# Patient Record
Sex: MALE | Race: WHITE | NOT HISPANIC OR LATINO | Employment: OTHER | URBAN - NONMETROPOLITAN AREA
[De-identification: names, ages, dates, MRNs, and addresses within clinical notes are randomized per-mention and may not be internally consistent; named-entity substitution may affect disease eponyms.]

---

## 2017-05-10 ENCOUNTER — HOSPITAL ENCOUNTER (OUTPATIENT)
Dept: CT IMAGING | Facility: HOSPITAL | Age: 72
Discharge: HOME | End: 2017-05-10
Attending: INTERNAL MEDICINE | Admitting: INTERNAL MEDICINE

## 2017-05-10 ENCOUNTER — OFFICE VISIT (OUTPATIENT)
Dept: ONCOLOGY | Facility: CLINIC | Age: 72
End: 2017-05-10

## 2017-05-10 ENCOUNTER — LAB (OUTPATIENT)
Dept: ONCOLOGY | Facility: CLINIC | Age: 72
End: 2017-05-10

## 2017-05-10 VITALS
TEMPERATURE: 98.1 F | BODY MASS INDEX: 33.71 KG/M2 | HEART RATE: 75 BPM | WEIGHT: 215.2 LBS | DIASTOLIC BLOOD PRESSURE: 56 MMHG | OXYGEN SATURATION: 95 % | RESPIRATION RATE: 18 BRPM | SYSTOLIC BLOOD PRESSURE: 91 MMHG

## 2017-05-10 DIAGNOSIS — I71.40 ABDOMINAL AORTIC ANEURYSM (AAA) WITHOUT RUPTURE (HCC): ICD-10-CM

## 2017-05-10 DIAGNOSIS — C34.11 MALIGNANT NEOPLASM OF UPPER LOBE OF RIGHT LUNG (HCC): ICD-10-CM

## 2017-05-10 DIAGNOSIS — Z85.118 HISTORY OF LUNG CANCER: Primary | ICD-10-CM

## 2017-05-10 DIAGNOSIS — J44.9 CHRONIC OBSTRUCTIVE PULMONARY DISEASE, UNSPECIFIED COPD TYPE (HCC): ICD-10-CM

## 2017-05-10 LAB
ALBUMIN SERPL-MCNC: 4.5 G/DL (ref 3.4–4.8)
ALBUMIN/GLOB SERPL: 1.6 G/DL (ref 1.5–2.5)
ALP SERPL-CCNC: 54 U/L (ref 40–129)
ALT SERPL W P-5'-P-CCNC: 33 U/L (ref 10–44)
ANION GAP SERPL CALCULATED.3IONS-SCNC: 8 MMOL/L (ref 3.6–11.2)
AST SERPL-CCNC: 36 U/L (ref 10–34)
BASOPHILS # BLD AUTO: 0.05 10*3/MM3 (ref 0–0.3)
BASOPHILS NFR BLD AUTO: 0.6 % (ref 0–2)
BILIRUB SERPL-MCNC: 0.8 MG/DL (ref 0.2–1.8)
BUN BLD-MCNC: 16 MG/DL (ref 7–21)
BUN/CREAT SERPL: 17.4 (ref 7–25)
CALCIUM SPEC-SCNC: 9.5 MG/DL (ref 7.7–10)
CHLORIDE SERPL-SCNC: 103 MMOL/L (ref 99–112)
CO2 SERPL-SCNC: 30 MMOL/L (ref 24.3–31.9)
CREAT BLD-MCNC: 0.92 MG/DL (ref 0.43–1.29)
CREAT BLDA-MCNC: 0.9 MG/DL (ref 0.6–1.3)
DEPRECATED RDW RBC AUTO: 46.6 FL (ref 37–54)
EOSINOPHIL # BLD AUTO: 0.37 10*3/MM3 (ref 0–0.7)
EOSINOPHIL NFR BLD AUTO: 4.6 % (ref 0–7)
ERYTHROCYTE [DISTWIDTH] IN BLOOD BY AUTOMATED COUNT: 14.4 % (ref 11.5–14.5)
GFR SERPL CREATININE-BSD FRML MDRD: 81 ML/MIN/1.73
GLOBULIN UR ELPH-MCNC: 2.9 GM/DL
GLUCOSE BLD-MCNC: 246 MG/DL (ref 70–110)
HCT VFR BLD AUTO: 44.9 % (ref 42–52)
HGB BLD-MCNC: 14.5 G/DL (ref 14–18)
IMM GRANULOCYTES # BLD: 0.05 10*3/MM3 (ref 0–0.03)
IMM GRANULOCYTES NFR BLD: 0.6 % (ref 0–0.5)
LYMPHOCYTES # BLD AUTO: 1.68 10*3/MM3 (ref 1–3)
LYMPHOCYTES NFR BLD AUTO: 21 % (ref 16–46)
MCH RBC QN AUTO: 28.8 PG (ref 27–33)
MCHC RBC AUTO-ENTMCNC: 32.3 G/DL (ref 33–37)
MCV RBC AUTO: 89.1 FL (ref 80–94)
MONOCYTES # BLD AUTO: 0.62 10*3/MM3 (ref 0.1–0.9)
MONOCYTES NFR BLD AUTO: 7.8 % (ref 0–12)
NEUTROPHILS # BLD AUTO: 5.23 10*3/MM3 (ref 1.4–6.5)
NEUTROPHILS NFR BLD AUTO: 65.4 % (ref 40–75)
OSMOLALITY SERPL CALC.SUM OF ELEC: 290.6 MOSM/KG (ref 273–305)
PLATELET # BLD AUTO: 210 10*3/MM3 (ref 130–400)
PMV BLD AUTO: 9.8 FL (ref 6–10)
POTASSIUM BLD-SCNC: 4.1 MMOL/L (ref 3.5–5.3)
PROT SERPL-MCNC: 7.4 G/DL (ref 6–8)
RBC # BLD AUTO: 5.04 10*6/MM3 (ref 4.7–6.1)
SODIUM BLD-SCNC: 141 MMOL/L (ref 135–153)
WBC NRBC COR # BLD: 8 10*3/MM3 (ref 4.5–12.5)

## 2017-05-10 PROCEDURE — 80053 COMPREHEN METABOLIC PANEL: CPT | Performed by: INTERNAL MEDICINE

## 2017-05-10 PROCEDURE — 36415 COLL VENOUS BLD VENIPUNCTURE: CPT | Performed by: INTERNAL MEDICINE

## 2017-05-10 PROCEDURE — 85025 COMPLETE CBC W/AUTO DIFF WBC: CPT | Performed by: INTERNAL MEDICINE

## 2017-05-10 PROCEDURE — 71260 CT THORAX DX C+: CPT | Performed by: RADIOLOGY

## 2017-05-10 PROCEDURE — 82565 ASSAY OF CREATININE: CPT

## 2017-05-10 PROCEDURE — 71260 CT THORAX DX C+: CPT

## 2017-05-10 PROCEDURE — 99214 OFFICE O/P EST MOD 30 MIN: CPT | Performed by: INTERNAL MEDICINE

## 2017-05-10 PROCEDURE — 0 IOPAMIDOL 61 % SOLUTION: Performed by: INTERNAL MEDICINE

## 2017-05-10 RX ADMIN — IOPAMIDOL 80 ML: 612 INJECTION, SOLUTION INTRAVENOUS at 08:27

## 2017-06-15 ENCOUNTER — OFFICE VISIT (OUTPATIENT)
Dept: CARDIOLOGY | Facility: CLINIC | Age: 72
End: 2017-06-15

## 2017-06-15 VITALS
SYSTOLIC BLOOD PRESSURE: 113 MMHG | DIASTOLIC BLOOD PRESSURE: 75 MMHG | HEART RATE: 66 BPM | HEIGHT: 67 IN | BODY MASS INDEX: 33.59 KG/M2 | WEIGHT: 214 LBS | RESPIRATION RATE: 18 BRPM

## 2017-06-15 DIAGNOSIS — I71.21 THORACIC ASCENDING AORTIC ANEURYSM (HCC): Primary | ICD-10-CM

## 2017-06-15 DIAGNOSIS — J44.9 ADVANCED COPD (HCC): ICD-10-CM

## 2017-06-15 DIAGNOSIS — J98.4 LUNG CALCIFICATION: ICD-10-CM

## 2017-06-15 DIAGNOSIS — E78.5 DYSLIPIDEMIA: ICD-10-CM

## 2017-06-15 DIAGNOSIS — I10 ESSENTIAL HYPERTENSION: ICD-10-CM

## 2017-06-15 PROCEDURE — 99213 OFFICE O/P EST LOW 20 MIN: CPT | Performed by: INTERNAL MEDICINE

## 2017-06-15 PROCEDURE — 93000 ELECTROCARDIOGRAM COMPLETE: CPT | Performed by: INTERNAL MEDICINE

## 2017-06-15 NOTE — PROGRESS NOTES
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   • Advanced COPD, on home oxygen.   • History of Lung calcification diagnosed in November 2011, status post right upper lobectomy with no adjuvant chemotherapy.    • Type 2 diabetes mellitus   • Hypertension, which is well controlled.   • Dizziness   • SOB (shortness of breath)   • PAD (peripheral artery disease)   • Dyslipidemia       Dear Scar Mejia MD:    Subjective     Kb Marshall is a 72 y.o. male with the problems as listed above, presents      History of Present Illness:Mr. Marshall is a 72-year-old  male with a history of thoracic ascending aortic aneurysm measuring about 4.7 cm on CT scan of the chest in February 2016, is being followed by CT surgery at [facility].  He has also some peripheral artery disease.  He is here today for her regular cardiology follow-up.  Denies any significant back pains other than his chronic pains that is had for many years.  His blood pressure has been well controlled.  He denies any chest pains.  He has chronic dyspnea related to COPD and is on home oxygen.        Allergies   Allergen Reactions   • Acetaminophen-Codeine    • Indocin [Indomethacin] Itching   :      Current Outpatient Prescriptions:   •  amLODIPine (NORVASC) 5 MG tablet, Take 5 mg by mouth Daily., Disp: , Rfl:   •  aspirin 81 MG EC tablet, Take 81 mg by mouth Daily., Disp: , Rfl:   •  atorvastatin (LIPITOR) 10 MG tablet, Take 10 mg by mouth Daily., Disp: , Rfl:   •  Empagliflozin (JARDIANCE) 10 MG tablet, Take  by mouth., Disp: , Rfl:   •  gabapentin (NEURONTIN) 300 MG capsule, Take 300 mg by mouth 3 (Three) Times a Day., Disp: , Rfl:   •  glimepiride (AMARYL) 4 MG tablet, Take 4 mg by mouth Every Morning Before Breakfast., Disp: , Rfl:   •   hydrALAZINE (APRESOLINE) 50 MG tablet, Take 50 mg by mouth 3 (Three) Times a Day., Disp: , Rfl:   •  hydrochlorothiazide (HYDRODIURIL) 12.5 MG tablet, Take 12.5 mg by mouth Daily., Disp: , Rfl:   •  lisinopril (PRINIVIL,ZESTRIL) 20 MG tablet, Take 20 mg by mouth 2 (Two) Times a Day., Disp: , Rfl:   •  metFORMIN (GLUCOPHAGE) 500 MG tablet, Take 500 mg by mouth 2 (Two) Times a Day With Meals., Disp: , Rfl:   •  metoprolol tartrate (LOPRESSOR) 25 MG tablet, Take 37.5 mg by mouth 2 (Two) Times a Day. Takes 1 & 1/2 tab of 25mg, b.i.d., Disp: , Rfl:   •  nabumetone (RELAFEN) 500 MG tablet, Take 500 mg by mouth 2 (Two) Times a Day As Needed for mild pain (1-3)., Disp: , Rfl:   •  OLANZapine (ZYPREXA) 5 MG tablet, Take 5 mg by mouth Every Night., Disp: , Rfl:   •  oxyCODONE-acetaminophen (PERCOCET) 7.5-325 MG per tablet, Take 1 tablet by mouth Every 6 (Six) Hours As Needed., Disp: , Rfl:       The following portions of the patient's history were reviewed and updated as appropriate: allergies, current medications, past family history, past medical history, past social history, past surgical history and problem list.    Social History   Substance Use Topics   • Smoking status: Former Smoker     Packs/day: 1.00     Years: 50.00     Quit date: 11/2011   • Smokeless tobacco: Current User     Types: Chew   • Alcohol use No       Review of Systems   Constitution: Negative for chills and fever.   HENT: Negative for headaches, nosebleeds and sore throat.    Cardiovascular: Positive for chest pain (like heartburn at night) and leg swelling.   Respiratory: Positive for shortness of breath. Negative for cough, hemoptysis and wheezing.    Musculoskeletal: Positive for back pain (has chronic back pains.).   Gastrointestinal: Negative for abdominal pain, hematemesis, hematochezia, melena, nausea and vomiting.   Genitourinary: Negative for dysuria and hematuria.       Objective   Vitals:    06/15/17 1413 06/15/17 1605   BP:  113/75  "  Pulse:  66   Resp: 18    Weight: 214 lb (97.1 kg)    Height: 67\" (170.2 cm)      Body mass index is 33.52 kg/(m^2).        Physical Exam   Constitutional: He is oriented to person, place, and time. He appears well-developed and well-nourished.   Well-developed, well-nourished, chronically ill-looking  male who is alert, oriented ×3 and is in no acute distress at this time.   HENT:   Head: Normocephalic.   Eyes: Conjunctivae and EOM are normal.   Neck: Normal range of motion. Neck supple. No JVD present. No tracheal deviation present. No thyromegaly present.   Cardiovascular: Normal rate and regular rhythm.  Exam reveals no gallop and no friction rub.    No murmur heard.  Pulmonary/Chest: Breath sounds normal. No respiratory distress. He has no wheezes. He has no rales.   Abdominal: Soft. Bowel sounds are normal. He exhibits no mass. There is no tenderness.   Musculoskeletal: He exhibits edema.   Neurological: He is alert and oriented to person, place, and time. No cranial nerve deficit.   Skin: Skin is warm and dry.   Psychiatric: He has a normal mood and affect.       Lab Results   Component Value Date     05/10/2017    K 4.1 05/10/2017     05/10/2017    CO2 30.0 05/10/2017    BUN 16 05/10/2017    CREATININE 0.92 05/10/2017    GLUCOSE 246 (H) 05/10/2017    CALCIUM 9.5 05/10/2017    AST 36 (H) 05/10/2017    ALT 33 05/10/2017    ALKPHOS 54 05/10/2017    LABIL2 1.6 05/10/2017     No results found for: CKTOTAL  Lab Results   Component Value Date    WBC 8.00 05/10/2017    HGB 14.5 05/10/2017    HCT 44.9 05/10/2017     05/10/2017     Lab Results   Component Value Date    INR 1.00 03/29/2015        Lab Results   Component Value Date    BNP 20 04/16/2015     Echo   Lab Results   Component Value Date    ECHOEFEST 55 10/25/2016       ECG 12 Lead  Date/Time: 6/15/2017 2:09 PM  Performed by: IZZY DIALLO  Authorized by: IZZY DIALLO   Rhythm: sinus rhythm  BPM: 70  Comments: Nonspecific ST-T " wave changes noted in leads II, III, and F aVF.            Assessment/Plan      1. Thoracic ascending aortic aneurysm of 4.7cm and arch aortic aneurysm of 4.1cm noted on the CT scan of the chest recently in February 2016.     2. Essential hypertension     3. Advanced COPD, on home oxygen.     4. History of Lung calcification diagnosed in November 2011, status post right upper lobectomy with no adjuvant chemotherapy.      5. Dyslipidemia            Recommendations:    1. Follow-up with cardiothoracic surgery at  for the thoracic aneurysm.  2. Continue with the metoprolol, lisinopril, amlodipine and HCTZ.  3. Follow-up in 9 months.     Return in about 9 months (around 3/15/2018).    As always, I appreciate very much the opportunity to participate in the cardiovascular care of your patients.      With Best Regards,    Edward Aguilar MD, [facility]    Dragon disclaimer:  Much of this encounter note is an electronic transcription/translation of spoken language to printed text. The electronic translation of spoken language may permit erroneous, or at times, nonsensical words or phrases to be inadvertently transcribed; Although I have reviewed the note for such errors, some may still exist.

## 2017-06-28 ENCOUNTER — TELEPHONE (OUTPATIENT)
Dept: CARDIOLOGY | Facility: CLINIC | Age: 72
End: 2017-06-28

## 2017-06-28 NOTE — TELEPHONE ENCOUNTER
Patient called stated for the last 3-4 days his BP has been running low 105's/64's. He wants to know if he should continue to take all his BP medication?

## 2017-06-28 NOTE — TELEPHONE ENCOUNTER
Stop amlodipine. Recheck BP this evening and if >110/60 can take his evening time BP medications, then continue as scheduled tomorrow. Drink more water today to get BP up.

## 2017-11-06 DIAGNOSIS — J98.4 LUNG CALCIFICATION: Primary | ICD-10-CM

## 2017-11-17 ENCOUNTER — HOSPITAL ENCOUNTER (OUTPATIENT)
Dept: CT IMAGING | Facility: HOSPITAL | Age: 72
Discharge: HOME | End: 2017-11-17
Attending: INTERNAL MEDICINE | Admitting: INTERNAL MEDICINE

## 2017-11-17 DIAGNOSIS — J98.4 LUNG CALCIFICATION: ICD-10-CM

## 2017-11-17 LAB — CREAT BLDA-MCNC: 1 MG/DL (ref 0.6–1.3)

## 2017-11-17 PROCEDURE — 82565 ASSAY OF CREATININE: CPT

## 2017-11-17 PROCEDURE — 71260 CT THORAX DX C+: CPT | Performed by: RADIOLOGY

## 2017-11-17 PROCEDURE — 0 IOPAMIDOL 61 % SOLUTION: Performed by: INTERNAL MEDICINE

## 2017-11-17 PROCEDURE — 71260 CT THORAX DX C+: CPT

## 2017-11-17 RX ADMIN — IOPAMIDOL 80 ML: 612 INJECTION, SOLUTION INTRAVENOUS at 14:45

## 2017-11-20 ENCOUNTER — HOSPITAL ENCOUNTER (EMERGENCY)
Facility: HOSPITAL | Age: 72
Discharge: HOME | End: 2017-11-20
Admitting: NURSE PRACTITIONER

## 2017-11-20 VITALS
SYSTOLIC BLOOD PRESSURE: 130 MMHG | TEMPERATURE: 98 F | WEIGHT: 215 LBS | DIASTOLIC BLOOD PRESSURE: 77 MMHG | BODY MASS INDEX: 33.74 KG/M2 | RESPIRATION RATE: 17 BRPM | HEIGHT: 67 IN | OXYGEN SATURATION: 97 % | HEART RATE: 71 BPM

## 2017-11-20 DIAGNOSIS — S61.532A PUNCTURE WOUND OF LEFT WRIST, INITIAL ENCOUNTER: Primary | ICD-10-CM

## 2017-11-20 PROCEDURE — 99283 EMERGENCY DEPT VISIT LOW MDM: CPT

## 2017-11-21 NOTE — ED PROVIDER NOTES
Subjective   Patient is a 72 y.o. male presenting with upper extremity pain.   History provided by:  Patient   used: No    Upper Extremity Issue   Location:  Wrist  Wrist location:  L wrist  Injury: yes    Time since incident:  1 hour  Mechanism of injury comment:  Small punctured area with knife; is concerned cause there is redness  Pain details:     Quality:  Aching    Radiates to:  Does not radiate    Severity:  Mild    Onset quality:  Sudden    Duration:  1 day    Timing:  Constant    Progression:  Waxing and waning  Handedness:  Right-handed  Dislocation: no    Foreign body present:  No foreign bodies  Tetanus status:  Up to date  Prior injury to area:  No  Relieved by:  Nothing  Worsened by:  Nothing  Ineffective treatments:  None tried  Associated symptoms: no back pain, no decreased range of motion, no fatigue, no fever, no muscle weakness, no neck pain, no numbness, no stiffness and no swelling    Risk factors: no concern for non-accidental trauma, no known bone disorder, no frequent fractures and no recent illness        Review of Systems   Constitutional: Negative.  Negative for fatigue and fever.   HENT: Negative.    Eyes: Negative.    Respiratory: Negative.    Cardiovascular: Negative.    Gastrointestinal: Negative.    Endocrine: Negative.    Genitourinary: Negative.    Musculoskeletal: Negative.  Negative for back pain, neck pain and stiffness.   Skin: Negative.    Allergic/Immunologic: Negative.    Neurological: Negative.    Hematological: Negative.    Psychiatric/Behavioral: Negative.        Past Medical History:   Diagnosis Date   • Arthritis    • Asthma    • COPD (chronic obstructive pulmonary disease)    • Diabetes mellitus    • Dyslipidemia    • Heart disease    • Hypertension    • PAD (peripheral artery disease)        Allergies   Allergen Reactions   • Acetaminophen-Codeine    • Indocin [Indomethacin] Itching       Past Surgical History:   Procedure Laterality Date   •  ABDOMINAL AORTIC ANEURYSM REPAIR     • FINGER SURGERY Left    • KNEE SURGERY Left    • LUNG REMOVAL, PARTIAL  11/29/2011   • LUNG SURGERY  11/28/2011       Family History   Problem Relation Age of Onset   • Cancer Mother    • Cancer Brother        Social History     Social History   • Marital status:      Spouse name: N/A   • Number of children: N/A   • Years of education: N/A     Social History Main Topics   • Smoking status: Former Smoker     Packs/day: 1.00     Years: 50.00     Quit date: 11/2011   • Smokeless tobacco: Current User     Types: Chew   • Alcohol use No   • Drug use: No   • Sexual activity: Defer     Other Topics Concern   • Not on file     Social History Narrative           Objective   Physical Exam   Constitutional: He is oriented to person, place, and time. He appears well-developed and well-nourished.   HENT:   Head: Normocephalic.   Right Ear: External ear normal.   Left Ear: External ear normal.   Mouth/Throat: Oropharynx is clear and moist.   Eyes: EOM are normal. Pupils are equal, round, and reactive to light.   Neck: Normal range of motion. Neck supple.   Cardiovascular: Normal rate and regular rhythm.    Pulmonary/Chest: Effort normal and breath sounds normal.   Abdominal: Soft. Bowel sounds are normal.   Musculoskeletal: Normal range of motion.   Neurological: He is alert and oriented to person, place, and time.   Skin: Skin is warm and dry.   Tiny puncture area on left wrist with surrounding ecchymosis   Psychiatric: He has a normal mood and affect. His behavior is normal.   Nursing note and vitals reviewed.      Procedures         ED Course  ED Course                  MDM    Final diagnoses:   Puncture wound of left wrist, initial encounter            Evans Sanchez, APRN  11/20/17 1948

## 2017-11-28 ENCOUNTER — OFFICE VISIT (OUTPATIENT)
Dept: ONCOLOGY | Facility: CLINIC | Age: 72
End: 2017-11-28

## 2017-11-28 ENCOUNTER — LAB (OUTPATIENT)
Dept: ONCOLOGY | Facility: CLINIC | Age: 72
End: 2017-11-28

## 2017-11-28 VITALS
DIASTOLIC BLOOD PRESSURE: 78 MMHG | BODY MASS INDEX: 33.99 KG/M2 | SYSTOLIC BLOOD PRESSURE: 128 MMHG | TEMPERATURE: 97.2 F | RESPIRATION RATE: 22 BRPM | WEIGHT: 217 LBS | HEART RATE: 86 BPM | OXYGEN SATURATION: 95 %

## 2017-11-28 DIAGNOSIS — Z85.118 HISTORY OF LUNG CANCER: Primary | ICD-10-CM

## 2017-11-28 DIAGNOSIS — J43.9 PULMONARY EMPHYSEMA, UNSPECIFIED EMPHYSEMA TYPE (HCC): ICD-10-CM

## 2017-11-28 DIAGNOSIS — I71.21 THORACIC ASCENDING AORTIC ANEURYSM (HCC): ICD-10-CM

## 2017-11-28 DIAGNOSIS — R59.0 MEDIASTINAL ADENOPATHY: ICD-10-CM

## 2017-11-28 DIAGNOSIS — Z85.118 HISTORY OF LUNG CANCER: ICD-10-CM

## 2017-11-28 LAB
ALBUMIN SERPL-MCNC: 4.8 G/DL (ref 3.4–4.8)
ALBUMIN/GLOB SERPL: 1.8 G/DL (ref 1.5–2.5)
ALP SERPL-CCNC: 65 U/L (ref 40–129)
ALT SERPL W P-5'-P-CCNC: 50 U/L (ref 10–44)
ANION GAP SERPL CALCULATED.3IONS-SCNC: 8.2 MMOL/L (ref 3.6–11.2)
AST SERPL-CCNC: 44 U/L (ref 10–34)
BASOPHILS # BLD AUTO: 0.06 10*3/MM3 (ref 0–0.3)
BASOPHILS NFR BLD AUTO: 0.6 % (ref 0–2)
BILIRUB SERPL-MCNC: 0.7 MG/DL (ref 0.2–1.8)
BUN BLD-MCNC: 21 MG/DL (ref 7–21)
BUN/CREAT SERPL: 22.6 (ref 7–25)
CALCIUM SPEC-SCNC: 9.5 MG/DL (ref 7.7–10)
CHLORIDE SERPL-SCNC: 102 MMOL/L (ref 99–112)
CO2 SERPL-SCNC: 26.8 MMOL/L (ref 24.3–31.9)
CREAT BLD-MCNC: 0.93 MG/DL (ref 0.43–1.29)
DEPRECATED RDW RBC AUTO: 46.8 FL (ref 37–54)
EOSINOPHIL # BLD AUTO: 0.26 10*3/MM3 (ref 0–0.7)
EOSINOPHIL NFR BLD AUTO: 2.6 % (ref 0–7)
ERYTHROCYTE [DISTWIDTH] IN BLOOD BY AUTOMATED COUNT: 14.9 % (ref 11.5–14.5)
GFR SERPL CREATININE-BSD FRML MDRD: 80 ML/MIN/1.73
GLOBULIN UR ELPH-MCNC: 2.7 GM/DL
GLUCOSE BLD-MCNC: 187 MG/DL (ref 70–110)
HCT VFR BLD AUTO: 44.9 % (ref 42–52)
HGB BLD-MCNC: 15.2 G/DL (ref 14–18)
IMM GRANULOCYTES # BLD: 0.06 10*3/MM3 (ref 0–0.03)
IMM GRANULOCYTES NFR BLD: 0.6 % (ref 0–0.5)
LYMPHOCYTES # BLD AUTO: 1.91 10*3/MM3 (ref 1–3)
LYMPHOCYTES NFR BLD AUTO: 19.4 % (ref 16–46)
MCH RBC QN AUTO: 29.5 PG (ref 27–33)
MCHC RBC AUTO-ENTMCNC: 33.9 G/DL (ref 33–37)
MCV RBC AUTO: 87.2 FL (ref 80–94)
MONOCYTES # BLD AUTO: 0.99 10*3/MM3 (ref 0.1–0.9)
MONOCYTES NFR BLD AUTO: 10.1 % (ref 0–12)
NEUTROPHILS # BLD AUTO: 6.55 10*3/MM3 (ref 1.4–6.5)
NEUTROPHILS NFR BLD AUTO: 66.7 % (ref 40–75)
OSMOLALITY SERPL CALC.SUM OF ELEC: 281.7 MOSM/KG (ref 273–305)
PLATELET # BLD AUTO: 228 10*3/MM3 (ref 130–400)
PMV BLD AUTO: 9.3 FL (ref 6–10)
POTASSIUM BLD-SCNC: 4.1 MMOL/L (ref 3.5–5.3)
PROT SERPL-MCNC: 7.5 G/DL (ref 6–8)
RBC # BLD AUTO: 5.15 10*6/MM3 (ref 4.7–6.1)
SODIUM BLD-SCNC: 137 MMOL/L (ref 135–153)
WBC NRBC COR # BLD: 9.83 10*3/MM3 (ref 4.5–12.5)

## 2017-11-28 PROCEDURE — 85025 COMPLETE CBC W/AUTO DIFF WBC: CPT | Performed by: INTERNAL MEDICINE

## 2017-11-28 PROCEDURE — 99213 OFFICE O/P EST LOW 20 MIN: CPT | Performed by: INTERNAL MEDICINE

## 2017-11-28 PROCEDURE — 80053 COMPREHEN METABOLIC PANEL: CPT | Performed by: INTERNAL MEDICINE

## 2017-11-28 NOTE — PROGRESS NOTES
DATE:  11/28/2017    DIAGNOSIS:  1. 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 AAA2. Thoracic adenopathy     CHIEF COMPLAINT  Follow up of Lung Cancer       TREATMENT HISTORY:  1. RUL lobectomy Nov. 2011 w/o any adjuvant therapy       HISTORY OF PRESENT ILLNESS:   Mr. Marshall was initially diagnosed with a Stage Ib well-differentiated mucinous adenocarcinoma of the lung in 2011 and underwent RULobectomy performed by Dr. Welsh in November of 2011. He did not receive adjuvant chemotherapy. He has been well sin ce his initial surgery and has not had evidence of recurrence, though he does have thoracic adenopathy which sometimes enlarges, sometimes shrinks and which has in the past been + on PET but which he hasn' t wanted investigated or treated.     INTERVAL HISTORY:  Mr. Marshall is here today with his daughter for f/u of lung cancer. Since he was here last, he been well.  His breathing is stable.  He denies chest pain.  He is anxious to hear the results of his recent imaging but is otherwise without complaitn.    PAST MEDICAL HISTORY:  Past Medical History:   Diagnosis Date   • Arthritis    • Asthma    • COPD (chronic obstructive pulmonary disease)    • Diabetes mellitus    • Dyslipidemia    • Heart disease    • Hypertension    • PAD (peripheral artery disease)        PAST SURGICAL HISTORY:  Past Surgical History:   Procedure Laterality Date   • ABDOMINAL AORTIC ANEURYSM REPAIR     • FINGER SURGERY Left    • KNEE SURGERY Left    • LUNG REMOVAL, PARTIAL  11/29/2011   • LUNG SURGERY  11/28/2011       FAMILY HISTORY:  Family History   Problem Relation Age of Onset   • Cancer Mother    • Cancer Brother      Social History     Social History   • Marital status:      Spouse name: N/A   • Number of children: N/A   • Years of education: N/A     Occupational History   • Not on file.     Social History Main Topics   • Smoking status: Former Smoker     Packs/day: 1.00     Years: 50.00      Quit date: 11/2011   • Smokeless tobacco: Current User     Types: Chew   • Alcohol use No   • Drug use: No   • Sexual activity: Defer     Other Topics Concern   • Not on file     Social History Narrative       REVIEW OF SYSTEMS:    A comprehensive 14 point review of systems was performed and was negative except as mentioned    MEDICATIONS:  The current medication list was reviewed in the EMR    Current Outpatient Prescriptions:   •  amLODIPine (NORVASC) 5 MG tablet, Take 5 mg by mouth Daily., Disp: , Rfl:   •  aspirin 81 MG EC tablet, Take 81 mg by mouth Daily., Disp: , Rfl:   •  atorvastatin (LIPITOR) 10 MG tablet, Take 10 mg by mouth Daily., Disp: , Rfl:   •  Empagliflozin (JARDIANCE) 10 MG tablet, Take  by mouth., Disp: , Rfl:   •  gabapentin (NEURONTIN) 300 MG capsule, Take 300 mg by mouth 3 (Three) Times a Day., Disp: , Rfl:   •  glimepiride (AMARYL) 4 MG tablet, Take 4 mg by mouth Every Morning Before Breakfast., Disp: , Rfl:   •  hydrALAZINE (APRESOLINE) 50 MG tablet, Take 50 mg by mouth 3 (Three) Times a Day., Disp: , Rfl:   •  hydrochlorothiazide (HYDRODIURIL) 12.5 MG tablet, Take 12.5 mg by mouth Daily., Disp: , Rfl:   •  lisinopril (PRINIVIL,ZESTRIL) 20 MG tablet, Take 20 mg by mouth 2 (Two) Times a Day., Disp: , Rfl:   •  metFORMIN (GLUCOPHAGE) 500 MG tablet, Take 500 mg by mouth 2 (Two) Times a Day With Meals., Disp: , Rfl:   •  metoprolol tartrate (LOPRESSOR) 25 MG tablet, Take 37.5 mg by mouth 2 (Two) Times a Day. Takes 1 & 1/2 tab of 25mg, b.i.d., Disp: , Rfl:   •  nabumetone (RELAFEN) 500 MG tablet, Take 500 mg by mouth 2 (Two) Times a Day As Needed for mild pain (1-3)., Disp: , Rfl:   •  OLANZapine (ZYPREXA) 5 MG tablet, Take 5 mg by mouth Every Night., Disp: , Rfl:   •  oxyCODONE-acetaminophen (PERCOCET) 7.5-325 MG per tablet, Take 1 tablet by mouth Every 6 (Six) Hours As Needed., Disp: , Rfl:     ALLERGIES:    Allergies   Allergen Reactions   • Acetaminophen-Codeine    • Indocin  [Indomethacin] Itching       PHYSICAL EXAM:  /78  Pulse 86  Temp 97.2 °F (36.2 °C) (Oral)   Resp 22  Wt 217 lb (98.4 kg)  SpO2 95%  BMI 33.99 kg/m2   General: Awake, alert and oriented, in no distress.  In good spirits.  HEENT: Pupils are equal, round and reactive to light and accommodation, Extraocular movements full, oropharynx clear   Neck: no jvd, lymphadenopathy or thyromegaly   Cardiovascular: regular rate and rhythm without murmurs, rubs or gallops   Pulmonary: Decreased breath sounds bilaterally, but lungs are clear. No wheezing, rhonchi or rales. Old surgical incision is smooth and flat.   Abdomen: soft, non-tender, sl distended, bowel sounds present, no organomegaly   Extremities: No LE edema  Lymph: No cervical, supraclavicular, axillary, inguinal or femoral adenopathy   Neurologic: Mental status as above, CN II-XII intact, grossly non-focal exam     PATHOLOGY:  11-29-11 Rt upper lobe lung:   - Adenocarcinoma, mucinous type   - Negative for mutation involving ALK gene       IMAGING:  PET-CT Whole Body 1-9-13:   - Overall stable appearance compared with the previous exam.   - There is a continued evidence of hypermetabolism corresponding to mediastinal and right hilar adenopathy but no new lymph nodes are seen and no significant change in the SUV.     CT Chest w/o contrast 4-1-13:   - Overall there has been no significant interval change when compared to the previous exam dated 8/12.   - There are several m ediastinal lymph nodes present but stable.   - No pericardial or pleural effusion.   - Tiny nodule in the left lung base is stable   - Descending thoracic aneurysm stable.   - Fatty infiltration of the liver     CT Chest w/ contrast 7-29-13:   - Questionable increase in the siz e of mediastinal lymphadenopathy.   - An AP window lymph node was 1.4mc and is 1.9cm.   - A prevascular space lymph node was 1.4cm and is 1.4cm.   - A subcarinal region lymph node was 2cm and is 2.7cm.   - A left  hilar region lymph node is stable measuring 1.3 measuring 12 mm. conceivably difference and axial selection could cause this discrepancy.   - Compared 8/8/12 examination, the lymphadenopathy is stable to slightly improved.     Chest CT w/ contrast 6-9-14:   - Stable mediastinal borderline enlarged lymph nodes.   - An AP window lymph node measures 1.5cm and was 1.5cm.   - Stable slightly increased soft tissue density in the right hilar region.   - There is persistent supleural fibrotic scarring right lung base scarring is again noted.   - No new suspicious pulmonary mass or nodule.   - Posterior right rib fractures are again noted.   - Descending thoracic aortic aneurysm of 4.7cm. Stable.     CT Chest w/ contrast 12-02-14   - Enlarging mediastinal lymph nodoes   - No parenchymal soft tissue nodules or masses   - The largest soft tissue density is in the right paratrachel location and measures 2cm.  - Previously the same nodule measured 1.3cm.     CT Chest w/ contrast 03-02-15:   - The descending portion of the abdominal aorta is stable In size measuring  approximately 4.9 cm on today's study.   - There is abnormal adenopathy throughout the mediastinum. The largest lymph node measured 21 mm in the superior mediastinum adjacent to the esophagus. The size and number of lymph nodes is radiographically stable.   - There are emphysematous changes and increased interstitial markings throughout the lungs.     CT Chest w/ contrast 06-01-15:   - Stable appearance of the chest   - 4.3 cm infrarenal abdominal aortic aneurysm.     CT Chest w/ contrast 09-03-15:   - Mediastinal adenopathy is stable   - There is a 4.6cm abdominal aortic aneurysm slightly increased in size from the  previous exam.     CT Chest 09-20-16:  - Slight interval increase in size of the abdominal aortic aneurysm. It now measures 5 cm just above the diaphragm. There is adenopathy i n the mediastinum. The lymph nodes have increased in size in comparing with  the earlier CT back in September.   - Continued adenopathy in the mediastinum and right hilum.   - Radiographically this is stable in comparing with the earlier exam.   - There continues to be aneurysmal dilatation of the aorta with a diameter of 5 cm just above the diaphragm.   - The lungs show changes of chronic interstitial lung disease.     CT Chest 05-10-17:  - Persistent mediastinal adenopathy radiographically unchanged from September 2016.   - There continues to be diffuse interstitial lung disease throughout both lungs. There is aneurysmal dilatation of the aorta just above the diaphragm measuring 5 cm.    CT Chest 11-17-17:  Stable mediastinal adenopathy. Largest prevascular lymph   node is 1.6 cm and was previously 1.8 cm. A right subcarinal lymph node   is 2.2 cm and was previously 2.1 cm. A right para esophageal lymph node   superiorly is 1.8 cm and was previously 1.9 cm. No new adenopathy   stations identified.  Stable 5 cm fusiform type thoracic aortic aneurysm   predominantly involving the descending thoracic aorta. Mural thrombus is   present.      RECENT LABS:  Lab Results   Component Value Date    WBC 9.83 11/28/2017    HGB 15.2 11/28/2017    HCT 44.9 11/28/2017    MCV 87.2 11/28/2017    RDW 14.9 (H) 11/28/2017     11/28/2017    NEUTRORELPCT 66.7 11/28/2017    LYMPHORELPCT 19.4 11/28/2017    MONORELPCT 10.1 11/28/2017    EOSRELPCT 2.6 11/28/2017    BASORELPCT 0.6 11/28/2017    NEUTROABS 6.55 (H) 11/28/2017    LYMPHSABS 1.91 11/28/2017       Lab Results   Component Value Date     11/28/2017    K 4.1 11/28/2017    CO2 26.8 11/28/2017     11/28/2017    BUN 21 11/28/2017    CREATININE 0.93 11/28/2017    GLUCOSE 187 (H) 11/28/2017    CALCIUM 9.5 11/28/2017    ALKPHOS 65 11/28/2017    AST 44 (H) 11/28/2017    ALT 50 (H) 11/28/2017    BILITOT 0.7 11/28/2017    ALBUMIN 4.80 11/28/2017    PROTEINTOT 7.5 11/28/2017       ASSESSMENT & PLAN:  Kb Marshall is a very pleasant 72 y.o. 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    1. Lung cancer:   - Treated with RULobectomy in November 2011 without adjuvant therapy.   - At one point in the past, there was con cern for PET + enlarging mediastinal adenopathy, and I recommended biopsy, but after discussion with Dr. Ojeda and reluctance to treat recurrence if it was found, he decided to watch this with imaging.   - These LNs have intermittently waxed and waned over time.  Currently they are overall a little better. I do not know the etiology, but since he always said he wouldn' t take chemotherapy if it were indicated, Dr. Ojeda was disinclined to perform mediastinoscopy for biopsy. Ultimately he saw Dr. Rothman and was referred for bronchoscopy with EBUS and there was no cause for concern identified. Mediastinal adenopathy is still present, but imaging indicates adenopathy is stable to improved.  I think it is unlikely to be malignant under the circumstances.. Will continue with observation / monitoring .   - I will order CT Chest with contrast to be done in 6 months with f/u planned at that time.    2. Oxygen dependent COPD - stable on 2-3L NCO2. He remains tobacco free. He continues to f/u with his pulmonologist, Dr. Vicente.     3. AAA: S/p repair of femoral artery aneurysm performed by Dr. Ojeda. He has been following with Dr. Taveras and Dr. Rothman and more recently with Dr. Yusef Gusman . He has aneurysms in the thoracic and abdominal aorta. Dr. Gusman recommended continued monitoring for the time being.  Aneurysm measured stable on recent CT.    4.  Prophylaxis:  I recommended flu and pneumonia vaccines, but he declined.     This note was scribed for Chaya Ferrer MD by Ghada Verma RN.    I, Chaya Ferrer MD, personally performed the services described in this documentation as scribed by the above named individual in my presence, and it is both accurate and complete.   11/28/2017       I spent 15 minutes with Mr. Marshall today. More than 50% of the time was spent in counseling / coordination of care for the above mentioned problems.       Electronically Signed by: Chaya Ferrer MD      CC:   MD Dr. Jeff Elmore

## 2018-05-03 ENCOUNTER — OFFICE VISIT (OUTPATIENT)
Dept: CARDIOLOGY | Facility: CLINIC | Age: 73
End: 2018-05-03

## 2018-05-03 VITALS
HEIGHT: 67 IN | HEART RATE: 71 BPM | SYSTOLIC BLOOD PRESSURE: 121 MMHG | DIASTOLIC BLOOD PRESSURE: 74 MMHG | WEIGHT: 217 LBS | BODY MASS INDEX: 34.06 KG/M2

## 2018-05-03 DIAGNOSIS — I10 ESSENTIAL HYPERTENSION: ICD-10-CM

## 2018-05-03 DIAGNOSIS — I73.9 PAD (PERIPHERAL ARTERY DISEASE) (HCC): ICD-10-CM

## 2018-05-03 DIAGNOSIS — I71.21 THORACIC ASCENDING AORTIC ANEURYSM (HCC): Primary | ICD-10-CM

## 2018-05-03 DIAGNOSIS — C34.11 MALIGNANT NEOPLASM OF UPPER LOBE OF RIGHT LUNG (HCC): ICD-10-CM

## 2018-05-03 DIAGNOSIS — J44.9 ADVANCED COPD (HCC): ICD-10-CM

## 2018-05-03 PROCEDURE — 93000 ELECTROCARDIOGRAM COMPLETE: CPT | Performed by: INTERNAL MEDICINE

## 2018-05-03 PROCEDURE — 99213 OFFICE O/P EST LOW 20 MIN: CPT | Performed by: INTERNAL MEDICINE

## 2018-05-03 NOTE — PROGRESS NOTES
Scar Mejia MD  bK Marshall  1945 05/03/2018    Patient Active Problem List   Diagnosis   • Thoracic ascending aortic aneurysm of 4.7cm and arch aortic aneurysm of 4.1cm noted on the CT scan of the chest recently in February 2016.   • Advanced COPD, on home oxygen.   • History of Lung calcification diagnosed in November 2011, status post right upper lobectomy with no adjuvant chemotherapy.    • Type 2 diabetes mellitus   • Hypertension, which is well controlled.   • Dizziness   • SOB (shortness of breath)   • PAD (peripheral artery disease)   • Dyslipidemia   • Malignant neoplasm of upper lobe of right lung, status post right upper lobectomy in 2011, being followed by Dr. Johnson       Dear Scar Mejia MD:    Subjective     Kb Marshall is a 73 y.o. male with the problems as listed above, presents    Chief complaint: Ascending aortic aneurysm.    History of Present Illness:Mr. Marshall is a pleasant 71-year-old  male with history of thoracic ascending aortic aneurysm of 4.7 cm for which he follows with the cardiovascular surgeons at [facility].  He has chronic back pains but not any worse recently.  He has history of adenocarcinoma of the lung for which he follows with Dr. Johnson.  No history of known coronary artery disease.      Allergies   Allergen Reactions   • Acetaminophen-Codeine    • Adhesive Tape Other (See Comments)     ADHESIVE CAUSES SKIN BLISTERS, PER PT   • Indocin [Indomethacin] Itching   :      Current Outpatient Prescriptions:   •  atorvastatin (LIPITOR) 10 MG tablet, Take 10 mg by mouth Daily., Disp: , Rfl:   •  Empagliflozin (JARDIANCE) 10 MG tablet, Take  by mouth., Disp: , Rfl:   •  gabapentin (NEURONTIN) 300 MG capsule, Take 300 mg by mouth 3 (Three) Times a Day., Disp: , Rfl:   •  glimepiride (AMARYL) 4 MG tablet, Take 4 mg by mouth Every Morning Before Breakfast., Disp: , Rfl:   •  hydrALAZINE (APRESOLINE) 50 MG tablet, Take 50 mg by mouth 3 (Three) Times a  "Day., Disp: , Rfl:   •  hydrochlorothiazide (HYDRODIURIL) 12.5 MG tablet, Take 12.5 mg by mouth Daily., Disp: , Rfl:   •  lisinopril (PRINIVIL,ZESTRIL) 20 MG tablet, Take 20 mg by mouth 2 (Two) Times a Day., Disp: , Rfl:   •  metFORMIN (GLUCOPHAGE) 500 MG tablet, Take 500 mg by mouth 2 (Two) Times a Day With Meals., Disp: , Rfl:   •  metoprolol tartrate (LOPRESSOR) 25 MG tablet, Take 2 tablets by mouth Every 12 (Twelve) Hours. Takes 1 & 1/2 tab of 25mg, b.i.d., Disp: 120 tablet, Rfl: 5  •  nabumetone (RELAFEN) 500 MG tablet, Take 500 mg by mouth 2 (Two) Times a Day As Needed for mild pain (1-3)., Disp: , Rfl:   •  oxyCODONE-acetaminophen (PERCOCET) 7.5-325 MG per tablet, Take 1 tablet by mouth Every 6 (Six) Hours As Needed., Disp: , Rfl:   •  piroxicam (FELDENE) 20 MG capsule, Take 20 mg by mouth Daily., Disp: , Rfl:       The following portions of the patient's history were reviewed and updated as appropriate: allergies, current medications, past family history, past medical history, past social history, past surgical history and problem list.    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 Negative for leg swelling and palpitations.   Respiratory: Shortness of breath: SLIGHTLY WORSE, PER PT.    Neurological: Positive for loss of balance.       Objective   Vitals:    05/03/18 1509   BP: 121/74   BP Location: Right arm   Patient Position: Sitting   Cuff Size: Adult   Pulse: 71   Weight: 98.4 kg (217 lb)   Height: 170.2 cm (67\")     Body mass index is 33.99 kg/m².        Physical Exam   Constitutional: He is oriented to person, place, and time. He appears well-developed and well-nourished.   TRE: "   Mouth/Throat: Oropharynx is clear and moist.   Eyes: EOM are normal. Pupils are equal, round, and reactive to light.   Neck: Neck supple. No JVD present. No tracheal deviation present. No thyromegaly present.   Cardiovascular: Normal rate, regular rhythm, S1 normal and S2 normal.  Exam reveals no gallop and no friction rub.    No murmur heard.  Pulmonary/Chest: Effort normal. He has wheezes (mild expiratory wheezing bilaterally.).   Abdominal: Soft. Bowel sounds are normal. He exhibits no mass. There is no tenderness.   Musculoskeletal: Normal range of motion. He exhibits no edema.   Lymphadenopathy:     He has no cervical adenopathy.   Neurological: He is alert and oriented to person, place, and time.   Skin: Skin is warm and dry. No rash noted.   Psychiatric: He has a normal mood and affect.       Lab Results   Component Value Date     11/28/2017    K 4.1 11/28/2017     11/28/2017    CO2 26.8 11/28/2017    BUN 21 11/28/2017    CREATININE 0.93 11/28/2017    GLUCOSE 187 (H) 11/28/2017    CALCIUM 9.5 11/28/2017    AST 44 (H) 11/28/2017    ALT 50 (H) 11/28/2017    ALKPHOS 65 11/28/2017    LABIL2 1.8 11/28/2017     No results found for: CKTOTAL  Lab Results   Component Value Date    WBC 9.83 11/28/2017    HGB 15.2 11/28/2017    HCT 44.9 11/28/2017     11/28/2017     Lab Results   Component Value Date    INR 1.00 03/29/2015     No results found for: MG  No results found for: TSH, PSA, CHLPL, TRIG, HDL, LDL   Lab Results   Component Value Date    BNP 20 04/16/2015     Echo   Lab Results   Component Value Date    ECHOEFEST 55 10/25/2016       ECG 12 Lead  Date/Time: 5/3/2018 3:09 PM  Performed by: IZZY DIALLO  Authorized by: IZZY DIALLO               Assessment/Plan :  1. Thoracic ascending aortic aneurysm of 4.7cm and arch aortic aneurysm of 4.1cm noted on the CT scan of the chest recently in February 2016.     2. Essential hypertension     3. PAD (peripheral artery disease)     4. Advanced  COPD, on home oxygen.     5. Malignant neoplasm of upper lobe of right lung, status post right upper lobectomy in 2011, being followed by Dr. Gonzalez          Recommendations:  1. Continue metoprolol and increase the dose to 50 mg by mouth twice a day.  2. Continue to keep close follow-up with cardiac thoracic surgeons at [facility].  3. Follow up here in 1 year.    Return in about 1 year (around 5/3/2019).    As always, I appreciate very much the opportunity to participate in the cardiovascular care of your patients.      With Best Regards,    Edward Aguilar MD, [facility]    Dragon disclaimer:  Much of this encounter note is an electronic transcription/translation of spoken language to printed text. The electronic translation of spoken language may permit erroneous, or at times, nonsensical words or phrases to be inadvertently transcribed; Although I have reviewed the note for such errors, some may still exist.

## 2018-06-06 ENCOUNTER — OFFICE VISIT (OUTPATIENT)
Dept: ONCOLOGY | Facility: CLINIC | Age: 73
End: 2018-06-06

## 2018-06-06 ENCOUNTER — LAB (OUTPATIENT)
Dept: ONCOLOGY | Facility: CLINIC | Age: 73
End: 2018-06-06

## 2018-06-06 VITALS
DIASTOLIC BLOOD PRESSURE: 84 MMHG | WEIGHT: 216.9 LBS | BODY MASS INDEX: 33.97 KG/M2 | HEART RATE: 75 BPM | RESPIRATION RATE: 22 BRPM | OXYGEN SATURATION: 98 % | TEMPERATURE: 96.7 F | SYSTOLIC BLOOD PRESSURE: 124 MMHG

## 2018-06-06 DIAGNOSIS — C34.90 MALIGNANT NEOPLASM OF LUNG, UNSPECIFIED LATERALITY, UNSPECIFIED PART OF LUNG (HCC): Primary | ICD-10-CM

## 2018-06-06 DIAGNOSIS — L98.9 SKIN LESIONS: ICD-10-CM

## 2018-06-06 DIAGNOSIS — C34.11 MALIGNANT NEOPLASM OF UPPER LOBE OF RIGHT LUNG (HCC): Primary | ICD-10-CM

## 2018-06-06 DIAGNOSIS — I71.21 THORACIC ASCENDING AORTIC ANEURYSM (HCC): ICD-10-CM

## 2018-06-06 DIAGNOSIS — J44.9 ADVANCED COPD (HCC): ICD-10-CM

## 2018-06-06 LAB
ALBUMIN SERPL-MCNC: 4.5 G/DL (ref 3.4–4.8)
ALBUMIN/GLOB SERPL: 1.7 G/DL (ref 1.5–2.5)
ALP SERPL-CCNC: 58 U/L (ref 40–129)
ALT SERPL W P-5'-P-CCNC: 46 U/L (ref 10–44)
ANION GAP SERPL CALCULATED.3IONS-SCNC: 8.6 MMOL/L (ref 3.6–11.2)
AST SERPL-CCNC: 51 U/L (ref 10–34)
BASOPHILS # BLD AUTO: 0.04 10*3/MM3 (ref 0–0.3)
BASOPHILS NFR BLD AUTO: 0.5 % (ref 0–2)
BILIRUB SERPL-MCNC: 0.6 MG/DL (ref 0.2–1.8)
BUN BLD-MCNC: 17 MG/DL (ref 7–21)
BUN/CREAT SERPL: 17.7 (ref 7–25)
CALCIUM SPEC-SCNC: 8.8 MG/DL (ref 7.7–10)
CHLORIDE SERPL-SCNC: 103 MMOL/L (ref 99–112)
CO2 SERPL-SCNC: 25.4 MMOL/L (ref 24.3–31.9)
CREAT BLD-MCNC: 0.96 MG/DL (ref 0.43–1.29)
DEPRECATED RDW RBC AUTO: 48 FL (ref 37–54)
EOSINOPHIL # BLD AUTO: 0.28 10*3/MM3 (ref 0–0.7)
EOSINOPHIL NFR BLD AUTO: 3.2 % (ref 0–7)
ERYTHROCYTE [DISTWIDTH] IN BLOOD BY AUTOMATED COUNT: 14.9 % (ref 11.5–14.5)
GFR SERPL CREATININE-BSD FRML MDRD: 77 ML/MIN/1.73
GLOBULIN UR ELPH-MCNC: 2.7 GM/DL
GLUCOSE BLD-MCNC: 247 MG/DL (ref 70–110)
HCT VFR BLD AUTO: 44.1 % (ref 42–52)
HGB BLD-MCNC: 14.6 G/DL (ref 14–18)
IMM GRANULOCYTES # BLD: 0.06 10*3/MM3 (ref 0–0.03)
IMM GRANULOCYTES NFR BLD: 0.7 % (ref 0–0.5)
LYMPHOCYTES # BLD AUTO: 1.87 10*3/MM3 (ref 1–3)
LYMPHOCYTES NFR BLD AUTO: 21.7 % (ref 16–46)
MCH RBC QN AUTO: 29.3 PG (ref 27–33)
MCHC RBC AUTO-ENTMCNC: 33.1 G/DL (ref 33–37)
MCV RBC AUTO: 88.6 FL (ref 80–94)
MONOCYTES # BLD AUTO: 0.9 10*3/MM3 (ref 0.1–0.9)
MONOCYTES NFR BLD AUTO: 10.4 % (ref 0–12)
NEUTROPHILS # BLD AUTO: 5.48 10*3/MM3 (ref 1.4–6.5)
NEUTROPHILS NFR BLD AUTO: 63.5 % (ref 40–75)
OSMOLALITY SERPL CALC.SUM OF ELEC: 283.6 MOSM/KG (ref 273–305)
PLATELET # BLD AUTO: 224 10*3/MM3 (ref 130–400)
PMV BLD AUTO: 9.1 FL (ref 6–10)
POTASSIUM BLD-SCNC: 4.3 MMOL/L (ref 3.5–5.3)
PROT SERPL-MCNC: 7.2 G/DL (ref 6–8)
RBC # BLD AUTO: 4.98 10*6/MM3 (ref 4.7–6.1)
SODIUM BLD-SCNC: 137 MMOL/L (ref 135–153)
WBC NRBC COR # BLD: 8.63 10*3/MM3 (ref 4.5–12.5)

## 2018-06-06 PROCEDURE — 85025 COMPLETE CBC W/AUTO DIFF WBC: CPT | Performed by: INTERNAL MEDICINE

## 2018-06-06 PROCEDURE — 80053 COMPREHEN METABOLIC PANEL: CPT | Performed by: INTERNAL MEDICINE

## 2018-06-06 PROCEDURE — 36415 COLL VENOUS BLD VENIPUNCTURE: CPT

## 2018-06-06 PROCEDURE — 99213 OFFICE O/P EST LOW 20 MIN: CPT | Performed by: NURSE PRACTITIONER

## 2018-06-06 RX ORDER — METOPROLOL TARTRATE 50 MG/1
50 TABLET, FILM COATED ORAL 2 TIMES DAILY
COMMUNITY
End: 2021-01-01 | Stop reason: HOSPADM

## 2018-06-06 NOTE — PROGRESS NOTES
DATE:  6/6/2018    DIAGNOSIS:  1. 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 AAA2. Thoracic adenopathy     CHIEF COMPLAINT  Follow up of Lung Cancer     TREATMENT HISTORY:  1. RUL lobectomy Nov. 2011 w/o any adjuvant therapy     HISTORY OF PRESENT ILLNESS:   Mr. Marshall was initially diagnosed with a Stage Ib well-differentiated mucinous adenocarcinoma of the lung in 2011 and underwent RULobectomy performed by Dr. Welsh in November of 2011. He did not receive adjuvant chemotherapy. He has been well sin ce his initial surgery and has not had evidence of recurrence, though he does have thoracic adenopathy which sometimes enlarges, sometimes shrinks and which has in the past been + on PET but which he hasn' t wanted investigated or treated.     INTERVAL HISTORY:  Mr. Marshall is here today with his daughter for f/u of lung cancer. Since his last visit, overall, he reports he has been doing well. He continues to follow with Dr. Gusman for aneurysms in the thoracic and abdominal aorta which has remained stable on repeat imaging. He recently had repeat CT scan at [facility] and will follow up with Dr. Gusman next week. He denies any worsening SOB and continues to requires supplemental oxygen 3L per NC. He is complaining of several skin lesions/moles which have been changing the last few months and requests dermatology referral. He denies any other specific complaints.     PAST MEDICAL HISTORY:  Past Medical History:   Diagnosis Date   • Arthritis    • Asthma    • COPD (chronic obstructive pulmonary disease)    • Diabetes mellitus    • Dyslipidemia    • Heart disease    • Hypertension    • PAD (peripheral artery disease)        PAST SURGICAL HISTORY:  Past Surgical History:   Procedure Laterality Date   • ABDOMINAL AORTIC ANEURYSM REPAIR     • FINGER SURGERY Left    • KNEE SURGERY Left    • LUNG REMOVAL, PARTIAL  11/29/2011   • LUNG SURGERY  11/28/2011       FAMILY HISTORY:  Family History    Problem Relation Age of Onset   • Cancer Mother    • Cancer Brother      Social History     Social History   • Marital status:      Spouse name: N/A   • Number of children: N/A   • Years of education: N/A     Occupational History   • Not on file.     Social History Main Topics   • Smoking status: Former Smoker     Packs/day: 1.00     Years: 50.00     Quit date: 11/2011   • Smokeless tobacco: Current User     Types: Chew   • Alcohol use No   • Drug use: No   • Sexual activity: Defer     Other Topics Concern   • Not on file     Social History Narrative   • No narrative on file       REVIEW OF SYSTEMS:    A comprehensive 14 point review of systems was performed and was negative except as mentioned    MEDICATIONS:  The current medication list was reviewed in the EMR    Current Outpatient Prescriptions:   •  atorvastatin (LIPITOR) 10 MG tablet, Take 10 mg by mouth Daily., Disp: , Rfl:   •  Empagliflozin (JARDIANCE) 10 MG tablet, Take  by mouth., Disp: , Rfl:   •  gabapentin (NEURONTIN) 300 MG capsule, Take 300 mg by mouth 3 (Three) Times a Day., Disp: , Rfl:   •  glimepiride (AMARYL) 4 MG tablet, Take 4 mg by mouth Every Morning Before Breakfast., Disp: , Rfl:   •  hydrALAZINE (APRESOLINE) 50 MG tablet, Take 50 mg by mouth 3 (Three) Times a Day., Disp: , Rfl:   •  hydrochlorothiazide (HYDRODIURIL) 12.5 MG tablet, Take 12.5 mg by mouth Daily., Disp: , Rfl:   •  lisinopril (PRINIVIL,ZESTRIL) 20 MG tablet, Take 20 mg by mouth 2 (Two) Times a Day., Disp: , Rfl:   •  metFORMIN (GLUCOPHAGE) 500 MG tablet, Take 500 mg by mouth 2 (Two) Times a Day With Meals., Disp: , Rfl:   •  metoprolol tartrate (LOPRESSOR) 50 MG tablet, Take 50 mg by mouth 2 (Two) Times a Day. Takes 1 tablet twice per day, Disp: , Rfl:   •  nabumetone (RELAFEN) 500 MG tablet, Take 500 mg by mouth 2 (Two) Times a Day As Needed for mild pain (1-3)., Disp: , Rfl:   •  oxyCODONE-acetaminophen (PERCOCET) 7.5-325 MG per tablet, Take 1 tablet by mouth  Every 6 (Six) Hours As Needed., Disp: , Rfl:   •  piroxicam (FELDENE) 20 MG capsule, Take 20 mg by mouth Daily., Disp: , Rfl:     ALLERGIES:    Allergies   Allergen Reactions   • Acetaminophen-Codeine    • Adhesive Tape Other (See Comments)     ADHESIVE CAUSES SKIN BLISTERS, PER PT   • Indocin [Indomethacin] Itching     PHYSICAL EXAM:  /84   Pulse 75   Temp 96.7 °F (35.9 °C) (Oral)   Resp 22   Wt 98.4 kg (216 lb 14.4 oz)   SpO2 98%   BMI 33.97 kg/m²    General: Awake, alert and oriented, in no distress.  In good spirits.  HEENT: Pupils are equal, round and reactive to light and accommodation, Extraocular movements full, oropharynx clear, NC in place  Neck: no jvd, lymphadenopathy or thyromegaly   Cardiovascular: regular rate and rhythm without murmurs, rubs or gallops   Pulmonary: Decreased breath sounds bilaterally, but clear. No wheezing, rhonchi or rales. Old surgical incision is smooth and flat.   Abdomen: soft, non-tender, sl distended, bowel sounds present, no organomegaly   Extremities: No LE edema  Lymph: No cervical, supraclavicular, axillary, adenopathy   Neurologic: grossly non-focal exam     PATHOLOGY:  11-29-11 Rt upper lobe lung:   - Adenocarcinoma, mucinous type   - Negative for mutation involving ALK gene       IMAGING:  PET-CT Whole Body 1-9-13:   - Overall stable appearance compared with the previous exam.   - There is a continued evidence of hypermetabolism corresponding to mediastinal and right hilar adenopathy but no new lymph nodes are seen and no significant change in the SUV.     CT Chest w/o contrast 4-1-13:   - Overall there has been no significant interval change when compared to the previous exam dated 8/12.   - There are several m ediastinal lymph nodes present but stable.   - No pericardial or pleural effusion.   - Tiny nodule in the left lung base is stable   - Descending thoracic aneurysm stable.   - Fatty infiltration of the liver     CT Chest w/ contrast 7-29-13:   -  Questionable increase in the siz e of mediastinal lymphadenopathy.   - An AP window lymph node was 1.4mc and is 1.9cm.   - A prevascular space lymph node was 1.4cm and is 1.4cm.   - A subcarinal region lymph node was 2cm and is 2.7cm.   - A left hilar region lymph node is stable measuring 1.3 measuring 12 mm. conceivably difference and axial selection could cause this discrepancy.   - Compared 8/8/12 examination, the lymphadenopathy is stable to slightly improved.     Chest CT w/ contrast 6-9-14:   - Stable mediastinal borderline enlarged lymph nodes.   - An AP window lymph node measures 1.5cm and was 1.5cm.   - Stable slightly increased soft tissue density in the right hilar region.   - There is persistent supleural fibrotic scarring right lung base scarring is again noted.   - No new suspicious pulmonary mass or nodule.   - Posterior right rib fractures are again noted.   - Descending thoracic aortic aneurysm of 4.7cm. Stable.     CT Chest w/ contrast 12-02-14   - Enlarging mediastinal lymph nodoes   - No parenchymal soft tissue nodules or masses   - The largest soft tissue density is in the right paratrachel location and measures 2cm.  - Previously the same nodule measured 1.3cm.     CT Chest w/ contrast 03-02-15:   - The descending portion of the abdominal aorta is stable In size measuring  approximately 4.9 cm on today's study.   - There is abnormal adenopathy throughout the mediastinum. The largest lymph node measured 21 mm in the superior mediastinum adjacent to the esophagus. The size and number of lymph nodes is radiographically stable.   - There are emphysematous changes and increased interstitial markings throughout the lungs.     CT Chest w/ contrast 06-01-15:   - Stable appearance of the chest   - 4.3 cm infrarenal abdominal aortic aneurysm.     CT Chest w/ contrast 09-03-15:   - Mediastinal adenopathy is stable   - There is a 4.6cm abdominal aortic aneurysm slightly increased in size from the  previous  exam.     CT Chest 09-20-16:  - Slight interval increase in size of the abdominal aortic aneurysm. It now measures 5 cm just above the diaphragm. There is adenopathy i n the mediastinum. The lymph nodes have increased in size in comparing with the earlier CT back in September.   - Continued adenopathy in the mediastinum and right hilum.   - Radiographically this is stable in comparing with the earlier exam.   - There continues to be aneurysmal dilatation of the aorta with a diameter of 5 cm just above the diaphragm.   - The lungs show changes of chronic interstitial lung disease.     CT Chest 05-10-17:  - Persistent mediastinal adenopathy radiographically unchanged from September 2016.   - There continues to be diffuse interstitial lung disease throughout both lungs. There is aneurysmal dilatation of the aorta just above the diaphragm measuring 5 cm.    CT Chest 11-17-17:  Stable mediastinal adenopathy. Largest prevascular lymph   node is 1.6 cm and was previously 1.8 cm. A right subcarinal lymph node   is 2.2 cm and was previously 2.1 cm. A right para esophageal lymph node   superiorly is 1.8 cm and was previously 1.9 cm. No new adenopathy   stations identified.  Stable 5 cm fusiform type thoracic aortic aneurysm   predominantly involving the descending thoracic aorta. Mural thrombus is   present.    CT Thoracic Aorta 6/4/18 [facility]      RECENT LABS:  Lab Results   Component Value Date    WBC 8.63 06/06/2018    HGB 14.6 06/06/2018    HCT 44.1 06/06/2018    MCV 88.6 06/06/2018    RDW 14.9 (H) 06/06/2018     06/06/2018    NEUTRORELPCT 63.5 06/06/2018    LYMPHORELPCT 21.7 06/06/2018    MONORELPCT 10.4 06/06/2018    EOSRELPCT 3.2 06/06/2018    BASORELPCT 0.5 06/06/2018    NEUTROABS 5.48 06/06/2018    LYMPHSABS 1.87 06/06/2018       Lab Results   Component Value Date     06/06/2018    K 4.3 06/06/2018    CO2 25.4 06/06/2018     06/06/2018    BUN 17 06/06/2018    CREATININE 0.96 06/06/2018    GLUCOSE 247  (H) 06/06/2018    CALCIUM 8.8 06/06/2018    ALKPHOS 58 06/06/2018    AST 51 (H) 06/06/2018    ALT 46 (H) 06/06/2018    BILITOT 0.6 06/06/2018    ALBUMIN 4.50 06/06/2018    PROTEINTOT 7.2 06/06/2018       ASSESSMENT & PLAN:  Kb Marshall is a very pleasant 73 y.o. 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    1. Lung cancer:   - Treated with RU Lobectomy in November 2011 without adjuvant therapy.   - At one point in the past, there was concern for PET + enlarging mediastinal adenopathy, and recommended biopsy, but after discussion with Dr. Ojeda and reluctance to treat recurrence if it was found, he decided to watch this with imaging.   - These LNs have intermittently waxed and waned over time. Do not know the etiology, but since he always said he wouldn' t take chemotherapy if it were indicated, Dr. Ojeda was disinclined to perform mediastinoscopy for biopsy. Ultimately, he saw Dr. Rothman and was referred for bronchoscopy with EBUS and there was no cause for concern identified. Mediastinal adenopathy is still present, but imaging indicates adenopathy has remained stable (see above). Therefore, unlikely to be malignant under the circumstances. Will continue with observation / monitoring .   - Will plan to follow up again in 6 months with repeat CT Chest with contrast and labs.     2. Oxygen dependent COPD - stable on 3L oxygen per NC. He remains tobacco free. He continues to f/u with his pulmonologist, Dr. Vicente.     3. AAA: S/p repair of femoral artery aneurysm performed by Dr. Ojeda. He has been following with Dr. Taveras and Dr. Rothman and more recently with Dr. Yusef Gusman . He has aneurysms in the thoracic and abdominal aorta. Dr. Gusman recommended continued monitoring for the time being.  Aneurysm measured stable on recent CT. He will follow up with Dr. Gusman next week.     4. Changing skin lesions: Patient requests referral to dermatology. Will  place this today.     5.  Prophylaxis:  Recommended flu and pneumonia vaccines, but he declined.     ACO Quality measures  - Kb Marshall does not use tobacco products.  - Patient's Body mass index is 33.97 kg/m². BMI is above normal parameters. Recommendations include: referral to primary care.  - Current outpatient and discharge medications have been reconciled for the patient.  Reviewed by: GOLDEN Zamora      I spent 15 minutes with Mr. Marshall today. More than 50% of the time was spent in counseling / coordination of care for the above mentioned problems.       Electronically Signed by: GOLDEN Brown      CC:   MD Dr. Jeff Elmore

## 2018-09-20 DIAGNOSIS — Z01.818 PREOPERATIVE CLEARANCE: Primary | ICD-10-CM

## 2018-09-24 ENCOUNTER — OFFICE VISIT (OUTPATIENT)
Dept: SURGERY | Facility: CLINIC | Age: 73
End: 2018-09-24

## 2018-09-24 VITALS
BODY MASS INDEX: 32.18 KG/M2 | DIASTOLIC BLOOD PRESSURE: 74 MMHG | WEIGHT: 205 LBS | SYSTOLIC BLOOD PRESSURE: 113 MMHG | HEART RATE: 85 BPM | HEIGHT: 67 IN

## 2018-09-24 DIAGNOSIS — K62.5 RECTAL BLEEDING: Primary | ICD-10-CM

## 2018-09-24 PROCEDURE — 99202 OFFICE O/P NEW SF 15 MIN: CPT | Performed by: SURGERY

## 2018-09-24 RX ORDER — SODIUM, POTASSIUM,MAG SULFATES 17.5-3.13G
1 SOLUTION, RECONSTITUTED, ORAL ORAL EVERY 12 HOURS
Qty: 1 BOTTLE | Refills: 0 | Status: SHIPPED | OUTPATIENT
Start: 2018-09-24 | End: 2018-12-10

## 2018-09-24 NOTE — PROGRESS NOTES
Subjective   Kb Marshall is a 73 y.o. male here as consultation from Scar Mejia MD    73 y.o. male here for rectal bleeding There is no family history of colon neoplasia. No family hx of gastric, ovarian, or uterine cancer.colonoscopy 6 years ago With polyps noted as the only abnormalities..  Patient has previously undergone the following surgeries: Following surgery, abdominal aortic aneurysm repair.  Patient is not on anticoagulation.  Patient denies weight loss.  Patient states streaking on tissue paper and some pain when he wipes concerning for possible fissure..     The following portions of the patient's history were reviewed and updated as appropriate: allergies, current medications, past family history, past medical history, past social history, past surgical history and problem list.    Past Medical History:   Diagnosis Date   • Arthritis    • Asthma    • COPD (chronic obstructive pulmonary disease) (CMS/[facility])    • Diabetes mellitus (CMS/[facility])    • Dyslipidemia    • Heart disease    • Hypertension    • PAD (peripheral artery disease) (CMS/[facility])        Family History   Problem Relation Age of Onset   • Cancer Mother    • Cancer Brother        Social History     Social History   • Marital status:      Spouse name: N/A   • Number of children: N/A   • Years of education: N/A     Occupational History   • Not on file.     Social History Main Topics   • Smoking status: Former Smoker     Packs/day: 1.00     Years: 50.00     Quit date: 11/2011   • Smokeless tobacco: Current User     Types: Chew   • Alcohol use No   • Drug use: No   • Sexual activity: Defer     Other Topics Concern   • Not on file     Social History Narrative   • No narrative on file       Past Surgical History:   Procedure Laterality Date   • ABDOMINAL AORTIC ANEURYSM REPAIR     • FINGER SURGERY Left    • KNEE SURGERY Left    • LUNG REMOVAL, PARTIAL  11/29/2011   • LUNG SURGERY  11/28/2011         Review of Systems   Constitutional:  "Negative for activity change, appetite change, chills and fever.   HENT: Negative for sore throat and trouble swallowing.    Eyes: Negative for visual disturbance.   Respiratory: Positive for shortness of breath. Negative for cough.    Cardiovascular: Negative for chest pain and palpitations.   Gastrointestinal: Negative for abdominal distention, abdominal pain, blood in stool, constipation, diarrhea, nausea and vomiting.   Endocrine: Negative for cold intolerance and heat intolerance.   Genitourinary: Negative for dysuria.   Musculoskeletal: Negative for joint swelling.   Skin: Negative for color change, rash and wound.   Allergic/Immunologic: Negative for immunocompromised state.   Neurological: Negative for dizziness, seizures, weakness and headaches.   Hematological: Negative for adenopathy. Does not bruise/bleed easily.   Psychiatric/Behavioral: Negative for agitation and confusion.         /74   Pulse 85   Ht 170.2 cm (67\")   Wt 93 kg (205 lb)   BMI 32.11 kg/m²   Objective   Physical Exam   Constitutional: He is oriented to person, place, and time. He appears well-developed.   HENT:   Head: Normocephalic and atraumatic.   Mouth/Throat: Mucous membranes are normal.   Eyes: Pupils are equal, round, and reactive to light. Conjunctivae are normal.   Neck: Neck supple. No JVD present. No tracheal deviation present. No thyromegaly present.   Cardiovascular: Normal rate and regular rhythm.  Exam reveals no gallop and no friction rub.    No murmur heard.  Pulmonary/Chest: Effort normal. He has wheezes.   Abdominal: Soft. He exhibits no distension. There is no splenomegaly or hepatomegaly. There is no tenderness. No hernia.   Musculoskeletal: Normal range of motion. He exhibits no deformity.   Neurological: He is alert and oriented to person, place, and time.   Skin: Skin is warm and dry.   Psychiatric: He has a normal mood and affect.         Kb was seen today for colonoscopy consult.    Diagnoses and all " orders for this visit:    Rectal bleeding  -     Case Request; Standing  -     Case Request    Other orders  -     sodium-potassium-magnesium sulfates (SUPREP BOWEL PREP KIT) 17.5-3.13-1.6 GM/180ML solution oral solution; Take 1 bottle by mouth Every 12 (Twelve) Hours.  -     Follow anesthesia standing orders.  -     Follow anesthesia standing orders.; Standing  -     Verify NPO Status; Standing  -     Obtain informed consent; Standing  -     SCD (sequential compression device)- to be placed on patient in Pre-op; Standing        Assessment     73-year-old male with need for screening colonoscopy and to evaluate for possible anal fissure.  The patient will obtain cardiac clearance and is scheduled for colonoscopy.  He has a history of polyps.  Patient's Body mass index is 32.11 kg/m². BMI is above normal parameters. Recommendations include: educational material.

## 2018-10-04 ENCOUNTER — TELEPHONE (OUTPATIENT)
Dept: SURGERY | Facility: CLINIC | Age: 73
End: 2018-10-04

## 2018-10-04 NOTE — TELEPHONE ENCOUNTER
Spoke with Suzie to discuss patients colonoscopy instructions. Per patient request that we discuss this with her. Suzie is aware of clear liquid diet the day prior to procedure. Everything that the patient is allowed to have has been discussed. Bowel prep instructions were given as well as time to begin bowel prep. She was aware for them to be NPO after midnight and have a  present the day of.     BC 10/4/18 @ 3:40pm

## 2018-10-11 ENCOUNTER — ANESTHESIA (OUTPATIENT)
Dept: PERIOP | Facility: HOSPITAL | Age: 73
End: 2018-10-11

## 2018-10-11 ENCOUNTER — HOSPITAL ENCOUNTER (OUTPATIENT)
Facility: HOSPITAL | Age: 73
Setting detail: HOSPITAL OUTPATIENT SURGERY
Discharge: HOME | End: 2018-10-11
Attending: SURGERY | Admitting: ANESTHESIOLOGY

## 2018-10-11 ENCOUNTER — ANESTHESIA EVENT (OUTPATIENT)
Dept: PERIOP | Facility: HOSPITAL | Age: 73
End: 2018-10-11

## 2018-10-11 VITALS
TEMPERATURE: 98.2 F | BODY MASS INDEX: 32.18 KG/M2 | HEIGHT: 67 IN | DIASTOLIC BLOOD PRESSURE: 78 MMHG | RESPIRATION RATE: 20 BRPM | SYSTOLIC BLOOD PRESSURE: 120 MMHG | OXYGEN SATURATION: 96 % | WEIGHT: 205 LBS | HEART RATE: 74 BPM

## 2018-10-11 PROCEDURE — 45378 DIAGNOSTIC COLONOSCOPY: CPT | Performed by: SURGERY

## 2018-10-11 PROCEDURE — 25010000002 PROPOFOL 10 MG/ML EMULSION: Performed by: NURSE ANESTHETIST, CERTIFIED REGISTERED

## 2018-10-11 PROCEDURE — 25010000002 FENTANYL CITRATE (PF) 100 MCG/2ML SOLUTION: Performed by: NURSE ANESTHETIST, CERTIFIED REGISTERED

## 2018-10-11 RX ORDER — PROPOFOL 10 MG/ML
VIAL (ML) INTRAVENOUS AS NEEDED
Status: DISCONTINUED | OUTPATIENT
Start: 2018-10-11 | End: 2018-10-11 | Stop reason: SURG

## 2018-10-11 RX ORDER — SODIUM CHLORIDE 0.9 % (FLUSH) 0.9 %
3-10 SYRINGE (ML) INJECTION AS NEEDED
Status: DISCONTINUED | OUTPATIENT
Start: 2018-10-11 | End: 2018-10-11 | Stop reason: HOSPADM

## 2018-10-11 RX ORDER — SODIUM CHLORIDE, SODIUM LACTATE, POTASSIUM CHLORIDE, CALCIUM CHLORIDE 600; 310; 30; 20 MG/100ML; MG/100ML; MG/100ML; MG/100ML
125 INJECTION, SOLUTION INTRAVENOUS CONTINUOUS
Status: DISCONTINUED | OUTPATIENT
Start: 2018-10-11 | End: 2018-10-11 | Stop reason: HOSPADM

## 2018-10-11 RX ORDER — MEPERIDINE HYDROCHLORIDE 25 MG/ML
12.5 INJECTION INTRAMUSCULAR; INTRAVENOUS; SUBCUTANEOUS
Status: DISCONTINUED | OUTPATIENT
Start: 2018-10-11 | End: 2018-10-11 | Stop reason: HOSPADM

## 2018-10-11 RX ORDER — LIDOCAINE HYDROCHLORIDE 20 MG/ML
INJECTION, SOLUTION EPIDURAL; INFILTRATION; INTRACAUDAL; PERINEURAL AS NEEDED
Status: DISCONTINUED | OUTPATIENT
Start: 2018-10-11 | End: 2018-10-11 | Stop reason: SURG

## 2018-10-11 RX ORDER — FENTANYL CITRATE 50 UG/ML
50 INJECTION, SOLUTION INTRAMUSCULAR; INTRAVENOUS
Status: DISCONTINUED | OUTPATIENT
Start: 2018-10-11 | End: 2018-10-11 | Stop reason: HOSPADM

## 2018-10-11 RX ORDER — PANTOPRAZOLE SODIUM 40 MG/1
40 TABLET, DELAYED RELEASE ORAL DAILY
Qty: 30 TABLET | Refills: 1 | Status: SHIPPED | OUTPATIENT
Start: 2018-10-11 | End: 2018-12-10

## 2018-10-11 RX ORDER — FENTANYL CITRATE 50 UG/ML
INJECTION, SOLUTION INTRAMUSCULAR; INTRAVENOUS AS NEEDED
Status: DISCONTINUED | OUTPATIENT
Start: 2018-10-11 | End: 2018-10-11 | Stop reason: SURG

## 2018-10-11 RX ORDER — SODIUM CHLORIDE 0.9 % (FLUSH) 0.9 %
3 SYRINGE (ML) INJECTION EVERY 12 HOURS SCHEDULED
Status: DISCONTINUED | OUTPATIENT
Start: 2018-10-11 | End: 2018-10-11 | Stop reason: HOSPADM

## 2018-10-11 RX ORDER — ONDANSETRON 2 MG/ML
4 INJECTION INTRAMUSCULAR; INTRAVENOUS ONCE AS NEEDED
Status: DISCONTINUED | OUTPATIENT
Start: 2018-10-11 | End: 2018-10-11 | Stop reason: HOSPADM

## 2018-10-11 RX ORDER — IPRATROPIUM BROMIDE AND ALBUTEROL SULFATE 2.5; .5 MG/3ML; MG/3ML
3 SOLUTION RESPIRATORY (INHALATION) ONCE AS NEEDED
Status: DISCONTINUED | OUTPATIENT
Start: 2018-10-11 | End: 2018-10-11 | Stop reason: HOSPADM

## 2018-10-11 RX ADMIN — PROPOFOL 50 MG: 10 INJECTION, EMULSION INTRAVENOUS at 07:40

## 2018-10-11 RX ADMIN — FENTANYL CITRATE 50 MCG: 50 INJECTION INTRAMUSCULAR; INTRAVENOUS at 07:35

## 2018-10-11 RX ADMIN — LIDOCAINE HYDROCHLORIDE 50 MG: 20 INJECTION, SOLUTION EPIDURAL; INFILTRATION; INTRACAUDAL; PERINEURAL at 07:40

## 2018-10-11 RX ADMIN — PROPOFOL 50 MG: 10 INJECTION, EMULSION INTRAVENOUS at 07:45

## 2018-10-11 RX ADMIN — SODIUM CHLORIDE, POTASSIUM CHLORIDE, SODIUM LACTATE AND CALCIUM CHLORIDE: 600; 310; 30; 20 INJECTION, SOLUTION INTRAVENOUS at 07:35

## 2018-10-11 RX ADMIN — PROPOFOL 20 MG: 10 INJECTION, EMULSION INTRAVENOUS at 07:43

## 2018-10-11 RX ADMIN — FENTANYL CITRATE 50 MCG: 50 INJECTION INTRAMUSCULAR; INTRAVENOUS at 07:38

## 2018-10-11 RX ADMIN — PROPOFOL 50 MG: 10 INJECTION, EMULSION INTRAVENOUS at 07:50

## 2018-10-11 NOTE — ANESTHESIA PREPROCEDURE EVALUATION
Anesthesia Evaluation     Patient summary reviewed and Nursing notes reviewed   no history of anesthetic complications:  NPO Solid Status: > 8 hours  NPO Liquid Status: > 8 hours           Airway   Mallampati: II  TM distance: >3 FB  Neck ROM: full  No difficulty expected  Dental - normal exam   (+) edentulous    Pulmonary - normal exam    breath sounds clear to auscultation  (+) a smoker (quit 2011...h/o 3-4 packs per day) Former, lung cancer, COPD (on home O2 24/7) severe, asthma, shortness of breath,   Cardiovascular - normal exam    ECG reviewed  Patient on routine beta blocker and Beta blocker given within 24 hours of surgery  Rhythm: regular  Rate: normal    (+) hypertension poorly controlled 2 medications or greater, CAD, TRISTAN, PVD,       Neuro/Psych  (+) dizziness/light headedness,     GI/Hepatic/Renal/Endo    (+) obesity, morbid obesity, GERD, GI bleeding, diabetes mellitus,     Musculoskeletal     Abdominal  - normal exam  (+) obese,     Bowel sounds: normal.   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 Marshall is being followed by Dr. Aguilar and [facility] surgery.  Last seen 5/2018.  No further interventions planned at this time.                Anesthesia Plan    ASA 4     general   total IV anesthesia  intravenous induction   Anesthetic plan, all risks, benefits, and alternatives have been provided, discussed and informed consent has been obtained with: patient.    Plan discussed with CRNA.

## 2018-10-11 NOTE — OP NOTE
COLONOSCOPY  Procedure Note    Kb Marshall  10/11/2018    Pre-op Diagnosis:   Rectal bleeding [K62.5]    Post-op Diagnosis:   Diverticulosis    Indications: see above    Procedure(s):  COLONOSCOPY    Surgeon(s):  Calos Stockton MD    Anesthesia: Choice    Staff:   Circulator: Kaycee Murrieta RN  Endo Technician: Jesus Hanks    Findings: severe diverticulosis of the sigmoid colon    Operative Procedure: The patient was taken to the operating suite and placed in left lateral decubitus position.  Bilateral sequential compression devices were in place and IV anesthesia was administered.  Timeout procedure was performed.  Digital rectal examination was negative, prostate normal.  The colonoscope was inserted and advanced to the cecum as evidenced by the ileocecal valve and appendiceal orifice.  The bowel prep was very good.  The colonoscope was slowly removed and the entirety of the colonic mucosa was evaluated.  Colonoscopic findings included diverticular disease.  No source of bleeding identified.  The colonoscope was then removed and the patient was awakened from anesthesia and taken recovery.  They tolerated the procedure well.    Estimated Blood Loss: minimal    Specimens:  none                 Drains: none    Grafts or Implants: none    Complications: None    Recommendations: screening colonoscopy in 10 years.    Calos Stockton MD     Date: 10/11/2018  Time: 7:58 AM

## 2018-10-11 NOTE — ANESTHESIA POSTPROCEDURE EVALUATION
Patient: Kb Marshall    Procedure Summary     Date:  10/11/18 Room / Location:  [facility] OR  / [facility] OR    Anesthesia Start:  0735 Anesthesia Stop:  0758    Procedure:  COLONOSCOPY (N/A ) Diagnosis:       Rectal bleeding      (Rectal bleeding [K62.5])    Surgeon:  Calos Stockton MD Provider:  Mykel Nickerson DO    Anesthesia Type:  general ASA Status:  4          Anesthesia Type: general  Last vitals  BP   127/84 (10/11/18 0715)   Temp   97.5 °F (36.4 °C) (10/11/18 0715)   Pulse   65 (10/11/18 0715)   Resp   20 (10/11/18 0715)     SpO2   97 % (10/11/18 0715)     Post Anesthesia Care and Evaluation    Patient location during evaluation: PHASE II  Patient participation: complete - patient participated  Level of consciousness: awake and alert  Pain score: 1  Pain management: adequate  Airway patency: patent  Anesthetic complications: No anesthetic complications  PONV Status: controlled  Cardiovascular status: acceptable  Respiratory status: acceptable  Hydration status: acceptable

## 2018-10-11 NOTE — H&P (VIEW-ONLY)
Subjective   Kb Marshall is a 73 y.o. male here as consultation from Scar Mejia MD    73 y.o. male here for rectal bleeding There is no family history of colon neoplasia. No family hx of gastric, ovarian, or uterine cancer.colonoscopy 6 years ago With polyps noted as the only abnormalities..  Patient has previously undergone the following surgeries: Following surgery, abdominal aortic aneurysm repair.  Patient is not on anticoagulation.  Patient denies weight loss.  Patient states streaking on tissue paper and some pain when he wipes concerning for possible fissure..     The following portions of the patient's history were reviewed and updated as appropriate: allergies, current medications, past family history, past medical history, past social history, past surgical history and problem list.    Past Medical History:   Diagnosis Date   • Arthritis    • Asthma    • COPD (chronic obstructive pulmonary disease) (CMS/[facility])    • Diabetes mellitus (CMS/[facility])    • Dyslipidemia    • Heart disease    • Hypertension    • PAD (peripheral artery disease) (CMS/[facility])        Family History   Problem Relation Age of Onset   • Cancer Mother    • Cancer Brother        Social History     Social History   • Marital status:      Spouse name: N/A   • Number of children: N/A   • Years of education: N/A     Occupational History   • Not on file.     Social History Main Topics   • Smoking status: Former Smoker     Packs/day: 1.00     Years: 50.00     Quit date: 11/2011   • Smokeless tobacco: Current User     Types: Chew   • Alcohol use No   • Drug use: No   • Sexual activity: Defer     Other Topics Concern   • Not on file     Social History Narrative   • No narrative on file       Past Surgical History:   Procedure Laterality Date   • ABDOMINAL AORTIC ANEURYSM REPAIR     • FINGER SURGERY Left    • KNEE SURGERY Left    • LUNG REMOVAL, PARTIAL  11/29/2011   • LUNG SURGERY  11/28/2011         Review of Systems   Constitutional:  "Negative for activity change, appetite change, chills and fever.   HENT: Negative for sore throat and trouble swallowing.    Eyes: Negative for visual disturbance.   Respiratory: Positive for shortness of breath. Negative for cough.    Cardiovascular: Negative for chest pain and palpitations.   Gastrointestinal: Negative for abdominal distention, abdominal pain, blood in stool, constipation, diarrhea, nausea and vomiting.   Endocrine: Negative for cold intolerance and heat intolerance.   Genitourinary: Negative for dysuria.   Musculoskeletal: Negative for joint swelling.   Skin: Negative for color change, rash and wound.   Allergic/Immunologic: Negative for immunocompromised state.   Neurological: Negative for dizziness, seizures, weakness and headaches.   Hematological: Negative for adenopathy. Does not bruise/bleed easily.   Psychiatric/Behavioral: Negative for agitation and confusion.         /74   Pulse 85   Ht 170.2 cm (67\")   Wt 93 kg (205 lb)   BMI 32.11 kg/m²   Objective   Physical Exam   Constitutional: He is oriented to person, place, and time. He appears well-developed.   HENT:   Head: Normocephalic and atraumatic.   Mouth/Throat: Mucous membranes are normal.   Eyes: Pupils are equal, round, and reactive to light. Conjunctivae are normal.   Neck: Neck supple. No JVD present. No tracheal deviation present. No thyromegaly present.   Cardiovascular: Normal rate and regular rhythm.  Exam reveals no gallop and no friction rub.    No murmur heard.  Pulmonary/Chest: Effort normal. He has wheezes.   Abdominal: Soft. He exhibits no distension. There is no splenomegaly or hepatomegaly. There is no tenderness. No hernia.   Musculoskeletal: Normal range of motion. He exhibits no deformity.   Neurological: He is alert and oriented to person, place, and time.   Skin: Skin is warm and dry.   Psychiatric: He has a normal mood and affect.         Kb was seen today for colonoscopy consult.    Diagnoses and all " orders for this visit:    Rectal bleeding  -     Case Request; Standing  -     Case Request    Other orders  -     sodium-potassium-magnesium sulfates (SUPREP BOWEL PREP KIT) 17.5-3.13-1.6 GM/180ML solution oral solution; Take 1 bottle by mouth Every 12 (Twelve) Hours.  -     Follow anesthesia standing orders.  -     Follow anesthesia standing orders.; Standing  -     Verify NPO Status; Standing  -     Obtain informed consent; Standing  -     SCD (sequential compression device)- to be placed on patient in Pre-op; Standing        Assessment     73-year-old male with need for screening colonoscopy and to evaluate for possible anal fissure.  The patient will obtain cardiac clearance and is scheduled for colonoscopy.  He has a history of polyps.  Patient's Body mass index is 32.11 kg/m². BMI is above normal parameters. Recommendations include: educational material.

## 2018-12-03 ENCOUNTER — HOSPITAL ENCOUNTER (OUTPATIENT)
Dept: CT IMAGING | Facility: HOSPITAL | Age: 73
Discharge: HOME | End: 2018-12-03
Admitting: NURSE PRACTITIONER

## 2018-12-03 DIAGNOSIS — C34.90 MALIGNANT NEOPLASM OF LUNG, UNSPECIFIED LATERALITY, UNSPECIFIED PART OF LUNG (HCC): ICD-10-CM

## 2018-12-03 LAB — CREAT BLDA-MCNC: 0.7 MG/DL (ref 0.6–1.3)

## 2018-12-03 PROCEDURE — 82565 ASSAY OF CREATININE: CPT

## 2018-12-03 PROCEDURE — 71260 CT THORAX DX C+: CPT | Performed by: RADIOLOGY

## 2018-12-03 PROCEDURE — 25010000002 IOPAMIDOL 61 % SOLUTION: Performed by: NURSE PRACTITIONER

## 2018-12-03 PROCEDURE — 71260 CT THORAX DX C+: CPT

## 2018-12-03 RX ADMIN — IOPAMIDOL 80 ML: 612 INJECTION, SOLUTION INTRAVENOUS at 08:49

## 2018-12-07 NOTE — PROGRESS NOTES
DATE:  12/10/2018    DIAGNOSIS:  1. 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 AAA  3. Thoracic adenopathy     CHIEF COMPLAINT  Follow up of Lung Cancer     TREATMENT HISTORY:  1. RUL lobectomy Nov. 2011 w/o any adjuvant therapy     HISTORY OF PRESENT ILLNESS:   Mr. Marshall was initially diagnosed with a Stage Ib well-differentiated mucinous adenocarcinoma of the lung in 2011 and underwent RULobectomy performed by Dr. Welsh in November of 2011. He did not receive adjuvant chemotherapy. He has been well sin ce his initial surgery and has not had evidence of recurrence, though he does have thoracic adenopathy which sometimes enlarges, sometimes shrinks and which has in the past been + on PET but which he hasn' t wanted investigated or treated.     INTERVAL HISTORY:  Mr. Marshall is here today for follow up of lung cancer. He has been well since his last visit. He continues to follow with Dr. Gusman for aneurysms in the thoracic and abdominal aorta which has remained stable on repeat imaging. He denies any worsening SOB and continues to requires supplemental oxygen 3L per NC.    PAST MEDICAL HISTORY:  Past Medical History:   Diagnosis Date   • Aneurysm (CMS/HCC)    • Arthritis    • Asthma    • Cancer (CMS/HCC)     lungs   • COPD (chronic obstructive pulmonary disease) (CMS/HCC)    • Coronary artery disease    • Diabetes mellitus (CMS/HCC)    • Dyslipidemia    • GERD (gastroesophageal reflux disease)    • Heart disease    • Hypertension    • PAD (peripheral artery disease) (CMS/HCC)        PAST SURGICAL HISTORY:  Past Surgical History:   Procedure Laterality Date   • ABDOMINAL AORTIC ANEURYSM REPAIR     • FINGER SURGERY Left    • KNEE SURGERY Left    • LUNG REMOVAL, PARTIAL  11/29/2011   • LUNG SURGERY  11/28/2011       FAMILY HISTORY:  Family History   Problem Relation Age of Onset   • Cancer Mother    • Cancer Brother      Social History     Socioeconomic History   • Marital status:       Spouse name: Not on file   • Number of children: Not on file   • Years of education: Not on file   • Highest education level: Not on file   Social Needs   • Financial resource strain: Not on file   • Food insecurity - worry: Not on file   • Food insecurity - inability: Not on file   • Transportation needs - medical: Not on file   • Transportation needs - non-medical: Not on file   Occupational History   • Not on file   Tobacco Use   • Smoking status: Former Smoker     Packs/day: 1.00     Years: 50.00     Pack years: 50.00     Last attempt to quit: 2011     Years since quittin.1   • Smokeless tobacco: Current User     Types: Chew   Substance and Sexual Activity   • Alcohol use: Yes     Alcohol/week: 0.6 oz     Types: 1 Cans of beer per week   • Drug use: No   • Sexual activity: Defer   Other Topics Concern   • Not on file   Social History Narrative   • Not on file       REVIEW OF SYSTEMS:    A comprehensive 14 point review of systems was performed and was negative except as mentioned    MEDICATIONS:  The current medication list was reviewed in the EMR    Current Outpatient Medications:   •  atorvastatin (LIPITOR) 10 MG tablet, Take 10 mg by mouth Daily., Disp: , Rfl:   •  Empagliflozin (JARDIANCE) 10 MG tablet, Take  by mouth., Disp: , Rfl:   •  gabapentin (NEURONTIN) 300 MG capsule, Take 300 mg by mouth 3 (Three) Times a Day., Disp: , Rfl:   •  glimepiride (AMARYL) 4 MG tablet, Take 4 mg by mouth Every Morning Before Breakfast., Disp: , Rfl:   •  hydrALAZINE (APRESOLINE) 50 MG tablet, Take 50 mg by mouth 3 (Three) Times a Day., Disp: , Rfl:   •  hydrochlorothiazide (HYDRODIURIL) 12.5 MG tablet, Take 12.5 mg by mouth Daily., Disp: , Rfl:   •  lisinopril (PRINIVIL,ZESTRIL) 20 MG tablet, Take 20 mg by mouth 2 (Two) Times a Day., Disp: , Rfl:   •  metFORMIN (GLUCOPHAGE) 500 MG tablet, Take 500 mg by mouth 2 (Two) Times a Day With Meals., Disp: , Rfl:   •  metoprolol tartrate (LOPRESSOR) 50 MG tablet,  Take 50 mg by mouth 2 (Two) Times a Day. Takes 1 tablet twice per day, Disp: , Rfl:   •  nabumetone (RELAFEN) 500 MG tablet, Take 500 mg by mouth 2 (Two) Times a Day As Needed for mild pain (1-3)., Disp: , Rfl:   •  oxyCODONE-acetaminophen (PERCOCET) 7.5-325 MG per tablet, Take 1 tablet by mouth Every 6 (Six) Hours As Needed., Disp: , Rfl:   •  pantoprazole (PROTONIX) 40 MG EC tablet, Take 1 tablet by mouth Daily., Disp: 30 tablet, Rfl: 1  •  piroxicam (FELDENE) 20 MG capsule, Take 20 mg by mouth Daily., Disp: , Rfl:   •  sodium-potassium-magnesium sulfates (SUPREP BOWEL PREP KIT) 17.5-3.13-1.6 GM/180ML solution oral solution, Take 1 bottle by mouth Every 12 (Twelve) Hours., Disp: 1 bottle, Rfl: 0    ALLERGIES:    Allergies   Allergen Reactions   • Acetaminophen-Codeine    • Adhesive Tape Other (See Comments)     ADHESIVE CAUSES SKIN BLISTERS, PER PT   • Indocin [Indomethacin] Itching     PHYSICAL EXAM:  Visit Vitals  /69   Pulse 68   Temp 96.7 °F (35.9 °C) (Oral)   Resp 22   Wt 95.6 kg (210 lb 12.8 oz)   SpO2 97%   BMI 33.02 kg/m²   General: Awake, alert and oriented, in no distress.  In good spirits.  HEENT: Pupils are equal, round and reactive to light and accommodation, Extraocular movements full, oropharynx clear, NC in place  Neck: no jvd, lymphadenopathy or thyromegaly   Cardiovascular: regular rate and rhythm without murmurs, rubs or gallops   Pulmonary: Decreased breath sounds bilaterally, but clear. No wheezing, rhonchi or rales. Old surgical incision is smooth and flat.   Abdomen: soft, non-tender, sl distended, bowel sounds present, no organomegaly   Extremities: No LE edema  Lymph: No cervical, supraclavicular, axillary, adenopathy   Neurologic: grossly non-focal exam     PATHOLOGY:  11-29-11 Rt upper lobe lung:   - Adenocarcinoma, mucinous type   - Negative for mutation involving ALK gene       IMAGING:  PET-CT Whole Body 1-9-13:   - Overall stable appearance compared with the previous exam.   -  There is a continued evidence of hypermetabolism corresponding to mediastinal and right hilar adenopathy but no new lymph nodes are seen and no significant change in the SUV.     CT Chest w/o contrast 4-1-13:   - Overall there has been no significant interval change when compared to the previous exam dated 8/12.   - There are several m ediastinal lymph nodes present but stable.   - No pericardial or pleural effusion.   - Tiny nodule in the left lung base is stable   - Descending thoracic aneurysm stable.   - Fatty infiltration of the liver     CT Chest w/ contrast 7-29-13:   - Questionable increase in the siz e of mediastinal lymphadenopathy.   - An AP window lymph node was 1.4mc and is 1.9cm.   - A prevascular space lymph node was 1.4cm and is 1.4cm.   - A subcarinal region lymph node was 2cm and is 2.7cm.   - A left hilar region lymph node is stable measuring 1.3 measuring 12 mm. conceivably difference and axial selection could cause this discrepancy.   - Compared 8/8/12 examination, the lymphadenopathy is stable to slightly improved.     Chest CT w/ contrast 6-9-14:   - Stable mediastinal borderline enlarged lymph nodes.   - An AP window lymph node measures 1.5cm and was 1.5cm.   - Stable slightly increased soft tissue density in the right hilar region.   - There is persistent supleural fibrotic scarring right lung base scarring is again noted.   - No new suspicious pulmonary mass or nodule.   - Posterior right rib fractures are again noted.   - Descending thoracic aortic aneurysm of 4.7cm. Stable.     CT Chest w/ contrast 12-02-14   - Enlarging mediastinal lymph nodoes   - No parenchymal soft tissue nodules or masses   - The largest soft tissue density is in the right paratrachel location and measures 2cm.  - Previously the same nodule measured 1.3cm.     CT Chest w/ contrast 03-02-15:   - The descending portion of the abdominal aorta is stable In size measuring  approximately 4.9 cm on today's study.   - There  is abnormal adenopathy throughout the mediastinum. The largest lymph node measured 21 mm in the superior mediastinum adjacent to the esophagus. The size and number of lymph nodes is radiographically stable.   - There are emphysematous changes and increased interstitial markings throughout the lungs.     CT Chest w/ contrast 06-01-15:   - Stable appearance of the chest   - 4.3 cm infrarenal abdominal aortic aneurysm.     CT Chest w/ contrast 09-03-15:   - Mediastinal adenopathy is stable   - There is a 4.6cm abdominal aortic aneurysm slightly increased in size from the  previous exam.     CT Chest 09-20-16:  - Slight interval increase in size of the abdominal aortic aneurysm. It now measures 5 cm just above the diaphragm. There is adenopathy i n the mediastinum. The lymph nodes have increased in size in comparing with the earlier CT back in September.   - Continued adenopathy in the mediastinum and right hilum.   - Radiographically this is stable in comparing with the earlier exam.   - There continues to be aneurysmal dilatation of the aorta with a diameter of 5 cm just above the diaphragm.   - The lungs show changes of chronic interstitial lung disease.     CT Chest 05-10-17:  - Persistent mediastinal adenopathy radiographically unchanged from September 2016.   - There continues to be diffuse interstitial lung disease throughout both lungs. There is aneurysmal dilatation of the aorta just above the diaphragm measuring 5 cm    CT Chest 11-17-17:  - Stable mediastinal adenopathy. Largest prevascular lymph  node is 1.6 cm and was previously 1.8 cm. A right subcarinal lymph node  is 2.2 cm and was previously 2.1 cm. A right para esophageal lymph node superiorly is 1.8 cm and was previously 1.9 cm. No new adenopathy stations identified.   - Stable 5 cm fusiform type thoracic aortic aneurysm  predominantly involving the descending thoracic aorta. Mural thrombus is present.    CT Thoracic Aorta 6/4/18 [facility]        12-03-18  CT Chest With Contrast   - On the mediastinal windows there are enlarged lymph nodes in the mediastinum. These involve the superior mediastinum, aorticopulmonary window, anterior mediastinum, pretracheal and subcarinal regions. The size of the lymph nodes are fairly stable. The largest lymph node today is in the subcarinal region measuring 3 cm in diameter. There continues to be aneurysmal dilatation of the thoracic aorta. The maximum diameter is approximately 5 cm. The upper abdominal aorta is also dilated. On the lung windows there are moderate emphysematous changes noted in the lungs with hyperinflation and increased interstitial markings.     IMPRESSION:  - Continued evidence of COPD and emphysema in the lungs. There is persistent lymphadenopathy in the mediastinum. The largest node now has increased in size in the subcarinal region measuring 3 cm. The other lymph nodes are fairly stable.  - There continues to be aneurysmal dilatation of the descending thoracic  aorta, stable at 5 cm.     RECENT LABS:  Lab Results   Component Value Date    WBC 8.62 12/10/2018    HGB 14.7 12/10/2018    HCT 44.3 12/10/2018    MCV 89.5 12/10/2018    RDW 14.6 (H) 12/10/2018     12/10/2018    NEUTRORELPCT 64.8 12/10/2018    LYMPHORELPCT 20.4 12/10/2018    MONORELPCT 9.9 12/10/2018    EOSRELPCT 3.7 12/10/2018    BASORELPCT 0.6 12/10/2018    NEUTROABS 5.59 12/10/2018    LYMPHSABS 1.76 12/10/2018       Lab Results   Component Value Date     12/10/2018    K 4.2 12/10/2018    CO2 26.2 12/10/2018     12/10/2018    BUN 15 12/10/2018    CREATININE 0.79 12/10/2018    GLUCOSE 135 (H) 12/10/2018    CALCIUM 9.2 12/10/2018    ALKPHOS 59 12/10/2018    AST 36 (H) 12/10/2018    ALT 43 12/10/2018    BILITOT 1.3 12/10/2018    ALBUMIN 4.70 12/10/2018    PROTEINTOT 7.2 12/10/2018       ASSESSMENT & PLAN:  Kb Marshall is a very pleasant 73 y.o. male witha history of Stage IB ([id number]) well differentiated mucinous adenocarcinoma of  the lung.    1. Lung cancer:   - Treated with RU Lobectomy in November 2011 without adjuvant therapy.   - At one point in the past, there was concern for PET + enlarging mediastinal adenopathy, and recommended biopsy, but after discussion with Dr. Ojeda and reluctance to treat recurrence if it was found, he decided to watch this with imaging.   - These LNs have intermittently waxed and waned over time. Do not know the etiology, but since he always said he wouldn' t take chemotherapy if it were indicated, Dr. Ojeda was disinclined to perform mediastinoscopy for biopsy. Ultimately, he saw Dr. Rothman and was referred for bronchoscopy with EBUS and there was no cause for concern identified. Mediastinal adenopathy is still present, but imaging indicates adenopathy, while sizeable, has remained stable (see above). Previously had negative biopsy. Therefore, unlikely to be malignant under the circumstances. Will continue with observation / monitoring .   - Will plan to follow up again in 6 months with repeat CT Chest  with contrast and labs.     2. Oxygen dependent COPD - stable on 3L oxygen per NC. He remains tobacco free. Recommended continued f/u with his pulmonologist, Dr. Vicente at [facility].     3. AAA: S/p repair of femoral artery aneurysm performed by Dr. Ojeda. He has been following with Dr. Taveras and Dr. Yusef Gusman ([facility] CT Surgery) . He has aneurysms in the thoracic and abdominal aorta. Dr. Gusman recommended continued monitoring for the time being.  Aneurysm appeared stable on recent CT.      4.  Prophylaxis:  Recommended flu and pneumonia vaccines, but he declined.     5. ACO Quality measures  - bK Marshall does not use tobacco products.  - Patient's Body mass index is 33.02 kg/m². BMI is above normal parameters. Recommendations include: referral to primary care.  - Current outpatient and discharge medications have been reconciled for the patient.  Reviewed by: Chaya Ferrer MD    This note was scribed  for Chaya Ferrer MD by Ghada Verma RN.    I, Chaya Ferrer MD, personally performed the services described in this documentation as scribed by the above named individual in my presence, and it is both accurate and complete.  12/10/2018     I spent 15 minutes with Mr. Marshall today. More than 50% of the time was spent in counseling / coordination of care for the above mentioned problems.       Electronically Signed by: Chaya Ferrer MD        CC:   Scar Mejia MD Dr. Pramod Reddy Dr.David Minion

## 2018-12-10 ENCOUNTER — OFFICE VISIT (OUTPATIENT)
Dept: ONCOLOGY | Facility: CLINIC | Age: 73
End: 2018-12-10

## 2018-12-10 ENCOUNTER — LAB (OUTPATIENT)
Dept: ONCOLOGY | Facility: CLINIC | Age: 73
End: 2018-12-10

## 2018-12-10 VITALS
RESPIRATION RATE: 22 BRPM | SYSTOLIC BLOOD PRESSURE: 111 MMHG | HEART RATE: 68 BPM | TEMPERATURE: 96.7 F | BODY MASS INDEX: 33.02 KG/M2 | DIASTOLIC BLOOD PRESSURE: 69 MMHG | OXYGEN SATURATION: 97 % | WEIGHT: 210.8 LBS

## 2018-12-10 DIAGNOSIS — R59.0 MEDIASTINAL ADENOPATHY: ICD-10-CM

## 2018-12-10 DIAGNOSIS — C34.90 MALIGNANT NEOPLASM OF LUNG, UNSPECIFIED LATERALITY, UNSPECIFIED PART OF LUNG (HCC): ICD-10-CM

## 2018-12-10 DIAGNOSIS — C34.11 MALIGNANT NEOPLASM OF UPPER LOBE OF RIGHT LUNG (HCC): Primary | ICD-10-CM

## 2018-12-10 DIAGNOSIS — I71.21 THORACIC ASCENDING AORTIC ANEURYSM (HCC): ICD-10-CM

## 2018-12-10 DIAGNOSIS — J44.9 ADVANCED COPD (HCC): ICD-10-CM

## 2018-12-10 LAB
ALBUMIN SERPL-MCNC: 4.7 G/DL (ref 3.4–4.8)
ALBUMIN/GLOB SERPL: 1.9 G/DL (ref 1.5–2.5)
ALP SERPL-CCNC: 59 U/L (ref 40–129)
ALT SERPL W P-5'-P-CCNC: 43 U/L (ref 10–44)
ANION GAP SERPL CALCULATED.3IONS-SCNC: 8.8 MMOL/L (ref 3.6–11.2)
AST SERPL-CCNC: 36 U/L (ref 10–34)
BASOPHILS # BLD AUTO: 0.05 10*3/MM3 (ref 0–0.3)
BASOPHILS NFR BLD AUTO: 0.6 % (ref 0–2)
BILIRUB SERPL-MCNC: 1.3 MG/DL (ref 0.2–1.8)
BUN BLD-MCNC: 15 MG/DL (ref 7–21)
BUN/CREAT SERPL: 19 (ref 7–25)
CALCIUM SPEC-SCNC: 9.2 MG/DL (ref 7.7–10)
CHLORIDE SERPL-SCNC: 102 MMOL/L (ref 99–112)
CO2 SERPL-SCNC: 26.2 MMOL/L (ref 24.3–31.9)
CREAT BLD-MCNC: 0.79 MG/DL (ref 0.43–1.29)
DEPRECATED RDW RBC AUTO: 46.4 FL (ref 37–54)
EOSINOPHIL # BLD AUTO: 0.32 10*3/MM3 (ref 0–0.7)
EOSINOPHIL NFR BLD AUTO: 3.7 % (ref 0–7)
ERYTHROCYTE [DISTWIDTH] IN BLOOD BY AUTOMATED COUNT: 14.6 % (ref 11.5–14.5)
GFR SERPL CREATININE-BSD FRML MDRD: 96 ML/MIN/1.73
GLOBULIN UR ELPH-MCNC: 2.5 GM/DL
GLUCOSE BLD-MCNC: 135 MG/DL (ref 70–110)
HCT VFR BLD AUTO: 44.3 % (ref 42–52)
HGB BLD-MCNC: 14.7 G/DL (ref 14–18)
IMM GRANULOCYTES # BLD: 0.05 10*3/MM3 (ref 0–0.03)
IMM GRANULOCYTES NFR BLD: 0.6 % (ref 0–0.5)
LYMPHOCYTES # BLD AUTO: 1.76 10*3/MM3 (ref 1–3)
LYMPHOCYTES NFR BLD AUTO: 20.4 % (ref 16–46)
MCH RBC QN AUTO: 29.7 PG (ref 27–33)
MCHC RBC AUTO-ENTMCNC: 33.2 G/DL (ref 33–37)
MCV RBC AUTO: 89.5 FL (ref 80–94)
MONOCYTES # BLD AUTO: 0.85 10*3/MM3 (ref 0.1–0.9)
MONOCYTES NFR BLD AUTO: 9.9 % (ref 0–12)
NEUTROPHILS # BLD AUTO: 5.59 10*3/MM3 (ref 1.4–6.5)
NEUTROPHILS NFR BLD AUTO: 64.8 % (ref 40–75)
OSMOLALITY SERPL CALC.SUM OF ELEC: 276.7 MOSM/KG (ref 273–305)
PLATELET # BLD AUTO: 214 10*3/MM3 (ref 130–400)
PMV BLD AUTO: 9.2 FL (ref 6–10)
POTASSIUM BLD-SCNC: 4.2 MMOL/L (ref 3.5–5.3)
PROT SERPL-MCNC: 7.2 G/DL (ref 6–8)
RBC # BLD AUTO: 4.95 10*6/MM3 (ref 4.7–6.1)
SODIUM BLD-SCNC: 137 MMOL/L (ref 135–153)
WBC NRBC COR # BLD: 8.62 10*3/MM3 (ref 4.5–12.5)

## 2018-12-10 PROCEDURE — 80053 COMPREHEN METABOLIC PANEL: CPT | Performed by: NURSE PRACTITIONER

## 2018-12-10 PROCEDURE — 99214 OFFICE O/P EST MOD 30 MIN: CPT | Performed by: INTERNAL MEDICINE

## 2018-12-10 PROCEDURE — 85025 COMPLETE CBC W/AUTO DIFF WBC: CPT | Performed by: NURSE PRACTITIONER

## 2019-04-13 ENCOUNTER — HOSPITAL ENCOUNTER (EMERGENCY)
Facility: HOSPITAL | Age: 74
Discharge: HOME | End: 2019-04-13
Attending: EMERGENCY MEDICINE | Admitting: EMERGENCY MEDICINE

## 2019-04-13 VITALS
RESPIRATION RATE: 18 BRPM | WEIGHT: 195 LBS | BODY MASS INDEX: 30.61 KG/M2 | TEMPERATURE: 98 F | HEIGHT: 67 IN | SYSTOLIC BLOOD PRESSURE: 136 MMHG | DIASTOLIC BLOOD PRESSURE: 79 MMHG | HEART RATE: 75 BPM | OXYGEN SATURATION: 98 %

## 2019-04-13 DIAGNOSIS — S61.213A LACERATION OF LEFT MIDDLE FINGER WITHOUT FOREIGN BODY WITHOUT DAMAGE TO NAIL, INITIAL ENCOUNTER: Primary | ICD-10-CM

## 2019-04-13 PROCEDURE — 99284 EMERGENCY DEPT VISIT MOD MDM: CPT

## 2019-04-13 RX ORDER — LIDOCAINE HYDROCHLORIDE 10 MG/ML
10 INJECTION, SOLUTION EPIDURAL; INFILTRATION; INTRACAUDAL; PERINEURAL ONCE
Status: COMPLETED | OUTPATIENT
Start: 2019-04-13 | End: 2019-04-13

## 2019-04-13 RX ADMIN — LIDOCAINE HYDROCHLORIDE 10 ML: 10 INJECTION, SOLUTION EPIDURAL; INFILTRATION; INTRACAUDAL; PERINEURAL at 19:52

## 2019-04-15 ENCOUNTER — PATIENT OUTREACH (OUTPATIENT)
Dept: CASE MANAGEMENT | Facility: OTHER | Age: 74
End: 2019-04-15

## 2019-04-15 NOTE — OUTREACH NOTE
RN-Care Advisor Unsuccessful Outreach Attempt    UTR#:  1  Purpose of Call: ED f/u  Next Attempt Schedule: 3-5 days

## 2019-05-09 ENCOUNTER — OFFICE VISIT (OUTPATIENT)
Dept: CARDIOLOGY | Facility: CLINIC | Age: 74
End: 2019-05-09

## 2019-05-09 VITALS
SYSTOLIC BLOOD PRESSURE: 116 MMHG | HEART RATE: 74 BPM | WEIGHT: 202 LBS | BODY MASS INDEX: 31.71 KG/M2 | DIASTOLIC BLOOD PRESSURE: 75 MMHG | RESPIRATION RATE: 16 BRPM | OXYGEN SATURATION: 93 % | HEIGHT: 67 IN

## 2019-05-09 DIAGNOSIS — I71.21 THORACIC ASCENDING AORTIC ANEURYSM (HCC): Primary | ICD-10-CM

## 2019-05-09 DIAGNOSIS — E78.5 DYSLIPIDEMIA: ICD-10-CM

## 2019-05-09 DIAGNOSIS — I73.9 PAD (PERIPHERAL ARTERY DISEASE) (HCC): ICD-10-CM

## 2019-05-09 DIAGNOSIS — C34.11 MALIGNANT NEOPLASM OF UPPER LOBE OF RIGHT LUNG (HCC): ICD-10-CM

## 2019-05-09 DIAGNOSIS — I10 ESSENTIAL HYPERTENSION: ICD-10-CM

## 2019-05-09 PROCEDURE — 93000 ELECTROCARDIOGRAM COMPLETE: CPT | Performed by: NURSE PRACTITIONER

## 2019-05-09 PROCEDURE — 99213 OFFICE O/P EST LOW 20 MIN: CPT | Performed by: NURSE PRACTITIONER

## 2019-05-09 NOTE — PROGRESS NOTES
Scar Mejia MD  Kb DEAL Rolando  1945 05/09/2019    Patient Active Problem List   Diagnosis   • Thoracic ascending aortic aneurysm of 4.7cm and arch aortic aneurysm of 4.1cm noted on the CT scan of the chest recently in February 2016.   • Advanced COPD, on home oxygen.   • History of Lung calcification diagnosed in November 2011, status post right upper lobectomy with no adjuvant chemotherapy.    • Type 2 diabetes mellitus (CMS/HCC)   • Hypertension, which is well controlled.   • Dizziness   • SOB (shortness of breath)   • PAD (peripheral artery disease) (CMS/HCC)   • Dyslipidemia   • Malignant neoplasm of upper lobe of right lung, status post right upper lobectomy in 2011, being followed by Dr. Johnson   • Rectal bleeding       Dear Scar Mejia MD:    Subjective       Chief Complaint   Patient presents with   • Follow-up     1 year   • Shortness of Breath     increased   • Chest Pain     vague epigastric pain about 2 weeks ago, lasting 2 days   • Med Management     list provided       History of Present Illness:    The patient presents today for routine cardiology follow up. He has a history of thoracic ascending aortic aneurysm of 4.7 cm for which he follows with CT surgery at [facility]. He also has a history of lung cancer and follows routinely with Dr. Ferrer. He presents today for routine cardiology follow up. Reports he has be doing well. He has chronic shortness of breath which has not recently worsened. He wears continuous oxygen. He did have some epigastric discomfort 2 weeks ago. This lasted intermittently for 2 days. The pain was sharp and worse with deep breaths.This has now resolved. He has had no chest pains with exertion.    Allergies   Allergen Reactions   • Acetaminophen-Codeine    • Adhesive Tape Other (See Comments)     ADHESIVE CAUSES SKIN BLISTERS, PER PT   • Indocin [Indomethacin] Itching   :      Current Outpatient Medications:   •  atorvastatin (LIPITOR) 10 MG tablet,  Take 10 mg by mouth Daily., Disp: , Rfl:   •  Empagliflozin (JARDIANCE) 25 MG tablet, Take 25 mg by mouth Daily., Disp: , Rfl:   •  gabapentin (NEURONTIN) 400 MG capsule, Take 400 mg by mouth 3 (Three) Times a Day., Disp: , Rfl:   •  glimepiride (AMARYL) 4 MG tablet, Take 4 mg by mouth 2 (Two) Times a Day., Disp: , Rfl:   •  hydrALAZINE (APRESOLINE) 50 MG tablet, Take 50 mg by mouth 2 (Two) Times a Day., Disp: , Rfl:   •  hydrochlorothiazide (HYDRODIURIL) 12.5 MG tablet, Take 12.5 mg by mouth Daily., Disp: , Rfl:   •  lisinopril (PRINIVIL,ZESTRIL) 20 MG tablet, Take 20 mg by mouth 2 (Two) Times a Day., Disp: , Rfl:   •  metFORMIN (GLUCOPHAGE) 500 MG tablet, Take 1,000 mg by mouth 2 (Two) Times a Day With Meals. Takes 2 500mg tabs per dose, Disp: , Rfl:   •  metoprolol tartrate (LOPRESSOR) 50 MG tablet, Take 50 mg by mouth 2 (Two) Times a Day. Takes 1 tablet twice per day, Disp: , Rfl:   •  nabumetone (RELAFEN) 500 MG tablet, Take 500 mg by mouth 2 (Two) Times a Day As Needed for mild pain (1-3)., Disp: , Rfl:   •  oxyCODONE-acetaminophen (PERCOCET) 7.5-325 MG per tablet, Take 1 tablet by mouth Every 6 (Six) Hours As Needed., Disp: , Rfl:       The following portions of the patient's history were reviewed and updated as appropriate: allergies, current medications, past family history, past medical history, past social history, past surgical history and problem list.    Social History     Tobacco Use   • Smoking status: Former Smoker     Packs/day: 1.00     Years: 50.00     Pack years: 50.00     Last attempt to quit: 2011     Years since quittin.5   • Smokeless tobacco: Current User     Types: Chew   Substance Use Topics   • Alcohol use: Yes     Alcohol/week: 0.6 oz     Types: 1 Cans of beer per week   • Drug use: No       Review of Systems   Constitution: Negative for weakness and malaise/fatigue.   Cardiovascular: Negative for chest pain, dyspnea on exertion, irregular heartbeat, leg swelling, near-syncope,  "orthopnea, palpitations, paroxysmal nocturnal dyspnea and syncope.   Respiratory: Positive for shortness of breath. Negative for cough and wheezing.    Neurological: Negative for dizziness and light-headedness.       Objective   Vitals:    05/09/19 0944   BP: 116/75   Pulse: 74   Resp: 16   SpO2: 93%   Weight: 91.6 kg (202 lb)   Height: 170.2 cm (67\")     Body mass index is 31.64 kg/m².        Physical Exam   Constitutional: He is oriented to person, place, and time. He appears well-developed and well-nourished.   HENT:   Head: Normocephalic and atraumatic.   Cardiovascular: Normal rate, regular rhythm and normal heart sounds. Exam reveals no S3 and no S4.   No murmur heard.  Pulmonary/Chest: Effort normal. He has wheezes (expiratory). He has no rales.   Wearing continuous oxygen   Abdominal: Soft. Bowel sounds are normal.   Musculoskeletal: He exhibits no edema.   Neurological: He is alert and oriented to person, place, and time.   Skin: Skin is warm and dry.   Psychiatric: He has a normal mood and affect. His behavior is normal.             ECG 12 Lead  Date/Time: 5/9/2019 9:46 AM  Performed by: Edward Aguilar MD  Authorized by: Edward Aguilar MD   Comparison: compared with previous ECG   Similar to previous ECG  Rhythm: sinus rhythm  BPM: 74  Other findings: non-specific ST-T wave changes              Assessment/Plan    Diagnosis Plan   1. Thoracic ascending aortic aneurysm of 4.7cm and arch aortic aneurysm of 4.1cm noted on the CT scan of the chest recently in February 2016.  ECG 12 Lead   2. Essential hypertension  ECG 12 Lead   3. PAD (peripheral artery disease) (CMS/HCC)  ECG 12 Lead   4. Malignant neoplasm of upper lobe of right lung, status post right upper lobectomy in 2011, being followed by Dr. Johnson  ECG 12 Lead   5. Dyslipidemia  ECG 12 Lead                Recommendations:    1.  I have advised him to continue close follow up with CT surgery at [facility] for surveillance of the thoracic " aortic aneurysm.    2. Since his chest pain was atypical and has resolved, will not pursue any further testing. However, if this reoccurs he will let us know. Would consider stress test at that time.    3. Continue atorvastatin, hydralazine, HCTZ, lisinopril, and metoprolol.    4. Follow up in 6 months and PRN.       Return in about 6 months (around 11/9/2019) for Recheck.      With Best Regards,    Concha Winters, APRN

## 2019-06-03 ENCOUNTER — HOSPITAL ENCOUNTER (OUTPATIENT)
Dept: CT IMAGING | Facility: HOSPITAL | Age: 74
Discharge: HOME | End: 2019-06-03
Admitting: INTERNAL MEDICINE

## 2019-06-03 DIAGNOSIS — C34.11 MALIGNANT NEOPLASM OF UPPER LOBE OF RIGHT LUNG (HCC): ICD-10-CM

## 2019-06-03 DIAGNOSIS — R59.0 MEDIASTINAL ADENOPATHY: ICD-10-CM

## 2019-06-03 LAB — CREAT BLDA-MCNC: 0.8 MG/DL (ref 0.6–1.3)

## 2019-06-03 PROCEDURE — 82565 ASSAY OF CREATININE: CPT

## 2019-06-03 PROCEDURE — 71260 CT THORAX DX C+: CPT | Performed by: RADIOLOGY

## 2019-06-03 PROCEDURE — 71260 CT THORAX DX C+: CPT

## 2019-06-03 PROCEDURE — 0 IOVERSOL 68 % SOLUTION: Performed by: INTERNAL MEDICINE

## 2019-06-03 RX ADMIN — IOVERSOL 70 ML: 678 INJECTION INTRA-ARTERIAL; INTRAVENOUS at 08:47

## 2019-06-11 ENCOUNTER — OFFICE VISIT (OUTPATIENT)
Dept: ONCOLOGY | Facility: CLINIC | Age: 74
End: 2019-06-11

## 2019-06-11 VITALS
TEMPERATURE: 97.2 F | DIASTOLIC BLOOD PRESSURE: 77 MMHG | WEIGHT: 200 LBS | BODY MASS INDEX: 31.32 KG/M2 | SYSTOLIC BLOOD PRESSURE: 126 MMHG | OXYGEN SATURATION: 95 % | HEART RATE: 79 BPM | RESPIRATION RATE: 22 BRPM

## 2019-06-11 DIAGNOSIS — R59.0 MEDIASTINAL ADENOPATHY: Primary | ICD-10-CM

## 2019-06-11 DIAGNOSIS — J44.9 ADVANCED COPD (HCC): ICD-10-CM

## 2019-06-11 DIAGNOSIS — I71.21 THORACIC ASCENDING AORTIC ANEURYSM (HCC): ICD-10-CM

## 2019-06-11 PROCEDURE — 99213 OFFICE O/P EST LOW 20 MIN: CPT | Performed by: INTERNAL MEDICINE

## 2019-06-11 PROCEDURE — 80053 COMPREHEN METABOLIC PANEL: CPT | Performed by: INTERNAL MEDICINE

## 2019-06-11 PROCEDURE — 85025 COMPLETE CBC W/AUTO DIFF WBC: CPT | Performed by: INTERNAL MEDICINE

## 2019-06-11 NOTE — PROGRESS NOTES
DATE:  6/11/2019    DIAGNOSIS:  1. 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 AAA  3. Thoracic adenopathy     CHIEF COMPLAINT  Follow up of Lung Cancer     TREATMENT HISTORY:  1. RUL lobectomy Nov. 2011 w/o any adjuvant therapy     HISTORY OF PRESENT ILLNESS:   Mr. Marshall was initially diagnosed with a Stage Ib well-differentiated mucinous adenocarcinoma of the lung in 2011 and underwent RULobectomy performed by Dr. Welsh in November of 2011. He did not receive adjuvant chemotherapy. He has been well sin ce his initial surgery and has not had evidence of recurrence, though he does have thoracic adenopathy which sometimes enlarges, sometimes shrinks and which has in the past been + on PET but which he hasn' t wanted investigated or treated.     INTERVAL HISTORY:  Mr. Marshall is here today for follow up of lung cancer. He has been well since his last visit. He is anxious about his imaging.  He has been feeling well.  He was able to go fishing and enjoyed himself.  He continues to use 2-3L oxygen. He has no complaints today.      PAST MEDICAL HISTORY:  Past Medical History:   Diagnosis Date   • Aneurysm (CMS/HCC)    • Arthritis    • Asthma    • Cancer (CMS/HCC)     lungs   • COPD (chronic obstructive pulmonary disease) (CMS/HCC)    • Coronary artery disease    • Diabetes mellitus (CMS/HCC)    • Dyslipidemia    • GERD (gastroesophageal reflux disease)    • Heart disease    • Hypertension    • PAD (peripheral artery disease) (CMS/HCC)        PAST SURGICAL HISTORY:  Past Surgical History:   Procedure Laterality Date   • ABDOMINAL AORTIC ANEURYSM REPAIR     • COLONOSCOPY N/A 10/11/2018    Procedure: COLONOSCOPY;  Surgeon: Calos Stockton MD;  Location: [facility];  Service: Gastroenterology   • FINGER SURGERY Left    • KNEE SURGERY Left    • LUNG REMOVAL, PARTIAL  11/29/2011   • LUNG SURGERY  11/28/2011       FAMILY HISTORY:  Family History   Problem Relation Age of Onset   • Cancer  Mother    • Cancer Brother      Social History     Socioeconomic History   • Marital status:      Spouse name: Not on file   • Number of children: Not on file   • Years of education: Not on file   • Highest education level: Not on file   Tobacco Use   • Smoking status: Former Smoker     Packs/day: 1.00     Years: 50.00     Pack years: 50.00     Last attempt to quit: 2011     Years since quittin.6   • Smokeless tobacco: Current User     Types: Chew   Substance and Sexual Activity   • Alcohol use: Yes     Alcohol/week: 0.6 oz     Types: 1 Cans of beer per week   • Drug use: No   • Sexual activity: Defer       REVIEW OF SYSTEMS:    A comprehensive 14 point review of systems was performed and was negative except as mentioned    MEDICATIONS:  The current medication list was reviewed in the EMR    Current Outpatient Medications:   •  atorvastatin (LIPITOR) 10 MG tablet, Take 10 mg by mouth Daily., Disp: , Rfl:   •  Empagliflozin (JARDIANCE) 25 MG tablet, Take 25 mg by mouth Daily., Disp: , Rfl:   •  gabapentin (NEURONTIN) 400 MG capsule, Take 400 mg by mouth 3 (Three) Times a Day., Disp: , Rfl:   •  glimepiride (AMARYL) 4 MG tablet, Take 4 mg by mouth 2 (Two) Times a Day., Disp: , Rfl:   •  hydrALAZINE (APRESOLINE) 50 MG tablet, Take 50 mg by mouth 2 (Two) Times a Day., Disp: , Rfl:   •  hydrochlorothiazide (HYDRODIURIL) 12.5 MG tablet, Take 12.5 mg by mouth Daily., Disp: , Rfl:   •  lisinopril (PRINIVIL,ZESTRIL) 20 MG tablet, Take 20 mg by mouth 2 (Two) Times a Day., Disp: , Rfl:   •  metFORMIN (GLUCOPHAGE) 500 MG tablet, Take 1,000 mg by mouth 2 (Two) Times a Day With Meals. Takes 2 500mg tabs per dose, Disp: , Rfl:   •  metoprolol tartrate (LOPRESSOR) 50 MG tablet, Take 50 mg by mouth 2 (Two) Times a Day. Takes 1 tablet twice per day, Disp: , Rfl:   •  nabumetone (RELAFEN) 500 MG tablet, Take 500 mg by mouth 2 (Two) Times a Day As Needed for mild pain (1-3)., Disp: , Rfl:   •  oxyCODONE-acetaminophen  (PERCOCET) 7.5-325 MG per tablet, Take 1 tablet by mouth Every 6 (Six) Hours As Needed., Disp: , Rfl:     ALLERGIES:    Allergies   Allergen Reactions   • Acetaminophen-Codeine    • Adhesive Tape Other (See Comments)     ADHESIVE CAUSES SKIN BLISTERS, PER PT   • Indocin [Indomethacin] Itching     PHYSICAL EXAM:  Visit Vitals  /77   Pulse 79   Temp 97.2 °F (36.2 °C) (Oral)   Resp 22   Wt 90.7 kg (200 lb)   SpO2 95%   BMI 31.32 kg/m²   General: Awake, alert and oriented, in no distress.  In good spirits.  HEENT: Pupils are equal, round and reactive to light and accommodation, Extraocular movements full, oropharynx clear, NC in place  Neck: no jvd, lymphadenopathy or thyromegaly   Cardiovascular: regular rate and rhythm without murmurs, rubs or gallops   Pulmonary: Decreased breath sounds bilaterally, but clear. No wheezing, rhonchi or rales. Old surgical incision is smooth and flat.   Abdomen: soft, non-tender, sl distended, bowel sounds present, no organomegaly   Extremities: No LE edema  Lymph: No cervical, supraclavicular, axillary, adenopathy   Neurologic: grossly non-focal exam     PATHOLOGY:  11-29-11 Rt upper lobe lung:   - Adenocarcinoma, mucinous type   - Negative for mutation involving ALK gene       IMAGING:  PET-CT Whole Body 1-9-13:   - Overall stable appearance compared with the previous exam.   - There is a continued evidence of hypermetabolism corresponding to mediastinal and right hilar adenopathy but no new lymph nodes are seen and no significant change in the SUV.     CT Chest w/o contrast 4-1-13:   - Overall there has been no significant interval change when compared to the previous exam dated 8/12.   - There are several m ediastinal lymph nodes present but stable.   - No pericardial or pleural effusion.   - Tiny nodule in the left lung base is stable   - Descending thoracic aneurysm stable.   - Fatty infiltration of the liver     CT Chest w/ contrast 7-29-13:   - Questionable increase in the  siz e of mediastinal lymphadenopathy.   - An AP window lymph node was 1.4mc and is 1.9cm.   - A prevascular space lymph node was 1.4cm and is 1.4cm.   - A subcarinal region lymph node was 2cm and is 2.7cm.   - A left hilar region lymph node is stable measuring 1.3 measuring 12 mm. conceivably difference and axial selection could cause this discrepancy.   - Compared 8/8/12 examination, the lymphadenopathy is stable to slightly improved.     Chest CT w/ contrast 6-9-14:   - Stable mediastinal borderline enlarged lymph nodes.   - An AP window lymph node measures 1.5cm and was 1.5cm.   - Stable slightly increased soft tissue density in the right hilar region.   - There is persistent supleural fibrotic scarring right lung base scarring is again noted.   - No new suspicious pulmonary mass or nodule.   - Posterior right rib fractures are again noted.   - Descending thoracic aortic aneurysm of 4.7cm. Stable.     CT Chest w/ contrast 12-02-14   - Enlarging mediastinal lymph nodoes   - No parenchymal soft tissue nodules or masses   - The largest soft tissue density is in the right paratrachel location and measures 2cm.  - Previously the same nodule measured 1.3cm.     CT Chest w/ contrast 03-02-15:   - The descending portion of the abdominal aorta is stable In size measuring  approximately 4.9 cm on today's study.   - There is abnormal adenopathy throughout the mediastinum. The largest lymph node measured 21 mm in the superior mediastinum adjacent to the esophagus. The size and number of lymph nodes is radiographically stable.   - There are emphysematous changes and increased interstitial markings throughout the lungs.     CT Chest w/ contrast 06-01-15:   - Stable appearance of the chest   - 4.3 cm infrarenal abdominal aortic aneurysm.     CT Chest w/ contrast 09-03-15:   - Mediastinal adenopathy is stable   - There is a 4.6cm abdominal aortic aneurysm slightly increased in size from the  previous exam.     CT Chest  09-20-16:  - Slight interval increase in size of the abdominal aortic aneurysm. It now measures 5 cm just above the diaphragm. There is adenopathy i n the mediastinum. The lymph nodes have increased in size in comparing with the earlier CT back in September.   - Continued adenopathy in the mediastinum and right hilum.   - Radiographically this is stable in comparing with the earlier exam.   - There continues to be aneurysmal dilatation of the aorta with a diameter of 5 cm just above the diaphragm.   - The lungs show changes of chronic interstitial lung disease.     CT Chest 05-10-17:  - Persistent mediastinal adenopathy radiographically unchanged from September 2016.   - There continues to be diffuse interstitial lung disease throughout both lungs. There is aneurysmal dilatation of the aorta just above the diaphragm measuring 5 cm    CT Chest 11-17-17:  - Stable mediastinal adenopathy. Largest prevascular lymph  node is 1.6 cm and was previously 1.8 cm. A right subcarinal lymph node  is 2.2 cm and was previously 2.1 cm. A right para esophageal lymph node superiorly is 1.8 cm and was previously 1.9 cm. No new adenopathy stations identified.   - Stable 5 cm fusiform type thoracic aortic aneurysm  predominantly involving the descending thoracic aorta. Mural thrombus is present.    CT Thoracic Aorta 6/4/18 [facility]        12-03-18 CT Chest With Contrast   - On the mediastinal windows there are enlarged lymph nodes in the mediastinum. These involve the superior mediastinum, aorticopulmonary window, anterior mediastinum, pretracheal and subcarinal regions. The size of the lymph nodes are fairly stable. The largest lymph node today is in the subcarinal region measuring 3 cm in diameter. There continues to be aneurysmal dilatation of the thoracic aorta. The maximum diameter is approximately 5 cm. The upper abdominal aorta is also dilated. On the lung windows there are moderate emphysematous changes noted in the lungs with  hyperinflation and increased interstitial markings.     IMPRESSION:  - Continued evidence of COPD and emphysema in the lungs. There is persistent lymphadenopathy in the mediastinum. The largest node now has increased in size in the subcarinal region measuring 3 cm. The other lymph nodes are fairly stable.  - There continues to be aneurysmal dilatation of the descending thoracic  aorta, stable at 5 cm.     CT Chest with Contrast 6-3-19:  The soft tissue windows continue to show lymphadenopathy in  the mediastinum. The number of enlarged lymph nodes is stable. The  largest lymph node in the subcarinal area is slightly smaller measuring  2.5 cm today. It measured 3 cm earlier. The other lymph nodes are  stable, none have increased in size. There were no pleural effusions.  There continues to be aneurysmal dilatation of the distal thoracic  aorta. The lung windows show changes of interstitial lung disease,  emphysema and COPD.     IMPRESSION:  Lymphadenopathy in the mediastinum is relatively stable, the  largest node has decreased in size by approximately 5 mm. None of the  nodes have increased in size.     RECENT LABS:  Lab Results   Component Value Date    WBC 9.60 06/11/2019    HGB 14.8 06/11/2019    HCT 46.1 06/11/2019    MCV 90.7 06/11/2019    RDW 15.0 06/11/2019     06/11/2019    NEUTRORELPCT 67.8 06/11/2019    LYMPHORELPCT 19.3 (L) 06/11/2019    MONORELPCT 10.2 06/11/2019    EOSRELPCT 2.1 06/11/2019    BASORELPCT 0.3 06/11/2019    NEUTROABS 6.51 06/11/2019    LYMPHSABS 1.85 06/11/2019       Lab Results   Component Value Date     06/11/2019    K 3.9 06/11/2019    CO2 25.5 06/11/2019     06/11/2019    BUN 12 06/11/2019    CREATININE 0.89 06/11/2019    GLUCOSE 140 (H) 06/11/2019    CALCIUM 9.7 06/11/2019    ALKPHOS 59 06/11/2019    AST 26 06/11/2019    ALT 26 06/11/2019    BILITOT 0.9 06/11/2019    ALBUMIN 4.40 06/11/2019    PROTEINTOT 7.5 06/11/2019       ASSESSMENT & PLAN:  Kb Marshall is a  very pleasant 74 y.o. 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    1. Lung cancer:   - Treated with RU Lobectomy in November 2011 without adjuvant therapy.   - At one point in the past, there was concern for PET + enlarging mediastinal adenopathy, and recommended biopsy, but after discussion with Dr. Ojeda and reluctance to treat recurrence if it was found, he decided to watch this with imaging.   - These LNs have intermittently waxed and waned over time. Do not know the etiology, but since he always said he wouldn' t take chemotherapy if it were indicated, Dr. Ojeda was disinclined to perform mediastinoscopy for biopsy. Ultimately, he saw Dr. Rothman and was referred for bronchoscopy with EBUS and there was no cause for concern identified. Mediastinal adenopathy is still present, but imaging indicates adenopathy, while sizeable, has remained stable to improved (see above). Previously had negative biopsy. Therefore, unlikely to be malignant under the circumstances. Will continue with observation / monitoring .   - Will plan to follow up again in 6 months with repeat CT Chest and labs.     2. Oxygen dependent COPD - stable on 2.5-3L oxygen per NC. He remains tobacco free. Recommended continued f/u with his pulmonologist, Dr. Vicente at [facility]. He says he hasn't needed to f/u there in some time because of stability of his COPD.    3. AAA: S/p repair of femoral artery aneurysm performed by Dr. Ojeda. He has been following with Dr. Taveras and Dr. Yusef Gusman ([facility] CT Surgery) . He has aneurysms in the thoracic and abdominal aorta. Dr. Gusman recommended continued monitoring for the time being.  Aneurysm appeared stable on recent CT.      4.  Prophylaxis:  Recommended flu and pneumonia vaccines, but he declined.     5. ACO Quality measures  - Quiles TOYIN Marshall does not use tobacco products.  - Kb Marshall received 2018 flu vaccine.  - Current outpatient and  discharge medications have been reconciled for the patient.  Reviewed by: Chaya Ferrer MD     I spent 15 minutes with Mr. Marshall today. More than 50% of the time was spent in counseling / coordination of care for the above mentioned problems.       Electronically Signed by: Chaya Ferrer MD        CC:   Scar Mejia MD Dr. Pramod Reddy Dr.David Minion

## 2019-09-30 ENCOUNTER — TRANSCRIBE ORDERS (OUTPATIENT)
Dept: ADMINISTRATIVE | Facility: HOSPITAL | Age: 74
End: 2019-09-30

## 2019-09-30 DIAGNOSIS — M54.2 CERVICALGIA: Primary | ICD-10-CM

## 2019-09-30 DIAGNOSIS — M54.2 NECK PAIN: ICD-10-CM

## 2019-10-03 ENCOUNTER — HOSPITAL ENCOUNTER (OUTPATIENT)
Dept: CT IMAGING | Facility: HOSPITAL | Age: 74
Discharge: HOME | End: 2019-10-03
Admitting: PHYSICIAN ASSISTANT

## 2019-10-03 DIAGNOSIS — M54.2 NECK PAIN: ICD-10-CM

## 2019-10-03 DIAGNOSIS — M54.2 CERVICALGIA: ICD-10-CM

## 2019-10-03 PROCEDURE — 72125 CT NECK SPINE W/O DYE: CPT

## 2019-10-03 PROCEDURE — 72125 CT NECK SPINE W/O DYE: CPT | Performed by: RADIOLOGY

## 2019-10-31 ENCOUNTER — OFFICE VISIT (OUTPATIENT)
Dept: CARDIOLOGY | Facility: CLINIC | Age: 74
End: 2019-10-31

## 2019-10-31 VITALS
RESPIRATION RATE: 16 BRPM | WEIGHT: 194.2 LBS | DIASTOLIC BLOOD PRESSURE: 75 MMHG | SYSTOLIC BLOOD PRESSURE: 119 MMHG | BODY MASS INDEX: 30.48 KG/M2 | HEART RATE: 73 BPM | HEIGHT: 67 IN

## 2019-10-31 DIAGNOSIS — I71.21 THORACIC ASCENDING AORTIC ANEURYSM (HCC): ICD-10-CM

## 2019-10-31 DIAGNOSIS — J44.9 ADVANCED COPD (HCC): ICD-10-CM

## 2019-10-31 DIAGNOSIS — I10 ESSENTIAL HYPERTENSION: ICD-10-CM

## 2019-10-31 DIAGNOSIS — C34.11 MALIGNANT NEOPLASM OF UPPER LOBE OF RIGHT LUNG (HCC): ICD-10-CM

## 2019-10-31 DIAGNOSIS — R07.9 CHEST PAIN, UNSPECIFIED TYPE: Primary | ICD-10-CM

## 2019-10-31 PROCEDURE — 93000 ELECTROCARDIOGRAM COMPLETE: CPT | Performed by: INTERNAL MEDICINE

## 2019-10-31 PROCEDURE — 99213 OFFICE O/P EST LOW 20 MIN: CPT | Performed by: INTERNAL MEDICINE

## 2019-12-02 ENCOUNTER — HOSPITAL ENCOUNTER (OUTPATIENT)
Dept: CT IMAGING | Facility: HOSPITAL | Age: 74
Discharge: HOME | End: 2019-12-02
Admitting: INTERNAL MEDICINE

## 2019-12-02 DIAGNOSIS — R59.0 MEDIASTINAL ADENOPATHY: ICD-10-CM

## 2019-12-02 DIAGNOSIS — I71.21 THORACIC ASCENDING AORTIC ANEURYSM (HCC): ICD-10-CM

## 2019-12-02 PROCEDURE — 71250 CT THORAX DX C-: CPT

## 2019-12-02 PROCEDURE — 71250 CT THORAX DX C-: CPT | Performed by: RADIOLOGY

## 2019-12-11 ENCOUNTER — OFFICE VISIT (OUTPATIENT)
Dept: ONCOLOGY | Facility: CLINIC | Age: 74
End: 2019-12-11

## 2019-12-11 ENCOUNTER — LAB (OUTPATIENT)
Dept: ONCOLOGY | Facility: CLINIC | Age: 74
End: 2019-12-11

## 2019-12-11 VITALS
RESPIRATION RATE: 16 BRPM | BODY MASS INDEX: 31.62 KG/M2 | SYSTOLIC BLOOD PRESSURE: 116 MMHG | TEMPERATURE: 96.8 F | WEIGHT: 201.9 LBS | HEART RATE: 67 BPM | OXYGEN SATURATION: 93 % | DIASTOLIC BLOOD PRESSURE: 74 MMHG

## 2019-12-11 DIAGNOSIS — I71.21 THORACIC ASCENDING AORTIC ANEURYSM (HCC): ICD-10-CM

## 2019-12-11 DIAGNOSIS — J44.9 ADVANCED COPD (HCC): ICD-10-CM

## 2019-12-11 DIAGNOSIS — R59.0 MEDIASTINAL ADENOPATHY: Primary | ICD-10-CM

## 2019-12-11 DIAGNOSIS — R59.0 MEDIASTINAL ADENOPATHY: ICD-10-CM

## 2019-12-11 LAB
ALBUMIN SERPL-MCNC: 4.33 G/DL (ref 3.5–5.2)
ALBUMIN/GLOB SERPL: 1.3 G/DL
ALP SERPL-CCNC: 60 U/L (ref 39–117)
ALT SERPL W P-5'-P-CCNC: 35 U/L (ref 1–41)
ANION GAP SERPL CALCULATED.3IONS-SCNC: 14.1 MMOL/L (ref 5–15)
AST SERPL-CCNC: 30 U/L (ref 1–40)
BASOPHILS # BLD AUTO: 0.06 10*3/MM3 (ref 0–0.2)
BASOPHILS NFR BLD AUTO: 0.7 % (ref 0–1.5)
BILIRUB SERPL-MCNC: 0.8 MG/DL (ref 0.2–1.2)
BUN BLD-MCNC: 12 MG/DL (ref 8–23)
BUN/CREAT SERPL: 12.8 (ref 7–25)
CALCIUM SPEC-SCNC: 9.4 MG/DL (ref 8.6–10.5)
CHLORIDE SERPL-SCNC: 100 MMOL/L (ref 98–107)
CO2 SERPL-SCNC: 24.9 MMOL/L (ref 22–29)
CREAT BLD-MCNC: 0.94 MG/DL (ref 0.76–1.27)
DEPRECATED RDW RBC AUTO: 47.9 FL (ref 37–54)
EOSINOPHIL # BLD AUTO: 0.27 10*3/MM3 (ref 0–0.4)
EOSINOPHIL NFR BLD AUTO: 3 % (ref 0.3–6.2)
ERYTHROCYTE [DISTWIDTH] IN BLOOD BY AUTOMATED COUNT: 14.3 % (ref 12.3–15.4)
GFR SERPL CREATININE-BSD FRML MDRD: 79 ML/MIN/1.73
GLOBULIN UR ELPH-MCNC: 3.3 GM/DL
GLUCOSE BLD-MCNC: 191 MG/DL (ref 65–99)
HCT VFR BLD AUTO: 44.8 % (ref 37.5–51)
HGB BLD-MCNC: 14.7 G/DL (ref 13–17.7)
IMM GRANULOCYTES # BLD AUTO: 0.05 10*3/MM3 (ref 0–0.05)
IMM GRANULOCYTES NFR BLD AUTO: 0.6 % (ref 0–0.5)
LYMPHOCYTES # BLD AUTO: 1.53 10*3/MM3 (ref 0.7–3.1)
LYMPHOCYTES NFR BLD AUTO: 16.9 % (ref 19.6–45.3)
MCH RBC QN AUTO: 29.8 PG (ref 26.6–33)
MCHC RBC AUTO-ENTMCNC: 32.8 G/DL (ref 31.5–35.7)
MCV RBC AUTO: 90.9 FL (ref 79–97)
MONOCYTES # BLD AUTO: 1.05 10*3/MM3 (ref 0.1–0.9)
MONOCYTES NFR BLD AUTO: 11.6 % (ref 5–12)
NEUTROPHILS # BLD AUTO: 6.09 10*3/MM3 (ref 1.7–7)
NEUTROPHILS NFR BLD AUTO: 67.2 % (ref 42.7–76)
NRBC BLD AUTO-RTO: 0 /100 WBC (ref 0–0.2)
PLATELET # BLD AUTO: 196 10*3/MM3 (ref 140–450)
PMV BLD AUTO: 8.9 FL (ref 6–12)
POTASSIUM BLD-SCNC: 4 MMOL/L (ref 3.5–5.2)
PROT SERPL-MCNC: 7.6 G/DL (ref 6–8.5)
RBC # BLD AUTO: 4.93 10*6/MM3 (ref 4.14–5.8)
SODIUM BLD-SCNC: 139 MMOL/L (ref 136–145)
WBC NRBC COR # BLD: 9.05 10*3/MM3 (ref 3.4–10.8)

## 2019-12-11 PROCEDURE — 36415 COLL VENOUS BLD VENIPUNCTURE: CPT

## 2019-12-11 PROCEDURE — 85025 COMPLETE CBC W/AUTO DIFF WBC: CPT | Performed by: INTERNAL MEDICINE

## 2019-12-11 PROCEDURE — 99213 OFFICE O/P EST LOW 20 MIN: CPT | Performed by: INTERNAL MEDICINE

## 2019-12-11 PROCEDURE — 80053 COMPREHEN METABOLIC PANEL: CPT | Performed by: INTERNAL MEDICINE

## 2019-12-11 NOTE — PROGRESS NOTES
DATE:  12/11/2019    DIAGNOSIS:  1. 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 AAA  3. Thoracic adenopathy     CHIEF COMPLAINT  Follow up of Lung Cancer     TREATMENT HISTORY:  1. RUL lobectomy Nov. 2011 w/o any adjuvant therapy     HISTORY OF PRESENT ILLNESS:   Mr. Marshall was initially diagnosed with a Stage Ib well-differentiated mucinous adenocarcinoma of the lung in 2011 and underwent RULobectomy performed by Dr. Welsh in November of 2011. He did not receive adjuvant chemotherapy. He has been well sin ce his initial surgery and has not had evidence of recurrence, though he does have thoracic adenopathy which sometimes enlarges, sometimes shrinks and which has in the past been + on PET but which he hasn' t wanted investigated or treated.     INTERVAL HISTORY:  Mr. Marshall is here today for follow up of lung cancer. He has been well since his last visit except for an incident where he fell out of a tree trying to rig up something to hold his boat so he could work on the boat trailer.  Luckily he was not seriously injured.  He is anxious to hear about his imaging.  He has been feeling well.      PAST MEDICAL HISTORY:  Past Medical History:   Diagnosis Date   • Aneurysm (CMS/HCC)    • Arthritis    • Asthma    • Cancer (CMS/HCC)     lungs   • COPD (chronic obstructive pulmonary disease) (CMS/HCC)    • Coronary artery disease    • Diabetes mellitus (CMS/HCC)    • Dyslipidemia    • GERD (gastroesophageal reflux disease)    • Heart disease    • Hypertension    • PAD (peripheral artery disease) (CMS/HCC)        PAST SURGICAL HISTORY:  Past Surgical History:   Procedure Laterality Date   • ABDOMINAL AORTIC ANEURYSM REPAIR     • COLONOSCOPY N/A 10/11/2018    Procedure: COLONOSCOPY;  Surgeon: Calos Stockton MD;  Location: [facility];  Service: Gastroenterology   • FINGER SURGERY Left    • KNEE SURGERY Left    • LUNG REMOVAL, PARTIAL  11/29/2011   • LUNG SURGERY  11/28/2011        FAMILY HISTORY:  Family History   Problem Relation Age of Onset   • Cancer Mother    • Cancer Brother      Social History     Socioeconomic History   • Marital status:      Spouse name: Not on file   • Number of children: Not on file   • Years of education: Not on file   • Highest education level: Not on file   Tobacco Use   • Smoking status: Former Smoker     Packs/day: 3.00     Years: 50.00     Pack years: 150.00     Last attempt to quit: 2011     Years since quittin.1   • Smokeless tobacco: Former User     Types: Chew   Substance and Sexual Activity   • Alcohol use: Yes     Alcohol/week: 1.0 standard drinks     Types: 1 Cans of beer per week   • Drug use: No   • Sexual activity: Defer       REVIEW OF SYSTEMS:    A comprehensive 14 point review of systems was performed and was negative except as mentioned    MEDICATIONS:  The current medication list was reviewed in the EMR    Current Outpatient Medications:   •  atorvastatin (LIPITOR) 10 MG tablet, Take 10 mg by mouth Daily., Disp: , Rfl:   •  Empagliflozin (JARDIANCE) 25 MG tablet, Take 25 mg by mouth Daily., Disp: , Rfl:   •  gabapentin (NEURONTIN) 400 MG capsule, Take 400 mg by mouth 3 (Three) Times a Day., Disp: , Rfl:   •  glimepiride (AMARYL) 4 MG tablet, Take 4 mg by mouth 2 (Two) Times a Day., Disp: , Rfl:   •  hydrALAZINE (APRESOLINE) 50 MG tablet, Take 50 mg by mouth 2 (Two) Times a Day., Disp: , Rfl:   •  hydrochlorothiazide (HYDRODIURIL) 12.5 MG tablet, Take 12.5 mg by mouth Daily., Disp: , Rfl:   •  lisinopril (PRINIVIL,ZESTRIL) 20 MG tablet, Take 20 mg by mouth 2 (Two) Times a Day., Disp: , Rfl:   •  metFORMIN (GLUCOPHAGE) 500 MG tablet, Take 1,000 mg by mouth 2 (Two) Times a Day With Meals. Takes 2 500mg tabs per dose, Disp: , Rfl:   •  metoprolol tartrate (LOPRESSOR) 50 MG tablet, Take 50 mg by mouth 2 (Two) Times a Day. Takes 1 tablet twice per day, Disp: , Rfl:   •  nabumetone (RELAFEN) 500 MG tablet, Take 500 mg by mouth 2  (Two) Times a Day As Needed for mild pain (1-3)., Disp: , Rfl:   •  oxyCODONE-acetaminophen (PERCOCET) 7.5-325 MG per tablet, Take 1 tablet by mouth Every 6 (Six) Hours As Needed., Disp: , Rfl:     ALLERGIES:    Allergies   Allergen Reactions   • Acetaminophen-Codeine    • Adhesive Tape Other (See Comments)     ADHESIVE CAUSES SKIN BLISTERS, PER PT   • Indocin [Indomethacin] Itching     PHYSICAL EXAM:  Visit Vitals  /74   Pulse 67   Temp 96.8 °F (36 °C) (Oral)   Resp 16   Wt 91.6 kg (201 lb 14.4 oz)   SpO2 93% Comment: 2.5 liter o2   BMI 31.62 kg/m²   General: Awake, alert and oriented, in no distress.  In good spirits.  HEENT: Pupils are equal, round and reactive to light and accommodation, Extraocular movements full, oropharynx clear, NC in place  Neck: no jvd, lymphadenopathy or thyromegaly   Cardiovascular: regular rate and rhythm without murmurs, rubs or gallops   Pulmonary: Decreased breath sounds bilaterally, but clear. No wheezing, rhonchi or rales. Old surgical incision is smooth and flat.   Abdomen: soft, non-tender, sl distended, bowel sounds present, no organomegaly   Extremities: No LE edema  Lymph: No cervical, supraclavicular, axillary, adenopathy   Neurologic: grossly non-focal exam     PATHOLOGY:  11-29-11 Rt upper lobe lung:   - Adenocarcinoma, mucinous type   - Negative for mutation involving ALK gene       IMAGING:  PET-CT Whole Body 1-9-13:   - Overall stable appearance compared with the previous exam.   - There is a continued evidence of hypermetabolism corresponding to mediastinal and right hilar adenopathy but no new lymph nodes are seen and no significant change in the SUV.     CT Chest w/o contrast 4-1-13:   - Overall there has been no significant interval change when compared to the previous exam dated 8/12.   - There are several m ediastinal lymph nodes present but stable.   - No pericardial or pleural effusion.   - Tiny nodule in the left lung base is stable   - Descending thoracic  aneurysm stable.   - Fatty infiltration of the liver     CT Chest w/ contrast 7-29-13:   - Questionable increase in the siz e of mediastinal lymphadenopathy.   - An AP window lymph node was 1.4mc and is 1.9cm.   - A prevascular space lymph node was 1.4cm and is 1.4cm.   - A subcarinal region lymph node was 2cm and is 2.7cm.   - A left hilar region lymph node is stable measuring 1.3 measuring 12 mm. conceivably difference and axial selection could cause this discrepancy.   - Compared 8/8/12 examination, the lymphadenopathy is stable to slightly improved.     Chest CT w/ contrast 6-9-14:   - Stable mediastinal borderline enlarged lymph nodes.   - An AP window lymph node measures 1.5cm and was 1.5cm.   - Stable slightly increased soft tissue density in the right hilar region.   - There is persistent supleural fibrotic scarring right lung base scarring is again noted.   - No new suspicious pulmonary mass or nodule.   - Posterior right rib fractures are again noted.   - Descending thoracic aortic aneurysm of 4.7cm. Stable.     CT Chest w/ contrast 12-02-14   - Enlarging mediastinal lymph nodoes   - No parenchymal soft tissue nodules or masses   - The largest soft tissue density is in the right paratrachel location and measures 2cm.  - Previously the same nodule measured 1.3cm.     CT Chest w/ contrast 03-02-15:   - The descending portion of the abdominal aorta is stable In size measuring  approximately 4.9 cm on today's study.   - There is abnormal adenopathy throughout the mediastinum. The largest lymph node measured 21 mm in the superior mediastinum adjacent to the esophagus. The size and number of lymph nodes is radiographically stable.   - There are emphysematous changes and increased interstitial markings throughout the lungs.     CT Chest w/ contrast 06-01-15:   - Stable appearance of the chest   - 4.3 cm infrarenal abdominal aortic aneurysm.     CT Chest w/ contrast 09-03-15:   - Mediastinal adenopathy is stable    - There is a 4.6cm abdominal aortic aneurysm slightly increased in size from the  previous exam.     CT Chest 09-20-16:  - Slight interval increase in size of the abdominal aortic aneurysm. It now measures 5 cm just above the diaphragm. There is adenopathy i n the mediastinum. The lymph nodes have increased in size in comparing with the earlier CT back in September.   - Continued adenopathy in the mediastinum and right hilum.   - Radiographically this is stable in comparing with the earlier exam.   - There continues to be aneurysmal dilatation of the aorta with a diameter of 5 cm just above the diaphragm.   - The lungs show changes of chronic interstitial lung disease.     CT Chest 05-10-17:  - Persistent mediastinal adenopathy radiographically unchanged from September 2016.   - There continues to be diffuse interstitial lung disease throughout both lungs. There is aneurysmal dilatation of the aorta just above the diaphragm measuring 5 cm    CT Chest 11-17-17:  - Stable mediastinal adenopathy. Largest prevascular lymph  node is 1.6 cm and was previously 1.8 cm. A right subcarinal lymph node  is 2.2 cm and was previously 2.1 cm. A right para esophageal lymph node superiorly is 1.8 cm and was previously 1.9 cm. No new adenopathy stations identified.   - Stable 5 cm fusiform type thoracic aortic aneurysm  predominantly involving the descending thoracic aorta. Mural thrombus is present.    CT Thoracic Aorta 6/4/18 [facility]        12-03-18 CT Chest With Contrast   - On the mediastinal windows there are enlarged lymph nodes in the mediastinum. These involve the superior mediastinum, aorticopulmonary window, anterior mediastinum, pretracheal and subcarinal regions. The size of the lymph nodes are fairly stable. The largest lymph node today is in the subcarinal region measuring 3 cm in diameter. There continues to be aneurysmal dilatation of the thoracic aorta. The maximum diameter is approximately 5 cm. The upper abdominal  aorta is also dilated. On the lung windows there are moderate emphysematous changes noted in the lungs with hyperinflation and increased interstitial markings.     IMPRESSION:  - Continued evidence of COPD and emphysema in the lungs. There is persistent lymphadenopathy in the mediastinum. The largest node now has increased in size in the subcarinal region measuring 3 cm. The other lymph nodes are fairly stable.  - There continues to be aneurysmal dilatation of the descending thoracic  aorta, stable at 5 cm.     CT Chest with Contrast 6-3-19:  The soft tissue windows continue to show lymphadenopathy in  the mediastinum. The number of enlarged lymph nodes is stable. The  largest lymph node in the subcarinal area is slightly smaller measuring  2.5 cm today. It measured 3 cm earlier. The other lymph nodes are  stable, none have increased in size. There were no pleural effusions.  There continues to be aneurysmal dilatation of the distal thoracic  aorta. The lung windows show changes of interstitial lung disease,  emphysema and COPD.     IMPRESSION:  Lymphadenopathy in the mediastinum is relatively stable, the  largest node has decreased in size by approximately 5 mm. None of the  nodes have increased in size.     12-02-19 CT Chest:  Mediastinal lymph nodes are stable from the previous exam.  Largest nodes are in the aorticopulmonary window.     No new nodes are seen.     There are no pericardial or pleural effusions.     The lower thoracic and upper abdominal aorta are dilated. Maximum  dimension 5.75 cm. This does appear to be stable.     Chronic interstitial changes are present. There is no new parenchymal  soft tissue nodule or mass.     IMPRESSION:  1. Stable mediastinal adenopathy.  2. Coronary artery calcifications.  3. Thoracic aortic aneurysm.     RECENT LABS:  Lab Results   Component Value Date    WBC 9.05 12/11/2019    HGB 14.7 12/11/2019    HCT 44.8 12/11/2019    MCV 90.9 12/11/2019    RDW 14.3 12/11/2019      12/11/2019    NEUTRORELPCT 67.2 12/11/2019    LYMPHORELPCT 16.9 (L) 12/11/2019    MONORELPCT 11.6 12/11/2019    EOSRELPCT 3.0 12/11/2019    BASORELPCT 0.7 12/11/2019    NEUTROABS 6.09 12/11/2019    LYMPHSABS 1.53 12/11/2019       Lab Results   Component Value Date     12/11/2019    K 4.0 12/11/2019    CO2 24.9 12/11/2019     12/11/2019    BUN 12 12/11/2019    CREATININE 0.94 12/11/2019    GLUCOSE 191 (H) 12/11/2019    CALCIUM 9.4 12/11/2019    ALKPHOS 60 12/11/2019    AST 30 12/11/2019    ALT 35 12/11/2019    BILITOT 0.8 12/11/2019    ALBUMIN 4.33 12/11/2019    PROTEINTOT 7.6 12/11/2019       ASSESSMENT & PLAN:  Kb Marshall is a very pleasant 74 y.o. 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    1. Lung cancer:   - Treated with RU Lobectomy in November 2011 without adjuvant therapy.   - At one point in the past, there was concern for PET + enlarging mediastinal adenopathy, and recommended biopsy, but after discussion with Dr. Ojeda and reluctance to treat recurrence if it was found, he decided to watch this with imaging.   - These LNs have intermittently waxed and waned over time. Do not know the etiology, but since he always said he wouldn' t take chemotherapy if it were indicated, Dr. Ojeda was disinclined to perform mediastinoscopy for biopsy. Ultimately, he saw Dr. Rothman and was referred for bronchoscopy with EBUS and there was no cause for concern identified. Mediastinal adenopathy is still present, but imaging indicates adenopathy, while sizeable, has remained stable to improved (see above). Previously had negative biopsy. Therefore, unlikely to be malignant under the circumstances. Will continue with observation / monitoring .   - Will plan to follow up again in 6 months with repeat CT Chest and labs.     2. Oxygen dependent COPD - stable on 2.5-3L oxygen per NC. He remains tobacco free. Recommended continued f/u with his  pulmonologist, Dr. Vicente at [facility]. He says he hasn't needed to f/u there in some time because of stability of his COPD.    3. AAA: S/p repair of femoral artery aneurysm performed by Dr. Ojeda. He has been following with Dr. Taveras and Dr. Yusef Gusman ([facility] CT Surgery) . He has aneurysms in the thoracic and abdominal aorta. Dr. Gusman recommended continued monitoring for the time being.  Aneurysm appeared stable on recent CT but has grown somewhat over time and now measures 5.75 cm. He will continue to f/u with Dr. Gusman..      4.  Prophylaxis:  Recommended flu and pneumonia vaccines, but he declined.     5. ACO Quality measures  - Kb Marshall does not use tobacco products.  - Kb Marshall did not receive 2019 flu vaccine  This was recommended but declined.  - Current outpatient and discharge medications have been reconciled for the patient.  Reviewed by: Chaya Ferrer MD     This note was scribed for Chaya Ferrer MD by Ghada Veram RN.    I, Chaya Ferrer MD, personally performed the services described in this documentation as scribed by the above named individual in my presence, and it is both accurate and complete.  12/11/2019     I spent 15 minutes with Mr. Marshall today. More than 50% of the time was spent in counseling / coordination of care for the above mentioned problems.       Electronically Signed by: Chaya Ferrer MD        CC:   Scar Mejia MD Dr. Pramod Reddy Dr.David Minion

## 2020-06-10 ENCOUNTER — OFFICE VISIT (OUTPATIENT)
Dept: ONCOLOGY | Facility: CLINIC | Age: 75
End: 2020-06-10

## 2020-06-10 VITALS
RESPIRATION RATE: 22 BRPM | SYSTOLIC BLOOD PRESSURE: 135 MMHG | WEIGHT: 201.6 LBS | OXYGEN SATURATION: 95 % | TEMPERATURE: 97.8 F | HEART RATE: 76 BPM | BODY MASS INDEX: 31.58 KG/M2 | DIASTOLIC BLOOD PRESSURE: 85 MMHG

## 2020-06-10 DIAGNOSIS — J44.9 ADVANCED COPD (HCC): ICD-10-CM

## 2020-06-10 DIAGNOSIS — C34.11 MALIGNANT NEOPLASM OF UPPER LOBE OF RIGHT LUNG (HCC): Primary | ICD-10-CM

## 2020-06-10 DIAGNOSIS — I71.21 THORACIC ASCENDING AORTIC ANEURYSM (HCC): ICD-10-CM

## 2020-06-10 PROCEDURE — 85025 COMPLETE CBC W/AUTO DIFF WBC: CPT | Performed by: INTERNAL MEDICINE

## 2020-06-10 PROCEDURE — 99213 OFFICE O/P EST LOW 20 MIN: CPT | Performed by: INTERNAL MEDICINE

## 2020-06-10 PROCEDURE — 80053 COMPREHEN METABOLIC PANEL: CPT | Performed by: INTERNAL MEDICINE

## 2020-06-10 NOTE — PROGRESS NOTES
NAME: Kb Marshall    : 1945    DATE:  6/10/2020    DIAGNOSIS:  1. 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 AAA  3. Thoracic adenopathy     CHIEF COMPLAINT  Follow up of Lung Cancer     TREATMENT HISTORY:  1. RUL lobectomy 2011 w/o any adjuvant therapy     HISTORY OF PRESENT ILLNESS:   Mr. Marshall was initially diagnosed with a Stage Ib well-differentiated mucinous adenocarcinoma of the lung in  and underwent RULobectomy performed by Dr. Welsh in 2011. He did not receive adjuvant chemotherapy. He has been well sin ce his initial surgery and has not had evidence of recurrence, though he does have thoracic adenopathy which sometimes enlarges, sometimes shrinks and which has in the past been + on PET but which he hasn' t wanted investigated or treated.     INTERVAL HISTORY:  Mr. Marshall is here today for follow up of lung cancer. He continues to do well and denies complaint aside from discomfort in his L elbow which he thinks is due to putting it up on a bed-side stand of sorts. He has been active, working in his garden.  He says his breathing has been good and says he does anything he wants to.      PAST MEDICAL HISTORY:  Past Medical History:   Diagnosis Date   • Aneurysm (CMS/HCC)    • Arthritis    • Asthma    • Cancer (CMS/HCC)     lungs   • COPD (chronic obstructive pulmonary disease) (CMS/HCC)    • Coronary artery disease    • Diabetes mellitus (CMS/HCC)    • Dyslipidemia    • GERD (gastroesophageal reflux disease)    • Heart disease    • Hypertension    • PAD (peripheral artery disease) (CMS/HCC)        PAST SURGICAL HISTORY:  Past Surgical History:   Procedure Laterality Date   • ABDOMINAL AORTIC ANEURYSM REPAIR     • COLONOSCOPY N/A 10/11/2018    Procedure: COLONOSCOPY;  Surgeon: Calos Stockton MD;  Location: [facility];  Service: Gastroenterology   • FINGER SURGERY Left    • KNEE SURGERY Left    • LUNG REMOVAL, PARTIAL  2011   • LUNG  SURGERY  2011       FAMILY HISTORY:  Family History   Problem Relation Age of Onset   • Cancer Mother    • Cancer Brother      Social History     Socioeconomic History   • Marital status:      Spouse name: Not on file   • Number of children: Not on file   • Years of education: Not on file   • Highest education level: Not on file   Tobacco Use   • Smoking status: Former Smoker     Packs/day: 3.00     Years: 50.00     Pack years: 150.00     Last attempt to quit: 2011     Years since quittin.6   • Smokeless tobacco: Former User     Types: Chew   Substance and Sexual Activity   • Alcohol use: Yes     Alcohol/week: 1.0 standard drinks     Types: 1 Cans of beer per week   • Drug use: No   • Sexual activity: Defer       REVIEW OF SYSTEMS:    A comprehensive 14 point review of systems was performed and was negative except as mentioned    MEDICATIONS:  The current medication list was reviewed in the EMR    Current Outpatient Medications:   •  atorvastatin (LIPITOR) 10 MG tablet, Take 10 mg by mouth Daily., Disp: , Rfl:   •  Empagliflozin (JARDIANCE) 25 MG tablet, Take 25 mg by mouth Daily., Disp: , Rfl:   •  gabapentin (NEURONTIN) 400 MG capsule, Take 400 mg by mouth 3 (Three) Times a Day., Disp: , Rfl:   •  glimepiride (AMARYL) 4 MG tablet, Take 4 mg by mouth 2 (Two) Times a Day., Disp: , Rfl:   •  hydrALAZINE (APRESOLINE) 50 MG tablet, Take 50 mg by mouth 2 (Two) Times a Day., Disp: , Rfl:   •  hydrochlorothiazide (HYDRODIURIL) 12.5 MG tablet, Take 12.5 mg by mouth Daily., Disp: , Rfl:   •  lisinopril (PRINIVIL,ZESTRIL) 20 MG tablet, Take 20 mg by mouth 2 (Two) Times a Day., Disp: , Rfl:   •  metoprolol tartrate (LOPRESSOR) 50 MG tablet, Take 50 mg by mouth 2 (Two) Times a Day. Takes 1 tablet twice per day, Disp: , Rfl:   •  nabumetone (RELAFEN) 500 MG tablet, Take 500 mg by mouth 2 (Two) Times a Day As Needed for mild pain (1-3)., Disp: , Rfl:   •  oxyCODONE-acetaminophen (PERCOCET) 7.5-325 MG per  tablet, Take 1 tablet by mouth Every 6 (Six) Hours As Needed., Disp: , Rfl:     ALLERGIES:    Allergies   Allergen Reactions   • Acetaminophen-Codeine    • Adhesive Tape Other (See Comments)     ADHESIVE CAUSES SKIN BLISTERS, PER PT   • Indocin [Indomethacin] Itching     PHYSICAL EXAM:  Visit Vitals  /85   Pulse 76   Temp 97.8 °F (36.6 °C) (Temporal)   Resp 22   Wt 91.4 kg (201 lb 9.6 oz)   SpO2 95%   BMI 31.58 kg/m²     Pain Score    06/10/20 1158   PainSc:   2   PainLoc: Arm  Comment: left arm, pain is an 8 w/o pain medication     General: Awake, alert and oriented, appears well.  In good spirits.  HEENT: Pupils are equal, round and reactive to light and accommodation, Extraocular movements full, oropharynx clear, wearing NCO2.  Neck: no jvd, lymphadenopathy or thyromegaly   Cardiovascular: regular rate and rhythm without murmurs, rubs or gallops   Pulmonary: Decreased breath sounds bilaterally, but clear. No wheezing, rhonchi or rales. Old surgical incision is smooth and flat.   Abdomen: soft, non-tender, sl distended, bowel sounds present, no organomegaly   Extremities: No LE edema  Lymph: No cervical, supraclavicular, axillary, adenopathy   Neurologic: grossly non-focal exam     PATHOLOGY:  11-29-11 Rt upper lobe lung:   - Adenocarcinoma, mucinous type   - Negative for mutation involving ALK gene       IMAGING:  PET-CT Whole Body 1-9-13:   - Overall stable appearance compared with the previous exam.   - There is a continued evidence of hypermetabolism corresponding to mediastinal and right hilar adenopathy but no new lymph nodes are seen and no significant change in the SUV.     CT Chest w/o contrast 4-1-13:   - Overall there has been no significant interval change when compared to the previous exam dated 8/12.   - There are several m ediastinal lymph nodes present but stable.   - No pericardial or pleural effusion.   - Tiny nodule in the left lung base is stable   - Descending thoracic aneurysm stable.    - Fatty infiltration of the liver     CT Chest w/ contrast 7-29-13:   - Questionable increase in the siz e of mediastinal lymphadenopathy.   - An AP window lymph node was 1.4mc and is 1.9cm.   - A prevascular space lymph node was 1.4cm and is 1.4cm.   - A subcarinal region lymph node was 2cm and is 2.7cm.   - A left hilar region lymph node is stable measuring 1.3 measuring 12 mm. conceivably difference and axial selection could cause this discrepancy.   - Compared 8/8/12 examination, the lymphadenopathy is stable to slightly improved.     Chest CT w/ contrast 6-9-14:   - Stable mediastinal borderline enlarged lymph nodes.   - An AP window lymph node measures 1.5cm and was 1.5cm.   - Stable slightly increased soft tissue density in the right hilar region.   - There is persistent supleural fibrotic scarring right lung base scarring is again noted.   - No new suspicious pulmonary mass or nodule.   - Posterior right rib fractures are again noted.   - Descending thoracic aortic aneurysm of 4.7cm. Stable.     CT Chest w/ contrast 12-02-14   - Enlarging mediastinal lymph nodoes   - No parenchymal soft tissue nodules or masses   - The largest soft tissue density is in the right paratrachel location and measures 2cm.  - Previously the same nodule measured 1.3cm.     CT Chest w/ contrast 03-02-15:   - The descending portion of the abdominal aorta is stable In size measuring  approximately 4.9 cm on today's study.   - There is abnormal adenopathy throughout the mediastinum. The largest lymph node measured 21 mm in the superior mediastinum adjacent to the esophagus. The size and number of lymph nodes is radiographically stable.   - There are emphysematous changes and increased interstitial markings throughout the lungs.     CT Chest w/ contrast 06-01-15:   - Stable appearance of the chest   - 4.3 cm infrarenal abdominal aortic aneurysm.     CT Chest w/ contrast 09-03-15:   - Mediastinal adenopathy is stable   - There is a  4.6cm abdominal aortic aneurysm slightly increased in size from the  previous exam.     CT Chest 09-20-16:  - Slight interval increase in size of the abdominal aortic aneurysm. It now measures 5 cm just above the diaphragm. There is adenopathy i n the mediastinum. The lymph nodes have increased in size in comparing with the earlier CT back in September.   - Continued adenopathy in the mediastinum and right hilum.   - Radiographically this is stable in comparing with the earlier exam.   - There continues to be aneurysmal dilatation of the aorta with a diameter of 5 cm just above the diaphragm.   - The lungs show changes of chronic interstitial lung disease.     CT Chest 05-10-17:  - Persistent mediastinal adenopathy radiographically unchanged from September 2016.   - There continues to be diffuse interstitial lung disease throughout both lungs. There is aneurysmal dilatation of the aorta just above the diaphragm measuring 5 cm    CT Chest 11-17-17:  - Stable mediastinal adenopathy. Largest prevascular lymph  node is 1.6 cm and was previously 1.8 cm. A right subcarinal lymph node  is 2.2 cm and was previously 2.1 cm. A right para esophageal lymph node superiorly is 1.8 cm and was previously 1.9 cm. No new adenopathy stations identified.   - Stable 5 cm fusiform type thoracic aortic aneurysm  predominantly involving the descending thoracic aorta. Mural thrombus is present.    CT Thoracic Aorta 6/4/18 [facility]        12-03-18 CT Chest With Contrast   - On the mediastinal windows there are enlarged lymph nodes in the mediastinum. These involve the superior mediastinum, aorticopulmonary window, anterior mediastinum, pretracheal and subcarinal regions. The size of the lymph nodes are fairly stable. The largest lymph node today is in the subcarinal region measuring 3 cm in diameter. There continues to be aneurysmal dilatation of the thoracic aorta. The maximum diameter is approximately 5 cm. The upper abdominal aorta is also  dilated. On the lung windows there are moderate emphysematous changes noted in the lungs with hyperinflation and increased interstitial markings.     IMPRESSION:  - Continued evidence of COPD and emphysema in the lungs. There is persistent lymphadenopathy in the mediastinum. The largest node now has increased in size in the subcarinal region measuring 3 cm. The other lymph nodes are fairly stable.  - There continues to be aneurysmal dilatation of the descending thoracic  aorta, stable at 5 cm.     CT Chest with Contrast 6-3-19:  The soft tissue windows continue to show lymphadenopathy in  the mediastinum. The number of enlarged lymph nodes is stable. The  largest lymph node in the subcarinal area is slightly smaller measuring  2.5 cm today. It measured 3 cm earlier. The other lymph nodes are  stable, none have increased in size. There were no pleural effusions.  There continues to be aneurysmal dilatation of the distal thoracic  aorta. The lung windows show changes of interstitial lung disease,  emphysema and COPD.     IMPRESSION:  Lymphadenopathy in the mediastinum is relatively stable, the  largest node has decreased in size by approximately 5 mm. None of the  nodes have increased in size.     12-02-19 CT Chest:  Mediastinal lymph nodes are stable from the previous exam.  Largest nodes are in the aorticopulmonary window.     No new nodes are seen.     There are no pericardial or pleural effusions.     The lower thoracic and upper abdominal aorta are dilated. Maximum  dimension 5.75 cm. This does appear to be stable.     Chronic interstitial changes are present. There is no new parenchymal  soft tissue nodule or mass.     IMPRESSION:  1. Stable mediastinal adenopathy.  2. Coronary artery calcifications.  3. Thoracic aortic aneurysm.     RECENT LABS:  Lab Results   Component Value Date    WBC 9.40 06/10/2020    HGB 14.8 06/10/2020    HCT 45.7 06/10/2020    MCV 93.1 06/10/2020    RDW 14.5 06/10/2020      06/10/2020    NEUTRORELPCT 67.2 06/10/2020    LYMPHORELPCT 18.7 (L) 06/10/2020    MONORELPCT 9.5 06/10/2020    EOSRELPCT 3.0 06/10/2020    BASORELPCT 1.0 06/10/2020    NEUTROABS 6.32 06/10/2020    LYMPHSABS 1.76 06/10/2020       Lab Results   Component Value Date     06/10/2020    K 4.1 06/10/2020    CO2 24.4 06/10/2020     06/10/2020    BUN 13 06/10/2020    CREATININE 0.85 06/10/2020    GLUCOSE 149 (H) 06/10/2020    CALCIUM 9.4 06/10/2020    ALKPHOS 58 06/10/2020    AST 24 06/10/2020    ALT 25 06/10/2020    BILITOT 0.7 06/10/2020    ALBUMIN 4.25 06/10/2020    PROTEINTOT 7.5 06/10/2020       ASSESSMENT & PLAN:  Kb Marshall is a very pleasant 75 y.o. 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    1. Lung cancer:   - Treated with RU Lobectomy in November 2011 without adjuvant therapy.   - At one point in the past, there was concern for PET + enlarging mediastinal adenopathy, and recommended biopsy, but after discussion with Dr. Ojeda and reluctance to treat recurrence if it was found, he decided to watch this with imaging.   - These LNs have intermittently waxed and waned over time. Do not know the etiology, but since he always said he wouldn' t take chemotherapy if it were indicated, Dr. Ojeda was disinclined to perform mediastinoscopy for biopsy. Ultimately, he saw Dr. Rothman and was referred for bronchoscopy with EBUS and there was no cause for concern identified. Mediastinal adenopathy has continued to be present, but imaging has shown that adenopathy, while sizeable, has remained stable to improved (see above). Previously had negative biopsy. Therefore, unlikely to be malignant under the circumstances. Decided to continue with observation / monitoring .   - Will repeat CT to evaluate for interval change.    2. Oxygen dependent COPD - stable on 2.5-3L oxygen per NC. He remains tobacco free. Recommended continued f/u with his pulmonologist,  Dr. Vicente at [facility]. He says he is feeling well from a respiratory point of view and says he isn't limited by his COPD.    3. AAA: S/p repair of femoral artery aneurysm performed by Dr. Ojeda. He has been following with Dr. Taveras and Dr. Yusef Gusman ([facility] CT [facility]) . He has aneurysms in the thoracic and abdominal aorta. Dr. Gusmna recommended continued monitoring for the time being.  Aneurysm appeared stable on recent CT but has grown somewhat over time and last measured 5.75 cm. He will continue to f/u with Dr. Gusman.  Repeat CT as above.    4.  Prophylaxis:  Recommended flu and pneumonia vaccines, but he declined.     5. ACO Quality measures  - Kb Marshall does not use tobacco products.  - Kb Marshall did not receive 2019 flu vaccine  This was recommended but declined.  - Current outpatient and discharge medications have been reconciled for the patient.  Reviewed by: Chaya Ferrer MD       6. Follow up:   Will check CT Chest and have him return after imaging complete.    This note was scribed for Chaya Ferrer MD by Ghada Verma RN.    I, Chaya Ferrer MD, personally performed the services described in this documentation as scribed by the above named individual in my presence, and it is both accurate and complete.  06/10/2020       I spent 15 minutes with Kb Marshall today.  In the office today, more than 50% of this time was spent face-to-face with him  in counseling / coordination of care, reviewing his interim medical history and counseling on the current treatment plan.  All questions were answered to his satisfaction.          Electronically Signed by: Chaya Ferrer MD        CC:   Scar Mejia MD Dr. Pramod Reddy Dr.David Minion

## 2020-06-18 ENCOUNTER — HOSPITAL ENCOUNTER (OUTPATIENT)
Dept: RESPIRATORY THERAPY | Facility: HOSPITAL | Age: 75
Discharge: HOME | End: 2020-06-18

## 2020-06-18 ENCOUNTER — HOSPITAL ENCOUNTER (OUTPATIENT)
Dept: CT IMAGING | Facility: HOSPITAL | Age: 75
Discharge: HOME | End: 2020-06-18
Admitting: INTERNAL MEDICINE

## 2020-06-18 ENCOUNTER — TRANSCRIBE ORDERS (OUTPATIENT)
Dept: ADMINISTRATIVE | Facility: HOSPITAL | Age: 75
End: 2020-06-18

## 2020-06-18 DIAGNOSIS — Z79.891 ENCOUNTER FOR LONG-TERM METHADONE USE: Primary | ICD-10-CM

## 2020-06-18 DIAGNOSIS — C34.11 MALIGNANT NEOPLASM OF UPPER LOBE OF RIGHT LUNG (HCC): ICD-10-CM

## 2020-06-18 PROCEDURE — 71260 CT THORAX DX C+: CPT

## 2020-06-18 PROCEDURE — 0 IOVERSOL 74 % SOLUTION: Performed by: INTERNAL MEDICINE

## 2020-06-18 PROCEDURE — 71260 CT THORAX DX C+: CPT | Performed by: RADIOLOGY

## 2020-06-18 PROCEDURE — 93005 ELECTROCARDIOGRAM TRACING: CPT

## 2020-06-18 PROCEDURE — 93010 ELECTROCARDIOGRAM REPORT: CPT | Performed by: INTERNAL MEDICINE

## 2020-06-18 RX ADMIN — IOVERSOL 100 ML: 741 INJECTION INTRA-ARTERIAL; INTRAVENOUS at 09:11

## 2020-07-01 ENCOUNTER — OFFICE VISIT (OUTPATIENT)
Dept: ONCOLOGY | Facility: CLINIC | Age: 75
End: 2020-07-01

## 2020-07-01 VITALS
RESPIRATION RATE: 22 BRPM | WEIGHT: 200.9 LBS | BODY MASS INDEX: 31.47 KG/M2 | SYSTOLIC BLOOD PRESSURE: 139 MMHG | DIASTOLIC BLOOD PRESSURE: 80 MMHG | HEART RATE: 79 BPM | OXYGEN SATURATION: 92 % | TEMPERATURE: 97.3 F

## 2020-07-01 DIAGNOSIS — C34.11 MALIGNANT NEOPLASM OF UPPER LOBE OF RIGHT LUNG (HCC): Primary | ICD-10-CM

## 2020-07-01 PROCEDURE — 99214 OFFICE O/P EST MOD 30 MIN: CPT | Performed by: NURSE PRACTITIONER

## 2020-07-01 NOTE — PROGRESS NOTES
NAME: Kb Marshall    : 1945    DATE:  2020    DIAGNOSIS:  1. 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 AAA  3. Thoracic adenopathy     CHIEF COMPLAINT  Follow up of Lung Cancer     TREATMENT HISTORY:  1. RUL lobectomy 2011 w/o any adjuvant therapy     HISTORY OF PRESENT ILLNESS:   Mr. Marshall was initially diagnosed with a Stage Ib well-differentiated mucinous adenocarcinoma of the lung in  and underwent RULobectomy performed by Dr. Welsh in 2011. He did not receive adjuvant chemotherapy. He has been well since his initial surgery and has not had evidence of recurrence, though he does have thoracic adenopathy which sometimes enlarges, sometimes shrinks and which has in the past been + on PET but which he hasn' t wanted investigated or treated.     INTERVAL HISTORY:  Mr. Marshall presents today for follow up of lung cancer and recent imaging. He is wearing his supplemental O2 at 2 L/min per nasal cannula. He reports that his breathing has been good. He also reports that he is very active around his house and enjoys fishing almost daily. He denies any other specific complaints today.      PAST MEDICAL HISTORY:  Past Medical History:   Diagnosis Date   • Aneurysm (CMS/HCC)    • Arthritis    • Asthma    • Cancer (CMS/HCC)     lungs   • COPD (chronic obstructive pulmonary disease) (CMS/HCC)    • Coronary artery disease    • Diabetes mellitus (CMS/HCC)    • Dyslipidemia    • GERD (gastroesophageal reflux disease)    • Heart disease    • Hypertension    • PAD (peripheral artery disease) (CMS/HCC)        PAST SURGICAL HISTORY:  Past Surgical History:   Procedure Laterality Date   • ABDOMINAL AORTIC ANEURYSM REPAIR     • COLONOSCOPY N/A 10/11/2018    Procedure: COLONOSCOPY;  Surgeon: Calos Stockton MD;  Location: [facility];  Service: Gastroenterology   • FINGER SURGERY Left    • KNEE SURGERY Left    • LUNG REMOVAL, PARTIAL  2011   • LUNG SURGERY   2011       FAMILY HISTORY:  Family History   Problem Relation Age of Onset   • Cancer Mother    • Cancer Brother      Social History     Socioeconomic History   • Marital status:      Spouse name: Not on file   • Number of children: Not on file   • Years of education: Not on file   • Highest education level: Not on file   Tobacco Use   • Smoking status: Former Smoker     Packs/day: 3.00     Years: 50.00     Pack years: 150.00     Last attempt to quit: 2011     Years since quittin.6   • Smokeless tobacco: Former User     Types: Chew   Substance and Sexual Activity   • Alcohol use: Yes     Alcohol/week: 1.0 standard drinks     Types: 1 Cans of beer per week   • Drug use: No   • Sexual activity: Defer       REVIEW OF SYSTEMS:    A comprehensive 14 point review of systems was performed and was negative except as mentioned    MEDICATIONS:  The current medication list was reviewed in the EMR    Current Outpatient Medications:   •  atorvastatin (LIPITOR) 10 MG tablet, Take 10 mg by mouth Daily., Disp: , Rfl:   •  Empagliflozin (JARDIANCE) 25 MG tablet, Take 25 mg by mouth Daily., Disp: , Rfl:   •  gabapentin (NEURONTIN) 400 MG capsule, Take 400 mg by mouth 3 (Three) Times a Day., Disp: , Rfl:   •  glimepiride (AMARYL) 4 MG tablet, Take 4 mg by mouth 2 (Two) Times a Day., Disp: , Rfl:   •  hydrALAZINE (APRESOLINE) 50 MG tablet, Take 50 mg by mouth 2 (Two) Times a Day., Disp: , Rfl:   •  hydrochlorothiazide (HYDRODIURIL) 12.5 MG tablet, Take 12.5 mg by mouth Daily., Disp: , Rfl:   •  lisinopril (PRINIVIL,ZESTRIL) 20 MG tablet, Take 20 mg by mouth 2 (Two) Times a Day., Disp: , Rfl:   •  metoprolol tartrate (LOPRESSOR) 50 MG tablet, Take 50 mg by mouth 2 (Two) Times a Day. Takes 1 tablet twice per day, Disp: , Rfl:   •  nabumetone (RELAFEN) 500 MG tablet, Take 500 mg by mouth 2 (Two) Times a Day As Needed for mild pain (1-3)., Disp: , Rfl:   •  oxyCODONE-acetaminophen (PERCOCET) 7.5-325 MG per tablet,  Take 1 tablet by mouth Every 6 (Six) Hours As Needed., Disp: , Rfl:     ALLERGIES:    Allergies   Allergen Reactions   • Acetaminophen-Codeine    • Adhesive Tape Other (See Comments)     ADHESIVE CAUSES SKIN BLISTERS, PER PT   • Indocin [Indomethacin] Itching     PHYSICAL EXAM:  Visit Vitals  /80   Pulse 79   Temp 97.3 °F (36.3 °C) (Temporal)   Resp 22   Wt 91.1 kg (200 lb 14.4 oz)   SpO2 92%   BMI 31.47 kg/m²     Pain Score    07/01/20 0952   PainSc: 0-No pain     General: Awake, alert and oriented, appears well.    HEENT: Pupils are equal, round and reactive to light and accommodation, Extraocular movements full, oropharynx clear, wearing supplemental O2 at 2 L/min per nasal cannula  Neck: no jvd, lymphadenopathy or thyromegaly   Cardiovascular: regular rate and rhythm without murmurs, rubs or gallops   Pulmonary: Decreased breath sounds bilaterally, but clear. No wheezing, rhonchi or rales.   Abdomen: soft, non-tender, sl distended, bowel sounds present, no organomegaly   Extremities: No LE edema  Lymph: No cervical, supraclavicular, axillary adenopathy   Neurologic: grossly non-focal exam     PATHOLOGY:  11-29-11 Rt upper lobe lung:   - Adenocarcinoma, mucinous type   - Negative for mutation involving ALK gene       IMAGING:  PET-CT Whole Body 1-9-13:   - Overall stable appearance compared with the previous exam.   - There is a continued evidence of hypermetabolism corresponding to mediastinal and right hilar adenopathy but no new lymph nodes are seen and no significant change in the SUV.     CT Chest w/o contrast 4-1-13:   - Overall there has been no significant interval change when compared to the previous exam dated 8/12.   - There are several m ediastinal lymph nodes present but stable.   - No pericardial or pleural effusion.   - Tiny nodule in the left lung base is stable   - Descending thoracic aneurysm stable.   - Fatty infiltration of the liver     CT Chest w/ contrast 7-29-13:   - Questionable  increase in the siz e of mediastinal lymphadenopathy.   - An AP window lymph node was 1.4mc and is 1.9cm.   - A prevascular space lymph node was 1.4cm and is 1.4cm.   - A subcarinal region lymph node was 2cm and is 2.7cm.   - A left hilar region lymph node is stable measuring 1.3 measuring 12 mm. conceivably difference and axial selection could cause this discrepancy.   - Compared 8/8/12 examination, the lymphadenopathy is stable to slightly improved.     Chest CT w/ contrast 6-9-14:   - Stable mediastinal borderline enlarged lymph nodes.   - An AP window lymph node measures 1.5cm and was 1.5cm.   - Stable slightly increased soft tissue density in the right hilar region.   - There is persistent supleural fibrotic scarring right lung base scarring is again noted.   - No new suspicious pulmonary mass or nodule.   - Posterior right rib fractures are again noted.   - Descending thoracic aortic aneurysm of 4.7cm. Stable.     CT Chest w/ contrast 12-02-14   - Enlarging mediastinal lymph nodoes   - No parenchymal soft tissue nodules or masses   - The largest soft tissue density is in the right paratrachel location and measures 2cm.  - Previously the same nodule measured 1.3cm.     CT Chest w/ contrast 03-02-15:   - The descending portion of the abdominal aorta is stable In size measuring  approximately 4.9 cm on today's study.   - There is abnormal adenopathy throughout the mediastinum. The largest lymph node measured 21 mm in the superior mediastinum adjacent to the esophagus. The size and number of lymph nodes is radiographically stable.   - There are emphysematous changes and increased interstitial markings throughout the lungs.     CT Chest w/ contrast 06-01-15:   - Stable appearance of the chest   - 4.3 cm infrarenal abdominal aortic aneurysm.     CT Chest w/ contrast 09-03-15:   - Mediastinal adenopathy is stable   - There is a 4.6cm abdominal aortic aneurysm slightly increased in size from the  previous exam.     CT  Chest 09-20-16:  - Slight interval increase in size of the abdominal aortic aneurysm. It now measures 5 cm just above the diaphragm. There is adenopathy i n the mediastinum. The lymph nodes have increased in size in comparing with the earlier CT back in September.   - Continued adenopathy in the mediastinum and right hilum.   - Radiographically this is stable in comparing with the earlier exam.   - There continues to be aneurysmal dilatation of the aorta with a diameter of 5 cm just above the diaphragm.   - The lungs show changes of chronic interstitial lung disease.     CT Chest 05-10-17:  - Persistent mediastinal adenopathy radiographically unchanged from September 2016.   - There continues to be diffuse interstitial lung disease throughout both lungs. There is aneurysmal dilatation of the aorta just above the diaphragm measuring 5 cm    CT Chest 11-17-17:  - Stable mediastinal adenopathy. Largest prevascular lymph  node is 1.6 cm and was previously 1.8 cm. A right subcarinal lymph node  is 2.2 cm and was previously 2.1 cm. A right para esophageal lymph node superiorly is 1.8 cm and was previously 1.9 cm. No new adenopathy stations identified.   - Stable 5 cm fusiform type thoracic aortic aneurysm  predominantly involving the descending thoracic aorta. Mural thrombus is present.    CT Thoracic Aorta 6/4/18 [facility]        12-03-18 CT Chest With Contrast   - On the mediastinal windows there are enlarged lymph nodes in the mediastinum. These involve the superior mediastinum, aorticopulmonary window, anterior mediastinum, pretracheal and subcarinal regions. The size of the lymph nodes are fairly stable. The largest lymph node today is in the subcarinal region measuring 3 cm in diameter. There continues to be aneurysmal dilatation of the thoracic aorta. The maximum diameter is approximately 5 cm. The upper abdominal aorta is also dilated. On the lung windows there are moderate emphysematous changes noted in the lungs with  hyperinflation and increased interstitial markings.     IMPRESSION:  - Continued evidence of COPD and emphysema in the lungs. There is persistent lymphadenopathy in the mediastinum. The largest node now has increased in size in the subcarinal region measuring 3 cm. The other lymph nodes are fairly stable.  - There continues to be aneurysmal dilatation of the descending thoracic  aorta, stable at 5 cm.     CT Chest with Contrast 6-3-19:  The soft tissue windows continue to show lymphadenopathy in  the mediastinum. The number of enlarged lymph nodes is stable. The  largest lymph node in the subcarinal area is slightly smaller measuring  2.5 cm today. It measured 3 cm earlier. The other lymph nodes are  stable, none have increased in size. There were no pleural effusions.  There continues to be aneurysmal dilatation of the distal thoracic  aorta. The lung windows show changes of interstitial lung disease,  emphysema and COPD.     IMPRESSION:  Lymphadenopathy in the mediastinum is relatively stable, the  largest node has decreased in size by approximately 5 mm. None of the  nodes have increased in size.     12-02-19 CT Chest:  Mediastinal lymph nodes are stable from the previous exam.  Largest nodes are in the aorticopulmonary window.     No new nodes are seen.     There are no pericardial or pleural effusions.     The lower thoracic and upper abdominal aorta are dilated. Maximum  dimension 5.75 cm. This does appear to be stable.     Chronic interstitial changes are present. There is no new parenchymal  soft tissue nodule or mass.     IMPRESSION:  1. Stable mediastinal adenopathy.  2. Coronary artery calcifications.  3. Thoracic aortic aneurysm.     06/18/2020 CT Chest with Contrast  Findings  LUNGS: Unremarkable. No parenchymal soft tissue nodules.  No focal air  space disease.     HEART: Unremarkable.     PERICARDIUM: No effusion.     MEDIASTINUM: MEDIASTINAL LYMPHADENOPATHY WITH AP WINDOW LYMPH NODES  MEASURING UP TO  2 CM APPEAR GROSSLY STABLE.     PLEURA: No pleural effusion. No pleural mass or abnormal calcification.     MAJOR AIRWAYS: Clear. No intrinsic mass.     VASCULATURE: AGAIN NOTED IS VASCULAR TORTUOSITY AND AORTIC ANEURYSM OF  THE DISTAL AORTA.     VISUALIZED UPPER ABDOMEN:        LIVER: Homogeneous. No focal hepatic mass or ductal dilatation.        SPLEEN: Homogeneous. No splenomegaly.        ADRENALS: No mass.        KIDNEYS: No mass. No obstructive uropathy.  No evidence of  urolithiasis.        GI TRACT: Non-dilated. No definite wall thickening.        PERITONEUM: No free air. No free fluid or loculated fluid  collections.           ABDOMINAL WALL: No focal hernia or mass.           OTHER: None.     BONES: No acute bony abnormality.     IMPRESSION:  Impression:  Stable appearance of the chest including distal thoracic aortic  aneurysm, mediastinal lymphadenopathy and 1 cm left lower lobe pulmonary  nodule.    RECENT LABS:  Lab Results   Component Value Date    WBC 9.40 06/10/2020    HGB 14.8 06/10/2020    HCT 45.7 06/10/2020    MCV 93.1 06/10/2020    RDW 14.5 06/10/2020     06/10/2020    NEUTRORELPCT 67.2 06/10/2020    LYMPHORELPCT 18.7 (L) 06/10/2020    MONORELPCT 9.5 06/10/2020    EOSRELPCT 3.0 06/10/2020    BASORELPCT 1.0 06/10/2020    NEUTROABS 6.32 06/10/2020    LYMPHSABS 1.76 06/10/2020       Lab Results   Component Value Date     06/10/2020    K 4.1 06/10/2020    CO2 24.4 06/10/2020     06/10/2020    BUN 13 06/10/2020    CREATININE 0.85 06/10/2020    GLUCOSE 149 (H) 06/10/2020    CALCIUM 9.4 06/10/2020    ALKPHOS 58 06/10/2020    AST 24 06/10/2020    ALT 25 06/10/2020    BILITOT 0.7 06/10/2020    ALBUMIN 4.25 06/10/2020    PROTEINTOT 7.5 06/10/2020       ASSESSMENT & PLAN:  Kb Marshall is a very pleasant 75 y.o. 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    1. Lung cancer:   - Treated with RU Lobectomy in November 2011 without  adjuvant therapy.   - At one point in the past, there was concern for PET + enlarging mediastinal adenopathy, and recommended biopsy, but after discussion with Dr. Ojeda and reluctance to treat recurrence if it was found, he decided to watch this with imaging.   - These LNs have intermittently waxed and waned over time. Do not know the etiology, but since he always said he wouldn' t take chemotherapy if it were indicated, Dr. Ojeda was disinclined to perform mediastinoscopy for biopsy. Ultimately, he saw Dr. Rothman and was referred for bronchoscopy with EBUS and there was no cause for concern identified. Mediastinal adenopathy has continued to be present, but imaging has shown that adenopathy, while sizeable, has remained stable to improved (see above). Previously had negative biopsy. Therefore, unlikely to be malignant under the circumstances. Decided to continue with observation / monitoring .   - Repeat imaging from 06/18/2020 (summarized above) remains stable. Will follow up in 6 months with repeat imaging and labs.    2. Oxygen dependent COPD - stable on 2.5-3L oxygen per NC. He remains tobacco free. Recommended continued f/u with his pulmonologist, Dr. Vicente at [facility]. He says he is feeling well from a respiratory point of view and says he isn't limited by his COPD.    3. AAA: S/p repair of femoral artery aneurysm performed by Dr. Ojeda. He has been following with Dr. Taveras and Dr. Yusef Gusman ([facility] CT Surgery) . He has aneurysms in the thoracic and abdominal aorta. Dr. Gusman recommended continued monitoring for the time being.  Aneurysm appeared stable on recent CT but has grown somewhat over time and last measured 5.75 cm. He will continue to f/u with Dr. Gusman.  Repeat CT as above.    4.  Prophylaxis:  Recommended flu and pneumonia vaccines, but he declined.     5. Follow up:   - With Dr. Ferrer in 6 months with labs and repeat imaging prior.    ACO / АЛЕКСАНДР/Other  Quality measures  -  Kb Marshall  did not receive 2019 flu vaccine. This was recommended.  -  Kb Marshall reports a pain score of 0.   -  Current outpatient and discharge medications have been reconciled for the patient.  Reviewed by: GOLDEN Arauz          I spent 25 minutes with Kb Marshall today.  In the office today, more than 50% of this time was spent face-to-face with him  in counseling / coordination of care, reviewing his interim medical history and counseling on the current treatment plan.  All questions were answered to his satisfaction.          Electronically Signed by: GOLDEN Arauz        CC:   Scar Mejia MD Dr. Pramod Reddy Dr.David Minion

## 2020-09-20 ENCOUNTER — HOSPITAL ENCOUNTER (EMERGENCY)
Facility: HOSPITAL | Age: 75
Discharge: HOME | End: 2020-09-20
Attending: EMERGENCY MEDICINE | Admitting: EMERGENCY MEDICINE

## 2020-09-20 ENCOUNTER — APPOINTMENT (OUTPATIENT)
Dept: GENERAL RADIOLOGY | Facility: HOSPITAL | Age: 75
End: 2020-09-20

## 2020-09-20 VITALS
RESPIRATION RATE: 22 BRPM | DIASTOLIC BLOOD PRESSURE: 82 MMHG | TEMPERATURE: 97.7 F | HEART RATE: 75 BPM | BODY MASS INDEX: 31.39 KG/M2 | OXYGEN SATURATION: 99 % | SYSTOLIC BLOOD PRESSURE: 116 MMHG | HEIGHT: 67 IN | WEIGHT: 200 LBS

## 2020-09-20 DIAGNOSIS — S90.32XA CONTUSION OF LEFT FOOT, INITIAL ENCOUNTER: Primary | ICD-10-CM

## 2020-09-20 LAB
ALBUMIN SERPL-MCNC: 4.06 G/DL (ref 3.5–5.2)
ALBUMIN/GLOB SERPL: 1.4 G/DL
ALP SERPL-CCNC: 56 U/L (ref 39–117)
ALT SERPL W P-5'-P-CCNC: 14 U/L (ref 1–41)
ANION GAP SERPL CALCULATED.3IONS-SCNC: 12.2 MMOL/L (ref 5–15)
AST SERPL-CCNC: 19 U/L (ref 1–40)
BASOPHILS # BLD AUTO: 0.06 10*3/MM3 (ref 0–0.2)
BASOPHILS NFR BLD AUTO: 0.6 % (ref 0–1.5)
BILIRUB SERPL-MCNC: 0.9 MG/DL (ref 0–1.2)
BUN SERPL-MCNC: 12 MG/DL (ref 8–23)
BUN/CREAT SERPL: 15 (ref 7–25)
CALCIUM SPEC-SCNC: 9.3 MG/DL (ref 8.6–10.5)
CHLORIDE SERPL-SCNC: 106 MMOL/L (ref 98–107)
CO2 SERPL-SCNC: 20.8 MMOL/L (ref 22–29)
CREAT SERPL-MCNC: 0.8 MG/DL (ref 0.76–1.27)
CRP SERPL-MCNC: 1.68 MG/DL (ref 0–0.5)
DEPRECATED RDW RBC AUTO: 48 FL (ref 37–54)
EOSINOPHIL # BLD AUTO: 0.44 10*3/MM3 (ref 0–0.4)
EOSINOPHIL NFR BLD AUTO: 4 % (ref 0.3–6.2)
ERYTHROCYTE [DISTWIDTH] IN BLOOD BY AUTOMATED COUNT: 14.5 % (ref 12.3–15.4)
GFR SERPL CREATININE-BSD FRML MDRD: 94 ML/MIN/1.73
GLOBULIN UR ELPH-MCNC: 2.9 GM/DL
GLUCOSE SERPL-MCNC: 163 MG/DL (ref 65–99)
HCT VFR BLD AUTO: 45.7 % (ref 37.5–51)
HGB BLD-MCNC: 14.6 G/DL (ref 13–17.7)
IMM GRANULOCYTES # BLD AUTO: 0.07 10*3/MM3 (ref 0–0.05)
IMM GRANULOCYTES NFR BLD AUTO: 0.6 % (ref 0–0.5)
LYMPHOCYTES # BLD AUTO: 1.25 10*3/MM3 (ref 0.7–3.1)
LYMPHOCYTES NFR BLD AUTO: 11.5 % (ref 19.6–45.3)
MCH RBC QN AUTO: 28.9 PG (ref 26.6–33)
MCHC RBC AUTO-ENTMCNC: 31.9 G/DL (ref 31.5–35.7)
MCV RBC AUTO: 90.3 FL (ref 79–97)
MONOCYTES # BLD AUTO: 0.98 10*3/MM3 (ref 0.1–0.9)
MONOCYTES NFR BLD AUTO: 9 % (ref 5–12)
NEUTROPHILS NFR BLD AUTO: 74.3 % (ref 42.7–76)
NEUTROPHILS NFR BLD AUTO: 8.08 10*3/MM3 (ref 1.7–7)
NRBC BLD AUTO-RTO: 0 /100 WBC (ref 0–0.2)
NT-PROBNP SERPL-MCNC: 880 PG/ML (ref 0–900)
PLATELET # BLD AUTO: 216 10*3/MM3 (ref 140–450)
PMV BLD AUTO: 8.5 FL (ref 6–12)
POTASSIUM SERPL-SCNC: 3.9 MMOL/L (ref 3.5–5.2)
PROT SERPL-MCNC: 7 G/DL (ref 6–8.5)
RBC # BLD AUTO: 5.06 10*6/MM3 (ref 4.14–5.8)
SODIUM SERPL-SCNC: 139 MMOL/L (ref 136–145)
TROPONIN T SERPL-MCNC: <0.01 NG/ML (ref 0–0.03)
WBC # BLD AUTO: 10.88 10*3/MM3 (ref 3.4–10.8)

## 2020-09-20 PROCEDURE — 85025 COMPLETE CBC W/AUTO DIFF WBC: CPT | Performed by: EMERGENCY MEDICINE

## 2020-09-20 PROCEDURE — 84484 ASSAY OF TROPONIN QUANT: CPT | Performed by: EMERGENCY MEDICINE

## 2020-09-20 PROCEDURE — 36415 COLL VENOUS BLD VENIPUNCTURE: CPT

## 2020-09-20 PROCEDURE — 83880 ASSAY OF NATRIURETIC PEPTIDE: CPT | Performed by: EMERGENCY MEDICINE

## 2020-09-20 PROCEDURE — 80053 COMPREHEN METABOLIC PANEL: CPT | Performed by: EMERGENCY MEDICINE

## 2020-09-20 PROCEDURE — 86140 C-REACTIVE PROTEIN: CPT | Performed by: EMERGENCY MEDICINE

## 2020-09-20 PROCEDURE — 93010 ELECTROCARDIOGRAM REPORT: CPT | Performed by: SPECIALIST

## 2020-09-20 PROCEDURE — 99284 EMERGENCY DEPT VISIT MOD MDM: CPT

## 2020-09-20 PROCEDURE — 93005 ELECTROCARDIOGRAM TRACING: CPT | Performed by: EMERGENCY MEDICINE

## 2020-09-20 PROCEDURE — 71046 X-RAY EXAM CHEST 2 VIEWS: CPT

## 2020-09-20 PROCEDURE — 73630 X-RAY EXAM OF FOOT: CPT

## 2020-09-20 RX ORDER — TRAMADOL HYDROCHLORIDE 50 MG/1
50 TABLET ORAL ONCE
Status: COMPLETED | OUTPATIENT
Start: 2020-09-20 | End: 2020-09-20

## 2020-09-20 RX ADMIN — TRAMADOL HYDROCHLORIDE 50 MG: 50 TABLET, FILM COATED ORAL at 04:03

## 2020-09-20 NOTE — ED PROVIDER NOTES
Subjective   Patient reports left foot pain that started last night.  He has been working on some drywall recently and thinks he might of dropped something on it but is unsure.  Denies any leg swelling or falls.  He also reports worsening shortness of breath for the last 4 to 5 months.  He states is not gotten worse in the last couple of days it is mainly here for his foot.  Denies any chest pain, abdominal pain but states he is a bit constipated at the moment which sometimes makes his shortness of breath worse.          Review of Systems   Constitutional: Negative for chills and fever.   HENT: Negative for sinus pain and sore throat.    Eyes: Negative for visual disturbance.   Respiratory: Positive for shortness of breath. Negative for chest tightness.    Cardiovascular: Negative for chest pain.   Gastrointestinal: Negative for abdominal pain, constipation, diarrhea, nausea and vomiting.   Genitourinary: Negative for dysuria, flank pain, hematuria and urgency.   Musculoskeletal: Negative for myalgias.   Skin: Negative for rash.   Neurological: Negative for syncope, numbness and headaches.   Psychiatric/Behavioral: Negative for confusion.       Past Medical History:   Diagnosis Date   • Aneurysm (CMS/HCC)    • Arthritis    • Asthma    • Cancer (CMS/HCC)     lungs   • COPD (chronic obstructive pulmonary disease) (CMS/HCC)    • Coronary artery disease    • Diabetes mellitus (CMS/HCC)    • Dyslipidemia    • GERD (gastroesophageal reflux disease)    • Heart disease    • Hypertension    • PAD (peripheral artery disease) (CMS/HCC)        Allergies   Allergen Reactions   • Acetaminophen-Codeine    • Adhesive Tape Other (See Comments)     ADHESIVE CAUSES SKIN BLISTERS, PER PT   • Indocin [Indomethacin] Itching       Past Surgical History:   Procedure Laterality Date   • ABDOMINAL AORTIC ANEURYSM REPAIR     • COLONOSCOPY N/A 10/11/2018    Procedure: COLONOSCOPY;  Surgeon: Calos Stockton MD;  Location: [facility] OR;   Service: Gastroenterology   • FINGER SURGERY Left    • KNEE SURGERY Left    • LUNG REMOVAL, PARTIAL  2011   • LUNG SURGERY  2011       Family History   Problem Relation Age of Onset   • Cancer Mother    • Cancer Brother        Social History     Socioeconomic History   • Marital status:      Spouse name: Not on file   • Number of children: Not on file   • Years of education: Not on file   • Highest education level: Not on file   Tobacco Use   • Smoking status: Former Smoker     Packs/day: 3.00     Years: 50.00     Pack years: 150.00     Quit date: 2011     Years since quittin.8   • Smokeless tobacco: Former User     Types: Chew   Substance and Sexual Activity   • Alcohol use: Yes     Alcohol/week: 1.0 standard drinks     Types: 1 Cans of beer per week   • Drug use: No   • Sexual activity: Defer           Objective   Physical Exam  Constitutional:       Appearance: He is well-developed.   HENT:      Head: Normocephalic and atraumatic.      Nose: Nose normal.      Mouth/Throat:      Pharynx: No oropharyngeal exudate.   Eyes:      Conjunctiva/sclera: Conjunctivae normal.      Pupils: Pupils are equal, round, and reactive to light.   Neck:      Musculoskeletal: Normal range of motion and neck supple.      Vascular: No JVD.      Trachea: No tracheal deviation.   Cardiovascular:      Rate and Rhythm: Normal rate and regular rhythm.      Heart sounds: Normal heart sounds. No murmur. No friction rub. No gallop.    Pulmonary:      Effort: Pulmonary effort is normal. No respiratory distress.      Breath sounds: Normal breath sounds. No stridor. No wheezing or rales.   Abdominal:      General: Bowel sounds are normal. There is no distension.      Palpations: Abdomen is soft. There is no mass.      Tenderness: There is no abdominal tenderness. There is no guarding or rebound.      Hernia: No hernia is present.   Musculoskeletal:         General: No tenderness or deformity.      Comments: Superior  aspect of mid left foot has a small burst blood vessel but otherwise no deformity, minimal tenderness with palpation distal neurovascular intact.   Lymphadenopathy:      Cervical: No cervical adenopathy.   Skin:     General: Skin is warm and dry.      Capillary Refill: Capillary refill takes less than 2 seconds.   Neurological:      Mental Status: He is alert and oriented to person, place, and time.      Cranial Nerves: No cranial nerve deficit.      Sensory: No sensory deficit.      Motor: No abnormal muscle tone.   Psychiatric:         Behavior: Behavior normal.         Thought Content: Thought content normal.         Judgment: Judgment normal.         Procedures  ECG shows a normal sinus rhythm with no acute ST changes normal axis and intervals         ED Course  ED Course as of Sep 22 0359   Sun Sep 20, 2020   0508 proBNP: 880.0 [SM]      ED Course User Index  [] Ramon Workman DO                                           [facility]    Final diagnoses:   Contusion of left foot, initial encounter            Ramon Workman DO  09/20/20 0502       Ramon Workman DO  09/22/20 0400

## 2020-09-21 ENCOUNTER — APPOINTMENT (OUTPATIENT)
Dept: GENERAL RADIOLOGY | Facility: HOSPITAL | Age: 75
End: 2020-09-21

## 2020-09-21 ENCOUNTER — EPISODE CHANGES (OUTPATIENT)
Dept: CASE MANAGEMENT | Facility: OTHER | Age: 75
End: 2020-09-21

## 2020-09-21 ENCOUNTER — HOSPITAL ENCOUNTER (EMERGENCY)
Facility: HOSPITAL | Age: 75
Discharge: HOME | End: 2020-09-21
Attending: EMERGENCY MEDICINE | Admitting: EMERGENCY MEDICINE

## 2020-09-21 ENCOUNTER — PATIENT OUTREACH (OUTPATIENT)
Dept: CASE MANAGEMENT | Facility: OTHER | Age: 75
End: 2020-09-21

## 2020-09-21 VITALS
RESPIRATION RATE: 20 BRPM | SYSTOLIC BLOOD PRESSURE: 149 MMHG | HEART RATE: 73 BPM | DIASTOLIC BLOOD PRESSURE: 89 MMHG | OXYGEN SATURATION: 96 % | BODY MASS INDEX: 28.25 KG/M2 | HEIGHT: 67 IN | WEIGHT: 180 LBS | TEMPERATURE: 99 F

## 2020-09-21 DIAGNOSIS — J44.1 COPD WITH ACUTE EXACERBATION (HCC): Primary | ICD-10-CM

## 2020-09-21 LAB
ALBUMIN SERPL-MCNC: 4.32 G/DL (ref 3.5–5.2)
ALBUMIN/GLOB SERPL: 1.3 G/DL
ALP SERPL-CCNC: 61 U/L (ref 39–117)
ALT SERPL W P-5'-P-CCNC: 15 U/L (ref 1–41)
ANION GAP SERPL CALCULATED.3IONS-SCNC: 14 MMOL/L (ref 5–15)
AST SERPL-CCNC: 19 U/L (ref 1–40)
BASOPHILS # BLD AUTO: 0.07 10*3/MM3 (ref 0–0.2)
BASOPHILS NFR BLD AUTO: 0.6 % (ref 0–1.5)
BILIRUB SERPL-MCNC: 1.2 MG/DL (ref 0–1.2)
BUN SERPL-MCNC: 12 MG/DL (ref 8–23)
BUN/CREAT SERPL: 15 (ref 7–25)
CALCIUM SPEC-SCNC: 9.7 MG/DL (ref 8.6–10.5)
CHLORIDE SERPL-SCNC: 102 MMOL/L (ref 98–107)
CO2 SERPL-SCNC: 23 MMOL/L (ref 22–29)
CREAT SERPL-MCNC: 0.8 MG/DL (ref 0.76–1.27)
CRP SERPL-MCNC: 3.27 MG/DL (ref 0–0.5)
DEPRECATED RDW RBC AUTO: 46.2 FL (ref 37–54)
EOSINOPHIL # BLD AUTO: 0.35 10*3/MM3 (ref 0–0.4)
EOSINOPHIL NFR BLD AUTO: 2.8 % (ref 0.3–6.2)
ERYTHROCYTE [DISTWIDTH] IN BLOOD BY AUTOMATED COUNT: 14.3 % (ref 12.3–15.4)
GFR SERPL CREATININE-BSD FRML MDRD: 94 ML/MIN/1.73
GLOBULIN UR ELPH-MCNC: 3.4 GM/DL
GLUCOSE SERPL-MCNC: 160 MG/DL (ref 65–99)
HCT VFR BLD AUTO: 47.3 % (ref 37.5–51)
HGB BLD-MCNC: 15.2 G/DL (ref 13–17.7)
HOLD SPECIMEN: NORMAL
HOLD SPECIMEN: NORMAL
IMM GRANULOCYTES # BLD AUTO: 0.07 10*3/MM3 (ref 0–0.05)
IMM GRANULOCYTES NFR BLD AUTO: 0.6 % (ref 0–0.5)
LYMPHOCYTES # BLD AUTO: 1.37 10*3/MM3 (ref 0.7–3.1)
LYMPHOCYTES NFR BLD AUTO: 11.1 % (ref 19.6–45.3)
MCH RBC QN AUTO: 28.4 PG (ref 26.6–33)
MCHC RBC AUTO-ENTMCNC: 32.1 G/DL (ref 31.5–35.7)
MCV RBC AUTO: 88.4 FL (ref 79–97)
MONOCYTES # BLD AUTO: 0.99 10*3/MM3 (ref 0.1–0.9)
MONOCYTES NFR BLD AUTO: 8 % (ref 5–12)
NEUTROPHILS NFR BLD AUTO: 76.9 % (ref 42.7–76)
NEUTROPHILS NFR BLD AUTO: 9.46 10*3/MM3 (ref 1.7–7)
NRBC BLD AUTO-RTO: 0 /100 WBC (ref 0–0.2)
PLATELET # BLD AUTO: 235 10*3/MM3 (ref 140–450)
PMV BLD AUTO: 8.7 FL (ref 6–12)
POTASSIUM SERPL-SCNC: 4 MMOL/L (ref 3.5–5.2)
PROT SERPL-MCNC: 7.7 G/DL (ref 6–8.5)
RBC # BLD AUTO: 5.35 10*6/MM3 (ref 4.14–5.8)
SODIUM SERPL-SCNC: 139 MMOL/L (ref 136–145)
TROPONIN T SERPL-MCNC: <0.01 NG/ML (ref 0–0.03)
WBC # BLD AUTO: 12.31 10*3/MM3 (ref 3.4–10.8)
WHOLE BLOOD HOLD SPECIMEN: NORMAL
WHOLE BLOOD HOLD SPECIMEN: NORMAL

## 2020-09-21 PROCEDURE — 80053 COMPREHEN METABOLIC PANEL: CPT | Performed by: PHYSICIAN ASSISTANT

## 2020-09-21 PROCEDURE — 84484 ASSAY OF TROPONIN QUANT: CPT | Performed by: PHYSICIAN ASSISTANT

## 2020-09-21 PROCEDURE — 96372 THER/PROPH/DIAG INJ SC/IM: CPT

## 2020-09-21 PROCEDURE — 71045 X-RAY EXAM CHEST 1 VIEW: CPT

## 2020-09-21 PROCEDURE — 86140 C-REACTIVE PROTEIN: CPT | Performed by: PHYSICIAN ASSISTANT

## 2020-09-21 PROCEDURE — 85025 COMPLETE CBC W/AUTO DIFF WBC: CPT | Performed by: PHYSICIAN ASSISTANT

## 2020-09-21 PROCEDURE — 25010000002 METHYLPREDNISOLONE PER 125 MG: Performed by: PHYSICIAN ASSISTANT

## 2020-09-21 PROCEDURE — 71045 X-RAY EXAM CHEST 1 VIEW: CPT | Performed by: RADIOLOGY

## 2020-09-21 PROCEDURE — 25010000002 CEFTRIAXONE PER 250 MG: Performed by: PHYSICIAN ASSISTANT

## 2020-09-21 PROCEDURE — 99283 EMERGENCY DEPT VISIT LOW MDM: CPT

## 2020-09-21 RX ORDER — DOXYCYCLINE 100 MG/1
100 CAPSULE ORAL 2 TIMES DAILY
Qty: 20 CAPSULE | Refills: 0 | Status: ON HOLD | OUTPATIENT
Start: 2020-09-21 | End: 2021-01-01

## 2020-09-21 RX ORDER — PREDNISONE 10 MG/1
10 TABLET ORAL DAILY
Qty: 30 TABLET | Refills: 0 | Status: ON HOLD | OUTPATIENT
Start: 2020-09-21 | End: 2021-01-01

## 2020-09-21 RX ORDER — METHYLPREDNISOLONE SODIUM SUCCINATE 125 MG/2ML
80 INJECTION, POWDER, LYOPHILIZED, FOR SOLUTION INTRAMUSCULAR; INTRAVENOUS ONCE
Status: COMPLETED | OUTPATIENT
Start: 2020-09-21 | End: 2020-09-21

## 2020-09-21 RX ADMIN — METHYLPREDNISOLONE SODIUM SUCCINATE 80 MG: 125 INJECTION, POWDER, FOR SOLUTION INTRAMUSCULAR; INTRAVENOUS at 22:23

## 2020-09-21 RX ADMIN — LIDOCAINE HYDROCHLORIDE 1 G: 10 INJECTION, SOLUTION EPIDURAL; INFILTRATION; INTRACAUDAL; PERINEURAL at 22:23

## 2020-09-21 NOTE — ED PROVIDER NOTES
Subjective   75-year-old male with past medical history of COPD oxygen dependent, CAD, diabetes, dyslipidemia, GERD, hypertension, and peripheral artery disease presents to the emergency room with a spider bite to the left foot x3 days.  Patient states he has a bite to his left foot which he is certain is a spider bite.  He did not see a spider, but states he thinks 1 was beneath his blankets in the bed.  He states since he got bit by the spider he has had shortness of breath and cough and it did not start until he was bit.  He denies chest pain, nausea, vomiting, fever, abdominal pain, back pain, loss of taste, loss of smell, sick contacts, or travel.  States he was seen here on Saturday for same complaint and discharged home.  Denies any aggravating factors or alleviating factors.      History provided by:  Patient   used: No        Review of Systems   Constitutional: Negative.  Negative for fever.   HENT: Negative.    Respiratory: Positive for shortness of breath and wheezing.    Cardiovascular: Negative.  Negative for chest pain.   Gastrointestinal: Negative.  Negative for abdominal pain.   Endocrine: Negative.    Genitourinary: Negative.  Negative for dysuria.   Skin: Positive for wound.   Neurological: Negative.    Psychiatric/Behavioral: Negative.    All other systems reviewed and are negative.      Past Medical History:   Diagnosis Date   • Aneurysm (CMS/HCC)    • Arthritis    • Asthma    • Cancer (CMS/HCC)     lungs   • COPD (chronic obstructive pulmonary disease) (CMS/HCC)    • Coronary artery disease    • Diabetes mellitus (CMS/HCC)    • Dyslipidemia    • GERD (gastroesophageal reflux disease)    • Heart disease    • Hypertension    • PAD (peripheral artery disease) (CMS/[facility])        Allergies   Allergen Reactions   • Acetaminophen-Codeine    • Adhesive Tape Other (See Comments)     ADHESIVE CAUSES SKIN BLISTERS, PER PT   • Indocin [Indomethacin] Itching       Past Surgical History:    Procedure Laterality Date   • ABDOMINAL AORTIC ANEURYSM REPAIR     • COLONOSCOPY N/A 10/11/2018    Procedure: COLONOSCOPY;  Surgeon: Calos Stockton MD;  Location: [facility];  Service: Gastroenterology   • FINGER SURGERY Left    • KNEE SURGERY Left    • LUNG REMOVAL, PARTIAL  2011   • LUNG SURGERY  2011       Family History   Problem Relation Age of Onset   • Cancer Mother    • Cancer Brother        Social History     Socioeconomic History   • Marital status:      Spouse name: Not on file   • Number of children: Not on file   • Years of education: Not on file   • Highest education level: Not on file   Tobacco Use   • Smoking status: Former Smoker     Packs/day: 3.00     Years: 50.00     Pack years: 150.00     Quit date: 2011     Years since quittin.8   • Smokeless tobacco: Former User     Types: Chew   Substance and Sexual Activity   • Alcohol use: Yes     Alcohol/week: 1.0 standard drinks     Types: 1 Cans of beer per week   • Drug use: No   • Sexual activity: Defer           Objective   Physical Exam  Vitals signs and nursing note reviewed.   Constitutional:       General: He is not in acute distress.     Appearance: He is well-developed. He is not diaphoretic.   HENT:      Head: Normocephalic and atraumatic.      Right Ear: External ear normal.      Left Ear: External ear normal.      Nose: Nose normal.   Eyes:      Conjunctiva/sclera: Conjunctivae normal.      Pupils: Pupils are equal, round, and reactive to light.   Neck:      Musculoskeletal: Normal range of motion and neck supple.      Vascular: No JVD.      Trachea: No tracheal deviation.   Cardiovascular:      Rate and Rhythm: Normal rate and regular rhythm.      Heart sounds: Normal heart sounds. No murmur.   Pulmonary:      Effort: Pulmonary effort is normal. No respiratory distress.      Breath sounds: Wheezing present.   Abdominal:      General: Bowel sounds are normal.      Palpations: Abdomen is soft.       Tenderness: There is no abdominal tenderness.   Musculoskeletal: Normal range of motion.         General: No deformity.   Skin:     General: Skin is warm and dry.      Coloration: Skin is not pale.      Findings: Lesion present. No erythema or rash.          Neurological:      Mental Status: He is alert and oriented to person, place, and time.      Cranial Nerves: No cranial nerve deficit.   Psychiatric:         Behavior: Behavior normal.         Thought Content: Thought content normal.         Procedures           ED Course  ED Course as of Sep 22 0043   Mon Sep 21, 2020   2130 IMPRESSION:   1. Stable chest with post surgical changes again noted.  2. Chronic interstitial fibrosis. No acute changes identified.    This report was finalized on 9/21/2020 8:39 PM by Dr. Edgar Monte MD.    XR Chest 1 View [TK]      ED Course User Index  [TK] Poppy Way PA-C                                           MDM  Number of Diagnoses or Management Options  COPD with acute exacerbation (CMS/HCC): new and requires workup     Amount and/or Complexity of Data Reviewed  Clinical lab tests: reviewed and ordered  Tests in the radiology section of CPT®: reviewed and ordered    Risk of Complications, Morbidity, and/or Mortality  Presenting problems: moderate  Diagnostic procedures: moderate  Management options: moderate    Patient Progress  Patient progress: stable      Final diagnoses:   COPD with acute exacerbation (CMS/HCC)            Poppy Way PA-C  09/22/20 0043

## 2020-12-28 ENCOUNTER — HOSPITAL ENCOUNTER (OUTPATIENT)
Dept: CT IMAGING | Facility: HOSPITAL | Age: 75
Discharge: HOME | End: 2020-12-28
Admitting: NURSE PRACTITIONER

## 2020-12-28 DIAGNOSIS — C34.11 MALIGNANT NEOPLASM OF UPPER LOBE OF RIGHT LUNG (HCC): ICD-10-CM

## 2020-12-28 LAB — CREAT BLDA-MCNC: 0.7 MG/DL (ref 0.6–1.3)

## 2020-12-28 PROCEDURE — 71260 CT THORAX DX C+: CPT

## 2020-12-28 PROCEDURE — 71260 CT THORAX DX C+: CPT | Performed by: RADIOLOGY

## 2020-12-28 PROCEDURE — 82565 ASSAY OF CREATININE: CPT

## 2020-12-28 PROCEDURE — 25010000002 IOPAMIDOL 61 % SOLUTION: Performed by: NURSE PRACTITIONER

## 2020-12-28 RX ADMIN — IOPAMIDOL 70 ML: 612 INJECTION, SOLUTION INTRAVENOUS at 08:43

## 2021-01-01 ENCOUNTER — APPOINTMENT (OUTPATIENT)
Dept: CT IMAGING | Facility: HOSPITAL | Age: 76
End: 2021-01-01

## 2021-01-01 ENCOUNTER — READMISSION MANAGEMENT (OUTPATIENT)
Dept: CALL CENTER | Facility: HOSPITAL | Age: 76
End: 2021-01-01

## 2021-01-01 ENCOUNTER — HOSPITAL ENCOUNTER (INPATIENT)
Facility: HOSPITAL | Age: 76
LOS: 4 days | Discharge: HOME HEALTH | End: 2021-11-04
Attending: STUDENT IN AN ORGANIZED HEALTH CARE EDUCATION/TRAINING PROGRAM | Admitting: HOSPITALIST

## 2021-01-01 ENCOUNTER — HOSPITAL ENCOUNTER (OUTPATIENT)
Dept: CARDIOLOGY | Facility: HOSPITAL | Age: 76
Discharge: HOME | End: 2021-11-24
Admitting: NURSE PRACTITIONER

## 2021-01-01 ENCOUNTER — ANESTHESIA (OUTPATIENT)
Dept: PERIOP | Facility: HOSPITAL | Age: 76
End: 2021-01-01

## 2021-01-01 ENCOUNTER — APPOINTMENT (OUTPATIENT)
Dept: CARDIOLOGY | Facility: HOSPITAL | Age: 76
End: 2021-01-01

## 2021-01-01 ENCOUNTER — APPOINTMENT (OUTPATIENT)
Dept: GENERAL RADIOLOGY | Facility: HOSPITAL | Age: 76
End: 2021-01-01

## 2021-01-01 ENCOUNTER — HOSPITAL ENCOUNTER (INPATIENT)
Facility: HOSPITAL | Age: 76
LOS: 4 days | Discharge: HOME | End: 2021-12-27
Attending: EMERGENCY MEDICINE | Admitting: HOSPITALIST

## 2021-01-01 ENCOUNTER — ANESTHESIA EVENT (OUTPATIENT)
Dept: PERIOP | Facility: HOSPITAL | Age: 76
End: 2021-01-01

## 2021-01-01 ENCOUNTER — APPOINTMENT (OUTPATIENT)
Dept: ULTRASOUND IMAGING | Facility: HOSPITAL | Age: 76
End: 2021-01-01

## 2021-01-01 ENCOUNTER — TELEPHONE (OUTPATIENT)
Dept: GENERAL RADIOLOGY | Facility: HOSPITAL | Age: 76
End: 2021-01-01

## 2021-01-01 ENCOUNTER — HOSPITAL ENCOUNTER (OUTPATIENT)
Dept: CARDIOLOGY | Facility: HOSPITAL | Age: 76
Discharge: HOME | End: 2021-11-12
Admitting: NURSE PRACTITIONER

## 2021-01-01 VITALS
OXYGEN SATURATION: 94 % | BODY MASS INDEX: 29.32 KG/M2 | RESPIRATION RATE: 22 BRPM | HEART RATE: 89 BPM | SYSTOLIC BLOOD PRESSURE: 126 MMHG | DIASTOLIC BLOOD PRESSURE: 80 MMHG | WEIGHT: 187.2 LBS

## 2021-01-01 VITALS
DIASTOLIC BLOOD PRESSURE: 87 MMHG | HEIGHT: 67 IN | OXYGEN SATURATION: 97 % | HEART RATE: 100 BPM | TEMPERATURE: 97.8 F | RESPIRATION RATE: 20 BRPM | SYSTOLIC BLOOD PRESSURE: 120 MMHG | BODY MASS INDEX: 29.26 KG/M2 | WEIGHT: 186.4 LBS

## 2021-01-01 VITALS
DIASTOLIC BLOOD PRESSURE: 72 MMHG | OXYGEN SATURATION: 85 % | RESPIRATION RATE: 22 BRPM | WEIGHT: 178 LBS | HEIGHT: 67 IN | SYSTOLIC BLOOD PRESSURE: 132 MMHG | HEART RATE: 70 BPM | BODY MASS INDEX: 27.94 KG/M2

## 2021-01-01 VITALS
TEMPERATURE: 97.9 F | WEIGHT: 173.9 LBS | RESPIRATION RATE: 20 BRPM | SYSTOLIC BLOOD PRESSURE: 128 MMHG | HEIGHT: 67 IN | OXYGEN SATURATION: 99 % | HEART RATE: 58 BPM | DIASTOLIC BLOOD PRESSURE: 67 MMHG | BODY MASS INDEX: 27.29 KG/M2

## 2021-01-01 DIAGNOSIS — I48.91 ATRIAL FIBRILLATION WITH RAPID VENTRICULAR RESPONSE: ICD-10-CM

## 2021-01-01 DIAGNOSIS — R07.9 CHEST PAIN, UNSPECIFIED TYPE: Primary | ICD-10-CM

## 2021-01-01 DIAGNOSIS — J44.9 ADVANCED COPD (HCC): ICD-10-CM

## 2021-01-01 DIAGNOSIS — I10 HYPERTENSION, UNSPECIFIED TYPE: ICD-10-CM

## 2021-01-01 DIAGNOSIS — I50.32 CHRONIC DIASTOLIC CONGESTIVE HEART FAILURE (HCC): Primary | ICD-10-CM

## 2021-01-01 DIAGNOSIS — J96.21 ACUTE ON CHRONIC RESPIRATORY FAILURE WITH HYPOXIA: ICD-10-CM

## 2021-01-01 DIAGNOSIS — R13.19 ESOPHAGEAL DYSPHAGIA: ICD-10-CM

## 2021-01-01 DIAGNOSIS — J44.1 COPD EXACERBATION: ICD-10-CM

## 2021-01-01 DIAGNOSIS — I50.9 ACUTE ON CHRONIC CONGESTIVE HEART FAILURE, UNSPECIFIED HEART FAILURE TYPE: Primary | ICD-10-CM

## 2021-01-01 LAB
A-A DO2: 116.4 MMHG (ref 0–300)
A-A DO2: 148.6 MMHG (ref 0–300)
A-A DO2: 240.9 MMHG (ref 0–300)
ABSOLUTE LUNG FLUID CONTENT: 30 % (ref 20–35)
ALBUMIN SERPL-MCNC: 2.8 G/DL (ref 3.5–5.2)
ALBUMIN SERPL-MCNC: 3.52 G/DL (ref 3.5–5.2)
ALBUMIN SERPL-MCNC: 3.99 G/DL (ref 3.5–5.2)
ALBUMIN SERPL-MCNC: 4.47 G/DL (ref 3.5–5.2)
ALBUMIN SERPL-MCNC: 4.68 G/DL (ref 3.5–5.2)
ALBUMIN/GLOB SERPL: 1 G/DL
ALBUMIN/GLOB SERPL: 1.2 G/DL
ALBUMIN/GLOB SERPL: 1.3 G/DL
ALBUMIN/GLOB SERPL: 1.4 G/DL
ALBUMIN/GLOB SERPL: 1.5 G/DL
ALP SERPL-CCNC: 52 U/L (ref 39–117)
ALP SERPL-CCNC: 54 U/L (ref 39–117)
ALP SERPL-CCNC: 57 U/L (ref 39–117)
ALP SERPL-CCNC: 62 U/L (ref 39–117)
ALP SERPL-CCNC: 69 U/L (ref 39–117)
ALT SERPL W P-5'-P-CCNC: 10 U/L (ref 1–41)
ALT SERPL W P-5'-P-CCNC: 14 U/L (ref 1–41)
ALT SERPL W P-5'-P-CCNC: 15 U/L (ref 1–41)
ALT SERPL W P-5'-P-CCNC: 34 U/L (ref 1–41)
ALT SERPL W P-5'-P-CCNC: 6 U/L (ref 1–41)
ANION GAP SERPL CALCULATED.3IONS-SCNC: 10.2 MMOL/L (ref 5–15)
ANION GAP SERPL CALCULATED.3IONS-SCNC: 10.5 MMOL/L (ref 5–15)
ANION GAP SERPL CALCULATED.3IONS-SCNC: 10.7 MMOL/L (ref 5–15)
ANION GAP SERPL CALCULATED.3IONS-SCNC: 11.8 MMOL/L (ref 5–15)
ANION GAP SERPL CALCULATED.3IONS-SCNC: 14.1 MMOL/L (ref 5–15)
ANION GAP SERPL CALCULATED.3IONS-SCNC: 14.5 MMOL/L (ref 5–15)
ANION GAP SERPL CALCULATED.3IONS-SCNC: 14.8 MMOL/L (ref 5–15)
ANION GAP SERPL CALCULATED.3IONS-SCNC: 15.4 MMOL/L (ref 5–15)
ANION GAP SERPL CALCULATED.3IONS-SCNC: 17.8 MMOL/L (ref 5–15)
ANION GAP SERPL CALCULATED.3IONS-SCNC: 20.6 MMOL/L (ref 5–15)
ANION GAP SERPL CALCULATED.3IONS-SCNC: 9.7 MMOL/L (ref 5–15)
APTT PPP: 36.4 SECONDS (ref 25.5–35.4)
APTT PPP: 38.3 SECONDS (ref 25.5–35.4)
APTT PPP: 42.3 SECONDS (ref 25.5–35.4)
APTT PPP: 51.6 SECONDS (ref 25.5–35.4)
APTT PPP: 56.5 SECONDS (ref 25.5–35.4)
ARTERIAL PATENCY WRIST A: ABNORMAL
ARTERIAL PATENCY WRIST A: ABNORMAL
ARTERIAL PATENCY WRIST A: POSITIVE
AST SERPL-CCNC: 105 U/L (ref 1–40)
AST SERPL-CCNC: 12 U/L (ref 1–40)
AST SERPL-CCNC: 12 U/L (ref 1–40)
AST SERPL-CCNC: 16 U/L (ref 1–40)
AST SERPL-CCNC: 18 U/L (ref 1–40)
ATMOSPHERIC PRESS: 718 MMHG
ATMOSPHERIC PRESS: 719 MMHG
ATMOSPHERIC PRESS: 721 MMHG
B PARAPERT DNA SPEC QL NAA+PROBE: NOT DETECTED
B PERT DNA SPEC QL NAA+PROBE: NOT DETECTED
BACTERIA SPEC AEROBE CULT: NORMAL
BACTERIA SPEC AEROBE CULT: NORMAL
BASE EXCESS BLDA CALC-SCNC: -1.9 MMOL/L (ref 0–2)
BASE EXCESS BLDA CALC-SCNC: -2.1 MMOL/L (ref 0–2)
BASE EXCESS BLDA CALC-SCNC: 0.2 MMOL/L (ref 0–2)
BASOPHILS # BLD AUTO: 0.03 10*3/MM3 (ref 0–0.2)
BASOPHILS # BLD AUTO: 0.04 10*3/MM3 (ref 0–0.2)
BASOPHILS # BLD AUTO: 0.1 10*3/MM3 (ref 0–0.2)
BASOPHILS NFR BLD AUTO: 0.3 % (ref 0–1.5)
BASOPHILS NFR BLD AUTO: 0.4 % (ref 0–1.5)
BASOPHILS NFR BLD AUTO: 0.5 % (ref 0–1.5)
BASOPHILS NFR BLD AUTO: 0.8 % (ref 0–1.5)
BDY SITE: ABNORMAL
BH CV ECHO MEAS - ACS: 1.9 CM
BH CV ECHO MEAS - AO MAX PG: 5.9 MMHG
BH CV ECHO MEAS - AO MEAN PG: 3 MMHG
BH CV ECHO MEAS - AO ROOT AREA (BSA CORRECTED): 2
BH CV ECHO MEAS - AO ROOT AREA: 12.3 CM^2
BH CV ECHO MEAS - AO ROOT DIAM: 4 CM
BH CV ECHO MEAS - AO V2 MAX: 121 CM/SEC
BH CV ECHO MEAS - AO V2 MEAN: 84 CM/SEC
BH CV ECHO MEAS - AO V2 VTI: 26.1 CM
BH CV ECHO MEAS - BSA(HAYCOCK): 2 M^2
BH CV ECHO MEAS - BSA: 1.9 M^2
BH CV ECHO MEAS - BZI_BMI: 28.5 KILOGRAMS/M^2
BH CV ECHO MEAS - BZI_METRIC_HEIGHT: 170.2 CM
BH CV ECHO MEAS - BZI_METRIC_WEIGHT: 82.6 KG
BH CV ECHO MEAS - EDV(CUBED): 85.2 ML
BH CV ECHO MEAS - EDV(MOD-SP4): 65.7 ML
BH CV ECHO MEAS - EDV(TEICH): 87.7 ML
BH CV ECHO MEAS - EF(CUBED): 61.5 %
BH CV ECHO MEAS - EF(MOD-SP4): 64.1 %
BH CV ECHO MEAS - EF(TEICH): 53.3 %
BH CV ECHO MEAS - ESV(CUBED): 32.8 ML
BH CV ECHO MEAS - ESV(MOD-SP4): 23.6 ML
BH CV ECHO MEAS - ESV(TEICH): 41 ML
BH CV ECHO MEAS - FS: 27.3 %
BH CV ECHO MEAS - IVS/LVPW: 1.2
BH CV ECHO MEAS - IVSD: 1.2 CM
BH CV ECHO MEAS - LA DIMENSION: 1.6 CM
BH CV ECHO MEAS - LA/AO: 0.41
BH CV ECHO MEAS - LV DIASTOLIC VOL/BSA (35-75): 33.8 ML/M^2
BH CV ECHO MEAS - LV MASS(C)D: 168.9 GRAMS
BH CV ECHO MEAS - LV MASS(C)DI: 87 GRAMS/M^2
BH CV ECHO MEAS - LV SYSTOLIC VOL/BSA (12-30): 12.1 ML/M^2
BH CV ECHO MEAS - LVIDD: 4.4 CM
BH CV ECHO MEAS - LVIDS: 3.2 CM
BH CV ECHO MEAS - LVLD AP4: 6.8 CM
BH CV ECHO MEAS - LVLS AP4: 5.5 CM
BH CV ECHO MEAS - LVOT AREA (M): 3.1 CM^2
BH CV ECHO MEAS - LVOT AREA: 3.1 CM^2
BH CV ECHO MEAS - LVOT DIAM: 2 CM
BH CV ECHO MEAS - LVPWD: 1 CM
BH CV ECHO MEAS - MV A MAX VEL: 76.3 CM/SEC
BH CV ECHO MEAS - MV E MAX VEL: 51.4 CM/SEC
BH CV ECHO MEAS - MV E/A: 0.67
BH CV ECHO MEAS - PA ACC TIME: 0.09 SEC
BH CV ECHO MEAS - PA PR(ACCEL): 39.4 MMHG
BH CV ECHO MEAS - RAP SYSTOLE: 10 MMHG
BH CV ECHO MEAS - RVSP: 51.7 MMHG
BH CV ECHO MEAS - SI(AO): 164.6 ML/M^2
BH CV ECHO MEAS - SI(CUBED): 27 ML/M^2
BH CV ECHO MEAS - SI(MOD-SP4): 21.7 ML/M^2
BH CV ECHO MEAS - SI(TEICH): 24.1 ML/M^2
BH CV ECHO MEAS - SV(AO): 319.8 ML
BH CV ECHO MEAS - SV(CUBED): 52.4 ML
BH CV ECHO MEAS - SV(MOD-SP4): 42.1 ML
BH CV ECHO MEAS - SV(TEICH): 46.7 ML
BH CV ECHO MEAS - TR MAX VEL: 323 CM/SEC
BILIRUB SERPL-MCNC: 0.8 MG/DL (ref 0–1.2)
BILIRUB SERPL-MCNC: 0.9 MG/DL (ref 0–1.2)
BILIRUB SERPL-MCNC: 1.1 MG/DL (ref 0–1.2)
BILIRUB SERPL-MCNC: 1.1 MG/DL (ref 0–1.2)
BILIRUB SERPL-MCNC: 1.2 MG/DL (ref 0–1.2)
BILIRUB UR QL STRIP: NEGATIVE
BILIRUB UR QL STRIP: NEGATIVE
BODY TEMPERATURE: 0 C
BUN SERPL-MCNC: 12 MG/DL (ref 8–23)
BUN SERPL-MCNC: 13 MG/DL (ref 8–23)
BUN SERPL-MCNC: 14 MG/DL (ref 8–23)
BUN SERPL-MCNC: 17 MG/DL (ref 8–23)
BUN SERPL-MCNC: 17 MG/DL (ref 8–23)
BUN SERPL-MCNC: 31 MG/DL (ref 8–23)
BUN SERPL-MCNC: 32 MG/DL (ref 8–23)
BUN SERPL-MCNC: 32 MG/DL (ref 8–23)
BUN SERPL-MCNC: 6 MG/DL (ref 8–23)
BUN/CREAT SERPL: 10 (ref 7–25)
BUN/CREAT SERPL: 13.3 (ref 7–25)
BUN/CREAT SERPL: 14 (ref 7–25)
BUN/CREAT SERPL: 15.1 (ref 7–25)
BUN/CREAT SERPL: 17.7 (ref 7–25)
BUN/CREAT SERPL: 19.4 (ref 7–25)
BUN/CREAT SERPL: 20.7 (ref 7–25)
BUN/CREAT SERPL: 20.9 (ref 7–25)
BUN/CREAT SERPL: 39.5 (ref 7–25)
BUN/CREAT SERPL: 40.3 (ref 7–25)
BUN/CREAT SERPL: 44.4 (ref 7–25)
C PNEUM DNA NPH QL NAA+NON-PROBE: NOT DETECTED
CALCIUM SPEC-SCNC: 7.9 MG/DL (ref 8.6–10.5)
CALCIUM SPEC-SCNC: 8 MG/DL (ref 8.6–10.5)
CALCIUM SPEC-SCNC: 8 MG/DL (ref 8.6–10.5)
CALCIUM SPEC-SCNC: 8.5 MG/DL (ref 8.6–10.5)
CALCIUM SPEC-SCNC: 8.9 MG/DL (ref 8.6–10.5)
CALCIUM SPEC-SCNC: 9.1 MG/DL (ref 8.6–10.5)
CALCIUM SPEC-SCNC: 9.2 MG/DL (ref 8.6–10.5)
CALCIUM SPEC-SCNC: 9.3 MG/DL (ref 8.6–10.5)
CALCIUM SPEC-SCNC: 9.5 MG/DL (ref 8.6–10.5)
CALCIUM SPEC-SCNC: 9.6 MG/DL (ref 8.6–10.5)
CALCIUM SPEC-SCNC: 9.8 MG/DL (ref 8.6–10.5)
CHLORIDE SERPL-SCNC: 100 MMOL/L (ref 98–107)
CHLORIDE SERPL-SCNC: 101 MMOL/L (ref 98–107)
CHLORIDE SERPL-SCNC: 102 MMOL/L (ref 98–107)
CHLORIDE SERPL-SCNC: 102 MMOL/L (ref 98–107)
CHLORIDE SERPL-SCNC: 104 MMOL/L (ref 98–107)
CHLORIDE SERPL-SCNC: 105 MMOL/L (ref 98–107)
CHLORIDE SERPL-SCNC: 96 MMOL/L (ref 98–107)
CHLORIDE SERPL-SCNC: 97 MMOL/L (ref 98–107)
CHLORIDE SERPL-SCNC: 98 MMOL/L (ref 98–107)
CHLORIDE SERPL-SCNC: 98 MMOL/L (ref 98–107)
CHLORIDE SERPL-SCNC: 99 MMOL/L (ref 98–107)
CHOLEST SERPL-MCNC: 167 MG/DL (ref 0–200)
CLARITY UR: CLEAR
CLARITY UR: CLEAR
CO2 BLDA-SCNC: 22.3 MMOL/L (ref 22–33)
CO2 BLDA-SCNC: 22.5 MMOL/L (ref 22–33)
CO2 BLDA-SCNC: 24.1 MMOL/L (ref 22–33)
CO2 SERPL-SCNC: 22.2 MMOL/L (ref 22–29)
CO2 SERPL-SCNC: 22.4 MMOL/L (ref 22–29)
CO2 SERPL-SCNC: 23.2 MMOL/L (ref 22–29)
CO2 SERPL-SCNC: 23.8 MMOL/L (ref 22–29)
CO2 SERPL-SCNC: 23.9 MMOL/L (ref 22–29)
CO2 SERPL-SCNC: 24.2 MMOL/L (ref 22–29)
CO2 SERPL-SCNC: 24.3 MMOL/L (ref 22–29)
CO2 SERPL-SCNC: 25.3 MMOL/L (ref 22–29)
CO2 SERPL-SCNC: 26.6 MMOL/L (ref 22–29)
CO2 SERPL-SCNC: 27.5 MMOL/L (ref 22–29)
CO2 SERPL-SCNC: 28.5 MMOL/L (ref 22–29)
COHGB MFR BLD: 1.5 % (ref 0–5)
COHGB MFR BLD: 1.6 % (ref 0–5)
COHGB MFR BLD: 1.7 % (ref 0–5)
COLOR UR: ABNORMAL
COLOR UR: YELLOW
CREAT SERPL-MCNC: 0.6 MG/DL (ref 0.76–1.27)
CREAT SERPL-MCNC: 0.62 MG/DL (ref 0.76–1.27)
CREAT SERPL-MCNC: 0.67 MG/DL (ref 0.76–1.27)
CREAT SERPL-MCNC: 0.72 MG/DL (ref 0.76–1.27)
CREAT SERPL-MCNC: 0.77 MG/DL (ref 0.76–1.27)
CREAT SERPL-MCNC: 0.81 MG/DL (ref 0.76–1.27)
CREAT SERPL-MCNC: 0.82 MG/DL (ref 0.76–1.27)
CREAT SERPL-MCNC: 0.86 MG/DL (ref 0.76–1.27)
CREAT SERPL-MCNC: 0.86 MG/DL (ref 0.76–1.27)
CREAT SERPL-MCNC: 0.9 MG/DL (ref 0.76–1.27)
CREAT SERPL-MCNC: 0.96 MG/DL (ref 0.76–1.27)
CRP SERPL-MCNC: 0.64 MG/DL (ref 0–0.5)
D DIMER PPP FEU-MCNC: 2.41 MCGFEU/ML (ref 0–0.5)
D-LACTATE SERPL-SCNC: 1.8 MMOL/L (ref 0.5–2)
D-LACTATE SERPL-SCNC: 2.7 MMOL/L (ref 0.5–2)
D-LACTATE SERPL-SCNC: 3.7 MMOL/L (ref 0.5–2)
D-LACTATE SERPL-SCNC: 4.1 MMOL/L (ref 0.5–2)
D-LACTATE SERPL-SCNC: 5.7 MMOL/L (ref 0.5–2)
DEPRECATED RDW RBC AUTO: 45.3 FL (ref 37–54)
DEPRECATED RDW RBC AUTO: 48 FL (ref 37–54)
DEPRECATED RDW RBC AUTO: 48.2 FL (ref 37–54)
DEPRECATED RDW RBC AUTO: 48.8 FL (ref 37–54)
DEPRECATED RDW RBC AUTO: 49.1 FL (ref 37–54)
DEPRECATED RDW RBC AUTO: 49.2 FL (ref 37–54)
DEPRECATED RDW RBC AUTO: 49.3 FL (ref 37–54)
DEPRECATED RDW RBC AUTO: 50.1 FL (ref 37–54)
DEPRECATED RDW RBC AUTO: 51.4 FL (ref 37–54)
EOSINOPHIL # BLD AUTO: 0 10*3/MM3 (ref 0–0.4)
EOSINOPHIL # BLD AUTO: 0 10*3/MM3 (ref 0–0.4)
EOSINOPHIL # BLD AUTO: 0.01 10*3/MM3 (ref 0–0.4)
EOSINOPHIL # BLD AUTO: 0.03 10*3/MM3 (ref 0–0.4)
EOSINOPHIL # BLD AUTO: 0.12 10*3/MM3 (ref 0–0.4)
EOSINOPHIL # BLD AUTO: 0.13 10*3/MM3 (ref 0–0.4)
EOSINOPHIL # BLD AUTO: 0.15 10*3/MM3 (ref 0–0.4)
EOSINOPHIL # BLD AUTO: 0.15 10*3/MM3 (ref 0–0.4)
EOSINOPHIL # BLD AUTO: 0.24 10*3/MM3 (ref 0–0.4)
EOSINOPHIL NFR BLD AUTO: 0 % (ref 0.3–6.2)
EOSINOPHIL NFR BLD AUTO: 0 % (ref 0.3–6.2)
EOSINOPHIL NFR BLD AUTO: 0.1 % (ref 0.3–6.2)
EOSINOPHIL NFR BLD AUTO: 0.2 % (ref 0.3–6.2)
EOSINOPHIL NFR BLD AUTO: 1.2 % (ref 0.3–6.2)
EOSINOPHIL NFR BLD AUTO: 1.4 % (ref 0.3–6.2)
EOSINOPHIL NFR BLD AUTO: 1.4 % (ref 0.3–6.2)
EOSINOPHIL NFR BLD AUTO: 1.7 % (ref 0.3–6.2)
EOSINOPHIL NFR BLD AUTO: 1.9 % (ref 0.3–6.2)
ERYTHROCYTE [DISTWIDTH] IN BLOOD BY AUTOMATED COUNT: 14.1 % (ref 12.3–15.4)
ERYTHROCYTE [DISTWIDTH] IN BLOOD BY AUTOMATED COUNT: 14.4 % (ref 12.3–15.4)
ERYTHROCYTE [DISTWIDTH] IN BLOOD BY AUTOMATED COUNT: 14.5 % (ref 12.3–15.4)
ERYTHROCYTE [DISTWIDTH] IN BLOOD BY AUTOMATED COUNT: 14.6 % (ref 12.3–15.4)
ERYTHROCYTE [DISTWIDTH] IN BLOOD BY AUTOMATED COUNT: 14.6 % (ref 12.3–15.4)
ERYTHROCYTE [DISTWIDTH] IN BLOOD BY AUTOMATED COUNT: 14.8 % (ref 12.3–15.4)
ERYTHROCYTE [DISTWIDTH] IN BLOOD BY AUTOMATED COUNT: 14.9 % (ref 12.3–15.4)
ERYTHROCYTE [DISTWIDTH] IN BLOOD BY AUTOMATED COUNT: 14.9 % (ref 12.3–15.4)
ERYTHROCYTE [DISTWIDTH] IN BLOOD BY AUTOMATED COUNT: 15 % (ref 12.3–15.4)
FLUAV RNA RESP QL NAA+PROBE: NOT DETECTED
FLUAV SUBTYP SPEC NAA+PROBE: NOT DETECTED
FLUAV SUBTYP SPEC NAA+PROBE: NOT DETECTED
FLUBV RNA ISLT QL NAA+PROBE: NOT DETECTED
FLUBV RNA ISLT QL NAA+PROBE: NOT DETECTED
FLUBV RNA RESP QL NAA+PROBE: NOT DETECTED
GAS FLOW AIRWAY: 4 LPM
GAS FLOW AIRWAY: 6 LPM
GFR SERPL CREATININE-BSD FRML MDRD: 106 ML/MIN/1.73
GFR SERPL CREATININE-BSD FRML MDRD: 116 ML/MIN/1.73
GFR SERPL CREATININE-BSD FRML MDRD: 126 ML/MIN/1.73
GFR SERPL CREATININE-BSD FRML MDRD: 131 ML/MIN/1.73
GFR SERPL CREATININE-BSD FRML MDRD: 76 ML/MIN/1.73
GFR SERPL CREATININE-BSD FRML MDRD: 82 ML/MIN/1.73
GFR SERPL CREATININE-BSD FRML MDRD: 87 ML/MIN/1.73
GFR SERPL CREATININE-BSD FRML MDRD: 87 ML/MIN/1.73
GFR SERPL CREATININE-BSD FRML MDRD: 92 ML/MIN/1.73
GFR SERPL CREATININE-BSD FRML MDRD: 93 ML/MIN/1.73
GFR SERPL CREATININE-BSD FRML MDRD: 98 ML/MIN/1.73
GLOBULIN UR ELPH-MCNC: 2.8 GM/DL
GLOBULIN UR ELPH-MCNC: 2.8 GM/DL
GLOBULIN UR ELPH-MCNC: 2.9 GM/DL
GLOBULIN UR ELPH-MCNC: 3.2 GM/DL
GLOBULIN UR ELPH-MCNC: 3.3 GM/DL
GLUCOSE BLDC GLUCOMTR-MCNC: 101 MG/DL (ref 70–130)
GLUCOSE BLDC GLUCOMTR-MCNC: 114 MG/DL (ref 70–130)
GLUCOSE BLDC GLUCOMTR-MCNC: 116 MG/DL (ref 70–130)
GLUCOSE BLDC GLUCOMTR-MCNC: 125 MG/DL (ref 70–130)
GLUCOSE BLDC GLUCOMTR-MCNC: 125 MG/DL (ref 70–130)
GLUCOSE BLDC GLUCOMTR-MCNC: 137 MG/DL (ref 70–130)
GLUCOSE BLDC GLUCOMTR-MCNC: 139 MG/DL (ref 70–130)
GLUCOSE BLDC GLUCOMTR-MCNC: 139 MG/DL (ref 70–130)
GLUCOSE BLDC GLUCOMTR-MCNC: 147 MG/DL (ref 70–130)
GLUCOSE BLDC GLUCOMTR-MCNC: 147 MG/DL (ref 70–130)
GLUCOSE BLDC GLUCOMTR-MCNC: 150 MG/DL (ref 70–130)
GLUCOSE BLDC GLUCOMTR-MCNC: 152 MG/DL (ref 70–130)
GLUCOSE BLDC GLUCOMTR-MCNC: 158 MG/DL (ref 70–130)
GLUCOSE BLDC GLUCOMTR-MCNC: 166 MG/DL (ref 70–130)
GLUCOSE BLDC GLUCOMTR-MCNC: 166 MG/DL (ref 70–130)
GLUCOSE BLDC GLUCOMTR-MCNC: 167 MG/DL (ref 70–130)
GLUCOSE BLDC GLUCOMTR-MCNC: 169 MG/DL (ref 70–130)
GLUCOSE BLDC GLUCOMTR-MCNC: 174 MG/DL (ref 70–130)
GLUCOSE BLDC GLUCOMTR-MCNC: 177 MG/DL (ref 70–130)
GLUCOSE BLDC GLUCOMTR-MCNC: 178 MG/DL (ref 70–130)
GLUCOSE BLDC GLUCOMTR-MCNC: 185 MG/DL (ref 70–130)
GLUCOSE BLDC GLUCOMTR-MCNC: 191 MG/DL (ref 70–130)
GLUCOSE BLDC GLUCOMTR-MCNC: 206 MG/DL (ref 70–130)
GLUCOSE BLDC GLUCOMTR-MCNC: 216 MG/DL (ref 70–130)
GLUCOSE BLDC GLUCOMTR-MCNC: 219 MG/DL (ref 70–130)
GLUCOSE BLDC GLUCOMTR-MCNC: 219 MG/DL (ref 70–130)
GLUCOSE BLDC GLUCOMTR-MCNC: 224 MG/DL (ref 70–130)
GLUCOSE BLDC GLUCOMTR-MCNC: 230 MG/DL (ref 70–130)
GLUCOSE BLDC GLUCOMTR-MCNC: 251 MG/DL (ref 70–130)
GLUCOSE BLDC GLUCOMTR-MCNC: 254 MG/DL (ref 70–130)
GLUCOSE BLDC GLUCOMTR-MCNC: 262 MG/DL (ref 70–130)
GLUCOSE BLDC GLUCOMTR-MCNC: 272 MG/DL (ref 70–130)
GLUCOSE BLDC GLUCOMTR-MCNC: 285 MG/DL (ref 70–130)
GLUCOSE BLDC GLUCOMTR-MCNC: 99 MG/DL (ref 70–130)
GLUCOSE SERPL-MCNC: 103 MG/DL (ref 65–99)
GLUCOSE SERPL-MCNC: 110 MG/DL (ref 65–99)
GLUCOSE SERPL-MCNC: 115 MG/DL (ref 65–99)
GLUCOSE SERPL-MCNC: 162 MG/DL (ref 65–99)
GLUCOSE SERPL-MCNC: 167 MG/DL (ref 65–99)
GLUCOSE SERPL-MCNC: 178 MG/DL (ref 65–99)
GLUCOSE SERPL-MCNC: 192 MG/DL (ref 65–99)
GLUCOSE SERPL-MCNC: 209 MG/DL (ref 65–99)
GLUCOSE SERPL-MCNC: 215 MG/DL (ref 65–99)
GLUCOSE SERPL-MCNC: 232 MG/DL (ref 65–99)
GLUCOSE SERPL-MCNC: 97 MG/DL (ref 65–99)
GLUCOSE UR STRIP-MCNC: ABNORMAL MG/DL
GLUCOSE UR STRIP-MCNC: NEGATIVE MG/DL
HADV DNA SPEC NAA+PROBE: NOT DETECTED
HBA1C MFR BLD: 6.5 % (ref 4.8–5.6)
HBA1C MFR BLD: 6.8 % (ref 4.8–5.6)
HCO3 BLDA-SCNC: 21.3 MMOL/L (ref 20–26)
HCO3 BLDA-SCNC: 21.4 MMOL/L (ref 20–26)
HCO3 BLDA-SCNC: 23.1 MMOL/L (ref 20–26)
HCOV 229E RNA SPEC QL NAA+PROBE: NOT DETECTED
HCOV HKU1 RNA SPEC QL NAA+PROBE: NOT DETECTED
HCOV NL63 RNA SPEC QL NAA+PROBE: NOT DETECTED
HCOV OC43 RNA SPEC QL NAA+PROBE: NOT DETECTED
HCT VFR BLD AUTO: 28.9 % (ref 37.5–51)
HCT VFR BLD AUTO: 28.9 % (ref 37.5–51)
HCT VFR BLD AUTO: 29.4 % (ref 37.5–51)
HCT VFR BLD AUTO: 31.9 % (ref 37.5–51)
HCT VFR BLD AUTO: 47.9 % (ref 37.5–51)
HCT VFR BLD AUTO: 49.7 % (ref 37.5–51)
HCT VFR BLD AUTO: 50.7 % (ref 37.5–51)
HCT VFR BLD AUTO: 51.8 % (ref 37.5–51)
HCT VFR BLD AUTO: 51.9 % (ref 37.5–51)
HCT VFR BLD CALC: 48.5 % (ref 38–51)
HCT VFR BLD CALC: 49.3 % (ref 38–51)
HCT VFR BLD CALC: 49.9 % (ref 38–51)
HDLC SERPL-MCNC: 48 MG/DL (ref 40–60)
HGB BLD-MCNC: 10.1 G/DL (ref 13–17.7)
HGB BLD-MCNC: 15.6 G/DL (ref 13–17.7)
HGB BLD-MCNC: 15.8 G/DL (ref 13–17.7)
HGB BLD-MCNC: 16.3 G/DL (ref 13–17.7)
HGB BLD-MCNC: 16.5 G/DL (ref 13–17.7)
HGB BLD-MCNC: 16.8 G/DL (ref 13–17.7)
HGB BLD-MCNC: 9 G/DL (ref 13–17.7)
HGB BLDA-MCNC: 15.8 G/DL (ref 14–18)
HGB BLDA-MCNC: 16.1 G/DL (ref 14–18)
HGB BLDA-MCNC: 16.3 G/DL (ref 14–18)
HGB UR QL STRIP.AUTO: NEGATIVE
HGB UR QL STRIP.AUTO: NEGATIVE
HMPV RNA NPH QL NAA+NON-PROBE: NOT DETECTED
HPIV1 RNA SPEC QL NAA+PROBE: NOT DETECTED
HPIV2 RNA SPEC QL NAA+PROBE: NOT DETECTED
HPIV3 RNA NPH QL NAA+PROBE: NOT DETECTED
HPIV4 P GENE NPH QL NAA+PROBE: NOT DETECTED
IMM GRANULOCYTES # BLD AUTO: 0.13 10*3/MM3 (ref 0–0.05)
IMM GRANULOCYTES # BLD AUTO: 0.15 10*3/MM3 (ref 0–0.05)
IMM GRANULOCYTES # BLD AUTO: 0.15 10*3/MM3 (ref 0–0.05)
IMM GRANULOCYTES # BLD AUTO: 0.17 10*3/MM3 (ref 0–0.05)
IMM GRANULOCYTES # BLD AUTO: 0.21 10*3/MM3 (ref 0–0.05)
IMM GRANULOCYTES # BLD AUTO: 0.23 10*3/MM3 (ref 0–0.05)
IMM GRANULOCYTES # BLD AUTO: 0.23 10*3/MM3 (ref 0–0.05)
IMM GRANULOCYTES # BLD AUTO: 0.24 10*3/MM3 (ref 0–0.05)
IMM GRANULOCYTES # BLD AUTO: 0.31 10*3/MM3 (ref 0–0.05)
IMM GRANULOCYTES NFR BLD AUTO: 1.1 % (ref 0–0.5)
IMM GRANULOCYTES NFR BLD AUTO: 1.3 % (ref 0–0.5)
IMM GRANULOCYTES NFR BLD AUTO: 1.5 % (ref 0–0.5)
IMM GRANULOCYTES NFR BLD AUTO: 1.5 % (ref 0–0.5)
IMM GRANULOCYTES NFR BLD AUTO: 1.6 % (ref 0–0.5)
IMM GRANULOCYTES NFR BLD AUTO: 1.8 % (ref 0–0.5)
IMM GRANULOCYTES NFR BLD AUTO: 2 % (ref 0–0.5)
IMM GRANULOCYTES NFR BLD AUTO: 2.6 % (ref 0–0.5)
IMM GRANULOCYTES NFR BLD AUTO: 3 % (ref 0–0.5)
INHALED O2 CONCENTRATION: 32 %
INHALED O2 CONCENTRATION: 36 %
INHALED O2 CONCENTRATION: 50 %
INR PPP: 1.2 (ref 0.9–1.1)
KETONES UR QL STRIP: ABNORMAL
KETONES UR QL STRIP: NEGATIVE
LDLC SERPL CALC-MCNC: 106 MG/DL (ref 0–100)
LDLC/HDLC SERPL: 2.2 {RATIO}
LEUKOCYTE ESTERASE UR QL STRIP.AUTO: NEGATIVE
LEUKOCYTE ESTERASE UR QL STRIP.AUTO: NEGATIVE
LIPASE SERPL-CCNC: 22 U/L (ref 13–60)
LYMPHOCYTES # BLD AUTO: 0.49 10*3/MM3 (ref 0.7–3.1)
LYMPHOCYTES # BLD AUTO: 0.65 10*3/MM3 (ref 0.7–3.1)
LYMPHOCYTES # BLD AUTO: 0.69 10*3/MM3 (ref 0.7–3.1)
LYMPHOCYTES # BLD AUTO: 0.73 10*3/MM3 (ref 0.7–3.1)
LYMPHOCYTES # BLD AUTO: 0.77 10*3/MM3 (ref 0.7–3.1)
LYMPHOCYTES # BLD AUTO: 0.87 10*3/MM3 (ref 0.7–3.1)
LYMPHOCYTES # BLD AUTO: 0.89 10*3/MM3 (ref 0.7–3.1)
LYMPHOCYTES # BLD AUTO: 1.21 10*3/MM3 (ref 0.7–3.1)
LYMPHOCYTES # BLD AUTO: 1.76 10*3/MM3 (ref 0.7–3.1)
LYMPHOCYTES NFR BLD AUTO: 10.5 % (ref 19.6–45.3)
LYMPHOCYTES NFR BLD AUTO: 11.7 % (ref 19.6–45.3)
LYMPHOCYTES NFR BLD AUTO: 13.7 % (ref 19.6–45.3)
LYMPHOCYTES NFR BLD AUTO: 4.4 % (ref 19.6–45.3)
LYMPHOCYTES NFR BLD AUTO: 4.9 % (ref 19.6–45.3)
LYMPHOCYTES NFR BLD AUTO: 5.3 % (ref 19.6–45.3)
LYMPHOCYTES NFR BLD AUTO: 5.7 % (ref 19.6–45.3)
LYMPHOCYTES NFR BLD AUTO: 6.8 % (ref 19.6–45.3)
LYMPHOCYTES NFR BLD AUTO: 9.7 % (ref 19.6–45.3)
Lab: ABNORMAL
M PNEUMO IGG SER IA-ACNC: NOT DETECTED
MAGNESIUM SERPL-MCNC: 1.9 MG/DL (ref 1.6–2.4)
MAGNESIUM SERPL-MCNC: 2 MG/DL (ref 1.6–2.4)
MAGNESIUM SERPL-MCNC: 2.1 MG/DL (ref 1.6–2.4)
MAGNESIUM SERPL-MCNC: 2.2 MG/DL (ref 1.6–2.4)
MAXIMAL PREDICTED HEART RATE: 145 BPM
MCH RBC QN AUTO: 28.7 PG (ref 26.6–33)
MCH RBC QN AUTO: 28.8 PG (ref 26.6–33)
MCH RBC QN AUTO: 28.9 PG (ref 26.6–33)
MCH RBC QN AUTO: 29.1 PG (ref 26.6–33)
MCH RBC QN AUTO: 29.2 PG (ref 26.6–33)
MCH RBC QN AUTO: 29.2 PG (ref 26.6–33)
MCH RBC QN AUTO: 29.4 PG (ref 26.6–33)
MCH RBC QN AUTO: 29.5 PG (ref 26.6–33)
MCH RBC QN AUTO: 29.7 PG (ref 26.6–33)
MCHC RBC AUTO-ENTMCNC: 30.6 G/DL (ref 31.5–35.7)
MCHC RBC AUTO-ENTMCNC: 31.1 G/DL (ref 31.5–35.7)
MCHC RBC AUTO-ENTMCNC: 31.1 G/DL (ref 31.5–35.7)
MCHC RBC AUTO-ENTMCNC: 31.7 G/DL (ref 31.5–35.7)
MCHC RBC AUTO-ENTMCNC: 31.8 G/DL (ref 31.5–35.7)
MCHC RBC AUTO-ENTMCNC: 31.9 G/DL (ref 31.5–35.7)
MCHC RBC AUTO-ENTMCNC: 32.1 G/DL (ref 31.5–35.7)
MCHC RBC AUTO-ENTMCNC: 32.4 G/DL (ref 31.5–35.7)
MCHC RBC AUTO-ENTMCNC: 32.6 G/DL (ref 31.5–35.7)
MCV RBC AUTO: 89.9 FL (ref 79–97)
MCV RBC AUTO: 90.9 FL (ref 79–97)
MCV RBC AUTO: 91.2 FL (ref 79–97)
MCV RBC AUTO: 91.4 FL (ref 79–97)
MCV RBC AUTO: 91.9 FL (ref 79–97)
MCV RBC AUTO: 92.3 FL (ref 79–97)
MCV RBC AUTO: 92.5 FL (ref 79–97)
MCV RBC AUTO: 93.6 FL (ref 79–97)
MCV RBC AUTO: 94.4 FL (ref 79–97)
METHGB BLD QL: 0 % (ref 0–3)
MODALITY: ABNORMAL
MONOCYTES # BLD AUTO: 0.07 10*3/MM3 (ref 0.1–0.9)
MONOCYTES # BLD AUTO: 0.4 10*3/MM3 (ref 0.1–0.9)
MONOCYTES # BLD AUTO: 0.55 10*3/MM3 (ref 0.1–0.9)
MONOCYTES # BLD AUTO: 0.58 10*3/MM3 (ref 0.1–0.9)
MONOCYTES # BLD AUTO: 0.71 10*3/MM3 (ref 0.1–0.9)
MONOCYTES # BLD AUTO: 0.88 10*3/MM3 (ref 0.1–0.9)
MONOCYTES # BLD AUTO: 0.94 10*3/MM3 (ref 0.1–0.9)
MONOCYTES # BLD AUTO: 0.97 10*3/MM3 (ref 0.1–0.9)
MONOCYTES # BLD AUTO: 1.13 10*3/MM3 (ref 0.1–0.9)
MONOCYTES NFR BLD AUTO: 0.7 % (ref 5–12)
MONOCYTES NFR BLD AUTO: 10.8 % (ref 5–12)
MONOCYTES NFR BLD AUTO: 10.9 % (ref 5–12)
MONOCYTES NFR BLD AUTO: 3 % (ref 5–12)
MONOCYTES NFR BLD AUTO: 3.9 % (ref 5–12)
MONOCYTES NFR BLD AUTO: 4.1 % (ref 5–12)
MONOCYTES NFR BLD AUTO: 7.3 % (ref 5–12)
MONOCYTES NFR BLD AUTO: 8.2 % (ref 5–12)
MONOCYTES NFR BLD AUTO: 8.4 % (ref 5–12)
NEUTROPHILS NFR BLD AUTO: 11.77 10*3/MM3 (ref 1.7–7)
NEUTROPHILS NFR BLD AUTO: 12 10*3/MM3 (ref 1.7–7)
NEUTROPHILS NFR BLD AUTO: 13.28 10*3/MM3 (ref 1.7–7)
NEUTROPHILS NFR BLD AUTO: 6.55 10*3/MM3 (ref 1.7–7)
NEUTROPHILS NFR BLD AUTO: 6.74 10*3/MM3 (ref 1.7–7)
NEUTROPHILS NFR BLD AUTO: 7.54 10*3/MM3 (ref 1.7–7)
NEUTROPHILS NFR BLD AUTO: 72.6 % (ref 42.7–76)
NEUTROPHILS NFR BLD AUTO: 74.8 % (ref 42.7–76)
NEUTROPHILS NFR BLD AUTO: 75 % (ref 42.7–76)
NEUTROPHILS NFR BLD AUTO: 77.7 % (ref 42.7–76)
NEUTROPHILS NFR BLD AUTO: 8.72 10*3/MM3 (ref 1.7–7)
NEUTROPHILS NFR BLD AUTO: 81.4 % (ref 42.7–76)
NEUTROPHILS NFR BLD AUTO: 88.6 % (ref 42.7–76)
NEUTROPHILS NFR BLD AUTO: 89.6 % (ref 42.7–76)
NEUTROPHILS NFR BLD AUTO: 89.8 % (ref 42.7–76)
NEUTROPHILS NFR BLD AUTO: 9.21 10*3/MM3 (ref 1.7–7)
NEUTROPHILS NFR BLD AUTO: 9.6 10*3/MM3 (ref 1.7–7)
NEUTROPHILS NFR BLD AUTO: 92.5 % (ref 42.7–76)
NITRITE UR QL STRIP: NEGATIVE
NITRITE UR QL STRIP: NEGATIVE
NOTE: ABNORMAL
NOTIFIED BY: ABNORMAL
NOTIFIED WHO: ABNORMAL
NRBC BLD AUTO-RTO: 0 /100 WBC (ref 0–0.2)
NT-PROBNP SERPL-MCNC: 268.8 PG/ML (ref 0–1800)
NT-PROBNP SERPL-MCNC: 6678 PG/ML (ref 0–1800)
NT-PROBNP SERPL-MCNC: 718.7 PG/ML (ref 0–1800)
NT-PROBNP SERPL-MCNC: 7255 PG/ML (ref 0–1800)
OXYHGB MFR BLDV: 90.2 % (ref 94–99)
OXYHGB MFR BLDV: 91.1 % (ref 94–99)
OXYHGB MFR BLDV: 91.5 % (ref 94–99)
PCO2 BLDA: 31.8 MM HG (ref 35–45)
PCO2 BLDA: 32.2 MM HG (ref 35–45)
PCO2 BLDA: 32.8 MM HG (ref 35–45)
PCO2 TEMP ADJ BLD: ABNORMAL MM[HG]
PH BLDA: 7.42 PH UNITS (ref 7.35–7.45)
PH BLDA: 7.43 PH UNITS (ref 7.35–7.45)
PH BLDA: 7.46 PH UNITS (ref 7.35–7.45)
PH UR STRIP.AUTO: 5.5 [PH] (ref 5–8)
PH UR STRIP.AUTO: <=5 [PH] (ref 5–8)
PH, TEMP CORRECTED: ABNORMAL
PHOSPHATE SERPL-MCNC: 3 MG/DL (ref 2.5–4.5)
PLATELET # BLD AUTO: 228 10*3/MM3 (ref 140–450)
PLATELET # BLD AUTO: 232 10*3/MM3 (ref 140–450)
PLATELET # BLD AUTO: 257 10*3/MM3 (ref 140–450)
PLATELET # BLD AUTO: 262 10*3/MM3 (ref 140–450)
PLATELET # BLD AUTO: 267 10*3/MM3 (ref 140–450)
PLATELET # BLD AUTO: 294 10*3/MM3 (ref 140–450)
PLATELET # BLD AUTO: 317 10*3/MM3 (ref 140–450)
PLATELET # BLD AUTO: 325 10*3/MM3 (ref 140–450)
PLATELET # BLD AUTO: 344 10*3/MM3 (ref 140–450)
PMV BLD AUTO: 8.7 FL (ref 6–12)
PMV BLD AUTO: 8.7 FL (ref 6–12)
PMV BLD AUTO: 8.9 FL (ref 6–12)
PMV BLD AUTO: 9 FL (ref 6–12)
PMV BLD AUTO: 9 FL (ref 6–12)
PMV BLD AUTO: 9.1 FL (ref 6–12)
PMV BLD AUTO: 9.2 FL (ref 6–12)
PMV BLD AUTO: 9.3 FL (ref 6–12)
PMV BLD AUTO: 9.3 FL (ref 6–12)
PO2 BLDA: 58.3 MM HG (ref 83–108)
PO2 BLDA: 61.5 MM HG (ref 83–108)
PO2 BLDA: 62 MM HG (ref 83–108)
PO2 TEMP ADJ BLD: ABNORMAL MM[HG]
POTASSIUM SERPL-SCNC: 3.5 MMOL/L (ref 3.5–5.2)
POTASSIUM SERPL-SCNC: 3.7 MMOL/L (ref 3.5–5.2)
POTASSIUM SERPL-SCNC: 3.9 MMOL/L (ref 3.5–5.2)
POTASSIUM SERPL-SCNC: 4.3 MMOL/L (ref 3.5–5.2)
POTASSIUM SERPL-SCNC: 4.5 MMOL/L (ref 3.5–5.2)
POTASSIUM SERPL-SCNC: 4.9 MMOL/L (ref 3.5–5.2)
PROT SERPL-MCNC: 5.6 G/DL (ref 6–8.5)
PROT SERPL-MCNC: 6.4 G/DL (ref 6–8.5)
PROT SERPL-MCNC: 6.8 G/DL (ref 6–8.5)
PROT SERPL-MCNC: 7.8 G/DL (ref 6–8.5)
PROT SERPL-MCNC: 7.9 G/DL (ref 6–8.5)
PROT UR QL STRIP: NEGATIVE
PROT UR QL STRIP: NEGATIVE
PROTHROMBIN TIME: 15.7 SECONDS (ref 12.8–14.5)
QT INTERVAL: 270 MS
QT INTERVAL: 334 MS
QT INTERVAL: 386 MS
QT INTERVAL: 410 MS
QTC INTERVAL: 383 MS
QTC INTERVAL: 421 MS
QTC INTERVAL: 431 MS
QTC INTERVAL: 457 MS
RBC # BLD AUTO: 3.06 10*6/MM3 (ref 4.14–5.8)
RBC # BLD AUTO: 3.13 10*6/MM3 (ref 4.14–5.8)
RBC # BLD AUTO: 3.14 10*6/MM3 (ref 4.14–5.8)
RBC # BLD AUTO: 3.49 10*6/MM3 (ref 4.14–5.8)
RBC # BLD AUTO: 5.25 10*6/MM3 (ref 4.14–5.8)
RBC # BLD AUTO: 5.41 10*6/MM3 (ref 4.14–5.8)
RBC # BLD AUTO: 5.58 10*6/MM3 (ref 4.14–5.8)
RBC # BLD AUTO: 5.6 10*6/MM3 (ref 4.14–5.8)
RBC # BLD AUTO: 5.77 10*6/MM3 (ref 4.14–5.8)
RHINOVIRUS RNA SPEC NAA+PROBE: NOT DETECTED
RSV RNA NPH QL NAA+NON-PROBE: NOT DETECTED
SAO2 % BLDCOA: 91.5 % (ref 94–99)
SAO2 % BLDCOA: 92.6 % (ref 94–99)
SAO2 % BLDCOA: 93 % (ref 94–99)
SARS-COV-2 RNA PNL SPEC NAA+PROBE: NOT DETECTED
SARS-COV-2 RNA RESP QL NAA+PROBE: NOT DETECTED
SODIUM SERPL-SCNC: 134 MMOL/L (ref 136–145)
SODIUM SERPL-SCNC: 134 MMOL/L (ref 136–145)
SODIUM SERPL-SCNC: 135 MMOL/L (ref 136–145)
SODIUM SERPL-SCNC: 138 MMOL/L (ref 136–145)
SODIUM SERPL-SCNC: 139 MMOL/L (ref 136–145)
SODIUM SERPL-SCNC: 141 MMOL/L (ref 136–145)
SODIUM SERPL-SCNC: 141 MMOL/L (ref 136–145)
SODIUM SERPL-SCNC: 142 MMOL/L (ref 136–145)
SODIUM SERPL-SCNC: 142 MMOL/L (ref 136–145)
SP GR UR STRIP: 1.02 (ref 1–1.03)
SP GR UR STRIP: 1.03 (ref 1–1.03)
STRESS TARGET HR: 123 BPM
TRIGL SERPL-MCNC: 66 MG/DL (ref 0–150)
TROPONIN T SERPL-MCNC: 0.02 NG/ML (ref 0–0.03)
TROPONIN T SERPL-MCNC: 0.03 NG/ML (ref 0–0.03)
TROPONIN T SERPL-MCNC: 0.03 NG/ML (ref 0–0.03)
TROPONIN T SERPL-MCNC: 0.04 NG/ML (ref 0–0.03)
TROPONIN T SERPL-MCNC: <0.01 NG/ML (ref 0–0.03)
TROPONIN T SERPL-MCNC: <0.01 NG/ML (ref 0–0.03)
TSH SERPL DL<=0.05 MIU/L-ACNC: 2.19 UIU/ML (ref 0.27–4.2)
UROBILINOGEN UR QL STRIP: ABNORMAL
UROBILINOGEN UR QL STRIP: ABNORMAL
VENTILATOR MODE: ABNORMAL
VLDLC SERPL-MCNC: 13 MG/DL (ref 5–40)
WBC # BLD AUTO: 12.81 10*3/MM3 (ref 3.4–10.8)
WBC # BLD AUTO: 13.13 10*3/MM3 (ref 3.4–10.8)
WBC # BLD AUTO: 13.54 10*3/MM3 (ref 3.4–10.8)
WBC # BLD AUTO: 14.79 10*3/MM3 (ref 3.4–10.8)
WBC # BLD AUTO: 9.96 10*3/MM3 (ref 3.4–10.8)
WBC NRBC COR # BLD: 10.38 10*3/MM3 (ref 3.4–10.8)
WBC NRBC COR # BLD: 10.71 10*3/MM3 (ref 3.4–10.8)
WBC NRBC COR # BLD: 8.44 10*3/MM3 (ref 3.4–10.8)
WBC NRBC COR # BLD: 9 10*3/MM3 (ref 3.4–10.8)

## 2021-01-01 PROCEDURE — 94640 AIRWAY INHALATION TREATMENT: CPT

## 2021-01-01 PROCEDURE — 83605 ASSAY OF LACTIC ACID: CPT | Performed by: INTERNAL MEDICINE

## 2021-01-01 PROCEDURE — 83880 ASSAY OF NATRIURETIC PEPTIDE: CPT | Performed by: EMERGENCY MEDICINE

## 2021-01-01 PROCEDURE — 25010000002 DIGOXIN PER 500 MCG: Performed by: SURGERY

## 2021-01-01 PROCEDURE — 99232 SBSQ HOSP IP/OBS MODERATE 35: CPT | Performed by: INTERNAL MEDICINE

## 2021-01-01 PROCEDURE — 97162 PT EVAL MOD COMPLEX 30 MIN: CPT

## 2021-01-01 PROCEDURE — 83735 ASSAY OF MAGNESIUM: CPT | Performed by: EMERGENCY MEDICINE

## 2021-01-01 PROCEDURE — 94799 UNLISTED PULMONARY SVC/PX: CPT

## 2021-01-01 PROCEDURE — 84100 ASSAY OF PHOSPHORUS: CPT | Performed by: HOSPITALIST

## 2021-01-01 PROCEDURE — 36600 WITHDRAWAL OF ARTERIAL BLOOD: CPT

## 2021-01-01 PROCEDURE — 83605 ASSAY OF LACTIC ACID: CPT | Performed by: PHYSICIAN ASSISTANT

## 2021-01-01 PROCEDURE — 83050 HGB METHEMOGLOBIN QUAN: CPT

## 2021-01-01 PROCEDURE — 99239 HOSP IP/OBS DSCHRG MGMT >30: CPT | Performed by: INTERNAL MEDICINE

## 2021-01-01 PROCEDURE — 83036 HEMOGLOBIN GLYCOSYLATED A1C: CPT | Performed by: HOSPITALIST

## 2021-01-01 PROCEDURE — 83735 ASSAY OF MAGNESIUM: CPT | Performed by: NURSE PRACTITIONER

## 2021-01-01 PROCEDURE — 82962 GLUCOSE BLOOD TEST: CPT

## 2021-01-01 PROCEDURE — 83880 ASSAY OF NATRIURETIC PEPTIDE: CPT

## 2021-01-01 PROCEDURE — 93005 ELECTROCARDIOGRAM TRACING: CPT | Performed by: EMERGENCY MEDICINE

## 2021-01-01 PROCEDURE — 80048 BASIC METABOLIC PNL TOTAL CA: CPT | Performed by: NURSE PRACTITIONER

## 2021-01-01 PROCEDURE — 99285 EMERGENCY DEPT VISIT HI MDM: CPT

## 2021-01-01 PROCEDURE — 93970 EXTREMITY STUDY: CPT

## 2021-01-01 PROCEDURE — 71250 CT THORAX DX C-: CPT | Performed by: RADIOLOGY

## 2021-01-01 PROCEDURE — 85730 THROMBOPLASTIN TIME PARTIAL: CPT | Performed by: EMERGENCY MEDICINE

## 2021-01-01 PROCEDURE — 94760 N-INVAS EAR/PLS OXIMETRY 1: CPT

## 2021-01-01 PROCEDURE — 93306 TTE W/DOPPLER COMPLETE: CPT

## 2021-01-01 PROCEDURE — 85025 COMPLETE CBC W/AUTO DIFF WBC: CPT | Performed by: INTERNAL MEDICINE

## 2021-01-01 PROCEDURE — 85025 COMPLETE CBC W/AUTO DIFF WBC: CPT | Performed by: EMERGENCY MEDICINE

## 2021-01-01 PROCEDURE — 25010000002 METHYLPREDNISOLONE PER 40 MG: Performed by: HOSPITALIST

## 2021-01-01 PROCEDURE — 87633 RESP VIRUS 12-25 TARGETS: CPT | Performed by: HOSPITALIST

## 2021-01-01 PROCEDURE — 99233 SBSQ HOSP IP/OBS HIGH 50: CPT | Performed by: INTERNAL MEDICINE

## 2021-01-01 PROCEDURE — 83690 ASSAY OF LIPASE: CPT | Performed by: EMERGENCY MEDICINE

## 2021-01-01 PROCEDURE — 99221 1ST HOSP IP/OBS SF/LOW 40: CPT | Performed by: SURGERY

## 2021-01-01 PROCEDURE — 36415 COLL VENOUS BLD VENIPUNCTURE: CPT | Performed by: NURSE PRACTITIONER

## 2021-01-01 PROCEDURE — 80053 COMPREHEN METABOLIC PANEL: CPT | Performed by: HOSPITALIST

## 2021-01-01 PROCEDURE — 80061 LIPID PANEL: CPT | Performed by: HOSPITALIST

## 2021-01-01 PROCEDURE — 80053 COMPREHEN METABOLIC PANEL: CPT | Performed by: PHYSICIAN ASSISTANT

## 2021-01-01 PROCEDURE — 81003 URINALYSIS AUTO W/O SCOPE: CPT | Performed by: EMERGENCY MEDICINE

## 2021-01-01 PROCEDURE — 63710000001 INSULIN ASPART PER 5 UNITS: Performed by: HOSPITALIST

## 2021-01-01 PROCEDURE — 99233 SBSQ HOSP IP/OBS HIGH 50: CPT | Performed by: HOSPITALIST

## 2021-01-01 PROCEDURE — 83605 ASSAY OF LACTIC ACID: CPT | Performed by: EMERGENCY MEDICINE

## 2021-01-01 PROCEDURE — 93005 ELECTROCARDIOGRAM TRACING: CPT | Performed by: HOSPITALIST

## 2021-01-01 PROCEDURE — 93005 ELECTROCARDIOGRAM TRACING: CPT | Performed by: STUDENT IN AN ORGANIZED HEALTH CARE EDUCATION/TRAINING PROGRAM

## 2021-01-01 PROCEDURE — 84484 ASSAY OF TROPONIN QUANT: CPT | Performed by: EMERGENCY MEDICINE

## 2021-01-01 PROCEDURE — 81003 URINALYSIS AUTO W/O SCOPE: CPT | Performed by: PHYSICIAN ASSISTANT

## 2021-01-01 PROCEDURE — 25010000002 HEPARIN (PORCINE) PER 1000 UNITS: Performed by: HOSPITALIST

## 2021-01-01 PROCEDURE — 93010 ELECTROCARDIOGRAM REPORT: CPT | Performed by: INTERNAL MEDICINE

## 2021-01-01 PROCEDURE — 80048 BASIC METABOLIC PNL TOTAL CA: CPT | Performed by: HOSPITALIST

## 2021-01-01 PROCEDURE — 71045 X-RAY EXAM CHEST 1 VIEW: CPT | Performed by: RADIOLOGY

## 2021-01-01 PROCEDURE — 0DC58ZZ EXTIRPATION OF MATTER FROM ESOPHAGUS, VIA NATURAL OR ARTIFICIAL OPENING ENDOSCOPIC: ICD-10-PCS | Performed by: SURGERY

## 2021-01-01 PROCEDURE — 25010000002 METHYLPREDNISOLONE PER 125 MG: Performed by: PHYSICIAN ASSISTANT

## 2021-01-01 PROCEDURE — 74220 X-RAY XM ESOPHAGUS 1CNTRST: CPT

## 2021-01-01 PROCEDURE — 99221 1ST HOSP IP/OBS SF/LOW 40: CPT | Performed by: INTERNAL MEDICINE

## 2021-01-01 PROCEDURE — 71045 X-RAY EXAM CHEST 1 VIEW: CPT

## 2021-01-01 PROCEDURE — 80048 BASIC METABOLIC PNL TOTAL CA: CPT | Performed by: SURGERY

## 2021-01-01 PROCEDURE — 25010000002 FUROSEMIDE PER 20 MG: Performed by: PHYSICIAN ASSISTANT

## 2021-01-01 PROCEDURE — 25010000002 DIGOXIN PER 500 MCG: Performed by: INTERNAL MEDICINE

## 2021-01-01 PROCEDURE — 80053 COMPREHEN METABOLIC PANEL: CPT | Performed by: EMERGENCY MEDICINE

## 2021-01-01 PROCEDURE — 74175 CTA ABDOMEN W/CONTRAST: CPT

## 2021-01-01 PROCEDURE — 85025 COMPLETE CBC W/AUTO DIFF WBC: CPT | Performed by: PHYSICIAN ASSISTANT

## 2021-01-01 PROCEDURE — 80048 BASIC METABOLIC PNL TOTAL CA: CPT | Performed by: INTERNAL MEDICINE

## 2021-01-01 PROCEDURE — 84443 ASSAY THYROID STIM HORMONE: CPT | Performed by: HOSPITALIST

## 2021-01-01 PROCEDURE — 82805 BLOOD GASES W/O2 SATURATION: CPT

## 2021-01-01 PROCEDURE — 63710000001 INSULIN ASPART PER 5 UNITS: Performed by: SURGERY

## 2021-01-01 PROCEDURE — 83735 ASSAY OF MAGNESIUM: CPT | Performed by: HOSPITALIST

## 2021-01-01 PROCEDURE — 43247 EGD REMOVE FOREIGN BODY: CPT | Performed by: SURGERY

## 2021-01-01 PROCEDURE — 85025 COMPLETE CBC W/AUTO DIFF WBC: CPT | Performed by: HOSPITALIST

## 2021-01-01 PROCEDURE — 71250 CT THORAX DX C-: CPT

## 2021-01-01 PROCEDURE — 83735 ASSAY OF MAGNESIUM: CPT | Performed by: INTERNAL MEDICINE

## 2021-01-01 PROCEDURE — 99232 SBSQ HOSP IP/OBS MODERATE 35: CPT | Performed by: HOSPITALIST

## 2021-01-01 PROCEDURE — 83880 ASSAY OF NATRIURETIC PEPTIDE: CPT | Performed by: NURSE PRACTITIONER

## 2021-01-01 PROCEDURE — 84484 ASSAY OF TROPONIN QUANT: CPT | Performed by: HOSPITALIST

## 2021-01-01 PROCEDURE — 0 IOPAMIDOL PER 1 ML: Performed by: EMERGENCY MEDICINE

## 2021-01-01 PROCEDURE — 80053 COMPREHEN METABOLIC PANEL: CPT | Performed by: INTERNAL MEDICINE

## 2021-01-01 PROCEDURE — 74220 X-RAY XM ESOPHAGUS 1CNTRST: CPT | Performed by: RADIOLOGY

## 2021-01-01 PROCEDURE — 25010000002 GLUCAGON (HUMAN RECOMBINANT) 1 MG RECONSTITUTED SOLUTION: Performed by: HOSPITALIST

## 2021-01-01 PROCEDURE — 83605 ASSAY OF LACTIC ACID: CPT | Performed by: HOSPITALIST

## 2021-01-01 PROCEDURE — 85730 THROMBOPLASTIN TIME PARTIAL: CPT | Performed by: HOSPITALIST

## 2021-01-01 PROCEDURE — 36415 COLL VENOUS BLD VENIPUNCTURE: CPT

## 2021-01-01 PROCEDURE — 99223 1ST HOSP IP/OBS HIGH 75: CPT | Performed by: HOSPITALIST

## 2021-01-01 PROCEDURE — 87636 SARSCOV2 & INF A&B AMP PRB: CPT | Performed by: EMERGENCY MEDICINE

## 2021-01-01 PROCEDURE — 85610 PROTHROMBIN TIME: CPT | Performed by: EMERGENCY MEDICINE

## 2021-01-01 PROCEDURE — 87636 SARSCOV2 & INF A&B AMP PRB: CPT | Performed by: PHYSICIAN ASSISTANT

## 2021-01-01 PROCEDURE — 71275 CT ANGIOGRAPHY CHEST: CPT

## 2021-01-01 PROCEDURE — 0 IOPAMIDOL PER 1 ML: Performed by: STUDENT IN AN ORGANIZED HEALTH CARE EDUCATION/TRAINING PROGRAM

## 2021-01-01 PROCEDURE — 76775 US EXAM ABDO BACK WALL LIM: CPT

## 2021-01-01 PROCEDURE — 82375 ASSAY CARBOXYHB QUANT: CPT

## 2021-01-01 PROCEDURE — 25010000002 ONDANSETRON PER 1 MG: Performed by: NURSE ANESTHETIST, CERTIFIED REGISTERED

## 2021-01-01 PROCEDURE — 25010000002 HEPARIN (PORCINE) PER 1000 UNITS: Performed by: SURGERY

## 2021-01-01 PROCEDURE — 99213 OFFICE O/P EST LOW 20 MIN: CPT | Performed by: NURSE PRACTITIONER

## 2021-01-01 PROCEDURE — 99222 1ST HOSP IP/OBS MODERATE 55: CPT | Performed by: HOSPITALIST

## 2021-01-01 PROCEDURE — 94726 PLETHYSMOGRAPHY LUNG VOLUMES: CPT

## 2021-01-01 PROCEDURE — 87040 BLOOD CULTURE FOR BACTERIA: CPT | Performed by: PHYSICIAN ASSISTANT

## 2021-01-01 PROCEDURE — 85025 COMPLETE CBC W/AUTO DIFF WBC: CPT | Performed by: SURGERY

## 2021-01-01 PROCEDURE — 84484 ASSAY OF TROPONIN QUANT: CPT | Performed by: PHYSICIAN ASSISTANT

## 2021-01-01 PROCEDURE — 83880 ASSAY OF NATRIURETIC PEPTIDE: CPT | Performed by: PHYSICIAN ASSISTANT

## 2021-01-01 PROCEDURE — 86140 C-REACTIVE PROTEIN: CPT | Performed by: PHYSICIAN ASSISTANT

## 2021-01-01 PROCEDURE — 85379 FIBRIN DEGRADATION QUANT: CPT | Performed by: NURSE PRACTITIONER

## 2021-01-01 PROCEDURE — 93306 TTE W/DOPPLER COMPLETE: CPT | Performed by: INTERNAL MEDICINE

## 2021-01-01 RX ORDER — SODIUM CHLORIDE 0.9 % (FLUSH) 0.9 %
10 SYRINGE (ML) INJECTION AS NEEDED
Status: DISCONTINUED | OUTPATIENT
Start: 2021-01-01 | End: 2021-01-01 | Stop reason: HOSPADM

## 2021-01-01 RX ORDER — GLIPIZIDE 5 MG/1
10 TABLET ORAL
Status: DISCONTINUED | OUTPATIENT
Start: 2021-01-01 | End: 2021-01-01 | Stop reason: HOSPADM

## 2021-01-01 RX ORDER — AMLODIPINE BESYLATE 5 MG/1
5 TABLET ORAL
Status: DISCONTINUED | OUTPATIENT
Start: 2021-01-01 | End: 2021-01-01 | Stop reason: HOSPADM

## 2021-01-01 RX ORDER — HYDRALAZINE HYDROCHLORIDE 50 MG/1
50 TABLET, FILM COATED ORAL 3 TIMES DAILY
Status: CANCELLED | OUTPATIENT
Start: 2021-01-01

## 2021-01-01 RX ORDER — ATORVASTATIN CALCIUM 10 MG/1
10 TABLET, FILM COATED ORAL DAILY
Status: DISCONTINUED | OUTPATIENT
Start: 2021-01-01 | End: 2021-01-01 | Stop reason: HOSPADM

## 2021-01-01 RX ORDER — HEPARIN SODIUM 5000 [USP'U]/ML
30 INJECTION, SOLUTION INTRAVENOUS; SUBCUTANEOUS AS NEEDED
Status: DISCONTINUED | OUTPATIENT
Start: 2021-01-01 | End: 2021-01-01 | Stop reason: HOSPADM

## 2021-01-01 RX ORDER — NAPROXEN 250 MG/1
250 TABLET ORAL 2 TIMES DAILY WITH MEALS
Status: CANCELLED | OUTPATIENT
Start: 2021-01-01

## 2021-01-01 RX ORDER — PANTOPRAZOLE SODIUM 40 MG/1
40 TABLET, DELAYED RELEASE ORAL
Status: DISCONTINUED | OUTPATIENT
Start: 2021-01-01 | End: 2021-01-01

## 2021-01-01 RX ORDER — DROPERIDOL 2.5 MG/ML
0.62 INJECTION, SOLUTION INTRAMUSCULAR; INTRAVENOUS ONCE AS NEEDED
Status: DISCONTINUED | OUTPATIENT
Start: 2021-01-01 | End: 2021-01-01 | Stop reason: HOSPADM

## 2021-01-01 RX ORDER — GABAPENTIN 400 MG/1
400 CAPSULE ORAL 4 TIMES DAILY
Status: CANCELLED | OUTPATIENT
Start: 2021-01-01

## 2021-01-01 RX ORDER — PANTOPRAZOLE SODIUM 40 MG/1
40 TABLET, DELAYED RELEASE ORAL DAILY
Qty: 30 TABLET | Refills: 1 | Status: SHIPPED | OUTPATIENT
Start: 2021-01-01 | End: 2022-01-01

## 2021-01-01 RX ORDER — METOPROLOL TARTRATE 50 MG/1
100 TABLET, FILM COATED ORAL 2 TIMES DAILY
COMMUNITY
End: 2021-01-01 | Stop reason: HOSPADM

## 2021-01-01 RX ORDER — BUMETANIDE 0.25 MG/ML
1 INJECTION INTRAMUSCULAR; INTRAVENOUS EVERY 6 HOURS
Status: COMPLETED | OUTPATIENT
Start: 2021-01-01 | End: 2021-01-01

## 2021-01-01 RX ORDER — METHYLPREDNISOLONE SODIUM SUCCINATE 125 MG/2ML
125 INJECTION, POWDER, LYOPHILIZED, FOR SOLUTION INTRAMUSCULAR; INTRAVENOUS ONCE
Status: DISCONTINUED | OUTPATIENT
Start: 2021-01-01 | End: 2021-01-01

## 2021-01-01 RX ORDER — BISACODYL 10 MG
10 SUPPOSITORY, RECTAL RECTAL DAILY PRN
Status: DISCONTINUED | OUTPATIENT
Start: 2021-01-01 | End: 2021-01-01 | Stop reason: HOSPADM

## 2021-01-01 RX ORDER — METOPROLOL TARTRATE 50 MG/1
50 TABLET, FILM COATED ORAL 2 TIMES DAILY
Status: DISCONTINUED | OUTPATIENT
Start: 2021-01-01 | End: 2021-01-01

## 2021-01-01 RX ORDER — CLOPIDOGREL BISULFATE 75 MG/1
75 TABLET ORAL DAILY
Status: CANCELLED | OUTPATIENT
Start: 2021-01-01 | End: 2022-12-23

## 2021-01-01 RX ORDER — MAGNESIUM HYDROXIDE 1200 MG/15ML
LIQUID ORAL AS NEEDED
Status: DISCONTINUED | OUTPATIENT
Start: 2021-01-01 | End: 2021-01-01 | Stop reason: HOSPADM

## 2021-01-01 RX ORDER — CALCIUM CARBONATE 200(500)MG
2 TABLET,CHEWABLE ORAL 3 TIMES DAILY PRN
Status: DISCONTINUED | OUTPATIENT
Start: 2021-01-01 | End: 2021-01-01 | Stop reason: HOSPADM

## 2021-01-01 RX ORDER — ALBUTEROL SULFATE 90 UG/1
2 AEROSOL, METERED RESPIRATORY (INHALATION) EVERY 6 HOURS PRN
COMMUNITY
End: 2021-01-01

## 2021-01-01 RX ORDER — OXYCODONE AND ACETAMINOPHEN 7.5; 325 MG/1; MG/1
1 TABLET ORAL EVERY 4 HOURS PRN
Status: CANCELLED | OUTPATIENT
Start: 2021-01-01

## 2021-01-01 RX ORDER — CARVEDILOL 12.5 MG/1
12.5 TABLET ORAL 2 TIMES DAILY WITH MEALS
Qty: 60 TABLET | Refills: 0 | Status: SHIPPED | OUTPATIENT
Start: 2021-01-01 | End: 2021-01-01 | Stop reason: SDUPTHER

## 2021-01-01 RX ORDER — CALCIUM CARBONATE 200(500)MG
1 TABLET,CHEWABLE ORAL 3 TIMES DAILY PRN
Status: DISCONTINUED | OUTPATIENT
Start: 2021-01-01 | End: 2021-01-01 | Stop reason: HOSPADM

## 2021-01-01 RX ORDER — NITROGLYCERIN 0.4 MG/1
0.4 TABLET SUBLINGUAL
Status: DISCONTINUED | OUTPATIENT
Start: 2021-01-01 | End: 2021-01-01 | Stop reason: HOSPADM

## 2021-01-01 RX ORDER — BUMETANIDE 0.25 MG/ML
1 INJECTION INTRAMUSCULAR; INTRAVENOUS EVERY 6 HOURS
Status: DISCONTINUED | OUTPATIENT
Start: 2021-01-01 | End: 2021-01-01 | Stop reason: HOSPADM

## 2021-01-01 RX ORDER — HYDROCHLOROTHIAZIDE 12.5 MG/1
12.5 TABLET ORAL DAILY
Status: CANCELLED | OUTPATIENT
Start: 2021-01-01

## 2021-01-01 RX ORDER — SODIUM CHLORIDE, SODIUM LACTATE, POTASSIUM CHLORIDE, CALCIUM CHLORIDE 600; 310; 30; 20 MG/100ML; MG/100ML; MG/100ML; MG/100ML
100 INJECTION, SOLUTION INTRAVENOUS ONCE AS NEEDED
Status: DISCONTINUED | OUTPATIENT
Start: 2021-01-01 | End: 2021-01-01 | Stop reason: HOSPADM

## 2021-01-01 RX ORDER — METOPROLOL TARTRATE 50 MG/1
100 TABLET, FILM COATED ORAL 2 TIMES DAILY
Status: CANCELLED | OUTPATIENT
Start: 2021-01-01

## 2021-01-01 RX ORDER — METHOCARBAMOL 750 MG/1
750 TABLET, FILM COATED ORAL 4 TIMES DAILY
Status: CANCELLED | OUTPATIENT
Start: 2021-01-01

## 2021-01-01 RX ORDER — METHOCARBAMOL 750 MG/1
750 TABLET, FILM COATED ORAL 4 TIMES DAILY
COMMUNITY
Start: 2021-01-01 | End: 2022-01-01

## 2021-01-01 RX ORDER — CARVEDILOL 6.25 MG/1
12.5 TABLET ORAL 2 TIMES DAILY WITH MEALS
Status: DISCONTINUED | OUTPATIENT
Start: 2021-01-01 | End: 2021-01-01 | Stop reason: HOSPADM

## 2021-01-01 RX ORDER — LISINOPRIL 10 MG/1
20 TABLET ORAL 2 TIMES DAILY
Status: CANCELLED | OUTPATIENT
Start: 2021-01-01

## 2021-01-01 RX ORDER — BUMETANIDE 1 MG/1
0.5 TABLET ORAL DAILY
Status: DISCONTINUED | OUTPATIENT
Start: 2021-01-01 | End: 2021-01-01

## 2021-01-01 RX ORDER — CLOPIDOGREL BISULFATE 75 MG/1
75 TABLET ORAL DAILY
Status: DISCONTINUED | OUTPATIENT
Start: 2021-01-01 | End: 2021-01-01 | Stop reason: HOSPADM

## 2021-01-01 RX ORDER — OXYCODONE AND ACETAMINOPHEN 7.5; 325 MG/1; MG/1
1 TABLET ORAL EVERY 4 HOURS PRN
Status: DISCONTINUED | OUTPATIENT
Start: 2021-01-01 | End: 2021-01-01 | Stop reason: HOSPADM

## 2021-01-01 RX ORDER — SODIUM CHLORIDE 0.9 % (FLUSH) 0.9 %
10 SYRINGE (ML) INJECTION EVERY 12 HOURS SCHEDULED
Status: DISCONTINUED | OUTPATIENT
Start: 2021-01-01 | End: 2021-01-01 | Stop reason: HOSPADM

## 2021-01-01 RX ORDER — HYDROCODONE BITARTRATE AND ACETAMINOPHEN 7.5; 325 MG/1; MG/1
1 TABLET ORAL ONCE
Status: DISCONTINUED | OUTPATIENT
Start: 2021-01-01 | End: 2021-01-01

## 2021-01-01 RX ORDER — METHYLPREDNISOLONE SODIUM SUCCINATE 40 MG/ML
40 INJECTION, POWDER, LYOPHILIZED, FOR SOLUTION INTRAMUSCULAR; INTRAVENOUS EVERY 12 HOURS
Status: DISCONTINUED | OUTPATIENT
Start: 2021-01-01 | End: 2021-01-01 | Stop reason: HOSPADM

## 2021-01-01 RX ORDER — DILTIAZEM HYDROCHLORIDE 5 MG/ML
10 INJECTION INTRAVENOUS ONCE
Status: COMPLETED | OUTPATIENT
Start: 2021-01-01 | End: 2021-01-01

## 2021-01-01 RX ORDER — BUMETANIDE 0.25 MG/ML
1 INJECTION INTRAMUSCULAR; INTRAVENOUS ONCE
Status: COMPLETED | OUTPATIENT
Start: 2021-01-01 | End: 2021-01-01

## 2021-01-01 RX ORDER — HEPARIN SODIUM 5000 [USP'U]/ML
5000 INJECTION, SOLUTION INTRAVENOUS; SUBCUTANEOUS EVERY 12 HOURS SCHEDULED
Status: DISCONTINUED | OUTPATIENT
Start: 2021-01-01 | End: 2021-01-01 | Stop reason: HOSPADM

## 2021-01-01 RX ORDER — TIOTROPIUM BROMIDE AND OLODATEROL 3.124; 2.736 UG/1; UG/1
1 SPRAY, METERED RESPIRATORY (INHALATION)
COMMUNITY
End: 2021-01-01 | Stop reason: HOSPADM

## 2021-01-01 RX ORDER — CHOLECALCIFEROL (VITAMIN D3) 125 MCG
10 CAPSULE ORAL NIGHTLY PRN
Status: DISCONTINUED | OUTPATIENT
Start: 2021-01-01 | End: 2021-01-01 | Stop reason: HOSPADM

## 2021-01-01 RX ORDER — BISACODYL 5 MG/1
5 TABLET, DELAYED RELEASE ORAL DAILY PRN
Status: DISCONTINUED | OUTPATIENT
Start: 2021-01-01 | End: 2021-01-01 | Stop reason: HOSPADM

## 2021-01-01 RX ORDER — GLIPIZIDE 5 MG/1
10 TABLET ORAL
Status: CANCELLED | OUTPATIENT
Start: 2021-01-01

## 2021-01-01 RX ORDER — SODIUM CHLORIDE, SODIUM LACTATE, POTASSIUM CHLORIDE, CALCIUM CHLORIDE 600; 310; 30; 20 MG/100ML; MG/100ML; MG/100ML; MG/100ML
125 INJECTION, SOLUTION INTRAVENOUS ONCE
Status: DISCONTINUED | OUTPATIENT
Start: 2021-01-01 | End: 2021-01-01 | Stop reason: HOSPADM

## 2021-01-01 RX ORDER — NICOTINE POLACRILEX 4 MG
15 LOZENGE BUCCAL
Status: DISCONTINUED | OUTPATIENT
Start: 2021-01-01 | End: 2021-01-01 | Stop reason: HOSPADM

## 2021-01-01 RX ORDER — LACTULOSE 10 G/15ML
30 SOLUTION ORAL DAILY
Status: COMPLETED | OUTPATIENT
Start: 2021-01-01 | End: 2021-01-01

## 2021-01-01 RX ORDER — IPRATROPIUM BROMIDE AND ALBUTEROL SULFATE 2.5; .5 MG/3ML; MG/3ML
3 SOLUTION RESPIRATORY (INHALATION) ONCE
Status: COMPLETED | OUTPATIENT
Start: 2021-01-01 | End: 2021-01-01

## 2021-01-01 RX ORDER — ARFORMOTEROL TARTRATE 15 UG/2ML
15 SOLUTION RESPIRATORY (INHALATION)
Status: DISCONTINUED | OUTPATIENT
Start: 2021-01-01 | End: 2021-01-01 | Stop reason: HOSPADM

## 2021-01-01 RX ORDER — OXYCODONE AND ACETAMINOPHEN 10; 325 MG/1; MG/1
1 TABLET ORAL EVERY 8 HOURS PRN
Status: DISCONTINUED | OUTPATIENT
Start: 2021-01-01 | End: 2021-01-01

## 2021-01-01 RX ORDER — BUMETANIDE 0.5 MG/1
0.5 TABLET ORAL DAILY
Qty: 30 TABLET | Refills: 0 | Status: SHIPPED | OUTPATIENT
Start: 2021-01-01 | End: 2021-01-01 | Stop reason: SDUPTHER

## 2021-01-01 RX ORDER — ONDANSETRON 2 MG/ML
INJECTION INTRAMUSCULAR; INTRAVENOUS AS NEEDED
Status: DISCONTINUED | OUTPATIENT
Start: 2021-01-01 | End: 2021-01-01 | Stop reason: SURG

## 2021-01-01 RX ORDER — SODIUM CHLORIDE, SODIUM LACTATE, POTASSIUM CHLORIDE, CALCIUM CHLORIDE 600; 310; 30; 20 MG/100ML; MG/100ML; MG/100ML; MG/100ML
INJECTION, SOLUTION INTRAVENOUS CONTINUOUS PRN
Status: DISCONTINUED | OUTPATIENT
Start: 2021-01-01 | End: 2021-01-01 | Stop reason: SURG

## 2021-01-01 RX ORDER — HEPARIN SODIUM 10000 [USP'U]/100ML
12 INJECTION, SOLUTION INTRAVENOUS
Status: DISCONTINUED | OUTPATIENT
Start: 2021-01-01 | End: 2021-01-01

## 2021-01-01 RX ORDER — HYDRALAZINE HYDROCHLORIDE 50 MG/1
50 TABLET, FILM COATED ORAL 3 TIMES DAILY
Status: DISCONTINUED | OUTPATIENT
Start: 2021-01-01 | End: 2021-01-01 | Stop reason: HOSPADM

## 2021-01-01 RX ORDER — METHOCARBAMOL 750 MG/1
750 TABLET, FILM COATED ORAL 4 TIMES DAILY
Status: CANCELLED | OUTPATIENT
Start: 2021-01-01 | End: 2022-01-01

## 2021-01-01 RX ORDER — HYDROCODONE BITARTRATE AND ACETAMINOPHEN 7.5; 325 MG/1; MG/1
1 TABLET ORAL ONCE
Status: COMPLETED | OUTPATIENT
Start: 2021-01-01 | End: 2021-01-01

## 2021-01-01 RX ORDER — DIGOXIN 0.25 MG/ML
250 INJECTION INTRAMUSCULAR; INTRAVENOUS EVERY 6 HOURS
Status: DISCONTINUED | OUTPATIENT
Start: 2021-01-01 | End: 2021-01-01

## 2021-01-01 RX ORDER — FUROSEMIDE 10 MG/ML
40 INJECTION INTRAMUSCULAR; INTRAVENOUS ONCE
Status: COMPLETED | OUTPATIENT
Start: 2021-01-01 | End: 2021-01-01

## 2021-01-01 RX ORDER — DEXTROSE MONOHYDRATE 25 G/50ML
25 INJECTION, SOLUTION INTRAVENOUS
Status: DISCONTINUED | OUTPATIENT
Start: 2021-01-01 | End: 2021-01-01 | Stop reason: HOSPADM

## 2021-01-01 RX ORDER — BUMETANIDE 0.5 MG/1
0.5 TABLET ORAL DAILY
Qty: 30 TABLET | Refills: 1 | Status: SHIPPED | OUTPATIENT
Start: 2021-01-01 | End: 2022-01-01 | Stop reason: SDUPTHER

## 2021-01-01 RX ORDER — ACETAMINOPHEN 500 MG
1000 TABLET ORAL EVERY 6 HOURS PRN
COMMUNITY
Start: 2021-01-01 | End: 2022-01-01

## 2021-01-01 RX ORDER — BISACODYL 10 MG
10 SUPPOSITORY, RECTAL RECTAL DAILY
Status: DISCONTINUED | OUTPATIENT
Start: 2021-01-01 | End: 2021-01-01 | Stop reason: HOSPADM

## 2021-01-01 RX ORDER — HEPARIN SODIUM 5000 [USP'U]/ML
60 INJECTION, SOLUTION INTRAVENOUS; SUBCUTANEOUS AS NEEDED
Status: DISCONTINUED | OUTPATIENT
Start: 2021-01-01 | End: 2021-01-01 | Stop reason: HOSPADM

## 2021-01-01 RX ORDER — OXYCODONE AND ACETAMINOPHEN 7.5; 325 MG/1; MG/1
1 TABLET ORAL 4 TIMES DAILY
Status: DISCONTINUED | OUTPATIENT
Start: 2021-01-01 | End: 2021-01-01 | Stop reason: HOSPADM

## 2021-01-01 RX ORDER — IPRATROPIUM BROMIDE AND ALBUTEROL SULFATE 2.5; .5 MG/3ML; MG/3ML
3 SOLUTION RESPIRATORY (INHALATION) ONCE AS NEEDED
Status: DISCONTINUED | OUTPATIENT
Start: 2021-01-01 | End: 2021-01-01 | Stop reason: HOSPADM

## 2021-01-01 RX ORDER — CARVEDILOL 12.5 MG/1
12.5 TABLET ORAL 2 TIMES DAILY WITH MEALS
Qty: 60 TABLET | Refills: 1 | Status: SHIPPED | OUTPATIENT
Start: 2021-01-01 | End: 2022-01-01 | Stop reason: SDUPTHER

## 2021-01-01 RX ORDER — OXYCODONE AND ACETAMINOPHEN 7.5; 325 MG/1; MG/1
1 TABLET ORAL ONCE
Status: COMPLETED | OUTPATIENT
Start: 2021-01-01 | End: 2021-01-01

## 2021-01-01 RX ORDER — POLYETHYLENE GLYCOL 3350 17 G/17G
17 POWDER, FOR SOLUTION ORAL DAILY PRN
Status: DISCONTINUED | OUTPATIENT
Start: 2021-01-01 | End: 2021-01-01 | Stop reason: HOSPADM

## 2021-01-01 RX ORDER — SODIUM CHLORIDE 9 MG/ML
75 INJECTION, SOLUTION INTRAVENOUS CONTINUOUS
Status: DISCONTINUED | OUTPATIENT
Start: 2021-01-01 | End: 2021-01-01

## 2021-01-01 RX ORDER — ONDANSETRON 2 MG/ML
4 INJECTION INTRAMUSCULAR; INTRAVENOUS AS NEEDED
Status: DISCONTINUED | OUTPATIENT
Start: 2021-01-01 | End: 2021-01-01 | Stop reason: HOSPADM

## 2021-01-01 RX ORDER — FAMOTIDINE 10 MG/ML
INJECTION, SOLUTION INTRAVENOUS AS NEEDED
Status: DISCONTINUED | OUTPATIENT
Start: 2021-01-01 | End: 2021-01-01 | Stop reason: SURG

## 2021-01-01 RX ORDER — OXYCODONE AND ACETAMINOPHEN 7.5; 325 MG/1; MG/1
1 TABLET ORAL EVERY 6 HOURS PRN
COMMUNITY
End: 2022-01-01 | Stop reason: HOSPADM

## 2021-01-01 RX ORDER — PANTOPRAZOLE SODIUM 40 MG/10ML
40 INJECTION, POWDER, LYOPHILIZED, FOR SOLUTION INTRAVENOUS
Status: DISCONTINUED | OUTPATIENT
Start: 2021-01-01 | End: 2021-01-01 | Stop reason: HOSPADM

## 2021-01-01 RX ORDER — GABAPENTIN 400 MG/1
400 CAPSULE ORAL EVERY 6 HOURS SCHEDULED
Status: DISCONTINUED | OUTPATIENT
Start: 2021-01-01 | End: 2021-01-01 | Stop reason: HOSPADM

## 2021-01-01 RX ORDER — ALBUTEROL SULFATE 2.5 MG/3ML
2.5 SOLUTION RESPIRATORY (INHALATION) EVERY 6 HOURS PRN
Status: CANCELLED | OUTPATIENT
Start: 2021-01-01

## 2021-01-01 RX ORDER — ACETAMINOPHEN 500 MG
1000 TABLET ORAL EVERY 6 HOURS PRN
Status: CANCELLED | OUTPATIENT
Start: 2021-01-01 | End: 2022-01-01

## 2021-01-01 RX ORDER — MIDAZOLAM HYDROCHLORIDE 1 MG/ML
0.5 INJECTION INTRAMUSCULAR; INTRAVENOUS
Status: DISCONTINUED | OUTPATIENT
Start: 2021-01-01 | End: 2021-01-01 | Stop reason: HOSPADM

## 2021-01-01 RX ORDER — OXYCODONE HYDROCHLORIDE AND ACETAMINOPHEN 5; 325 MG/1; MG/1
1 TABLET ORAL EVERY 8 HOURS PRN
Status: DISCONTINUED | OUTPATIENT
Start: 2021-01-01 | End: 2021-01-01 | Stop reason: HOSPADM

## 2021-01-01 RX ORDER — AMOXICILLIN 250 MG
2 CAPSULE ORAL 2 TIMES DAILY
Status: DISCONTINUED | OUTPATIENT
Start: 2021-01-01 | End: 2021-01-01 | Stop reason: HOSPADM

## 2021-01-01 RX ORDER — CLOPIDOGREL BISULFATE 75 MG/1
75 TABLET ORAL DAILY
COMMUNITY
Start: 2021-01-01 | End: 2022-01-01

## 2021-01-01 RX ORDER — ETOMIDATE 2 MG/ML
INJECTION INTRAVENOUS AS NEEDED
Status: DISCONTINUED | OUTPATIENT
Start: 2021-01-01 | End: 2021-01-01 | Stop reason: SURG

## 2021-01-01 RX ORDER — BUDESONIDE AND FORMOTEROL FUMARATE DIHYDRATE 160; 4.5 UG/1; UG/1
2 AEROSOL RESPIRATORY (INHALATION)
Status: ON HOLD | COMMUNITY
End: 2022-01-01

## 2021-01-01 RX ORDER — BUDESONIDE AND FORMOTEROL FUMARATE DIHYDRATE 160; 4.5 UG/1; UG/1
2 AEROSOL RESPIRATORY (INHALATION)
Status: DISCONTINUED | OUTPATIENT
Start: 2021-01-01 | End: 2021-01-01

## 2021-01-01 RX ORDER — HYDRALAZINE HYDROCHLORIDE 25 MG/1
50 TABLET, FILM COATED ORAL 3 TIMES DAILY
Status: CANCELLED | OUTPATIENT
Start: 2021-01-01

## 2021-01-01 RX ORDER — SODIUM CHLORIDE 450 MG/100ML
75 INJECTION, SOLUTION INTRAVENOUS CONTINUOUS
Status: DISCONTINUED | OUTPATIENT
Start: 2021-01-01 | End: 2021-01-01

## 2021-01-01 RX ORDER — ACETAMINOPHEN 500 MG
1000 TABLET ORAL EVERY 6 HOURS PRN
Status: CANCELLED | OUTPATIENT
Start: 2021-01-01

## 2021-01-01 RX ADMIN — HEPARIN SODIUM 12 UNITS/KG/HR: 10000 INJECTION, SOLUTION INTRAVENOUS at 18:28

## 2021-01-01 RX ADMIN — INSULIN ASPART 3 UNITS: 100 INJECTION, SOLUTION INTRAVENOUS; SUBCUTANEOUS at 08:13

## 2021-01-01 RX ADMIN — GABAPENTIN 400 MG: 400 CAPSULE ORAL at 12:05

## 2021-01-01 RX ADMIN — METOPROLOL TARTRATE 50 MG: 50 TABLET, FILM COATED ORAL at 20:43

## 2021-01-01 RX ADMIN — INSULIN ASPART 4 UNITS: 100 INJECTION, SOLUTION INTRAVENOUS; SUBCUTANEOUS at 11:06

## 2021-01-01 RX ADMIN — CARVEDILOL 12.5 MG: 6.25 TABLET, FILM COATED ORAL at 08:25

## 2021-01-01 RX ADMIN — METHYLPREDNISOLONE SODIUM SUCCINATE 40 MG: 40 INJECTION, POWDER, FOR SOLUTION INTRAMUSCULAR; INTRAVENOUS at 20:43

## 2021-01-01 RX ADMIN — DOXYCYCLINE 100 MG: 100 INJECTION, POWDER, LYOPHILIZED, FOR SOLUTION INTRAVENOUS at 01:24

## 2021-01-01 RX ADMIN — IOPAMIDOL 80 ML: 755 INJECTION, SOLUTION INTRAVENOUS at 02:54

## 2021-01-01 RX ADMIN — AMLODIPINE BESYLATE 5 MG: 5 TABLET ORAL at 08:53

## 2021-01-01 RX ADMIN — BUDESONIDE AND FORMOTEROL FUMARATE DIHYDRATE 2 PUFF: 160; 4.5 AEROSOL RESPIRATORY (INHALATION) at 07:33

## 2021-01-01 RX ADMIN — CLOPIDOGREL 75 MG: 75 TABLET, FILM COATED ORAL at 09:40

## 2021-01-01 RX ADMIN — HEPARIN SODIUM 5000 UNITS: 5000 INJECTION INTRAVENOUS; SUBCUTANEOUS at 01:24

## 2021-01-01 RX ADMIN — HYDRALAZINE HYDROCHLORIDE 50 MG: 50 TABLET ORAL at 16:39

## 2021-01-01 RX ADMIN — LINACLOTIDE 145 MCG: 145 CAPSULE, GELATIN COATED ORAL at 06:42

## 2021-01-01 RX ADMIN — GLIPIZIDE 10 MG: 5 TABLET ORAL at 08:13

## 2021-01-01 RX ADMIN — CARVEDILOL 12.5 MG: 6.25 TABLET, FILM COATED ORAL at 17:13

## 2021-01-01 RX ADMIN — GABAPENTIN 400 MG: 400 CAPSULE ORAL at 11:33

## 2021-01-01 RX ADMIN — HYDRALAZINE HYDROCHLORIDE 50 MG: 50 TABLET ORAL at 17:29

## 2021-01-01 RX ADMIN — GABAPENTIN 400 MG: 400 CAPSULE ORAL at 01:05

## 2021-01-01 RX ADMIN — OXYCODONE HYDROCHLORIDE AND ACETAMINOPHEN 1 TABLET: 5; 325 TABLET ORAL at 23:55

## 2021-01-01 RX ADMIN — OXYCODONE HYDROCHLORIDE AND ACETAMINOPHEN 1 TABLET: 7.5; 325 TABLET ORAL at 17:28

## 2021-01-01 RX ADMIN — OXYCODONE AND ACETAMINOPHEN 1 TABLET: 7.5; 325 TABLET ORAL at 18:57

## 2021-01-01 RX ADMIN — METHYLPREDNISOLONE SODIUM SUCCINATE 40 MG: 40 INJECTION, POWDER, FOR SOLUTION INTRAMUSCULAR; INTRAVENOUS at 08:29

## 2021-01-01 RX ADMIN — GABAPENTIN 400 MG: 400 CAPSULE ORAL at 05:28

## 2021-01-01 RX ADMIN — DOXYCYCLINE 100 MG: 100 INJECTION, POWDER, LYOPHILIZED, FOR SOLUTION INTRAVENOUS at 14:25

## 2021-01-01 RX ADMIN — FUROSEMIDE 40 MG: 10 INJECTION INTRAMUSCULAR; INTRAVENOUS at 22:07

## 2021-01-01 RX ADMIN — ATORVASTATIN CALCIUM 10 MG: 10 TABLET, FILM COATED ORAL at 09:40

## 2021-01-01 RX ADMIN — DIGOXIN 250 MCG: 250 INJECTION, SOLUTION INTRAMUSCULAR; INTRAVENOUS at 06:01

## 2021-01-01 RX ADMIN — ATORVASTATIN CALCIUM 10 MG: 10 TABLET, FILM COATED ORAL at 11:06

## 2021-01-01 RX ADMIN — OXYCODONE AND ACETAMINOPHEN 1 TABLET: 7.5; 325 TABLET ORAL at 18:38

## 2021-01-01 RX ADMIN — GABAPENTIN 400 MG: 400 CAPSULE ORAL at 17:13

## 2021-01-01 RX ADMIN — GABAPENTIN 400 MG: 400 CAPSULE ORAL at 13:04

## 2021-01-01 RX ADMIN — OXYCODONE HYDROCHLORIDE AND ACETAMINOPHEN 1 TABLET: 5; 325 TABLET ORAL at 01:16

## 2021-01-01 RX ADMIN — BUMETANIDE 1 MG: 0.25 INJECTION INTRAMUSCULAR; INTRAVENOUS at 10:45

## 2021-01-01 RX ADMIN — METHYLPREDNISOLONE SODIUM SUCCINATE 40 MG: 40 INJECTION, POWDER, FOR SOLUTION INTRAMUSCULAR; INTRAVENOUS at 09:03

## 2021-01-01 RX ADMIN — HYDRALAZINE HYDROCHLORIDE 50 MG: 50 TABLET ORAL at 20:43

## 2021-01-01 RX ADMIN — CARVEDILOL 12.5 MG: 6.25 TABLET, FILM COATED ORAL at 22:26

## 2021-01-01 RX ADMIN — OXYCODONE HYDROCHLORIDE AND ACETAMINOPHEN 1 TABLET: 5; 325 TABLET ORAL at 12:16

## 2021-01-01 RX ADMIN — DOXYCYCLINE 100 MG: 100 INJECTION, POWDER, LYOPHILIZED, FOR SOLUTION INTRAVENOUS at 11:33

## 2021-01-01 RX ADMIN — PANTOPRAZOLE SODIUM 40 MG: 40 INJECTION, POWDER, FOR SOLUTION INTRAVENOUS at 06:00

## 2021-01-01 RX ADMIN — INSULIN ASPART 3 UNITS: 100 INJECTION, SOLUTION INTRAVENOUS; SUBCUTANEOUS at 17:13

## 2021-01-01 RX ADMIN — GLIPIZIDE 10 MG: 5 TABLET ORAL at 08:52

## 2021-01-01 RX ADMIN — HYDRALAZINE HYDROCHLORIDE 50 MG: 50 TABLET ORAL at 08:30

## 2021-01-01 RX ADMIN — BUMETANIDE 0.5 MG: 1 TABLET ORAL at 08:53

## 2021-01-01 RX ADMIN — OXYCODONE HYDROCHLORIDE AND ACETAMINOPHEN 1 TABLET: 7.5; 325 TABLET ORAL at 17:13

## 2021-01-01 RX ADMIN — AMLODIPINE BESYLATE 5 MG: 5 TABLET ORAL at 08:28

## 2021-01-01 RX ADMIN — SODIUM CHLORIDE, PRESERVATIVE FREE 10 ML: 5 INJECTION INTRAVENOUS at 10:02

## 2021-01-01 RX ADMIN — APIXABAN 5 MG: 5 TABLET, FILM COATED ORAL at 08:53

## 2021-01-01 RX ADMIN — OXYCODONE HYDROCHLORIDE AND ACETAMINOPHEN 1 TABLET: 7.5; 325 TABLET ORAL at 16:39

## 2021-01-01 RX ADMIN — ETOMIDATE 18 MG: 2 INJECTION, SOLUTION INTRAVENOUS at 12:40

## 2021-01-01 RX ADMIN — SODIUM CHLORIDE 5 MG/HR: 900 INJECTION, SOLUTION INTRAVENOUS at 07:48

## 2021-01-01 RX ADMIN — OXYCODONE HYDROCHLORIDE AND ACETAMINOPHEN 1 TABLET: 5; 325 TABLET ORAL at 08:53

## 2021-01-01 RX ADMIN — GLIPIZIDE 10 MG: 5 TABLET ORAL at 08:12

## 2021-01-01 RX ADMIN — SODIUM CHLORIDE, POTASSIUM CHLORIDE, SODIUM LACTATE AND CALCIUM CHLORIDE: 600; 310; 30; 20 INJECTION, SOLUTION INTRAVENOUS at 12:36

## 2021-01-01 RX ADMIN — CARVEDILOL 12.5 MG: 6.25 TABLET, FILM COATED ORAL at 10:02

## 2021-01-01 RX ADMIN — DIGOXIN 250 MCG: 250 INJECTION, SOLUTION INTRAMUSCULAR; INTRAVENOUS at 01:08

## 2021-01-01 RX ADMIN — GABAPENTIN 400 MG: 400 CAPSULE ORAL at 05:18

## 2021-01-01 RX ADMIN — LINACLOTIDE 145 MCG: 145 CAPSULE, GELATIN COATED ORAL at 10:01

## 2021-01-01 RX ADMIN — HYDRALAZINE HYDROCHLORIDE 50 MG: 50 TABLET ORAL at 20:58

## 2021-01-01 RX ADMIN — GABAPENTIN 400 MG: 400 CAPSULE ORAL at 16:39

## 2021-01-01 RX ADMIN — HEPARIN SODIUM 5000 UNITS: 5000 INJECTION INTRAVENOUS; SUBCUTANEOUS at 20:19

## 2021-01-01 RX ADMIN — OXYCODONE HYDROCHLORIDE AND ACETAMINOPHEN 1 TABLET: 5; 325 TABLET ORAL at 18:20

## 2021-01-01 RX ADMIN — GABAPENTIN 400 MG: 400 CAPSULE ORAL at 17:57

## 2021-01-01 RX ADMIN — AMLODIPINE BESYLATE 5 MG: 5 TABLET ORAL at 08:30

## 2021-01-01 RX ADMIN — APIXABAN 5 MG: 5 TABLET, FILM COATED ORAL at 21:20

## 2021-01-01 RX ADMIN — LACTULOSE 30 G: 20 SOLUTION ORAL at 12:52

## 2021-01-01 RX ADMIN — SODIUM CHLORIDE, PRESERVATIVE FREE 10 ML: 5 INJECTION INTRAVENOUS at 20:55

## 2021-01-01 RX ADMIN — HEPARIN SODIUM 5000 UNITS: 5000 INJECTION INTRAVENOUS; SUBCUTANEOUS at 20:43

## 2021-01-01 RX ADMIN — OXYCODONE HYDROCHLORIDE AND ACETAMINOPHEN 1 TABLET: 10; 325 TABLET ORAL at 09:07

## 2021-01-01 RX ADMIN — SODIUM CHLORIDE, PRESERVATIVE FREE 10 ML: 5 INJECTION INTRAVENOUS at 20:16

## 2021-01-01 RX ADMIN — ATORVASTATIN CALCIUM 10 MG: 10 TABLET, FILM COATED ORAL at 08:28

## 2021-01-01 RX ADMIN — PANTOPRAZOLE SODIUM 40 MG: 40 INJECTION, POWDER, FOR SOLUTION INTRAVENOUS at 06:05

## 2021-01-01 RX ADMIN — GABAPENTIN 400 MG: 400 CAPSULE ORAL at 05:12

## 2021-01-01 RX ADMIN — GABAPENTIN 400 MG: 400 CAPSULE ORAL at 11:06

## 2021-01-01 RX ADMIN — SODIUM CHLORIDE, PRESERVATIVE FREE 10 ML: 5 INJECTION INTRAVENOUS at 08:30

## 2021-01-01 RX ADMIN — SODIUM CHLORIDE, PRESERVATIVE FREE 10 ML: 5 INJECTION INTRAVENOUS at 08:27

## 2021-01-01 RX ADMIN — GABAPENTIN 400 MG: 400 CAPSULE ORAL at 17:28

## 2021-01-01 RX ADMIN — GABAPENTIN 400 MG: 400 CAPSULE ORAL at 06:03

## 2021-01-01 RX ADMIN — CARVEDILOL 12.5 MG: 6.25 TABLET, FILM COATED ORAL at 08:28

## 2021-01-01 RX ADMIN — METOPROLOL TARTRATE 50 MG: 50 TABLET, FILM COATED ORAL at 08:13

## 2021-01-01 RX ADMIN — BUMETANIDE 1 MG: 0.25 INJECTION INTRAMUSCULAR; INTRAVENOUS at 11:34

## 2021-01-01 RX ADMIN — METHYLPREDNISOLONE SODIUM SUCCINATE 40 MG: 40 INJECTION, POWDER, FOR SOLUTION INTRAMUSCULAR; INTRAVENOUS at 20:57

## 2021-01-01 RX ADMIN — ATORVASTATIN CALCIUM 10 MG: 10 TABLET, FILM COATED ORAL at 08:30

## 2021-01-01 RX ADMIN — DOCUSATE SODIUM 50 MG AND SENNOSIDES 8.6 MG 2 TABLET: 8.6; 5 TABLET, FILM COATED ORAL at 20:55

## 2021-01-01 RX ADMIN — SODIUM CHLORIDE, PRESERVATIVE FREE 10 ML: 5 INJECTION INTRAVENOUS at 20:49

## 2021-01-01 RX ADMIN — DOXYCYCLINE 100 MG: 100 INJECTION, POWDER, LYOPHILIZED, FOR SOLUTION INTRAVENOUS at 02:40

## 2021-01-01 RX ADMIN — SODIUM CHLORIDE, PRESERVATIVE FREE 10 ML: 5 INJECTION INTRAVENOUS at 20:20

## 2021-01-01 RX ADMIN — CARVEDILOL 12.5 MG: 6.25 TABLET, FILM COATED ORAL at 18:38

## 2021-01-01 RX ADMIN — DOCUSATE SODIUM 50 MG AND SENNOSIDES 8.6 MG 2 TABLET: 8.6; 5 TABLET, FILM COATED ORAL at 10:01

## 2021-01-01 RX ADMIN — INSULIN ASPART 2 UNITS: 100 INJECTION, SOLUTION INTRAVENOUS; SUBCUTANEOUS at 16:39

## 2021-01-01 RX ADMIN — IPRATROPIUM BROMIDE 0.5 MG: 0.5 SOLUTION RESPIRATORY (INHALATION) at 16:43

## 2021-01-01 RX ADMIN — OXYCODONE HYDROCHLORIDE AND ACETAMINOPHEN 1 TABLET: 5; 325 TABLET ORAL at 01:30

## 2021-01-01 RX ADMIN — NITROGLYCERIN 0.4 MG: 0.4 TABLET SUBLINGUAL at 06:22

## 2021-01-01 RX ADMIN — DOXYCYCLINE 100 MG: 100 INJECTION, POWDER, LYOPHILIZED, FOR SOLUTION INTRAVENOUS at 01:05

## 2021-01-01 RX ADMIN — NITROGLYCERIN 0.4 MG: 0.4 TABLET SUBLINGUAL at 06:54

## 2021-01-01 RX ADMIN — Medication 10 MG: at 23:54

## 2021-01-01 RX ADMIN — NITROGLYCERIN 0.4 MG: 0.4 TABLET SUBLINGUAL at 06:43

## 2021-01-01 RX ADMIN — IOPAMIDOL 70 ML: 755 INJECTION, SOLUTION INTRAVENOUS at 23:18

## 2021-01-01 RX ADMIN — METHYLPREDNISOLONE SODIUM SUCCINATE 40 MG: 40 INJECTION, POWDER, FOR SOLUTION INTRAMUSCULAR; INTRAVENOUS at 08:13

## 2021-01-01 RX ADMIN — HYDRALAZINE HYDROCHLORIDE 50 MG: 50 TABLET ORAL at 08:12

## 2021-01-01 RX ADMIN — OXYCODONE AND ACETAMINOPHEN 1 TABLET: 7.5; 325 TABLET ORAL at 17:05

## 2021-01-01 RX ADMIN — SODIUM CHLORIDE, PRESERVATIVE FREE 10 ML: 5 INJECTION INTRAVENOUS at 21:21

## 2021-01-01 RX ADMIN — OXYCODONE HYDROCHLORIDE AND ACETAMINOPHEN 1 TABLET: 10; 325 TABLET ORAL at 02:07

## 2021-01-01 RX ADMIN — INSULIN ASPART 2 UNITS: 100 INJECTION, SOLUTION INTRAVENOUS; SUBCUTANEOUS at 21:00

## 2021-01-01 RX ADMIN — ONDANSETRON 4 MG: 2 INJECTION INTRAMUSCULAR; INTRAVENOUS at 12:40

## 2021-01-01 RX ADMIN — DOXYCYCLINE 100 MG: 100 INJECTION, POWDER, LYOPHILIZED, FOR SOLUTION INTRAVENOUS at 02:13

## 2021-01-01 RX ADMIN — CARVEDILOL 12.5 MG: 6.25 TABLET, FILM COATED ORAL at 08:30

## 2021-01-01 RX ADMIN — OXYCODONE HYDROCHLORIDE AND ACETAMINOPHEN 1 TABLET: 7.5; 325 TABLET ORAL at 14:47

## 2021-01-01 RX ADMIN — GABAPENTIN 400 MG: 400 CAPSULE ORAL at 05:19

## 2021-01-01 RX ADMIN — BUMETANIDE 0.5 MG: 1 TABLET ORAL at 17:05

## 2021-01-01 RX ADMIN — OXYCODONE HYDROCHLORIDE AND ACETAMINOPHEN 1 TABLET: 7.5; 325 TABLET ORAL at 08:52

## 2021-01-01 RX ADMIN — HYDRALAZINE HYDROCHLORIDE 50 MG: 50 TABLET ORAL at 08:28

## 2021-01-01 RX ADMIN — ATORVASTATIN CALCIUM 10 MG: 10 TABLET, FILM COATED ORAL at 17:05

## 2021-01-01 RX ADMIN — APIXABAN 5 MG: 5 TABLET, FILM COATED ORAL at 08:26

## 2021-01-01 RX ADMIN — APIXABAN 5 MG: 5 TABLET, FILM COATED ORAL at 09:39

## 2021-01-01 RX ADMIN — GLIPIZIDE 10 MG: 5 TABLET ORAL at 08:29

## 2021-01-01 RX ADMIN — APIXABAN 5 MG: 5 TABLET, FILM COATED ORAL at 20:56

## 2021-01-01 RX ADMIN — LINACLOTIDE 145 MCG: 145 CAPSULE, GELATIN COATED ORAL at 08:26

## 2021-01-01 RX ADMIN — INSULIN ASPART 4 UNITS: 100 INJECTION, SOLUTION INTRAVENOUS; SUBCUTANEOUS at 13:04

## 2021-01-01 RX ADMIN — DIGOXIN 250 MCG: 250 INJECTION, SOLUTION INTRAMUSCULAR; INTRAVENOUS at 18:13

## 2021-01-01 RX ADMIN — CARVEDILOL 12.5 MG: 6.25 TABLET, FILM COATED ORAL at 08:54

## 2021-01-01 RX ADMIN — INSULIN ASPART 3 UNITS: 100 INJECTION, SOLUTION INTRAVENOUS; SUBCUTANEOUS at 11:33

## 2021-01-01 RX ADMIN — INSULIN ASPART 2 UNITS: 100 INJECTION, SOLUTION INTRAVENOUS; SUBCUTANEOUS at 12:49

## 2021-01-01 RX ADMIN — OXYCODONE HYDROCHLORIDE AND ACETAMINOPHEN 1 TABLET: 5; 325 TABLET ORAL at 09:40

## 2021-01-01 RX ADMIN — OXYCODONE HYDROCHLORIDE AND ACETAMINOPHEN 1 TABLET: 7.5; 325 TABLET ORAL at 20:57

## 2021-01-01 RX ADMIN — SODIUM CHLORIDE, PRESERVATIVE FREE 10 ML: 5 INJECTION INTRAVENOUS at 21:07

## 2021-01-01 RX ADMIN — CALCIUM CARBONATE 2 TABLET: 500 TABLET, CHEWABLE ORAL at 11:06

## 2021-01-01 RX ADMIN — INSULIN ASPART 3 UNITS: 100 INJECTION, SOLUTION INTRAVENOUS; SUBCUTANEOUS at 08:54

## 2021-01-01 RX ADMIN — DOXYCYCLINE 100 MG: 100 INJECTION, POWDER, LYOPHILIZED, FOR SOLUTION INTRAVENOUS at 15:11

## 2021-01-01 RX ADMIN — INSULIN ASPART 2 UNITS: 100 INJECTION, SOLUTION INTRAVENOUS; SUBCUTANEOUS at 17:00

## 2021-01-01 RX ADMIN — OXYCODONE HYDROCHLORIDE AND ACETAMINOPHEN 1 TABLET: 7.5; 325 TABLET ORAL at 20:20

## 2021-01-01 RX ADMIN — DOCUSATE SODIUM 50 MG AND SENNOSIDES 8.6 MG 2 TABLET: 8.6; 5 TABLET, FILM COATED ORAL at 08:25

## 2021-01-01 RX ADMIN — OXYCODONE HYDROCHLORIDE AND ACETAMINOPHEN 1 TABLET: 7.5; 325 TABLET ORAL at 20:16

## 2021-01-01 RX ADMIN — Medication 10 MG: at 21:20

## 2021-01-01 RX ADMIN — IPRATROPIUM BROMIDE 0.5 MG: 0.5 SOLUTION RESPIRATORY (INHALATION) at 13:02

## 2021-01-01 RX ADMIN — CARVEDILOL 12.5 MG: 6.25 TABLET, FILM COATED ORAL at 09:40

## 2021-01-01 RX ADMIN — SODIUM CHLORIDE, PRESERVATIVE FREE 10 ML: 5 INJECTION INTRAVENOUS at 08:51

## 2021-01-01 RX ADMIN — SODIUM CHLORIDE, PRESERVATIVE FREE 10 ML: 5 INJECTION INTRAVENOUS at 21:01

## 2021-01-01 RX ADMIN — CARVEDILOL 12.5 MG: 6.25 TABLET, FILM COATED ORAL at 17:05

## 2021-01-01 RX ADMIN — APIXABAN 5 MG: 5 TABLET, FILM COATED ORAL at 18:57

## 2021-01-01 RX ADMIN — CLOPIDOGREL 75 MG: 75 TABLET, FILM COATED ORAL at 08:26

## 2021-01-01 RX ADMIN — HYDRALAZINE HYDROCHLORIDE 50 MG: 50 TABLET ORAL at 15:11

## 2021-01-01 RX ADMIN — METHYLPREDNISOLONE SODIUM SUCCINATE 40 MG: 40 INJECTION, POWDER, FOR SOLUTION INTRAMUSCULAR; INTRAVENOUS at 20:15

## 2021-01-01 RX ADMIN — DIGOXIN 250 MCG: 250 INJECTION, SOLUTION INTRAMUSCULAR; INTRAVENOUS at 20:50

## 2021-01-01 RX ADMIN — SODIUM CHLORIDE, PRESERVATIVE FREE 10 ML: 5 INJECTION INTRAVENOUS at 08:15

## 2021-01-01 RX ADMIN — IPRATROPIUM BROMIDE 0.5 MG: 0.5 SOLUTION RESPIRATORY (INHALATION) at 18:46

## 2021-01-01 RX ADMIN — HYDRALAZINE HYDROCHLORIDE 50 MG: 50 TABLET ORAL at 08:52

## 2021-01-01 RX ADMIN — PANTOPRAZOLE SODIUM 40 MG: 40 INJECTION, POWDER, FOR SOLUTION INTRAVENOUS at 05:57

## 2021-01-01 RX ADMIN — ATORVASTATIN CALCIUM 10 MG: 10 TABLET, FILM COATED ORAL at 08:53

## 2021-01-01 RX ADMIN — ATORVASTATIN CALCIUM 10 MG: 10 TABLET, FILM COATED ORAL at 10:02

## 2021-01-01 RX ADMIN — HEPARIN SODIUM 5000 UNITS: 5000 INJECTION INTRAVENOUS; SUBCUTANEOUS at 08:31

## 2021-01-01 RX ADMIN — DOXYCYCLINE 100 MG: 100 INJECTION, POWDER, LYOPHILIZED, FOR SOLUTION INTRAVENOUS at 12:57

## 2021-01-01 RX ADMIN — IPRATROPIUM BROMIDE 0.5 MG: 0.5 SOLUTION RESPIRATORY (INHALATION) at 12:37

## 2021-01-01 RX ADMIN — OXYCODONE HYDROCHLORIDE AND ACETAMINOPHEN 1 TABLET: 7.5; 325 TABLET ORAL at 11:34

## 2021-01-01 RX ADMIN — OXYCODONE HYDROCHLORIDE AND ACETAMINOPHEN 1 TABLET: 7.5; 325 TABLET ORAL at 17:57

## 2021-01-01 RX ADMIN — OXYCODONE HYDROCHLORIDE AND ACETAMINOPHEN 1 TABLET: 5; 325 TABLET ORAL at 03:43

## 2021-01-01 RX ADMIN — BISACODYL 5 MG: 5 TABLET, COATED ORAL at 12:52

## 2021-01-01 RX ADMIN — SODIUM CHLORIDE, PRESERVATIVE FREE 10 ML: 5 INJECTION INTRAVENOUS at 09:40

## 2021-01-01 RX ADMIN — GABAPENTIN 400 MG: 400 CAPSULE ORAL at 11:34

## 2021-01-01 RX ADMIN — INSULIN ASPART 2 UNITS: 100 INJECTION, SOLUTION INTRAVENOUS; SUBCUTANEOUS at 12:13

## 2021-01-01 RX ADMIN — HEPARIN SODIUM 5000 UNITS: 5000 INJECTION INTRAVENOUS; SUBCUTANEOUS at 08:12

## 2021-01-01 RX ADMIN — SODIUM CHLORIDE, PRESERVATIVE FREE 10 ML: 5 INJECTION INTRAVENOUS at 08:25

## 2021-01-01 RX ADMIN — Medication 10 MG: at 23:44

## 2021-01-01 RX ADMIN — PANTOPRAZOLE SODIUM 40 MG: 40 INJECTION, POWDER, FOR SOLUTION INTRAVENOUS at 06:35

## 2021-01-01 RX ADMIN — HEPARIN SODIUM 5000 UNITS: 5000 INJECTION INTRAVENOUS; SUBCUTANEOUS at 08:13

## 2021-01-01 RX ADMIN — DILTIAZEM HYDROCHLORIDE 10 MG: 5 INJECTION INTRAVENOUS at 04:35

## 2021-01-01 RX ADMIN — NITROGLYCERIN 0.4 MG: 0.4 TABLET SUBLINGUAL at 06:14

## 2021-01-01 RX ADMIN — INSULIN ASPART 2 UNITS: 100 INJECTION, SOLUTION INTRAVENOUS; SUBCUTANEOUS at 18:58

## 2021-01-01 RX ADMIN — SODIUM CHLORIDE, PRESERVATIVE FREE 10 ML: 5 INJECTION INTRAVENOUS at 20:44

## 2021-01-01 RX ADMIN — OXYCODONE HYDROCHLORIDE AND ACETAMINOPHEN 1 TABLET: 7.5; 325 TABLET ORAL at 08:35

## 2021-01-01 RX ADMIN — DILTIAZEM HYDROCHLORIDE 10 MG: 5 INJECTION INTRAVENOUS at 02:06

## 2021-01-01 RX ADMIN — HYDROCODONE BITARTRATE AND ACETAMINOPHEN 1 TABLET: 7.5; 325 TABLET ORAL at 08:55

## 2021-01-01 RX ADMIN — DOCUSATE SODIUM 50 MG AND SENNOSIDES 8.6 MG 2 TABLET: 8.6; 5 TABLET, FILM COATED ORAL at 21:20

## 2021-01-01 RX ADMIN — ATORVASTATIN CALCIUM 10 MG: 10 TABLET, FILM COATED ORAL at 08:54

## 2021-01-01 RX ADMIN — DOCUSATE SODIUM 50 MG AND SENNOSIDES 8.6 MG 2 TABLET: 8.6; 5 TABLET, FILM COATED ORAL at 20:50

## 2021-01-01 RX ADMIN — IPRATROPIUM BROMIDE 0.5 MG: 0.5 SOLUTION RESPIRATORY (INHALATION) at 06:55

## 2021-01-01 RX ADMIN — OXYCODONE HYDROCHLORIDE AND ACETAMINOPHEN 1 TABLET: 7.5; 325 TABLET ORAL at 11:33

## 2021-01-01 RX ADMIN — GLIPIZIDE 10 MG: 5 TABLET ORAL at 08:28

## 2021-01-01 RX ADMIN — OXYCODONE HYDROCHLORIDE AND ACETAMINOPHEN 1 TABLET: 7.5; 325 TABLET ORAL at 08:13

## 2021-01-01 RX ADMIN — ATORVASTATIN CALCIUM 10 MG: 10 TABLET, FILM COATED ORAL at 08:12

## 2021-01-01 RX ADMIN — BUMETANIDE 1 MG: 0.25 INJECTION INTRAMUSCULAR; INTRAVENOUS at 09:14

## 2021-01-01 RX ADMIN — TIOTROPIUM BROMIDE AND OLODATEROL 1 PUFF: 3.124; 2.736 SPRAY, METERED RESPIRATORY (INHALATION) at 10:45

## 2021-01-01 RX ADMIN — INSULIN ASPART 2 UNITS: 100 INJECTION, SOLUTION INTRAVENOUS; SUBCUTANEOUS at 18:13

## 2021-01-01 RX ADMIN — HYDRALAZINE HYDROCHLORIDE 50 MG: 50 TABLET ORAL at 20:16

## 2021-01-01 RX ADMIN — OXYCODONE HYDROCHLORIDE AND ACETAMINOPHEN 1 TABLET: 7.5; 325 TABLET ORAL at 11:06

## 2021-01-01 RX ADMIN — CARVEDILOL 12.5 MG: 6.25 TABLET, FILM COATED ORAL at 08:53

## 2021-01-01 RX ADMIN — HEPARIN SODIUM 5000 UNITS: 5000 INJECTION INTRAVENOUS; SUBCUTANEOUS at 08:27

## 2021-01-01 RX ADMIN — NITROGLYCERIN 0.4 MG: 0.4 TABLET SUBLINGUAL at 17:27

## 2021-01-01 RX ADMIN — OXYCODONE HYDROCHLORIDE AND ACETAMINOPHEN 1 TABLET: 7.5; 325 TABLET ORAL at 08:27

## 2021-01-01 RX ADMIN — INSULIN ASPART 4 UNITS: 100 INJECTION, SOLUTION INTRAVENOUS; SUBCUTANEOUS at 17:28

## 2021-01-01 RX ADMIN — BUDESONIDE AND FORMOTEROL FUMARATE DIHYDRATE 2 PUFF: 160; 4.5 AEROSOL RESPIRATORY (INHALATION) at 19:06

## 2021-01-01 RX ADMIN — CLOPIDOGREL 75 MG: 75 TABLET, FILM COATED ORAL at 10:01

## 2021-01-01 RX ADMIN — SODIUM CHLORIDE, POTASSIUM CHLORIDE, SODIUM LACTATE AND CALCIUM CHLORIDE: 600; 310; 30; 20 INJECTION, SOLUTION INTRAVENOUS at 14:12

## 2021-01-01 RX ADMIN — METOPROLOL TARTRATE 50 MG: 50 TABLET, FILM COATED ORAL at 08:12

## 2021-01-01 RX ADMIN — DIGOXIN 250 MCG: 250 INJECTION, SOLUTION INTRAMUSCULAR; INTRAVENOUS at 00:28

## 2021-01-01 RX ADMIN — ATORVASTATIN CALCIUM 10 MG: 10 TABLET, FILM COATED ORAL at 08:26

## 2021-01-01 RX ADMIN — OXYCODONE AND ACETAMINOPHEN 1 TABLET: 7.5; 325 TABLET ORAL at 12:49

## 2021-01-01 RX ADMIN — GLUCAGON HYDROCHLORIDE 1 MG: 1 INJECTION, POWDER, FOR SOLUTION INTRAMUSCULAR; INTRAVENOUS; SUBCUTANEOUS at 14:07

## 2021-01-01 RX ADMIN — AMLODIPINE BESYLATE 5 MG: 5 TABLET ORAL at 22:26

## 2021-01-01 RX ADMIN — FAMOTIDINE 20 MG: 10 INJECTION INTRAVENOUS at 12:40

## 2021-01-01 RX ADMIN — SODIUM CHLORIDE, PRESERVATIVE FREE 10 ML: 5 INJECTION INTRAVENOUS at 01:24

## 2021-01-01 RX ADMIN — BUMETANIDE 1 MG: 0.25 INJECTION INTRAMUSCULAR; INTRAVENOUS at 17:29

## 2021-01-01 RX ADMIN — HYDRALAZINE HYDROCHLORIDE 50 MG: 50 TABLET ORAL at 17:00

## 2021-01-01 RX ADMIN — NITROGLYCERIN 0.4 MG: 0.4 TABLET SUBLINGUAL at 17:18

## 2021-01-01 RX ADMIN — INSULIN ASPART 2 UNITS: 100 INJECTION, SOLUTION INTRAVENOUS; SUBCUTANEOUS at 18:38

## 2021-01-01 RX ADMIN — CARVEDILOL 12.5 MG: 6.25 TABLET, FILM COATED ORAL at 17:28

## 2021-01-01 RX ADMIN — APIXABAN 5 MG: 5 TABLET, FILM COATED ORAL at 21:06

## 2021-01-01 RX ADMIN — IPRATROPIUM BROMIDE AND ALBUTEROL SULFATE 3 ML: .5; 3 SOLUTION RESPIRATORY (INHALATION) at 21:40

## 2021-01-01 RX ADMIN — INSULIN ASPART 4 UNITS: 100 INJECTION, SOLUTION INTRAVENOUS; SUBCUTANEOUS at 11:33

## 2021-01-01 RX ADMIN — CARVEDILOL 12.5 MG: 6.25 TABLET, FILM COATED ORAL at 18:13

## 2021-01-01 RX ADMIN — SODIUM CHLORIDE 5 MG/HR: 900 INJECTION, SOLUTION INTRAVENOUS at 01:20

## 2021-01-01 RX ADMIN — Medication 10 MG: at 01:16

## 2021-01-01 RX ADMIN — SODIUM CHLORIDE 75 ML/HR: 9 INJECTION, SOLUTION INTRAVENOUS at 20:56

## 2021-01-01 RX ADMIN — SODIUM CHLORIDE 75 ML/HR: 4.5 INJECTION, SOLUTION INTRAVENOUS at 18:13

## 2021-01-01 RX ADMIN — HYDRALAZINE HYDROCHLORIDE 50 MG: 50 TABLET ORAL at 08:13

## 2021-01-01 RX ADMIN — DIGOXIN 250 MCG: 250 INJECTION, SOLUTION INTRAMUSCULAR; INTRAVENOUS at 06:00

## 2021-01-01 RX ADMIN — GABAPENTIN 400 MG: 400 CAPSULE ORAL at 00:15

## 2021-01-01 RX ADMIN — HEPARIN SODIUM 5000 UNITS: 5000 INJECTION INTRAVENOUS; SUBCUTANEOUS at 20:57

## 2021-01-01 RX ADMIN — OXYCODONE HYDROCHLORIDE AND ACETAMINOPHEN 1 TABLET: 7.5; 325 TABLET ORAL at 13:05

## 2021-01-01 RX ADMIN — METHYLPREDNISOLONE SODIUM SUCCINATE 40 MG: 40 INJECTION, POWDER, FOR SOLUTION INTRAMUSCULAR; INTRAVENOUS at 20:19

## 2021-01-01 RX ADMIN — HEPARIN SODIUM 5000 UNITS: 5000 INJECTION INTRAVENOUS; SUBCUTANEOUS at 08:55

## 2021-01-01 RX ADMIN — IPRATROPIUM BROMIDE 0.5 MG: 0.5 SOLUTION RESPIRATORY (INHALATION) at 06:26

## 2021-01-01 RX ADMIN — METHYLPREDNISOLONE SODIUM SUCCINATE 40 MG: 40 INJECTION, POWDER, FOR SOLUTION INTRAMUSCULAR; INTRAVENOUS at 08:27

## 2021-01-01 RX ADMIN — INSULIN ASPART 2 UNITS: 100 INJECTION, SOLUTION INTRAVENOUS; SUBCUTANEOUS at 08:34

## 2021-01-01 RX ADMIN — HEPARIN SODIUM 5000 UNITS: 5000 INJECTION INTRAVENOUS; SUBCUTANEOUS at 20:15

## 2021-01-01 RX ADMIN — DOXYCYCLINE 100 MG: 100 INJECTION, POWDER, LYOPHILIZED, FOR SOLUTION INTRAVENOUS at 02:07

## 2021-01-01 RX ADMIN — OXYCODONE HYDROCHLORIDE AND ACETAMINOPHEN 1 TABLET: 7.5; 325 TABLET ORAL at 20:44

## 2021-01-04 ENCOUNTER — LAB (OUTPATIENT)
Dept: ONCOLOGY | Facility: CLINIC | Age: 76
End: 2021-01-04

## 2021-01-04 ENCOUNTER — OFFICE VISIT (OUTPATIENT)
Dept: ONCOLOGY | Facility: CLINIC | Age: 76
End: 2021-01-04

## 2021-01-04 VITALS
TEMPERATURE: 97.7 F | WEIGHT: 189 LBS | DIASTOLIC BLOOD PRESSURE: 87 MMHG | BODY MASS INDEX: 29.6 KG/M2 | SYSTOLIC BLOOD PRESSURE: 137 MMHG | HEART RATE: 92 BPM | OXYGEN SATURATION: 96 % | RESPIRATION RATE: 18 BRPM

## 2021-01-04 DIAGNOSIS — R59.0 MEDIASTINAL ADENOPATHY: ICD-10-CM

## 2021-01-04 DIAGNOSIS — C34.11 MALIGNANT NEOPLASM OF UPPER LOBE OF RIGHT LUNG (HCC): Primary | ICD-10-CM

## 2021-01-04 DIAGNOSIS — J44.9 ADVANCED COPD (HCC): ICD-10-CM

## 2021-01-04 DIAGNOSIS — I71.21 THORACIC ASCENDING AORTIC ANEURYSM (HCC): ICD-10-CM

## 2021-01-04 DIAGNOSIS — C34.11 MALIGNANT NEOPLASM OF UPPER LOBE OF RIGHT LUNG (HCC): ICD-10-CM

## 2021-01-04 LAB
ALBUMIN SERPL-MCNC: 4.04 G/DL (ref 3.5–5.2)
ALBUMIN/GLOB SERPL: 1.4 G/DL
ALP SERPL-CCNC: 70 U/L (ref 39–117)
ALT SERPL W P-5'-P-CCNC: 12 U/L (ref 1–41)
ANION GAP SERPL CALCULATED.3IONS-SCNC: 8.3 MMOL/L (ref 5–15)
AST SERPL-CCNC: 15 U/L (ref 1–40)
BASOPHILS # BLD AUTO: 0.07 10*3/MM3 (ref 0–0.2)
BASOPHILS NFR BLD AUTO: 0.9 % (ref 0–1.5)
BILIRUB SERPL-MCNC: 0.7 MG/DL (ref 0–1.2)
BUN SERPL-MCNC: 14 MG/DL (ref 8–23)
BUN/CREAT SERPL: 16.9 (ref 7–25)
CALCIUM SPEC-SCNC: 9.1 MG/DL (ref 8.6–10.5)
CHLORIDE SERPL-SCNC: 103 MMOL/L (ref 98–107)
CO2 SERPL-SCNC: 25.7 MMOL/L (ref 22–29)
CREAT SERPL-MCNC: 0.83 MG/DL (ref 0.76–1.27)
DEPRECATED RDW RBC AUTO: 48.9 FL (ref 37–54)
EOSINOPHIL # BLD AUTO: 0.32 10*3/MM3 (ref 0–0.4)
EOSINOPHIL NFR BLD AUTO: 4.2 % (ref 0.3–6.2)
ERYTHROCYTE [DISTWIDTH] IN BLOOD BY AUTOMATED COUNT: 14.5 % (ref 12.3–15.4)
GFR SERPL CREATININE-BSD FRML MDRD: 91 ML/MIN/1.73
GLOBULIN UR ELPH-MCNC: 2.9 GM/DL
GLUCOSE SERPL-MCNC: 185 MG/DL (ref 65–99)
HCT VFR BLD AUTO: 47.9 % (ref 37.5–51)
HGB BLD-MCNC: 15.4 G/DL (ref 13–17.7)
IMM GRANULOCYTES # BLD AUTO: 0.05 10*3/MM3 (ref 0–0.05)
IMM GRANULOCYTES NFR BLD AUTO: 0.7 % (ref 0–0.5)
LYMPHOCYTES # BLD AUTO: 1.79 10*3/MM3 (ref 0.7–3.1)
LYMPHOCYTES NFR BLD AUTO: 23.8 % (ref 19.6–45.3)
MCH RBC QN AUTO: 29.3 PG (ref 26.6–33)
MCHC RBC AUTO-ENTMCNC: 32.2 G/DL (ref 31.5–35.7)
MCV RBC AUTO: 91.2 FL (ref 79–97)
MONOCYTES # BLD AUTO: 0.65 10*3/MM3 (ref 0.1–0.9)
MONOCYTES NFR BLD AUTO: 8.6 % (ref 5–12)
NEUTROPHILS NFR BLD AUTO: 4.65 10*3/MM3 (ref 1.7–7)
NEUTROPHILS NFR BLD AUTO: 61.8 % (ref 42.7–76)
NRBC BLD AUTO-RTO: 0 /100 WBC (ref 0–0.2)
PLATELET # BLD AUTO: 221 10*3/MM3 (ref 140–450)
PMV BLD AUTO: 8.9 FL (ref 6–12)
POTASSIUM SERPL-SCNC: 4 MMOL/L (ref 3.5–5.2)
PROT SERPL-MCNC: 6.9 G/DL (ref 6–8.5)
RBC # BLD AUTO: 5.25 10*6/MM3 (ref 4.14–5.8)
SODIUM SERPL-SCNC: 137 MMOL/L (ref 136–145)
WBC # BLD AUTO: 7.53 10*3/MM3 (ref 3.4–10.8)

## 2021-01-04 PROCEDURE — 85025 COMPLETE CBC W/AUTO DIFF WBC: CPT | Performed by: NURSE PRACTITIONER

## 2021-01-04 PROCEDURE — 80053 COMPREHEN METABOLIC PANEL: CPT | Performed by: NURSE PRACTITIONER

## 2021-01-04 PROCEDURE — 99214 OFFICE O/P EST MOD 30 MIN: CPT | Performed by: INTERNAL MEDICINE

## 2021-01-04 NOTE — PROGRESS NOTES
NAME: Kb Marshall    : 1945    DATE:  2021    DIAGNOSIS:  1. 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 AAA  3. Thoracic adenopathy     CHIEF COMPLAINT  Follow up of Lung Cancer     TREATMENT HISTORY:  1. RUL lobectomy 2011 w/o any adjuvant therapy     HISTORY OF PRESENT ILLNESS:   Mr. Marshall was initially diagnosed with a Stage Ib well-differentiated mucinous adenocarcinoma of the lung in  and underwent RULobectomy performed by Dr. Welsh in 2011. He did not receive adjuvant chemotherapy. He has been well since his initial surgery and has not had evidence of recurrence, though he does have thoracic adenopathy which sometimes enlarges, sometimes shrinks and which has in the past been + on PET but which he hasn' t wanted investigated or treated.     INTERVAL HISTORY:  \Mr. Marshall is here today for follow up of lung cancer. He reports he is having more shortness of breath with minimal activity. He has otherwise been well. He says he saw Dr. Gusman 3-4 months ago who told him he felt like his aneurysm was relatively stable and didn't feel intervention was wise.  He hasn't seen his pulmonologist in awhile but has continued to use his inhalers.  He was pleased to hear his adenopathy hadn't worsened and that there were no new nodules.       PAST MEDICAL HISTORY:  Past Medical History:   Diagnosis Date   • Aneurysm (CMS/HCC)    • Arthritis    • Asthma    • Cancer (CMS/HCC)     lungs   • COPD (chronic obstructive pulmonary disease) (CMS/HCC)    • Coronary artery disease    • Diabetes mellitus (CMS/HCC)    • Dyslipidemia    • GERD (gastroesophageal reflux disease)    • Heart disease    • Hypertension    • PAD (peripheral artery disease) (CMS/HCC)        PAST SURGICAL HISTORY:  Past Surgical History:   Procedure Laterality Date   • ABDOMINAL AORTIC ANEURYSM REPAIR     • COLONOSCOPY N/A 10/11/2018    Procedure: COLONOSCOPY;  Surgeon: Calos Stockton MD;   Location: [facility] OR;  Service: Gastroenterology   • FINGER SURGERY Left    • KNEE SURGERY Left    • LUNG REMOVAL, PARTIAL  2011   • LUNG SURGERY  2011       FAMILY HISTORY:  Family History   Problem Relation Age of Onset   • Cancer Mother    • Cancer Brother      Social History     Socioeconomic History   • Marital status:      Spouse name: Not on file   • Number of children: Not on file   • Years of education: Not on file   • Highest education level: Not on file   Tobacco Use   • Smoking status: Former Smoker     Packs/day: 3.00     Years: 50.00     Pack years: 150.00     Quit date: 2011     Years since quittin.1   • Smokeless tobacco: Former User     Types: Chew   Substance and Sexual Activity   • Alcohol use: Yes     Alcohol/week: 1.0 standard drinks     Types: 1 Cans of beer per week   • Drug use: No   • Sexual activity: Defer       REVIEW OF SYSTEMS:    A comprehensive 14 point review of systems was performed and was negative except as mentioned    MEDICATIONS:  The current medication list was reviewed in the EMR    Current Outpatient Medications:   •  atorvastatin (LIPITOR) 10 MG tablet, Take 10 mg by mouth Daily., Disp: , Rfl:   •  doxycycline (MONODOX) 100 MG capsule, Take 1 capsule by mouth 2 (Two) Times a Day., Disp: 20 capsule, Rfl: 0  •  Empagliflozin (JARDIANCE) 25 MG tablet, Take 25 mg by mouth Daily., Disp: , Rfl:   •  gabapentin (NEURONTIN) 400 MG capsule, Take 400 mg by mouth 3 (Three) Times a Day., Disp: , Rfl:   •  glimepiride (AMARYL) 4 MG tablet, Take 4 mg by mouth 2 (Two) Times a Day., Disp: , Rfl:   •  hydrALAZINE (APRESOLINE) 50 MG tablet, Take 50 mg by mouth 2 (Two) Times a Day., Disp: , Rfl:   •  hydrochlorothiazide (HYDRODIURIL) 12.5 MG tablet, Take 12.5 mg by mouth Daily., Disp: , Rfl:   •  lisinopril (PRINIVIL,ZESTRIL) 20 MG tablet, Take 20 mg by mouth 2 (Two) Times a Day., Disp: , Rfl:   •  metoprolol tartrate (LOPRESSOR) 50 MG tablet, Take 50 mg by mouth 2  (Two) Times a Day. Takes 1 tablet twice per day, Disp: , Rfl:   •  nabumetone (RELAFEN) 500 MG tablet, Take 500 mg by mouth 2 (Two) Times a Day As Needed for mild pain (1-3)., Disp: , Rfl:   •  oxyCODONE-acetaminophen (PERCOCET) 7.5-325 MG per tablet, Take 1 tablet by mouth Every 6 (Six) Hours As Needed., Disp: , Rfl:   •  predniSONE (DELTASONE) 10 MG tablet, Take 1 tablet by mouth Daily. 40mg daily x 3 days, 30mg daily x 3 days, 20mg daily x 3 days, 10mg daily x 3 days then stop, Disp: 30 tablet, Rfl: 0    ALLERGIES:    Allergies   Allergen Reactions   • Acetaminophen-Codeine    • Adhesive Tape Other (See Comments)     ADHESIVE CAUSES SKIN BLISTERS, PER PT   • Indocin [Indomethacin] Itching     PHYSICAL EXAM:  Visit Vitals  /87   Pulse 92   Temp 97.7 °F (36.5 °C) (Temporal)   Resp 18   Wt 85.7 kg (189 lb)   SpO2 96%   BMI 29.60 kg/m²     Pain Score    01/04/21 1456   PainSc: 0-No pain   ECOG score: 0     General: Awake, alert and oriented, appears well.    HEENT: Pupils are equal, round and reactive to light and accommodation, Extraocular movements full, oropharynx clear, wearing supplemental O2 at 2 L/min per nasal cannula  Neck: no jvd, lymphadenopathy or thyromegaly   Cardiovascular: regular rate and rhythm without murmurs, rubs or gallops   Pulmonary: Decreased breath sounds bilaterally, but clear. Good air movement.  No wheezing.   Abdomen: soft, non-tender, sl distended, bowel sounds present, no organomegaly   Extremities: No LE edema  Lymph: No cervical, supraclavicular, axillary adenopathy   Neurologic: grossly non-focal exam     PATHOLOGY:  11-29-11 Rt upper lobe lung:   - Adenocarcinoma, mucinous type   - Negative for mutation involving ALK gene       IMAGING:  PET-CT Whole Body 1-9-13:   - Overall stable appearance compared with the previous exam.   - There is a continued evidence of hypermetabolism corresponding to mediastinal and right hilar adenopathy but no new lymph nodes are seen and no  significant change in the SUV.     CT Chest w/o contrast 4-1-13:   - Overall there has been no significant interval change when compared to the previous exam dated 8/12.   - There are several m ediastinal lymph nodes present but stable.   - No pericardial or pleural effusion.   - Tiny nodule in the left lung base is stable   - Descending thoracic aneurysm stable.   - Fatty infiltration of the liver     CT Chest w/ contrast 7-29-13:   - Questionable increase in the siz e of mediastinal lymphadenopathy.   - An AP window lymph node was 1.4mc and is 1.9cm.   - A prevascular space lymph node was 1.4cm and is 1.4cm.   - A subcarinal region lymph node was 2cm and is 2.7cm.   - A left hilar region lymph node is stable measuring 1.3 measuring 12 mm. conceivably difference and axial selection could cause this discrepancy.   - Compared 8/8/12 examination, the lymphadenopathy is stable to slightly improved.     Chest CT w/ contrast 6-9-14:   - Stable mediastinal borderline enlarged lymph nodes.   - An AP window lymph node measures 1.5cm and was 1.5cm.   - Stable slightly increased soft tissue density in the right hilar region.   - There is persistent supleural fibrotic scarring right lung base scarring is again noted.   - No new suspicious pulmonary mass or nodule.   - Posterior right rib fractures are again noted.   - Descending thoracic aortic aneurysm of 4.7cm. Stable.     CT Chest w/ contrast 12-02-14   - Enlarging mediastinal lymph nodoes   - No parenchymal soft tissue nodules or masses   - The largest soft tissue density is in the right paratrachel location and measures 2cm.  - Previously the same nodule measured 1.3cm.     CT Chest w/ contrast 03-02-15:   - The descending portion of the abdominal aorta is stable In size measuring  approximately 4.9 cm on today's study.   - There is abnormal adenopathy throughout the mediastinum. The largest lymph node measured 21 mm in the superior mediastinum adjacent to the esophagus.  The size and number of lymph nodes is radiographically stable.   - There are emphysematous changes and increased interstitial markings throughout the lungs.     CT Chest w/ contrast 06-01-15:   - Stable appearance of the chest   - 4.3 cm infrarenal abdominal aortic aneurysm.     CT Chest w/ contrast 09-03-15:   - Mediastinal adenopathy is stable   - There is a 4.6cm abdominal aortic aneurysm slightly increased in size from the  previous exam.     CT Chest 09-20-16:  - Slight interval increase in size of the abdominal aortic aneurysm. It now measures 5 cm just above the diaphragm. There is adenopathy i n the mediastinum. The lymph nodes have increased in size in comparing with the earlier CT back in September.   - Continued adenopathy in the mediastinum and right hilum.   - Radiographically this is stable in comparing with the earlier exam.   - There continues to be aneurysmal dilatation of the aorta with a diameter of 5 cm just above the diaphragm.   - The lungs show changes of chronic interstitial lung disease.     CT Chest 05-10-17:  - Persistent mediastinal adenopathy radiographically unchanged from September 2016.   - There continues to be diffuse interstitial lung disease throughout both lungs. There is aneurysmal dilatation of the aorta just above the diaphragm measuring 5 cm    CT Chest 11-17-17:  - Stable mediastinal adenopathy. Largest prevascular lymph  node is 1.6 cm and was previously 1.8 cm. A right subcarinal lymph node  is 2.2 cm and was previously 2.1 cm. A right para esophageal lymph node superiorly is 1.8 cm and was previously 1.9 cm. No new adenopathy stations identified.   - Stable 5 cm fusiform type thoracic aortic aneurysm  predominantly involving the descending thoracic aorta. Mural thrombus is present.    CT Thoracic Aorta 6/4/18 [facility]        12-03-18 CT Chest With Contrast   - On the mediastinal windows there are enlarged lymph nodes in the mediastinum. These involve the superior mediastinum,  aorticopulmonary window, anterior mediastinum, pretracheal and subcarinal regions. The size of the lymph nodes are fairly stable. The largest lymph node today is in the subcarinal region measuring 3 cm in diameter. There continues to be aneurysmal dilatation of the thoracic aorta. The maximum diameter is approximately 5 cm. The upper abdominal aorta is also dilated. On the lung windows there are moderate emphysematous changes noted in the lungs with hyperinflation and increased interstitial markings.     IMPRESSION:  - Continued evidence of COPD and emphysema in the lungs. There is persistent lymphadenopathy in the mediastinum. The largest node now has increased in size in the subcarinal region measuring 3 cm. The other lymph nodes are fairly stable.  - There continues to be aneurysmal dilatation of the descending thoracic  aorta, stable at 5 cm.     CT Chest with Contrast 6-3-19:  The soft tissue windows continue to show lymphadenopathy in  the mediastinum. The number of enlarged lymph nodes is stable. The  largest lymph node in the subcarinal area is slightly smaller measuring  2.5 cm today. It measured 3 cm earlier. The other lymph nodes are  stable, none have increased in size. There were no pleural effusions.  There continues to be aneurysmal dilatation of the distal thoracic  aorta. The lung windows show changes of interstitial lung disease,  emphysema and COPD.     IMPRESSION:  Lymphadenopathy in the mediastinum is relatively stable, the  largest node has decreased in size by approximately 5 mm. None of the  nodes have increased in size.     12-02-19 CT Chest:  Mediastinal lymph nodes are stable from the previous exam.  Largest nodes are in the aorticopulmonary window.     No new nodes are seen.     There are no pericardial or pleural effusions.     The lower thoracic and upper abdominal aorta are dilated. Maximum  dimension 5.75 cm. This does appear to be stable.     Chronic interstitial changes are present.  There is no new parenchymal  soft tissue nodule or mass.     IMPRESSION:  1. Stable mediastinal adenopathy.  2. Coronary artery calcifications.  3. Thoracic aortic aneurysm.     06/18/2020 CT Chest with Contrast  Findings  LUNGS: Unremarkable. No parenchymal soft tissue nodules.  No focal air  space disease.     HEART: Unremarkable.     PERICARDIUM: No effusion.     MEDIASTINUM: MEDIASTINAL LYMPHADENOPATHY WITH AP WINDOW LYMPH NODES  MEASURING UP TO 2 CM APPEAR GROSSLY STABLE.     PLEURA: No pleural effusion. No pleural mass or abnormal calcification.     MAJOR AIRWAYS: Clear. No intrinsic mass.     VASCULATURE: AGAIN NOTED IS VASCULAR TORTUOSITY AND AORTIC ANEURYSM OF  THE DISTAL AORTA.     VISUALIZED UPPER ABDOMEN:        LIVER: Homogeneous. No focal hepatic mass or ductal dilatation.        SPLEEN: Homogeneous. No splenomegaly.        ADRENALS: No mass.        KIDNEYS: No mass. No obstructive uropathy.  No evidence of  urolithiasis.        GI TRACT: Non-dilated. No definite wall thickening.        PERITONEUM: No free air. No free fluid or loculated fluid  collections.           ABDOMINAL WALL: No focal hernia or mass.           OTHER: None.     BONES: No acute bony abnormality.     IMPRESSION:  Impression:  Stable appearance of the chest including distal thoracic aortic  aneurysm, mediastinal lymphadenopathy and 1 cm left lower lobe pulmonary  Nodule.      CT Chest 12-28-20  CT FINDINGS: On the lung windows there continue to be advanced changes  of emphysema and COPD in both lungs. Postoperative changes are noted on  the right where there are multiple small staples in the hilar area  consistent with previous upper lobectomy. No focal lung nodules or  masses are demonstrated. There were no pleural effusions. On the  mediastinal window there is persistent lymphadenopathy in the  mediastinum. There are several enlarged lymph nodes that measure over 2  cm in diameter. There are prominent 1 cm sized lymph nodes as  well.  These have not significantly changed in comparing with the June exam.  Size and volume is stable. There continues to be aneurysmal dilatation  of the descending thoracic aorta. Just above the diaphragm the aorta has  a diameter of 6.2 cm. There is intramural thrombus. There is diffuse  fatty change throughout the liver. No masses have developed in the  adrenal glands.     IMPRESSION:  Advanced changes of COPD and emphysema in the lungs. There  is persistent mediastinal lymphadenopathy which is unchanged from June.  There is aneurysmal dilatation of the thoracic aorta up to 6.2 cm. There  is diffuse fatty change throughout the liver.     RECENT LABS:  Lab Results   Component Value Date    WBC 7.53 01/04/2021    HGB 15.4 01/04/2021    HCT 47.9 01/04/2021    MCV 91.2 01/04/2021    RDW 14.5 01/04/2021     01/04/2021    NEUTRORELPCT 61.8 01/04/2021    LYMPHORELPCT 23.8 01/04/2021    MONORELPCT 8.6 01/04/2021    EOSRELPCT 4.2 01/04/2021    BASORELPCT 0.9 01/04/2021    NEUTROABS 4.65 01/04/2021    LYMPHSABS 1.79 01/04/2021       Lab Results   Component Value Date     01/04/2021    K 4.0 01/04/2021    CO2 25.7 01/04/2021     01/04/2021    BUN 14 01/04/2021    CREATININE 0.83 01/04/2021    GLUCOSE 185 (H) 01/04/2021    CALCIUM 9.1 01/04/2021    ALKPHOS 70 01/04/2021    AST 15 01/04/2021    ALT 12 01/04/2021    BILITOT 0.7 01/04/2021    ALBUMIN 4.04 01/04/2021    PROTEINTOT 6.9 01/04/2021       ASSESSMENT & PLAN:  Kb Marshall is a very pleasant 75 y.o. 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    1. Lung cancer:   - Treated with RU Lobectomy in November 2011 without adjuvant therapy.   - At one point in the past, there was concern for PET + enlarging mediastinal adenopathy, and recommended biopsy, but after discussion with Dr. Ojeda and reluctance to treat recurrence if it was found, he decided to watch this with imaging.   - These LNs  have intermittently waxed and waned over time. Do not know the etiology, but since he always said he wouldn' t take chemotherapy if it were indicated, Dr. Ojeda was disinclined to perform mediastinoscopy for biopsy. Ultimately, he saw Dr. Rothman and was referred for bronchoscopy with EBUS and there was no cause for concern identified. Mediastinal adenopathy has continued to be present, but imaging has shown that adenopathy, while sizeable, has remained stable to improved (see above). Previously had negative biopsy. Therefore, unlikely to be malignant under the circumstances. Decided to continue with observation / monitoring .   - Repeat imaging from 12/28/2020 (summarized above) remains stable. Will follow up in 1 ear with repeat imaging and labs.    2. Oxygen dependent COPD - stable on 2.5-3L oxygen per NC. He remains tobacco free. Recommended continued f/u with his pulmonologist, Dr. Vicente at [facility]. Today he says he hasn't seen Dr. Vicente in about 5 years.  Given progressive dyspnea, I recommended he f/u.    3. AAA: S/p repair of femoral artery aneurysm performed by Dr. Ojeda. He has been following with Dr. Taveras and Dr. Yusef Gusman ([facility]) . He has aneurysms in the thoracic and abdominal aorta. Dr. Gusman recommended continued monitoring for the time being.  Aneurysm  has grown somewhat over time and last measured 6.2cm. He will continue to f/u with Dr. Gusman.      4.  Prophylaxis:  Recommended flu and pneumonia vaccines, but he declined.     5. Follow up:   -  MD f/u in 1 year with labs and repeat imaging prior.    ACO / АЛЕКСАНДР/Other  Quality measures  -  Kb Marshall did not receive 2020 flu vaccine.. This was recommended but declined.  -  Quiles M Marshall reports a pain score of 0.   -  Current outpatient and discharge medications have been reconciled for the patient.  Reviewed by: Chaya Ferrer MD     This note was scribed for Chaya Ferrer MD by Ghada Verma RN.    I, Chaya LAWRENCE  MD Carissa, personally performed the services described in this documentation as scribed by the above named individual in my presence, and it is both accurate and complete.  01/04/2021      I spent 25 minutes with Kb Marshall today.  In the office today, more than 50% of this time was spent face-to-face with him  in counseling / coordination of care, reviewing his interim medical history and counseling on the current treatment plan.  All questions were answered to his satisfaction.          Electronically Signed by: Chaya Ferrer MD         CC:   Scar Mejia MD Dr. Pramod Reddy Dr.David Minion

## 2021-04-13 ENCOUNTER — HOSPITAL ENCOUNTER (EMERGENCY)
Facility: HOSPITAL | Age: 76
Discharge: HOME | End: 2021-04-13
Attending: EMERGENCY MEDICINE | Admitting: EMERGENCY MEDICINE

## 2021-04-13 ENCOUNTER — APPOINTMENT (OUTPATIENT)
Dept: GENERAL RADIOLOGY | Facility: HOSPITAL | Age: 76
End: 2021-04-13

## 2021-04-13 VITALS
OXYGEN SATURATION: 96 % | WEIGHT: 180 LBS | RESPIRATION RATE: 22 BRPM | DIASTOLIC BLOOD PRESSURE: 72 MMHG | TEMPERATURE: 97.5 F | BODY MASS INDEX: 28.25 KG/M2 | SYSTOLIC BLOOD PRESSURE: 116 MMHG | HEART RATE: 72 BPM | HEIGHT: 67 IN
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
## 2021-04-13 DIAGNOSIS — S63.501A SPRAIN OF RIGHT WRIST, INITIAL ENCOUNTER: ICD-10-CM

## 2021-04-13 PROCEDURE — 96372 THER/PROPH/DIAG INJ SC/IM: CPT

## 2021-04-13 PROCEDURE — 99282 EMERGENCY DEPT VISIT SF MDM: CPT

## 2021-04-13 PROCEDURE — 99283 EMERGENCY DEPT VISIT LOW MDM: CPT

## 2021-04-13 PROCEDURE — 25010000002 KETOROLAC TROMETHAMINE PER 15 MG: Performed by: EMERGENCY MEDICINE

## 2021-04-13 PROCEDURE — 73100 X-RAY EXAM OF WRIST: CPT

## 2021-04-13 PROCEDURE — 73130 X-RAY EXAM OF HAND: CPT

## 2021-04-13 RX ORDER — METHYLPREDNISOLONE SODIUM SUCCINATE 125 MG/2ML
80 INJECTION, POWDER, LYOPHILIZED, FOR SOLUTION INTRAMUSCULAR; INTRAVENOUS ONCE
Status: DISCONTINUED | OUTPATIENT
Start: 2021-04-13 | End: 2021-04-13

## 2021-04-13 RX ORDER — KETOROLAC TROMETHAMINE 30 MG/ML
30 INJECTION, SOLUTION INTRAMUSCULAR; INTRAVENOUS ONCE
Status: COMPLETED | OUTPATIENT
Start: 2021-04-13 | End: 2021-04-13

## 2021-04-13 RX ORDER — ONDANSETRON 4 MG/1
4 TABLET, ORALLY DISINTEGRATING ORAL ONCE
Status: DISCONTINUED | OUTPATIENT
Start: 2021-04-13 | End: 2021-04-13 | Stop reason: HOSPADM

## 2021-04-13 RX ORDER — OXYCODONE AND ACETAMINOPHEN 7.5; 325 MG/1; MG/1
1 TABLET ORAL ONCE AS NEEDED
Status: DISCONTINUED | OUTPATIENT
Start: 2021-04-13 | End: 2021-04-13

## 2021-04-13 RX ADMIN — KETOROLAC TROMETHAMINE 30 MG: 30 INJECTION, SOLUTION INTRAMUSCULAR at 03:18

## 2021-04-13 NOTE — ED PROVIDER NOTES
Subjective     History provided by:  Patient   used: No    Arm Pain  Location:  Pain to right wrist and right hand.  Quality:  Patient rates his pain as an 8/10 on the pain severity scale.  Severity:  Severe  Onset quality:  Gradual  Timing:  Constant  Progression:  Worsening  Chronicity:  New  Context:  Patient reports he fell earlier this morning.  Mechanical fall and since that time has had pain to his right wrist and hand.  Relieved by:  Nothing.  Worsened by:  Movement.  Ineffective treatments:  None tried at this time.  Associated symptoms: no abdominal pain, no chest pain, no congestion, no cough, no diarrhea, no ear pain, no fatigue, no fever, no headaches, no loss of consciousness, no myalgias, no nausea, no rash, no rhinorrhea, no shortness of breath, no sore throat, no vomiting and no wheezing        Review of Systems   Constitutional: Negative for activity change, appetite change, chills, diaphoresis, fatigue and fever.   HENT: Negative for congestion, ear pain, rhinorrhea and sore throat.    Eyes: Negative for redness.   Respiratory: Negative for cough, chest tightness, shortness of breath and wheezing.    Cardiovascular: Negative for chest pain, palpitations and leg swelling.   Gastrointestinal: Negative for abdominal pain, diarrhea, nausea and vomiting.   Genitourinary: Negative for dysuria and urgency.   Musculoskeletal: Negative for arthralgias, back pain, myalgias and neck pain.   Skin: Negative for pallor, rash and wound.   Neurological: Negative for dizziness, loss of consciousness, speech difficulty, weakness and headaches.   Psychiatric/Behavioral: Negative for agitation, behavioral problems, confusion and decreased concentration.   All other systems reviewed and are negative.      Past Medical History:   Diagnosis Date   • Aneurysm (CMS/HCC)    • Arthritis    • Asthma    • Cancer (CMS/HCC)     lungs   • COPD (chronic obstructive pulmonary disease) (CMS/[facility])    • Coronary  artery disease    • Diabetes mellitus (CMS/HCC)    • Dyslipidemia    • GERD (gastroesophageal reflux disease)    • Heart disease    • Hypertension    • PAD (peripheral artery disease) (CMS/HCC)        Allergies   Allergen Reactions   • Acetaminophen-Codeine    • Adhesive Tape Other (See Comments)     ADHESIVE CAUSES SKIN BLISTERS, PER PT   • Indocin [Indomethacin] Itching       Past Surgical History:   Procedure Laterality Date   • ABDOMINAL AORTIC ANEURYSM REPAIR     • COLONOSCOPY N/A 10/11/2018    Procedure: COLONOSCOPY;  Surgeon: Calos Stockton MD;  Location: [facility];  Service: Gastroenterology   • FINGER SURGERY Left    • KNEE SURGERY Left    • LUNG REMOVAL, PARTIAL  2011   • LUNG SURGERY  2011       Family History   Problem Relation Age of Onset   • Cancer Mother    • Cancer Brother        Social History     Socioeconomic History   • Marital status:      Spouse name: Not on file   • Number of children: Not on file   • Years of education: Not on file   • Highest education level: Not on file   Tobacco Use   • Smoking status: Former Smoker     Packs/day: 3.00     Years: 50.00     Pack years: 150.00     Quit date: 2011     Years since quittin.4   • Smokeless tobacco: Former User     Types: Chew   Substance and Sexual Activity   • Alcohol use: Yes     Alcohol/week: 1.0 standard drinks     Types: 1 Cans of beer per week   • Drug use: No   • Sexual activity: Defer           Objective   Physical Exam  Vitals and nursing note reviewed.   Constitutional:       General: He is not in acute distress.     Appearance: Normal appearance. He is well-developed. He is not toxic-appearing or diaphoretic.   HENT:      Head: Normocephalic and atraumatic.      Right Ear: External ear normal.      Left Ear: External ear normal.      Nose: Nose normal.      Mouth/Throat:      Pharynx: No oropharyngeal exudate.      Tonsils: No tonsillar exudate.   Eyes:      General: Lids are normal.       Conjunctiva/sclera: Conjunctivae normal.      Pupils: Pupils are equal, round, and reactive to light.   Neck:      Thyroid: No thyromegaly.   Cardiovascular:      Rate and Rhythm: Normal rate and regular rhythm.      Pulses: Normal pulses.      Heart sounds: Normal heart sounds, S1 normal and S2 normal.   Pulmonary:      Effort: Pulmonary effort is normal. No tachypnea or respiratory distress.      Breath sounds: Normal breath sounds. No decreased breath sounds, wheezing or rales.   Chest:      Chest wall: No tenderness.   Abdominal:      General: Bowel sounds are normal. There is no distension.      Palpations: Abdomen is soft.      Tenderness: There is no abdominal tenderness. There is no guarding or rebound.   Musculoskeletal:         General: Tenderness and signs of injury present. No deformity. Normal range of motion.        Arms:       Cervical back: Full passive range of motion without pain, normal range of motion and neck supple.   Lymphadenopathy:      Cervical: No cervical adenopathy.   Skin:     General: Skin is warm and dry.      Coloration: Skin is not pale.      Findings: No erythema or rash.   Neurological:      Mental Status: He is alert and oriented to person, place, and time.      GCS: GCS eye subscore is 4. GCS verbal subscore is 5. GCS motor subscore is 6.      Cranial Nerves: No cranial nerve deficit.      Sensory: No sensory deficit.   Psychiatric:         Speech: Speech normal.         Behavior: Behavior normal.         Thought Content: Thought content normal.         Judgment: Judgment normal.         Procedures           ED Course  ED Course as of Apr 13 0458   Tue Apr 13, 2021   025 FINDINGS:  No fracture, dislocation or other acute osseous abnormality is demonstrated.     Advanced osteoarthritis with severe involvement of the 1st CMC joint and moderate involvement of multiple IP joints, intercarpal articulations in the radiocarpal joint. 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  IMPRESSION:   1. No acute osseous abnormality.  2. Advanced chronic degenerative arthropathy.   XR Wrist 2 View Right [ES]   0252 FINDINGS:  No fracture, dislocation or other acute osseous abnormality is demonstrated.     Advanced osteoarthritis with severe involvement of the 1st CMC joint and moderate involvement of multiple IP joints, intercarpal articulations in the radiocarpal joint. 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 IMPRESSION:   1. No acute osseous abnormality.  2. Advanced chronic degenerative arthropathy.   XR Hand 3+ View Right [ES]      ED Course User Index  [ES] Nav Rosales MD                                           MDM  Number of Diagnoses or Management Options  Fall, initial encounter: new and requires workup  Sprain of right wrist, initial encounter: new and requires workup     Amount and/or Complexity of Data Reviewed  Tests in the radiology section of CPT®: reviewed and ordered  Review and summarize past medical records: yes  Independent visualization of images, tracings, or specimens: yes    Risk of Complications, Morbidity, and/or Mortality  Presenting problems: moderate  Diagnostic procedures: moderate  Management options: moderate    Patient Progress  Patient progress: stable      Final diagnoses:   Fall, initial encounter   Sprain of right wrist, initial encounter       ED Disposition  ED Disposition     ED Disposition Condition Comment    Discharge Stable           Zia Baca, DO  [address] [place] [address] [phone]    Schedule an appointment as soon as possible for a visit in 1 day  REEVALUATE         Medication List      No changes were made to your prescriptions during this visit.          Nav Rosales MD  04/13/21 [id number]

## 2021-04-14 ENCOUNTER — PATIENT OUTREACH (OUTPATIENT)
Dept: CASE MANAGEMENT | Facility: OTHER | Age: 76
End: 2021-04-14

## 2021-04-14 ENCOUNTER — EPISODE CHANGES (OUTPATIENT)
Dept: CASE MANAGEMENT | Facility: OTHER | Age: 76
End: 2021-04-14

## 2021-04-14 NOTE — OUTREACH NOTE
Patient Outreach Note    RN-ACM outreach to patient.  Patient had an ED visit at [facility] 04/13/21.  Patient presented with wrist injury after a fall.  Call answered this date by patient's niece, Elva.  Per her report, patient was away from home to attend an appointment with the orthopedic surgeon has had been recommended at discharge.  Message left for patient to call with care coordination needs.      Vicky Oliveros RN  Ambulatory     4/14/2021, 14:39 EDT

## 2021-10-31 PROBLEM — J96.21 ACUTE ON CHRONIC RESPIRATORY FAILURE WITH HYPOXIA (HCC): Status: ACTIVE | Noted: 2021-01-01

## 2021-11-08 NOTE — OUTREACH NOTE
COPD/PN Week 1 Survey      Responses   [facility] patient discharged from? [facility]   Does the patient have one of the following disease processes/diagnoses(primary or secondary)? COPD/Pneumonia   Was the primary reason for admission: COPD exacerbation   Week 1 attempt successful? Yes   Call start time 0922   Call end time 0930   Discharge diagnosis Acute on chronic hypoxic respiratory failure COPD exacerbation Acute diastolic heart failure    Meds reviewed with patient/caregiver? Yes   Is the patient having any side effects they believe may be caused by any medication additions or changes? No   Does the patient have all medications ordered at discharge? Yes   Is the patient taking all medications as directed (includes completed medication regime)? Yes   Does the patient have a primary care provider?  Yes   Does the patient have an appointment with their PCP or specialist within 7 days of discharge? Yes   Has the patient kept scheduled appointments due by today? N/A   Comments Has appt with PCP on Nov 10, [facility] Nov 12,  Dr. Gusman  Nov 29.  Pt was unsure where Heart Failure Clinic was and phone # was given.   What is the Home health agency?  [facility]    Has home health visited the patient within 72 hours of discharge? Yes   What DME was ordered? [employer]   Has all DME been delivered? Yes   Pulse Ox monitoring Intermittent   Pulse Ox device source Patient   O2 Sat comments 97% on 3L    O2 Sat: education provided Sat levels,  Monitoring frequency,  When to seek care   Did the patient receive a copy of their discharge instructions? Yes   Nursing interventions Reviewed instructions with patient   What is the patient's perception of their health status since discharge? Improving   If the patient is a current smoker, are they able to teach back resources for cessation? Not a smoker   Is the patient/caregiver able to teach back the hierarchy of who to call/visit for symptoms/problems? PCP,  Specialist, Home health nurse, Urgent Care, ED, 911 Yes   Additional teach back comments States his legs were weak but it has gotten better.  He states he is improving. Has all new medications and appts made.   Is the patient able to teach back COPD zones? Yes   Patient reports what zone on this call? Green Zone   Green Zone Reports doing well,  Breathing without shortness of breath   Green Zone interventions: Take daily medications,  Use oxygen as prescribed   Week 1 call completed? Yes   Wrap up additional comments Denies questions or needs at this time          Bhavani Ruffin LPN

## 2021-11-12 NOTE — PROGRESS NOTES
Heart Failure Clinic    Date: 11/12/21     Vitals:    11/12/21 0956   BP: 132/72   Pulse: 70   Resp: 22   SpO2: (!) 76%        Method of arrival: Other wheelchair    Weighing self daily: No    Monitoring Heart Failure Zones: No    Today's HF Zone: Yellow     Taking medications as prescribed: Yes    Edema Yes feet     Shortness of Air: Yes    Number of pillows used at night:<2    Educational Materials given:  Initial visit, and daily weight loss                                                                          ReDS Value:  Did not obtain        Ya Houston MA 11/12/21 09:58 EST

## 2021-11-12 NOTE — PROGRESS NOTES
Heart Failure Pharmacy Note  Patient Name: Kb Marshall  Referring Provider: HOWARD Macias  Primary Cardiologist: Jeff    Medication Use:   Adherence: no issues   Hx of med intolerances: indocin- itching   Affordability: no issues  Retail Rx Management: not at this time    Past Medical History:   Diagnosis Date   • Aneurysm (HCC)    • Arthritis    • Asthma    • Cancer ([facility])     lungs   • COPD (chronic obstructive pulmonary disease) ([facility])    • Coronary artery disease    • Diabetes mellitus ([facility])    • Dyslipidemia    • GERD (gastroesophageal reflux disease)    • Heart disease    • Hypertension    • PAD (peripheral artery disease) ([facility])      ALLERGIES: Acetaminophen-codeine, Adhesive tape, and Indocin [indomethacin]  Current Outpatient Medications   Medication Sig Dispense Refill   • albuterol sulfate  (90 Base) MCG/ACT inhaler Inhale 2 puffs Every 6 (Six) Hours As Needed for Wheezing.     • atorvastatin (LIPITOR) 10 MG tablet Take 10 mg by mouth Daily.     • bumetanide (BUMEX) 0.5 MG tablet Take 1 tablet by mouth Daily for 30 days. 30 tablet 0   • carvedilol (COREG) 12.5 MG tablet Take 1 tablet by mouth 2 (Two) Times a Day With Meals for 30 days. 60 tablet 0   • Empagliflozin (JARDIANCE) 25 MG tablet Take 25 mg by mouth Daily.     • gabapentin (NEURONTIN) 400 MG capsule Take 400 mg by mouth 4 (Four) Times a Day.     • glimepiride (AMARYL) 4 MG tablet Take 4 mg by mouth 2 (Two) Times a Day.     • hydrALAZINE (APRESOLINE) 50 MG tablet Take 50 mg by mouth 3 (Three) Times a Day.     • linaclotide (Linzess) 145 MCG capsule capsule Take 145 mcg by mouth Every Morning Before Breakfast.     • lisinopril (PRINIVIL,ZESTRIL) 20 MG tablet Take 20 mg by mouth 2 (Two) Times a Day.     • metFORMIN (GLUCOPHAGE) 500 MG tablet Take 1,000 mg by mouth 2 (Two) Times a Day With Meals.     • nabumetone (RELAFEN) 500 MG tablet Take 500 mg by mouth 2 (Two) Times a Day.     • oxyCODONE-acetaminophen (PERCOCET)  MG per tablet Take 1  "tablet by mouth 4 (Four) Times a Day.     • tiotropium bromide-olodaterol (Stiolto Respimat) 2.5-2.5 MCG/ACT aerosol solution inhaler Inhale 1 puff Daily.       No current facility-administered medications for this encounter.       Vaccination History:   Pneumonia: needed  Annual Influenza: UTD  COVID: UTD    Objective  Vitals:    11/12/21 0956   BP: 132/72   BP Location: Left arm   Patient Position: Sitting   Pulse: 70   Resp: 22   SpO2: (!) 76%   Weight: 80.7 kg (178 lb)   Height: 170.2 cm (67\")     Wt Readings from Last 3 Encounters:   11/12/21 80.7 kg (178 lb)   11/04/21 84.6 kg (186 lb 6.4 oz)   04/13/21 81.6 kg (180 lb)         11/12/21  0956   Weight: 80.7 kg (178 lb)     Lab Results   Component Value Date    GLUCOSE 162 (H) 11/12/2021    BUN 12 11/12/2021    CREATININE 0.90 11/12/2021    EGFRIFNONA 82 11/12/2021    BCR 13.3 11/12/2021    K 4.3 11/12/2021    CO2 27.5 11/12/2021    CALCIUM 9.8 11/12/2021    ALBUMIN 3.99 11/02/2021    LABIL2 1.7 05/06/2016     (H) 11/02/2021    ALT 34 11/02/2021     Lab Results   Component Value Date    WBC 14.79 (H) 11/04/2021    HGB 16.8 11/04/2021    HCT 51.9 (H) 11/04/2021    MCV 89.9 11/04/2021     11/04/2021     Lab Results   Component Value Date    TROPONINT <0.010 10/30/2021     Lab Results   Component Value Date    PROBNP 718.7 11/12/2021     Results for orders placed during the hospital encounter of 10/30/21    Adult Transthoracic Echo Complete w/ Color, Spectral and Contrast if necessary per protocol    Interpretation Summary  · Normal left ventricular cavity size and wall thickness noted. All left ventricular wall segments contract normally  · Left ventricular ejection fraction appears to be 56 - 60%.  · Left ventricular diastolic function is consistent with (grade I) impaired relaxation.  · The mitral valve is structurally normal with no significant stenosis present. Mild mitral valve regurgitation is present.  · The aortic valve is structurally " normal with no regurgitation or stenosis present.  · Mild dilation of the aortic root is present(4.0cm). Moderate dilation of the descending aorta present.(4.84cm).  · Mild tricuspid valve regurgitation is present. Estimated right ventricular systolic pressure from tricuspid regurgitation is moderately elevated (45-55 mmHg).  · Moderate pulmonary hypertension is present.  · There is no evidence of pericardial effusion.           GDMT:       Class   Drug   Dose Last Dose Adjustment Additional Titration   ACEi/ARB/ARNI       Beta Blocker Coreg  12.5mg BID 11/04/21    MRA         Patient also taking Jardiance 25 mg QD     Drug Therapy Problems    1. Drug Interactions Screening  2. Drug-Disease Interactions  3. Beta Blocker duplication   4. Med list accuracy     Recommendations:     1. Patient is taking Coreg (nonselective) and has COPD which could worsen symptoms and diminish effects of his beta2 agonists. I would recommend cardioselective beta blocker. However, patient was just instructed to discontinue metoprolol on 11/04/21. Continue to monitor for worsening COPD/SOB.   Bumex and nabumetone are not recommended to be taken together as NSAIDS can diminish effects of loop diuretics. In addition, loop diuretics may enhance the nephrotoxic effect of NSAIDS. In addition, lisinopril and NSAIDS could worsen renal function. I let patient know that it was probably not in his best interest to continue nabumetone.   2. Patient is taking nabumetone which is not recommended in heart failure. Along with the interactions listed above, patient would benefit from discontinuation of nabumetone.   3. Upon questioning, patient was still taking metoprolol after starting carvedilol. I instructed- per Dr. Macias's discharge summary- that patient should be stopping metoprolol. Patient verbalized understanding. He had a med list in his wallet, and I corrected the list for him.   4. Patient was instructed to bring in medications with him next  visit.    Discharge medications have been reviewed and reconciled.    Patient was educated on heart failure medications and the importance of medication adherence. All questions were addressed and patient expressed understanding. Used teach-back method to assess understanding.     Thank you for allowing me to participate in the care of your patient,    Brynn Pérez [facility]  11/12/21  10:53 EST

## 2021-11-12 NOTE — PROGRESS NOTES
[facility], [place] CHF CLINIC OFFICE VISIT    Subjective:   No chief complaint on file.    History of Present Illness  bK Marshall is a 76-year-old  male who presents to the clinic today as a new patient for heart failure follow-up post hospitalization.  He is accompanied by his daughter.  He has a history of chronic diastolic congestive heart failure with an LVEF of 56 to 60% from echocardiogram on 10/31/2021 read by Dr. Aguilar.  He currently takes Bumex 0.5 mg daily, Coreg 12.5 mg twice daily, lisinopril 20 mg twice daily, hydralazine 50 mg 3 times daily.    Overall breathing seems stable. He is chronically on 3 L of oxygen  Denies any abdominal or lower extremity swelling  Reports urine output is good  No home weight monitoring  Intermittent BP home monitoring, no log available for review  Reports compliance with medicine  He reports a history of abdominal aortic aneurysm and is followed with the cardiothoracic surgeon at [facility].  Former smoker  Does consume sodium at home    PCP: Dr. Mejia  Cardiologist:  Nephrologist:  Pulmonologist:    Hospitalizations: Discharged 11/4/2021    Past Medical History:   Diagnosis Date   • Aneurysm (HCC)    • Arthritis    • Asthma    • Cancer (HCC)     lungs   • COPD (chronic obstructive pulmonary disease) (HCC)    • Coronary artery disease    • Diabetes mellitus (HCC)    • Dyslipidemia    • GERD (gastroesophageal reflux disease)    • Heart disease    • Hypertension    • PAD (peripheral artery disease) (HCC)      Past Surgical History:   Procedure Laterality Date   • ABDOMINAL AORTIC ANEURYSM REPAIR     • COLONOSCOPY N/A 10/11/2018    Procedure: COLONOSCOPY;  Surgeon: Calos Stockton MD;  Location: [facility];  Service: Gastroenterology   • FINGER SURGERY Left    • KNEE SURGERY Left    • LUNG REMOVAL, PARTIAL  11/29/2011   • LUNG SURGERY  11/28/2011     Social History     Socioeconomic History   • Marital status:    Tobacco Use   • Smoking status: Former Smoker      Packs/day: 3.00     Years: 50.00     Pack years: 150.00     Quit date: 11/2011     Years since quitting: 10.0   • Smokeless tobacco: Former User     Types: Chew   Substance and Sexual Activity   • Alcohol use: Yes     Alcohol/week: 1.0 standard drink     Types: 1 Cans of beer per week   • Drug use: No   • Sexual activity: Defer     Family History   Problem Relation Age of Onset   • Cancer Mother    • Cancer Brother      Allergies:  Allergies   Allergen Reactions   • Acetaminophen-Codeine    • Adhesive Tape Other (See Comments)     ADHESIVE CAUSES SKIN BLISTERS, PER PT   • Indocin [Indomethacin] Itching     Review of Systems   Constitutional: Negative for chills, fever, weight gain and weight loss.   HENT: Negative for congestion, hoarse voice and sore throat.    Eyes: Negative for blurred vision, pain and visual disturbance.   Cardiovascular: Negative for chest pain, claudication, cyanosis, dyspnea on exertion, irregular heartbeat, leg swelling, near-syncope, orthopnea, palpitations and syncope.   Respiratory: Negative for cough, shortness of breath and wheezing.    Endocrine: Negative for cold intolerance, heat intolerance and polyuria.   Hematologic/Lymphatic: Negative for bleeding problem. Does not bruise/bleed easily.   Skin: Negative for color change, flushing and rash.   Musculoskeletal: Negative for arthritis, back pain, joint pain and myalgias.   Gastrointestinal: Negative for abdominal pain, constipation, diarrhea, nausea and vomiting.   Genitourinary: Negative for dysuria, frequency, hesitancy and urgency.   Neurological: Negative for excessive daytime sleepiness, dizziness, headaches, numbness, vertigo and weakness.   Psychiatric/Behavioral: Negative for depression. The patient does not have insomnia and is not nervous/anxious.    All other systems reviewed and are negative.    Current Outpatient Medications   Medication Sig Dispense Refill   • albuterol sulfate  (90 Base) MCG/ACT inhaler Inhale 2  "puffs Every 6 (Six) Hours As Needed for Wheezing.     • atorvastatin (LIPITOR) 10 MG tablet Take 10 mg by mouth Daily.     • bumetanide (BUMEX) 0.5 MG tablet Take 1 tablet by mouth Daily for 30 days. 30 tablet 0   • carvedilol (COREG) 12.5 MG tablet Take 1 tablet by mouth 2 (Two) Times a Day With Meals for 30 days. 60 tablet 0   • Empagliflozin (JARDIANCE) 25 MG tablet Take 25 mg by mouth Daily.     • gabapentin (NEURONTIN) 400 MG capsule Take 400 mg by mouth 4 (Four) Times a Day.     • glimepiride (AMARYL) 4 MG tablet Take 4 mg by mouth 2 (Two) Times a Day.     • hydrALAZINE (APRESOLINE) 50 MG tablet Take 50 mg by mouth 3 (Three) Times a Day.     • linaclotide (Linzess) 145 MCG capsule capsule Take 145 mcg by mouth Every Morning Before Breakfast.     • lisinopril (PRINIVIL,ZESTRIL) 20 MG tablet Take 20 mg by mouth 2 (Two) Times a Day.     • metFORMIN (GLUCOPHAGE) 500 MG tablet Take 1,000 mg by mouth 2 (Two) Times a Day With Meals.     • nabumetone (RELAFEN) 500 MG tablet Take 500 mg by mouth 2 (Two) Times a Day.     • oxyCODONE-acetaminophen (PERCOCET)  MG per tablet Take 1 tablet by mouth 4 (Four) Times a Day.     • tiotropium bromide-olodaterol (Stiolto Respimat) 2.5-2.5 MCG/ACT aerosol solution inhaler Inhale 1 puff Daily.       No current facility-administered medications for this encounter.      Objective:     Vitals:    11/12/21 0956 11/12/21 1144   BP: 132/72    BP Location: Left arm    Patient Position: Sitting    Pulse: 70    Resp: 22    SpO2: (!) 76% (!) 85%   Weight: 80.7 kg (178 lb)    Height: 170.2 cm (67\")    Oxygen saturation had difficulty reading however patient appeared in no acute distress and was on his continuous 3 L of oxygen    Body mass index is 27.88 kg/m².    ReDS Result:   Lab Results   Component Value Date    ABSOLUTELUNG 30 11/03/2021   Unable to obtain    Wt Readings from Last 3 Encounters:   11/12/21 80.7 kg (178 lb)   11/04/21 84.6 kg (186 lb 6.4 oz)   04/13/21 81.6 kg (180 lb) "        Vitals reviewed.   Constitutional:       Appearance: Normal appearance. Well-developed.      Comments: In a wheelchair  3 L of oxygen via nasal cannula   Eyes:      Conjunctiva/sclera: Conjunctivae normal.   HENT:      Head: Normocephalic.   Neck:      Vascular: No JVD or JVR.   Pulmonary:      Effort: Pulmonary effort is normal.      Breath sounds: Normal breath sounds.   Cardiovascular:      Normal rate. Regular rhythm.   Edema:     Ankle: bilateral trace edema of the ankle.     Feet: bilateral trace edema of the feet.  Abdominal:      General: Bowel sounds are normal.      Palpations: Abdomen is soft. There is no hepatomegaly or splenomegaly.   Musculoskeletal: Normal range of motion.      Cervical back: Normal range of motion and neck supple. Skin:     General: Skin is warm and dry.   Neurological:      Mental Status: Alert and oriented to person, place, and time.   Psychiatric:         Attention and Perception: Attention normal.         Mood and Affect: Mood normal.         Speech: Speech normal.         Behavior: Behavior normal. Behavior is cooperative.         Cognition and Memory: Cognition normal.       Cardiographics  Results for orders placed during the hospital encounter of 10/30/21    Adult Transthoracic Echo Complete w/ Color, Spectral and Contrast if necessary per protocol    Interpretation Summary  · Normal left ventricular cavity size and wall thickness noted. All left ventricular wall segments contract normally  · Left ventricular ejection fraction appears to be 56 - 60%.  · Left ventricular diastolic function is consistent with (grade I) impaired relaxation.  · The mitral valve is structurally normal with no significant stenosis present. Mild mitral valve regurgitation is present.  · The aortic valve is structurally normal with no regurgitation or stenosis present.  · Mild dilation of the aortic root is present(4.0cm). Moderate dilation of the descending aorta present.(4.84cm).  · Mild  tricuspid valve regurgitation is present. Estimated right ventricular systolic pressure from tricuspid regurgitation is moderately elevated (45-55 mmHg).  · Moderate pulmonary hypertension is present.  · There is no evidence of pericardial effusion.    Imaging  CT Chest Without Contrast Diagnostic    Result Date: 11/2/2021  1.  Previously identified 6.6 cm descending thoracic aortic aneurysm has not significantly changed in size. 2.  Subpleural fibrotic change noted.  This report was finalized on 11/2/2021 12:47 PM by Dr. Dennis Romero MD.      XR Chest 1 View    Result Date: 11/2/2021  1.  Left lung airspace and scattered right lung airspace disease are again noted. 2.  Interval development of lucency at left lung base is probably artifactual and not pneumothorax, but repeat imaging with CT may be beneficial.  This report was finalized on 11/2/2021 8:28 AM by Dr. Dennis Romero MD.      XR Chest 1 View    Result Date: 10/30/2021  Marked coarsening of the background pulmonary interstitium is similar to the prior study. No definite superimposed acute process. Signer Name: JAIDEN CARTAGENA MD  Signed: 10/30/2021 9:10 PM  Workstation Name: [facility]  [facility]    CT Chest Pulmonary Embolism    Result Date: 10/30/2021  Diffuse lung disease which may represent paraseptal emphysema though some of the changes are more suggestive of underlying interstitial lung disease with honeycombing and fibrosis. No active pneumonitis. No evidence of pulmonary embolus. Severe aneurysmal dilatation of the descending thoracic aorta as well as aneurysmal dilatation of the abdominal aorta as detailed above. This does not appear significantly changed. Mediastinal and hilar lymphadenopathy also unchanged from prior studies and likely reflects chronic reactive changes. Signer Name: RUTHIE Langford MD  Signed: 10/30/2021 11:34 PM  Workstation Name: [facility]  [facility] Aorta  Limited    Result Date: 10/31/2021  Suboptimal examination due to poor visualization of the aorta. Apparent aneurysmal dilatation of the proximal aorta measuring up to 4.6 cm. Further evaluation with CT recommended for more thorough evaluation. Signer Name: RUTHIE Langford MD  Signed: 10/31/2021 4:05 PM  Workstation Name: [facility]  [facility]    US Venous Doppler Lower Extremity Bilateral (duplex)    Result Date: 10/31/2021  No deep venous thrombus seen in either lower extremity. Signer Name: RUTHIE Langford MD  Signed: 10/31/2021 4:07 PM  Workstation Name: [facility]  [facility]iology [facility]    EKG:        Lab Review   No results found for: TSH  Lab Results   Component Value Date    GLUCOSE 162 (H) 11/12/2021    BUN 12 11/12/2021    CREATININE 0.90 11/12/2021    EGFRIFNONA 82 11/12/2021    BCR 13.3 11/12/2021    K 4.3 11/12/2021    CO2 27.5 11/12/2021    CALCIUM 9.8 11/12/2021    ALBUMIN 3.99 11/02/2021    LABIL2 1.7 05/06/2016     (H) 11/02/2021    ALT 34 11/02/2021     Lab Results   Component Value Date    WBC 14.79 (H) 11/04/2021    HGB 16.8 11/04/2021    HCT 51.9 (H) 11/04/2021    MCV 89.9 11/04/2021     11/04/2021     Lab Results   Component Value Date    TROPONINT <0.010 10/30/2021     Lab Results   Component Value Date    PROBNP 718.7 11/12/2021      The following portions of the patient's history were reviewed and updated as appropriate: allergies, current medications, past family history, past medical history, past social history, past surgical history and problem list.     Old records reviewed and pertinent information is included in the above objective data.     Assessment/Plan:      Diagnosis Plan   1. Chronic diastolic congestive heart failure (HCC)  Basic Metabolic Panel    BNP    Magnesium    Basic Metabolic Panel    Basic Metabolic Panel    BNP    BNP    Magnesium    Magnesium   2. Hypertension, unspecified type     3. Advanced COPD, on  home oxygen.       BMP, pro-BNP and magnesium today  Discussed and reviewed lab results today  Continue current medications  Monitor BP and heart rate   Bring medications at next visit to confirm that he has discontinued metoprolol since starting Coreg  Follow-up in 2 weeks, sooner if needed    30 minutes face to face spent counseling patient extensively on dietary Na+ intake, importance of activity, weight monitoring, compliance with medications and follow up appointments.

## 2021-11-16 NOTE — OUTREACH NOTE
COPD/PN Week 2 Survey      Responses   [facility] patient discharged from? [facility]   Does the patient have one of the following disease processes/diagnoses(primary or secondary)? COPD/Pneumonia   Was the primary reason for admission: COPD exacerbation   Week 2 attempt successful? No   Unsuccessful attempts Attempt 1          Massiel Marshall RN

## 2021-11-18 NOTE — OUTREACH NOTE
COPD/PN Week 2 Survey      Responses   [facility] patient discharged from? [facility]   Does the patient have one of the following disease processes/diagnoses(primary or secondary)? COPD/Pneumonia   Was the primary reason for admission: COPD exacerbation   Week 2 attempt successful? Yes   Call start time 1100   Call end time 1105   Discharge diagnosis Acute on chronic hypoxic respiratory failure COPD exacerbation Acute diastolic heart failure    Meds reviewed with patient/caregiver? Yes   Is the patient having any side effects they believe may be caused by any medication additions or changes? No   Does the patient have all medications ordered at discharge? Yes   Is the patient taking all medications as directed (includes completed medication regime)? Yes   Does the patient have a primary care provider?  Yes   Does the patient have an appointment with their PCP or specialist within 7 days of discharge? Yes   Has the patient kept scheduled appointments due by today? Yes   What is the Home health agency?  [facility]    Has home health visited the patient within 72 hours of discharge? Yes   Pulse Ox monitoring Intermittent   O2 Sat comments upper 90's on 3L O2   O2 Sat: education provided When to seek care   Psychosocial issues? No   Did the patient receive a copy of their discharge instructions? Yes   Nursing interventions Reviewed instructions with patient   What is the patient's perception of their health status since discharge? Improving   Nursing Interventions Nurse provided patient education   Are the patient's immunizations up to date?  --  [N/A, does not take Flu shots]   Nursing interventions Advised patient to discuss with provider at next visit   Is the patient/caregiver able to teach back the hierarchy of who to call/visit for symptoms/problems? PCP, Specialist, Home health nurse, Urgent Care, ED, 911 Yes   Additional teach back comments states is getting stronger   Is the patient able to teach back  COPD zones? Yes   Patient reports what zone on this call? Green Zone   Green Zone Reports doing well,  Breathing without shortness of breath,  Usual activity and exercise level,  Usual amount of phlegm/mucus without difficulty coughing up,  Sleeping well,  Appetite is good   Green Zone interventions: Take daily medications,  Use oxygen as prescribed,  Avoid indoor/outdoor triggers,  Continue regular exercise/diet plan   Week 2 call completed? Yes          Cassidy Newman RN

## 2021-11-24 NOTE — PROGRESS NOTES
Heart Failure Clinic    Date: 11/24/21     Vitals:    11/24/21 0930   BP: 126/80   Pulse: 89   Resp: 22   SpO2: 94%        Method of arrival: Ambulatory with cane    Weighing self daily: Yes    Monitoring Heart Failure Zones: Yes    Today's HF Zone: Green    Taking medications as prescribed: Yes    Edema No    Shortness of Air: Yes has improved.     Number of pillows used at night:<2    Educational Materials given:                                                                           ReDS Value:   LQx3    Luz Maria Merritt MA 11/24/21 09:40 EST

## 2021-11-24 NOTE — PROGRESS NOTES
Heart Failure Pharmacy Note  Patient Name: Kb Marshall  Referring Provider: HOWARD Macias  Primary Cardiologist: Jeff    Medication Use:   Adherence: no issues   Hx of med intolerances: None related to HF    Affordability: no issues  Retail Rx Management: not at this time    Past Medical History:   Diagnosis Date   • Aneurysm (HCC)    • Arthritis    • Asthma    • Cancer ([facility])     lungs   • COPD (chronic obstructive pulmonary disease) ([facility])    • Coronary artery disease    • Diabetes mellitus ([facility])    • Dyslipidemia    • GERD (gastroesophageal reflux disease)    • Heart disease    • Hypertension    • PAD (peripheral artery disease) ([facility])      ALLERGIES: Acetaminophen-codeine, Adhesive tape, and Indocin [indomethacin]  Current Outpatient Medications   Medication Sig Dispense Refill   • albuterol sulfate  (90 Base) MCG/ACT inhaler Inhale 2 puffs Every 6 (Six) Hours As Needed for Wheezing.     • atorvastatin (LIPITOR) 10 MG tablet Take 10 mg by mouth Daily.     • budesonide-formoterol (SYMBICORT) 160-4.5 MCG/ACT inhaler Inhale 2 puffs 2 (Two) Times a Day.     • bumetanide (BUMEX) 0.5 MG tablet Take 1 tablet by mouth Daily. 30 tablet 1   • carvedilol (COREG) 12.5 MG tablet Take 1 tablet by mouth 2 (Two) Times a Day With Meals. 60 tablet 1   • Empagliflozin (JARDIANCE) 25 MG tablet Take 25 mg by mouth Daily.     • gabapentin (NEURONTIN) 400 MG capsule Take 400 mg by mouth 4 (Four) Times a Day.     • glimepiride (AMARYL) 4 MG tablet Take 4 mg by mouth 2 (Two) Times a Day.     • hydrALAZINE (APRESOLINE) 50 MG tablet Take 50 mg by mouth 3 (Three) Times a Day.     • linaclotide (Linzess) 145 MCG capsule capsule Take 145 mcg by mouth Every Morning Before Breakfast.     • lisinopril (PRINIVIL,ZESTRIL) 20 MG tablet Take 20 mg by mouth 2 (Two) Times a Day.     • metFORMIN (GLUCOPHAGE) 500 MG tablet Take 1,000 mg by mouth 2 (Two) Times a Day With Meals.     • nabumetone (RELAFEN) 500 MG tablet Take 500 mg by mouth 2 (Two) Times  a Day As Needed for Mild Pain . Only for gout flare up     • oxyCODONE-acetaminophen (PERCOCET) 7.5-325 MG per tablet Take 1 tablet by mouth 4 (Four) Times a Day.     • tiotropium bromide-olodaterol (Stiolto Respimat) 2.5-2.5 MCG/ACT aerosol solution inhaler Inhale 1 puff Daily.       No current facility-administered medications for this encounter.       Vaccination History:   Pneumonia: needed  Annual Influenza: UTD  COVID: UTD    Objective  Vitals:    11/24/21 0930   BP: 126/80   BP Location: Left arm   Patient Position: Sitting   Cuff Size: Adult   Pulse: 89   Resp: 22   SpO2: 94%   Weight: 84.9 kg (187 lb 3.2 oz)     Wt Readings from Last 3 Encounters:   11/24/21 84.9 kg (187 lb 3.2 oz)   11/12/21 80.7 kg (178 lb)   11/04/21 84.6 kg (186 lb 6.4 oz)         11/24/21  0930   Weight: 84.9 kg (187 lb 3.2 oz)     Lab Results   Component Value Date    GLUCOSE 178 (H) 11/24/2021    BUN 12 11/24/2021    CREATININE 0.86 11/24/2021    EGFRIFNONA 87 11/24/2021    BCR 14.0 11/24/2021    K 4.5 11/24/2021    CO2 26.6 11/24/2021    CALCIUM 9.2 11/24/2021    ALBUMIN 3.99 11/02/2021    LABIL2 1.7 05/06/2016     (H) 11/02/2021    ALT 34 11/02/2021     Lab Results   Component Value Date    WBC 14.79 (H) 11/04/2021    HGB 16.8 11/04/2021    HCT 51.9 (H) 11/04/2021    MCV 89.9 11/04/2021     11/04/2021     Lab Results   Component Value Date    TROPONINT <0.010 10/30/2021     Lab Results   Component Value Date    PROBNP 268.8 11/24/2021     Results for orders placed during the hospital encounter of 10/30/21    Adult Transthoracic Echo Complete w/ Color, Spectral and Contrast if necessary per protocol    Interpretation Summary  · Normal left ventricular cavity size and wall thickness noted. All left ventricular wall segments contract normally  · Left ventricular ejection fraction appears to be 56 - 60%.  · Left ventricular diastolic function is consistent with (grade I) impaired relaxation.  · The mitral valve is  structurally normal with no significant stenosis present. Mild mitral valve regurgitation is present.  · The aortic valve is structurally normal with no regurgitation or stenosis present.  · Mild dilation of the aortic root is present(4.0cm). Moderate dilation of the descending aorta present.(4.84cm).  · Mild tricuspid valve regurgitation is present. Estimated right ventricular systolic pressure from tricuspid regurgitation is moderately elevated (45-55 mmHg).  · Moderate pulmonary hypertension is present.  · There is no evidence of pericardial effusion.           GDMT:       Class   Drug   Dose Last Dose Adjustment Additional Titration   ACEi/ARB/ARNI Lisinopril  20mg BID     Beta Blocker Coreg  12.5mg BID 11/04/21    SGLT2i Jardiance 25mg     MRA             Drug Therapy Problems    1. Needs vaccinations - flu  2. Drug-disease interaction - nabumetone  3. Pt thinks one inhaler may not be covered.     Recommendations:     1. Pt declines flu vaccination   2. Educated Pt on potential risks of nabumetone or NSAIDS in HF.  Pt says he only uses if he has a gout flare.   3. [employer] Candidate [email or web address] $[facility] cop[facility].     Thank you for allowing me to participate in the care of your patient,    Jannie Zamora PharmD  11/24/21  11:26 EST

## 2021-11-24 NOTE — PROGRESS NOTES
[facility] CLINIC OFFICE VISIT    Subjective:   Follow-up (CHF)    History of Present Illness  Kb Marshall is a 76-year-old  male who presents to the clinic today for heart failure follow-up. He is accompanied by his daughter.  He has a history of chronic diastolic congestive heart failure with an LVEF of 56 to 60% from echocardiogram on 10/31/2021 read by Dr. Aguilar.  He currently takes Bumex 0.5 mg daily, Coreg 12.5 mg twice daily, lisinopril 20 mg twice daily, hydralazine 50 mg 3 times daily.     Overall breathing seems stable. He is chronically on 3 L of oxygen  Denies any abdominal or lower extremity swelling  Reports urine output is good  Home weight monitoring stable   Home BP monitoring, stable   Reports compliance with medicine  He reports a history of abdominal aortic aneurysm and is followed with the cardiothoracic surgeon at [facility].  Former smoker  States he feels great and feels the Coreg has helped him significantly   Needing RF on Bumex and Coreg   Does consume sodium at home, is trying to cut back but admits it is difficult     PCP: Dr. Mejia  Cardiologist:  Nephrologist:  Pulmonologist:    Hospitalizations: Discharged 11/4/2021    Past Medical History:   Diagnosis Date   • Aneurysm (HCC)    • Arthritis    • Asthma    • Cancer (HCC)     lungs   • COPD (chronic obstructive pulmonary disease) (HCC)    • Coronary artery disease    • Diabetes mellitus (HCC)    • Dyslipidemia    • GERD (gastroesophageal reflux disease)    • Heart disease    • Hypertension    • PAD (peripheral artery disease) (HCC)      Past Surgical History:   Procedure Laterality Date   • ABDOMINAL AORTIC ANEURYSM REPAIR     • COLONOSCOPY N/A 10/11/2018    Procedure: COLONOSCOPY;  Surgeon: Calos Stockton MD;  Location: [facility] OR;  Service: Gastroenterology   • FINGER SURGERY Left    • KNEE SURGERY Left    • LUNG REMOVAL, PARTIAL  11/29/2011   • LUNG SURGERY  11/28/2011     Social History     Socioeconomic History   •  Marital status:    Tobacco Use   • Smoking status: Former Smoker     Packs/day: 3.00     Years: 50.00     Pack years: 150.00     Quit date: 11/2011     Years since quitting: 10.0   • Smokeless tobacco: Former User     Types: Chew   Substance and Sexual Activity   • Alcohol use: Yes     Alcohol/week: 1.0 standard drink     Types: 1 Cans of beer per week   • Drug use: No   • Sexual activity: Defer     Family History   Problem Relation Age of Onset   • Cancer Mother    • Cancer Brother      Allergies:  Allergies   Allergen Reactions   • Acetaminophen-Codeine    • Adhesive Tape Other (See Comments)     ADHESIVE CAUSES SKIN BLISTERS, PER PT   • Indocin [Indomethacin] Itching     Review of Systems   Constitutional: Negative for chills, fever, weight gain and weight loss.   HENT: Negative for congestion, hoarse voice and sore throat.    Eyes: Negative for blurred vision, pain and visual disturbance.   Cardiovascular: Negative for chest pain, claudication, cyanosis, dyspnea on exertion, irregular heartbeat, leg swelling, near-syncope, orthopnea, palpitations and syncope.   Respiratory: Negative for cough, shortness of breath and wheezing.    Endocrine: Negative for cold intolerance, heat intolerance and polyuria.   Hematologic/Lymphatic: Negative for bleeding problem. Does not bruise/bleed easily.   Skin: Negative for color change, flushing and rash.   Musculoskeletal: Negative for arthritis, back pain, joint pain and myalgias.   Gastrointestinal: Negative for abdominal pain, constipation, diarrhea, nausea and vomiting.   Genitourinary: Negative for dysuria, frequency, hesitancy and urgency.   Neurological: Negative for excessive daytime sleepiness, dizziness, headaches, numbness, vertigo and weakness.   Psychiatric/Behavioral: Negative for depression. The patient does not have insomnia and is not nervous/anxious.    All other systems reviewed and are negative.    Current Outpatient Medications   Medication Sig  Dispense Refill   • albuterol sulfate  (90 Base) MCG/ACT inhaler Inhale 2 puffs Every 6 (Six) Hours As Needed for Wheezing.     • atorvastatin (LIPITOR) 10 MG tablet Take 10 mg by mouth Daily.     • budesonide-formoterol (SYMBICORT) 160-4.5 MCG/ACT inhaler Inhale 2 puffs 2 (Two) Times a Day.     • bumetanide (BUMEX) 0.5 MG tablet Take 1 tablet by mouth Daily. 30 tablet 1   • carvedilol (COREG) 12.5 MG tablet Take 1 tablet by mouth 2 (Two) Times a Day With Meals. 60 tablet 1   • Empagliflozin (JARDIANCE) 25 MG tablet Take 25 mg by mouth Daily.     • gabapentin (NEURONTIN) 400 MG capsule Take 400 mg by mouth 4 (Four) Times a Day.     • glimepiride (AMARYL) 4 MG tablet Take 4 mg by mouth 2 (Two) Times a Day.     • hydrALAZINE (APRESOLINE) 50 MG tablet Take 50 mg by mouth 3 (Three) Times a Day.     • linaclotide (Linzess) 145 MCG capsule capsule Take 145 mcg by mouth Every Morning Before Breakfast.     • lisinopril (PRINIVIL,ZESTRIL) 20 MG tablet Take 20 mg by mouth 2 (Two) Times a Day.     • metFORMIN (GLUCOPHAGE) 500 MG tablet Take 1,000 mg by mouth 2 (Two) Times a Day With Meals.     • nabumetone (RELAFEN) 500 MG tablet Take 500 mg by mouth 2 (Two) Times a Day As Needed for Mild Pain . Only for gout flare up     • oxyCODONE-acetaminophen (PERCOCET) 7.5-325 MG per tablet Take 1 tablet by mouth 4 (Four) Times a Day.     • tiotropium bromide-olodaterol (Stiolto Respimat) 2.5-2.5 MCG/ACT aerosol solution inhaler Inhale 1 puff Daily.       No current facility-administered medications for this encounter.      Objective:     Vitals:    11/24/21 0930   BP: 126/80   BP Location: Left arm   Patient Position: Sitting   Cuff Size: Adult   Pulse: 89   Resp: 22   SpO2: 94%   Weight: 84.9 kg (187 lb 3.2 oz)   Body mass index is 29.32 kg/m².    ReDS Result:   Lab Results   Component Value Date    ABSOLUTELUNG 30 11/03/2021   Unable to obtain 11/24/2021    Wt Readings from Last 3 Encounters:   11/24/21 84.9 kg (187 lb 3.2 oz)    11/12/21 80.7 kg (178 lb)   11/04/21 84.6 kg (186 lb 6.4 oz)        Vitals reviewed.   Constitutional:       Appearance: Normal appearance. Well-developed.      Comments: Walks into clinic today   4L of oxygen via nasal cannula   Eyes:      Conjunctiva/sclera: Conjunctivae normal.   HENT:      Head: Normocephalic.   Neck:      Vascular: No JVD or JVR.   Pulmonary:      Effort: Pulmonary effort is normal.      Breath sounds: Normal breath sounds.   Cardiovascular:      Normal rate. Regular rhythm.   Edema:     Ankle: bilateral trace edema of the ankle.     Feet: bilateral trace edema of the feet.  Abdominal:      General: Bowel sounds are normal.      Palpations: Abdomen is soft. There is no hepatomegaly or splenomegaly.   Musculoskeletal: Normal range of motion.      Cervical back: Normal range of motion and neck supple. Skin:     General: Skin is warm and dry.   Neurological:      Mental Status: Alert and oriented to person, place, and time.   Psychiatric:         Attention and Perception: Attention normal.         Mood and Affect: Mood normal.         Speech: Speech normal.         Behavior: Behavior normal. Behavior is cooperative.         Cognition and Memory: Cognition normal.       Cardiographics  Results for orders placed during the hospital encounter of 10/30/21    Adult Transthoracic Echo Complete w/ Color, Spectral and Contrast if necessary per protocol    Interpretation Summary  · Normal left ventricular cavity size and wall thickness noted. All left ventricular wall segments contract normally  · Left ventricular ejection fraction appears to be 56 - 60%.  · Left ventricular diastolic function is consistent with (grade I) impaired relaxation.  · The mitral valve is structurally normal with no significant stenosis present. Mild mitral valve regurgitation is present.  · The aortic valve is structurally normal with no regurgitation or stenosis present.  · Mild dilation of the aortic root is present(4.0cm).  Moderate dilation of the descending aorta present.(4.84cm).  · Mild tricuspid valve regurgitation is present. Estimated right ventricular systolic pressure from tricuspid regurgitation is moderately elevated (45-55 mmHg).  · Moderate pulmonary hypertension is present.  · There is no evidence of pericardial effusion.    Imaging  CT Chest Without Contrast Diagnostic    Result Date: 11/2/2021  1.  Previously identified 6.6 cm descending thoracic aortic aneurysm has not significantly changed in size. 2.  Subpleural fibrotic change noted.  This report was finalized on 11/2/2021 12:47 PM by Dr. Dennis Romero MD.      XR Chest 1 View    Result Date: 11/2/2021  1.  Left lung airspace and scattered right lung airspace disease are again noted. 2.  Interval development of lucency at left lung base is probably artifactual and not pneumothorax, but repeat imaging with CT may be beneficial.  This report was finalized on 11/2/2021 8:28 AM by Dr. Dennis Romero MD.      XR Chest 1 View    Result Date: 10/30/2021  Marked coarsening of the background pulmonary interstitium is similar to the prior study. No definite superimposed acute process. Signer Name: JAIDEN CARTAGENA MD  Signed: 10/30/2021 9:10 PM  Workstation Name: [facility]  [facility]    CT Chest Pulmonary Embolism    Result Date: 10/30/2021  Diffuse lung disease which may represent paraseptal emphysema though some of the changes are more suggestive of underlying interstitial lung disease with honeycombing and fibrosis. No active pneumonitis. No evidence of pulmonary embolus. Severe aneurysmal dilatation of the descending thoracic aorta as well as aneurysmal dilatation of the abdominal aorta as detailed above. This does not appear significantly changed. Mediastinal and hilar lymphadenopathy also unchanged from prior studies and likely reflects chronic reactive changes. Signer Name: RUTHIE Langford MD  Signed: 10/30/2021 11:34 PM  Workstation Name:  [facility]  Radiology [facility] Aorta Limited    Result Date: 10/31/2021  Suboptimal examination due to poor visualization of the aorta. Apparent aneurysmal dilatation of the proximal aorta measuring up to 4.6 cm. Further evaluation with CT recommended for more thorough evaluation. Signer Name: RUTHIE Langford MD  Signed: 10/31/2021 4:05 PM  Workstation Name: [facility]  Radiology [facility] Venous Doppler Lower Extremity Bilateral (duplex)    Result Date: 10/31/2021  No deep venous thrombus seen in either lower extremity. Signer Name: RUTHIE Langford MD  Signed: 10/31/2021 4:07 PM  Workstation Name: [facility]  Radiology [facility]    EKG:        Lab Review   No results found for: TSH  Lab Results   Component Value Date    GLUCOSE 178 (H) 11/24/2021    BUN 12 11/24/2021    CREATININE 0.86 11/24/2021    EGFRIFNONA 87 11/24/2021    BCR 14.0 11/24/2021    K 4.5 11/24/2021    CO2 26.6 11/24/2021    CALCIUM 9.2 11/24/2021    ALBUMIN 3.99 11/02/2021    LABIL2 1.7 05/06/2016     (H) 11/02/2021    ALT 34 11/02/2021     Lab Results   Component Value Date    WBC 14.79 (H) 11/04/2021    HGB 16.8 11/04/2021    HCT 51.9 (H) 11/04/2021    MCV 89.9 11/04/2021     11/04/2021     Lab Results   Component Value Date    TROPONINT <0.010 10/30/2021     Lab Results   Component Value Date    PROBNP 268.8 11/24/2021      The following portions of the patient's history were reviewed and updated as appropriate: allergies, current medications, past family history, past medical history, past social history, past surgical history and problem list.     Old records reviewed and pertinent information is included in the above objective data.     Assessment/Plan:      Diagnosis Plan   1. Chronic diastolic congestive heart failure (HCC)  Basic Metabolic Panel    BNP    Magnesium    Basic Metabolic Panel    Magnesium   2. Hypertension, unspecified type     3. Advanced  COPD, on home oxygen.       BMP, pro-BNP and magnesium today  Discussed and reviewed lab results today  Continue current medications  Monitor BP and heart rate   Refills authorized   Counseled patient extensively on dietary Na+ intake, importance of activity, weight monitoring, compliance with medications and follow up appointments.  Follow-up in 4 weeks, sooner if needed

## 2021-11-24 NOTE — OUTREACH NOTE
COPD/PN Week 3 Survey      Responses   [facility] patient discharged from? [facility]   Does the patient have one of the following disease processes/diagnoses(primary or secondary)? COPD/Pneumonia   Was the primary reason for admission: COPD exacerbation   Week 3 attempt successful? Yes   Call start time 1552   Call end time 1557   Meds reviewed with patient/caregiver? Yes   Is the patient having any side effects they believe may be caused by any medication additions or changes? No   Does the patient have all medications ordered at discharge? Yes   Is the patient taking all medications as directed (includes completed medication regime)? Yes   Does the patient have a primary care provider?  Yes   Does the patient have an appointment with their PCP or specialist within 7 days of discharge? Yes   Has the patient kept scheduled appointments due by today? Yes   Has home health visited the patient within 72 hours of discharge? Yes   Has all DME been delivered? Yes   Pulse Ox monitoring Intermittent   Pulse Ox device source Patient   O2 Sat comments States does not check O2 sat regularly.   Psychosocial issues? No   What is the patient's perception of their health status since discharge? Improving   Is the patient/caregiver able to teach back the hierarchy of who to call/visit for symptoms/problems? PCP, Specialist, Home health nurse, Urgent Care, ED, 911 Yes   Patient reports what zone on this call? Green Zone   Green Zone Reports doing well,  Sleeping well,  Breathing without shortness of breath,  Appetite is good   Green Zone interventions: Take daily medications,  Use oxygen as prescribed,  Continue regular exercise/diet plan,  Avoid indoor/outdoor triggers   Week 3 call completed? Yes   Wrap up additional comments Patient states is improving. Denies any needs today.          Carmen Clark RN

## 2021-12-06 NOTE — OUTREACH NOTE
COPD/PN Week 4 Survey      Responses   [facility] patient discharged from? [facility]   Does the patient have one of the following disease processes/diagnoses(primary or secondary)? COPD/Pneumonia   Was the primary reason for admission: COPD exacerbation   Week 4 attempt successful? Yes   Call start time 0836   Call end time 0838   Meds reviewed with patient/caregiver? Yes   Is the patient having any side effects they believe may be caused by any medication additions or changes? No   Is the patient taking all medications as directed (includes completed medication regime)? Yes   Has the patient kept scheduled appointments due by today? Yes   Is the patient still receiving Home Health Services? N/A   Pulse Ox monitoring Intermittent   Pulse Ox device source Patient   O2 Sat: education provided Monitoring frequency   Psychosocial issues? No   What is the patient's perception of their health status since discharge? Improving   Nursing Interventions Nurse provided patient education   If the patient is a current smoker, are they able to teach back resources for cessation? Not a smoker   Is the patient able to teach back COPD zones? Yes   Nursing interventions Education provided on various zones   Patient reports what zone on this call? Green Zone   Green Zone Reports doing well,  Breathing without shortness of breath,  Usual activity and exercise level,  Usual amount of phlegm/mucus without difficulty coughing up,  Sleeping well,  Appetite is good   Green Zone interventions: Take daily medications,  Avoid indoor/outdoor triggers   Week 4 call completed? Yes   Would the patient like one additional call? No   Graduated Yes   Is the patient interested in additional calls from an ambulatory ?  NOTE:  applies to high risk patients requiring additional follow-up. No   Did the patient feel the follow up calls were helpful during their recovery period? Yes   Was the number of calls appropriate? Yes   Wrap up  additional comments PATIENT STATES HE IS GOING TO HAVE AAA REPAIR ON 12/13/21          Kaylee Morejon LPN

## 2021-12-23 PROBLEM — R07.9 CHEST PAIN: Status: ACTIVE | Noted: 2021-01-01

## 2021-12-24 NOTE — ANESTHESIA PROCEDURE NOTES
Airway  Urgency: elective    Date/Time: 12/24/2021 12:41 PM  Airway not difficult    General Information and Staff    Patient location during procedure: OR  Anesthesiologist: Mykel Lucero MD  CRNA: Landry Hayden CRNA    Indications and Patient Condition  Indications for airway management: airway protection    Preoxygenated: yes  MILS maintained throughout  Mask difficulty assessment: 0 - not attempted    Final Airway Details  Final airway type: endotracheal airway      Successful airway: ETT  Cuffed: yes   Successful intubation technique: direct laryngoscopy  Facilitating devices/methods: intubating stylet  Endotracheal tube insertion site: oral  Blade: Morteza  Blade size: 3  ETT size (mm): 7.5  Cormack-Lehane Classification: grade I - full view of glottis  Placement verified by: chest auscultation, capnometry and palpation of cuff   Cuff volume (mL): 10  Measured from: lips  ETT/EBT  to lips (cm): 21  Number of attempts at approach: 1  Assessment: lips, teeth, and gum same as pre-op and atraumatic intubation    Additional Comments  Dentition and lips same as preop

## 2021-12-24 NOTE — ANESTHESIA POSTPROCEDURE EVALUATION
Patient: Kb Marshall    Procedure Summary     Date: 12/24/21 Room / Location:  [facility] OR 02 /  [facility] OR    Anesthesia Start: 1236 Anesthesia Stop: 1559    Procedure: ESOPHAGOGASTRODUODENOSCOPY (N/A Esophagus) Diagnosis:       Chest pain, unspecified type      (Chest pain, unspecified type [R07.9])    Surgeons: Magui Murillo MD Provider: Mykel Lucero MD    Anesthesia Type: general ASA Status: 4 - Emergent          Anesthesia Type: No value filed.    Vitals  Vitals Value Taken Time   /67 12/24/21 1629   Temp 98 °F (36.7 °C) 12/24/21 1559   Pulse 112 12/24/21 1629   Resp 14 12/24/21 1629   SpO2 90 % 12/24/21 1629           Post Anesthesia Care and Evaluation    Patient location during evaluation: PHASE II  Patient participation: complete - patient participated  Level of consciousness: awake and alert  Pain score: 0  Pain management: adequate  Airway patency: patent  Anesthetic complications: No anesthetic complications    Cardiovascular status: acceptable  Respiratory status: acceptable  Hydration status: acceptable

## 2021-12-24 NOTE — ANESTHESIA PREPROCEDURE EVALUATION
Anesthesia Evaluation     no history of anesthetic complications:  NPO Solid Status: Waived due to emergency  NPO Liquid Status: Waived due to emergency           Airway   Mallampati: I  TM distance: >3 FB  Neck ROM: full  No difficulty expected  Dental    (+) edentulous    Pulmonary - normal exam   (+) lung cancer, COPD, asthma,home oxygen, shortness of breath,   Cardiovascular - normal exam    (+) hypertension, CAD, PVD,       Neuro/Psych  (+) dizziness/light headedness,     GI/Hepatic/Renal/Endo    (+)  GERD, GI bleeding , diabetes mellitus,     Musculoskeletal     (+) back pain,   Abdominal  - normal exam    Bowel sounds: normal.   Substance History      OB/GYN          Other      history of cancer remission    ROS/Med Hx Other: Had Thoracic Aortic endovascular graft placed 12/4/2021     Now with midsternal pain and Filing defect  In distal esophagus on Esophagram                           Anesthesia Plan    ASA 4 - emergent     general     intravenous induction     Anesthetic plan, all risks, benefits, and alternatives have been provided, discussed and informed consent has been obtained with: patient.

## 2021-12-25 NOTE — ANESTHESIA POSTPROCEDURE EVALUATION
Patient: Kb Marshall    Procedure Summary     Date: 12/24/21 Room / Location:  [facility] 02 /  [facility] OR    Anesthesia Start: 1236 Anesthesia Stop: 1559    Procedure: ESOPHAGOGASTRODUODENOSCOPY (N/A Esophagus) Diagnosis:       Chest pain, unspecified type      (Chest pain, unspecified type [R07.9])    Surgeons: Magui Murillo MD Provider: Mykel Lucero MD    Anesthesia Type: general ASA Status: 4 - Emergent          Anesthesia Type: general    Vitals  Vitals Value Taken Time   /90 12/24/21 1641   Temp 98 °F (36.7 °C) 12/24/21 1559   Pulse 108 12/24/21 1641   Resp 14 12/24/21 1641   SpO2 100 % 12/24/21 1641           Anesthesia Post Evaluation

## 2021-12-27 PROBLEM — R07.9 CHEST PAIN: Status: RESOLVED | Noted: 2021-01-01 | Resolved: 2021-01-01

## 2021-12-28 NOTE — OUTREACH NOTE
Prep Survey      Responses   [facility] facility patient discharged from? [facility]   Is LACE score < 7 ? No   Emergency Room discharge w/ pulse ox? No   Eligibility Readm Mgmt   Discharge diagnosis Odynophagia   Does the patient have one of the following disease processes/diagnoses(primary or secondary)? Other   Does the patient have Home health ordered? No   Is there a DME ordered? No   Prep survey completed? Yes          Flaquita Dee RN

## 2022-01-01 ENCOUNTER — READMISSION MANAGEMENT (OUTPATIENT)
Dept: CALL CENTER | Facility: HOSPITAL | Age: 77
End: 2022-01-01

## 2022-01-01 ENCOUNTER — APPOINTMENT (OUTPATIENT)
Dept: GENERAL RADIOLOGY | Facility: HOSPITAL | Age: 77
End: 2022-01-01

## 2022-01-01 ENCOUNTER — LAB (OUTPATIENT)
Dept: ONCOLOGY | Facility: CLINIC | Age: 77
End: 2022-01-01

## 2022-01-01 ENCOUNTER — APPOINTMENT (OUTPATIENT)
Dept: ULTRASOUND IMAGING | Facility: HOSPITAL | Age: 77
End: 2022-01-01

## 2022-01-01 ENCOUNTER — APPOINTMENT (OUTPATIENT)
Dept: CARDIOLOGY | Facility: HOSPITAL | Age: 77
End: 2022-01-01

## 2022-01-01 ENCOUNTER — OFFICE VISIT (OUTPATIENT)
Dept: ONCOLOGY | Facility: CLINIC | Age: 77
End: 2022-01-01

## 2022-01-01 ENCOUNTER — DOCUMENTATION (OUTPATIENT)
Dept: ONCOLOGY | Facility: CLINIC | Age: 77
End: 2022-01-01

## 2022-01-01 ENCOUNTER — OFFICE VISIT (OUTPATIENT)
Dept: PULMONOLOGY | Facility: CLINIC | Age: 77
End: 2022-01-01

## 2022-01-01 ENCOUNTER — HOSPITAL ENCOUNTER (OUTPATIENT)
Dept: CT IMAGING | Facility: HOSPITAL | Age: 77
Discharge: HOME | End: 2022-01-04
Admitting: INTERNAL MEDICINE

## 2022-01-01 ENCOUNTER — PATIENT OUTREACH (OUTPATIENT)
Dept: CASE MANAGEMENT | Facility: OTHER | Age: 77
End: 2022-01-01

## 2022-01-01 ENCOUNTER — HOSPITAL ENCOUNTER (INPATIENT)
Facility: HOSPITAL | Age: 77
LOS: 5 days | Discharge: HOSPICE | End: 2022-06-13
Attending: EMERGENCY MEDICINE | Admitting: INTERNAL MEDICINE

## 2022-01-01 ENCOUNTER — HOSPITAL ENCOUNTER (OUTPATIENT)
Dept: CARDIOLOGY | Facility: HOSPITAL | Age: 77
Discharge: HOME | End: 2022-02-09
Admitting: NURSE PRACTITIONER

## 2022-01-01 ENCOUNTER — HOSPITAL ENCOUNTER (OUTPATIENT)
Dept: RESPIRATORY THERAPY | Facility: HOSPITAL | Age: 77
Discharge: HOME | End: 2022-03-11

## 2022-01-01 ENCOUNTER — APPOINTMENT (OUTPATIENT)
Dept: CT IMAGING | Facility: HOSPITAL | Age: 77
End: 2022-01-01

## 2022-01-01 ENCOUNTER — HOSPITAL ENCOUNTER (OUTPATIENT)
Dept: CARDIOLOGY | Facility: HOSPITAL | Age: 77
Discharge: HOME | End: 2022-01-05
Admitting: NURSE PRACTITIONER

## 2022-01-01 VITALS
WEIGHT: 173 LBS | HEIGHT: 67 IN | SYSTOLIC BLOOD PRESSURE: 110 MMHG | BODY MASS INDEX: 27.15 KG/M2 | OXYGEN SATURATION: 95 % | HEART RATE: 85 BPM | DIASTOLIC BLOOD PRESSURE: 66 MMHG

## 2022-01-01 VITALS
BODY MASS INDEX: 28.04 KG/M2 | TEMPERATURE: 97.5 F | OXYGEN SATURATION: 94 % | RESPIRATION RATE: 18 BRPM | HEART RATE: 79 BPM | DIASTOLIC BLOOD PRESSURE: 60 MMHG | WEIGHT: 179 LBS | SYSTOLIC BLOOD PRESSURE: 106 MMHG

## 2022-01-01 VITALS
HEART RATE: 130 BPM | WEIGHT: 169.5 LBS | TEMPERATURE: 99.2 F | DIASTOLIC BLOOD PRESSURE: 85 MMHG | OXYGEN SATURATION: 96 % | RESPIRATION RATE: 20 BRPM | HEIGHT: 67 IN | SYSTOLIC BLOOD PRESSURE: 152 MMHG | BODY MASS INDEX: 26.6 KG/M2

## 2022-01-01 VITALS
OXYGEN SATURATION: 94 % | SYSTOLIC BLOOD PRESSURE: 132 MMHG | BODY MASS INDEX: 25.58 KG/M2 | HEART RATE: 96 BPM | HEIGHT: 67 IN | DIASTOLIC BLOOD PRESSURE: 72 MMHG | WEIGHT: 163 LBS | TEMPERATURE: 97.3 F

## 2022-01-01 VITALS
WEIGHT: 179.8 LBS | DIASTOLIC BLOOD PRESSURE: 64 MMHG | RESPIRATION RATE: 20 BRPM | SYSTOLIC BLOOD PRESSURE: 104 MMHG | BODY MASS INDEX: 28.16 KG/M2 | HEART RATE: 78 BPM | OXYGEN SATURATION: 95 %

## 2022-01-01 DIAGNOSIS — J44.9 ADVANCED COPD: ICD-10-CM

## 2022-01-01 DIAGNOSIS — C34.11 MALIGNANT NEOPLASM OF UPPER LOBE OF RIGHT LUNG: Primary | ICD-10-CM

## 2022-01-01 DIAGNOSIS — I50.32 CHRONIC DIASTOLIC (CONGESTIVE) HEART FAILURE: Primary | ICD-10-CM

## 2022-01-01 DIAGNOSIS — I50.9 CONGESTIVE HEART FAILURE, UNSPECIFIED HF CHRONICITY, UNSPECIFIED HEART FAILURE TYPE: ICD-10-CM

## 2022-01-01 DIAGNOSIS — I10 HYPERTENSION, UNSPECIFIED TYPE: ICD-10-CM

## 2022-01-01 DIAGNOSIS — J96.21 ACUTE ON CHRONIC RESPIRATORY FAILURE WITH HYPOXIA: ICD-10-CM

## 2022-01-01 DIAGNOSIS — C34.11 MALIGNANT NEOPLASM OF UPPER LOBE OF RIGHT LUNG: ICD-10-CM

## 2022-01-01 DIAGNOSIS — I95.9 HYPOTENSION, UNSPECIFIED HYPOTENSION TYPE: ICD-10-CM

## 2022-01-01 DIAGNOSIS — R06.02 SOB (SHORTNESS OF BREATH): ICD-10-CM

## 2022-01-01 DIAGNOSIS — J18.9 PNEUMONIA DUE TO INFECTIOUS ORGANISM, UNSPECIFIED LATERALITY, UNSPECIFIED PART OF LUNG: ICD-10-CM

## 2022-01-01 DIAGNOSIS — R59.0 MEDIASTINAL ADENOPATHY: ICD-10-CM

## 2022-01-01 DIAGNOSIS — J98.4 LUNG CALCIFICATION: ICD-10-CM

## 2022-01-01 DIAGNOSIS — J44.9 ADVANCED COPD: Primary | ICD-10-CM

## 2022-01-01 DIAGNOSIS — J42 CHRONIC BRONCHITIS, UNSPECIFIED CHRONIC BRONCHITIS TYPE: Primary | ICD-10-CM

## 2022-01-01 DIAGNOSIS — I48.91 ATRIAL FIBRILLATION, UNSPECIFIED TYPE: ICD-10-CM

## 2022-01-01 DIAGNOSIS — Z51.5 COMFORT MEASURES ONLY STATUS: ICD-10-CM

## 2022-01-01 DIAGNOSIS — E87.6 HYPOKALEMIA: ICD-10-CM

## 2022-01-01 DIAGNOSIS — I48.91 ATRIAL FIBRILLATION WITH RAPID VENTRICULAR RESPONSE: Primary | ICD-10-CM

## 2022-01-01 LAB
A-A DO2: 185.2 MMHG (ref 0–300)
A-A DO2: 211.9 MMHG (ref 0–300)
A-A DO2: 310.5 MMHG (ref 0–300)
ABSOLUTE LUNG FLUID CONTENT: 26 % (ref 20–35)
ABSOLUTE LUNG FLUID CONTENT: 41 % (ref 20–35)
ALBUMIN SERPL-MCNC: 2.04 G/DL (ref 3.5–5.2)
ALBUMIN SERPL-MCNC: 2.41 G/DL (ref 3.5–5.2)
ALBUMIN SERPL-MCNC: 2.61 G/DL (ref 3.5–5.2)
ALBUMIN SERPL-MCNC: 2.64 G/DL (ref 3.5–5.2)
ALBUMIN SERPL-MCNC: 2.74 G/DL (ref 3.5–5.2)
ALBUMIN SERPL-MCNC: 3.75 G/DL (ref 3.5–5.2)
ALBUMIN/GLOB SERPL: 0.5 G/DL
ALBUMIN/GLOB SERPL: 0.8 G/DL
ALBUMIN/GLOB SERPL: 1 G/DL
ALBUMIN/GLOB SERPL: 1.2 G/DL
ALP SERPL-CCNC: 67 U/L (ref 39–117)
ALP SERPL-CCNC: 67 U/L (ref 39–117)
ALP SERPL-CCNC: 70 U/L (ref 39–117)
ALP SERPL-CCNC: 71 U/L (ref 39–117)
ALP SERPL-CCNC: 73 U/L (ref 39–117)
ALP SERPL-CCNC: 84 U/L (ref 39–117)
ALT SERPL W P-5'-P-CCNC: 10 U/L (ref 1–41)
ALT SERPL W P-5'-P-CCNC: 11 U/L (ref 1–41)
ALT SERPL W P-5'-P-CCNC: 12 U/L (ref 1–41)
ALT SERPL W P-5'-P-CCNC: 13 U/L (ref 1–41)
ALT SERPL W P-5'-P-CCNC: 7 U/L (ref 1–41)
ALT SERPL W P-5'-P-CCNC: 8 U/L (ref 1–41)
ANION GAP SERPL CALCULATED.3IONS-SCNC: 10.7 MMOL/L (ref 5–15)
ANION GAP SERPL CALCULATED.3IONS-SCNC: 11.5 MMOL/L (ref 5–15)
ANION GAP SERPL CALCULATED.3IONS-SCNC: 11.8 MMOL/L (ref 5–15)
ANION GAP SERPL CALCULATED.3IONS-SCNC: 12.7 MMOL/L (ref 5–15)
ANION GAP SERPL CALCULATED.3IONS-SCNC: 13 MMOL/L (ref 5–15)
ANION GAP SERPL CALCULATED.3IONS-SCNC: 14.9 MMOL/L (ref 5–15)
ANION GAP SERPL CALCULATED.3IONS-SCNC: 14.9 MMOL/L (ref 5–15)
ANION GAP SERPL CALCULATED.3IONS-SCNC: 15.2 MMOL/L (ref 5–15)
ANISOCYTOSIS BLD QL: ABNORMAL
APTT PPP: 31.2 SECONDS (ref 26.5–34.5)
APTT PPP: 32.4 SECONDS (ref 26.5–34.5)
APTT PPP: 39.7 SECONDS (ref 26.5–34.5)
APTT PPP: 46.7 SECONDS (ref 26.5–34.5)
APTT PPP: 49 SECONDS (ref 26.5–34.5)
APTT PPP: 59 SECONDS (ref 26.5–34.5)
APTT PPP: 64.4 SECONDS (ref 26.5–34.5)
APTT PPP: 77.6 SECONDS (ref 26.5–34.5)
ARTERIAL PATENCY WRIST A: ABNORMAL
AST SERPL-CCNC: 11 U/L (ref 1–40)
AST SERPL-CCNC: 13 U/L (ref 1–40)
AST SERPL-CCNC: 17 U/L (ref 1–40)
AST SERPL-CCNC: 18 U/L (ref 1–40)
ATMOSPHERIC PRESS: 723 MMHG
ATMOSPHERIC PRESS: 724 MMHG
ATMOSPHERIC PRESS: 724 MMHG
B PARAPERT DNA SPEC QL NAA+PROBE: NOT DETECTED
B PERT DNA SPEC QL NAA+PROBE: NOT DETECTED
BACTERIA BLD CULT: ABNORMAL
BACTERIA SPEC AEROBE CULT: ABNORMAL
BACTERIA SPEC AEROBE CULT: NORMAL
BACTERIA SPEC AEROBE CULT: NORMAL
BACTERIA SPEC RESP CULT: ABNORMAL
BACTERIA SPEC RESP CULT: ABNORMAL
BASE EXCESS BLDA CALC-SCNC: 7.3 MMOL/L (ref 0–2)
BASE EXCESS BLDA CALC-SCNC: 7.7 MMOL/L (ref 0–2)
BASE EXCESS BLDA CALC-SCNC: 8.6 MMOL/L (ref 0–2)
BASOPHILS # BLD AUTO: 0.01 10*3/MM3 (ref 0–0.2)
BASOPHILS # BLD AUTO: 0.02 10*3/MM3 (ref 0–0.2)
BASOPHILS # BLD AUTO: 0.03 10*3/MM3 (ref 0–0.2)
BASOPHILS # BLD AUTO: 0.07 10*3/MM3 (ref 0–0.2)
BASOPHILS NFR BLD AUTO: 0.1 % (ref 0–1.5)
BASOPHILS NFR BLD AUTO: 0.2 % (ref 0–1.5)
BASOPHILS NFR BLD AUTO: 0.2 % (ref 0–1.5)
BASOPHILS NFR BLD AUTO: 0.6 % (ref 0–1.5)
BDY SITE: ABNORMAL
BH CV ECHO MEAS - ACS: 3.2 CM
BH CV ECHO MEAS - AO MAX PG: 7.2 MMHG
BH CV ECHO MEAS - AO MEAN PG: 3 MMHG
BH CV ECHO MEAS - AO ROOT DIAM: 4 CM
BH CV ECHO MEAS - AO V2 MAX: 134 CM/SEC
BH CV ECHO MEAS - AO V2 VTI: 27.7 CM
BH CV ECHO MEAS - EDV(CUBED): 145.9 ML
BH CV ECHO MEAS - EDV(MOD-SP4): 73.5 ML
BH CV ECHO MEAS - EF(MOD-SP4): 67.6 %
BH CV ECHO MEAS - ESV(CUBED): 45.3 ML
BH CV ECHO MEAS - ESV(MOD-SP4): 23.8 ML
BH CV ECHO MEAS - FS: 32.3 %
BH CV ECHO MEAS - IVS/LVPW: 0.93 CM
BH CV ECHO MEAS - IVSD: 1.14 CM
BH CV ECHO MEAS - LA DIMENSION: 3.1 CM
BH CV ECHO MEAS - LAT PEAK E' VEL: 7 CM/SEC
BH CV ECHO MEAS - LV DIASTOLIC VOL/BSA (35-75): 38.9 CM2
BH CV ECHO MEAS - LV MASS(C)D: 248.7 GRAMS
BH CV ECHO MEAS - LV SYSTOLIC VOL/BSA (12-30): 12.6 CM2
BH CV ECHO MEAS - LVIDD: 5.3 CM
BH CV ECHO MEAS - LVIDS: 3.6 CM
BH CV ECHO MEAS - LVOT AREA: 3.8 CM2
BH CV ECHO MEAS - LVOT DIAM: 2.2 CM
BH CV ECHO MEAS - LVPWD: 1.23 CM
BH CV ECHO MEAS - MED PEAK E' VEL: 5.3 CM/SEC
BH CV ECHO MEAS - MV A MAX VEL: 78 CM/SEC
BH CV ECHO MEAS - MV E MAX VEL: 44.6 CM/SEC
BH CV ECHO MEAS - MV E/A: 0.57
BH CV ECHO MEAS - PA ACC TIME: 0.07 SEC
BH CV ECHO MEAS - PA PR(ACCEL): 45.7 MMHG
BH CV ECHO MEAS - RAP SYSTOLE: 10 MMHG
BH CV ECHO MEAS - RVSP: 82.3 MMHG
BH CV ECHO MEAS - SI(MOD-SP4): 26.3 ML/M2
BH CV ECHO MEAS - SV(MOD-SP4): 49.7 ML
BH CV ECHO MEAS - TR MAX PG: 72.3 MMHG
BH CV ECHO MEAS - TR MAX VEL: 425 CM/SEC
BH CV ECHO MEASUREMENTS AVERAGE E/E' RATIO: 7.25
BILIRUB SERPL-MCNC: 0.5 MG/DL (ref 0–1.2)
BILIRUB SERPL-MCNC: 0.6 MG/DL (ref 0–1.2)
BILIRUB SERPL-MCNC: 0.6 MG/DL (ref 0–1.2)
BILIRUB SERPL-MCNC: 0.7 MG/DL (ref 0–1.2)
BILIRUB SERPL-MCNC: 0.9 MG/DL (ref 0–1.2)
BILIRUB SERPL-MCNC: 1 MG/DL (ref 0–1.2)
BILIRUB UR QL STRIP: NEGATIVE
BODY TEMPERATURE: 0 C
BUN SERPL-MCNC: 12 MG/DL (ref 8–23)
BUN SERPL-MCNC: 14 MG/DL (ref 8–23)
BUN SERPL-MCNC: 26 MG/DL (ref 8–23)
BUN SERPL-MCNC: 26 MG/DL (ref 8–23)
BUN SERPL-MCNC: 36 MG/DL (ref 8–23)
BUN SERPL-MCNC: 38 MG/DL (ref 8–23)
BUN SERPL-MCNC: 38 MG/DL (ref 8–23)
BUN SERPL-MCNC: 8 MG/DL (ref 8–23)
BUN/CREAT SERPL: 11 (ref 7–25)
BUN/CREAT SERPL: 15.4 (ref 7–25)
BUN/CREAT SERPL: 16.5 (ref 7–25)
BUN/CREAT SERPL: 38.3 (ref 7–25)
BUN/CREAT SERPL: 42.6 (ref 7–25)
BUN/CREAT SERPL: 44.8 (ref 7–25)
BUN/CREAT SERPL: 46.9 (ref 7–25)
BUN/CREAT SERPL: 55.9 (ref 7–25)
C PNEUM DNA NPH QL NAA+NON-PROBE: NOT DETECTED
CALCIUM SPEC-SCNC: 8.1 MG/DL (ref 8.6–10.5)
CALCIUM SPEC-SCNC: 8.5 MG/DL (ref 8.6–10.5)
CALCIUM SPEC-SCNC: 8.7 MG/DL (ref 8.6–10.5)
CALCIUM SPEC-SCNC: 8.8 MG/DL (ref 8.6–10.5)
CALCIUM SPEC-SCNC: 8.9 MG/DL (ref 8.6–10.5)
CALCIUM SPEC-SCNC: 8.9 MG/DL (ref 8.6–10.5)
CALCIUM SPEC-SCNC: 9 MG/DL (ref 8.6–10.5)
CALCIUM SPEC-SCNC: 9.2 MG/DL (ref 8.6–10.5)
CHLORIDE SERPL-SCNC: 100 MMOL/L (ref 98–107)
CHLORIDE SERPL-SCNC: 100 MMOL/L (ref 98–107)
CHLORIDE SERPL-SCNC: 102 MMOL/L (ref 98–107)
CHLORIDE SERPL-SCNC: 102 MMOL/L (ref 98–107)
CHLORIDE SERPL-SCNC: 96 MMOL/L (ref 98–107)
CHLORIDE SERPL-SCNC: 98 MMOL/L (ref 98–107)
CHLORIDE SERPL-SCNC: 99 MMOL/L (ref 98–107)
CHLORIDE SERPL-SCNC: 99 MMOL/L (ref 98–107)
CLARITY UR: CLEAR
CO2 BLDA-SCNC: 33.7 MMOL/L (ref 22–33)
CO2 BLDA-SCNC: 35.3 MMOL/L (ref 22–33)
CO2 BLDA-SCNC: 35.9 MMOL/L (ref 22–33)
CO2 SERPL-SCNC: 22.1 MMOL/L (ref 22–29)
CO2 SERPL-SCNC: 24 MMOL/L (ref 22–29)
CO2 SERPL-SCNC: 24.8 MMOL/L (ref 22–29)
CO2 SERPL-SCNC: 25.1 MMOL/L (ref 22–29)
CO2 SERPL-SCNC: 25.5 MMOL/L (ref 22–29)
CO2 SERPL-SCNC: 27.3 MMOL/L (ref 22–29)
CO2 SERPL-SCNC: 29.2 MMOL/L (ref 22–29)
CO2 SERPL-SCNC: 30.3 MMOL/L (ref 22–29)
COHGB MFR BLD: 1.4 % (ref 0–5)
COHGB MFR BLD: 1.5 % (ref 0–5)
COHGB MFR BLD: 1.6 % (ref 0–5)
COLOR UR: YELLOW
CREAT SERPL-MCNC: 0.58 MG/DL (ref 0.76–1.27)
CREAT SERPL-MCNC: 0.61 MG/DL (ref 0.76–1.27)
CREAT SERPL-MCNC: 0.68 MG/DL (ref 0.76–1.27)
CREAT SERPL-MCNC: 0.73 MG/DL (ref 0.76–1.27)
CREAT SERPL-MCNC: 0.78 MG/DL (ref 0.76–1.27)
CREAT SERPL-MCNC: 0.81 MG/DL (ref 0.76–1.27)
CREAT SERPL-MCNC: 0.85 MG/DL (ref 0.76–1.27)
CREAT SERPL-MCNC: 0.94 MG/DL (ref 0.76–1.27)
CRP SERPL-MCNC: 43.58 MG/DL (ref 0–0.5)
D-LACTATE SERPL-SCNC: 2 MMOL/L (ref 0.5–2)
D-LACTATE SERPL-SCNC: 2.1 MMOL/L (ref 0.5–2)
D-LACTATE SERPL-SCNC: 2.4 MMOL/L (ref 0.5–2)
DACRYOCYTES BLD QL SMEAR: ABNORMAL
DEPRECATED RDW RBC AUTO: 47.9 FL (ref 37–54)
DEPRECATED RDW RBC AUTO: 57.2 FL (ref 37–54)
DEPRECATED RDW RBC AUTO: 58.5 FL (ref 37–54)
DEPRECATED RDW RBC AUTO: 58.5 FL (ref 37–54)
DEPRECATED RDW RBC AUTO: 60.5 FL (ref 37–54)
DEPRECATED RDW RBC AUTO: 62.2 FL (ref 37–54)
EGFRCR SERPLBLD CKD-EPI 2021: 101.1 ML/MIN/1.73
EGFRCR SERPLBLD CKD-EPI 2021: 84 ML/MIN/1.73
EGFRCR SERPLBLD CKD-EPI 2021: 91.4 ML/MIN/1.73
EGFRCR SERPLBLD CKD-EPI 2021: 96.3 ML/MIN/1.73
EGFRCR SERPLBLD CKD-EPI 2021: 99.5 ML/MIN/1.73
EOSINOPHIL # BLD AUTO: 0 10*3/MM3 (ref 0–0.4)
EOSINOPHIL # BLD AUTO: 0.01 10*3/MM3 (ref 0–0.4)
EOSINOPHIL # BLD AUTO: 0.02 10*3/MM3 (ref 0–0.4)
EOSINOPHIL # BLD AUTO: 0.18 10*3/MM3 (ref 0–0.4)
EOSINOPHIL NFR BLD AUTO: 0 % (ref 0.3–6.2)
EOSINOPHIL NFR BLD AUTO: 0.1 % (ref 0.3–6.2)
EOSINOPHIL NFR BLD AUTO: 0.2 % (ref 0.3–6.2)
EOSINOPHIL NFR BLD AUTO: 1.5 % (ref 0.3–6.2)
ERYTHROCYTE [DISTWIDTH] IN BLOOD BY AUTOMATED COUNT: 14.5 % (ref 12.3–15.4)
ERYTHROCYTE [DISTWIDTH] IN BLOOD BY AUTOMATED COUNT: 19.5 % (ref 12.3–15.4)
ERYTHROCYTE [DISTWIDTH] IN BLOOD BY AUTOMATED COUNT: 19.8 % (ref 12.3–15.4)
ERYTHROCYTE [DISTWIDTH] IN BLOOD BY AUTOMATED COUNT: 20.1 % (ref 12.3–15.4)
ERYTHROCYTE [DISTWIDTH] IN BLOOD BY AUTOMATED COUNT: 20.3 % (ref 12.3–15.4)
ERYTHROCYTE [DISTWIDTH] IN BLOOD BY AUTOMATED COUNT: 20.3 % (ref 12.3–15.4)
FLUAV SUBTYP SPEC NAA+PROBE: NOT DETECTED
FLUBV RNA ISLT QL NAA+PROBE: NOT DETECTED
GAS FLOW AIRWAY: 10 LPM
GFR SERPL CREATININE-BSD FRML MDRD: 105 ML/MIN/1.73
GFR SERPL CREATININE-BSD FRML MDRD: 88 ML/MIN/1.73
GFR SERPL CREATININE-BSD FRML MDRD: 97 ML/MIN/1.73
GLOBULIN UR ELPH-MCNC: 2.7 GM/DL
GLOBULIN UR ELPH-MCNC: 3 GM/DL
GLOBULIN UR ELPH-MCNC: 3.1 GM/DL
GLOBULIN UR ELPH-MCNC: 3.4 GM/DL
GLOBULIN UR ELPH-MCNC: 3.4 GM/DL
GLOBULIN UR ELPH-MCNC: 4.1 GM/DL
GLUCOSE BLDC GLUCOMTR-MCNC: 135 MG/DL (ref 70–130)
GLUCOSE BLDC GLUCOMTR-MCNC: 135 MG/DL (ref 70–130)
GLUCOSE BLDC GLUCOMTR-MCNC: 138 MG/DL (ref 70–130)
GLUCOSE BLDC GLUCOMTR-MCNC: 159 MG/DL (ref 70–130)
GLUCOSE BLDC GLUCOMTR-MCNC: 161 MG/DL (ref 70–130)
GLUCOSE BLDC GLUCOMTR-MCNC: 165 MG/DL (ref 70–130)
GLUCOSE BLDC GLUCOMTR-MCNC: 168 MG/DL (ref 70–130)
GLUCOSE BLDC GLUCOMTR-MCNC: 189 MG/DL (ref 70–130)
GLUCOSE BLDC GLUCOMTR-MCNC: 195 MG/DL (ref 70–130)
GLUCOSE BLDC GLUCOMTR-MCNC: 201 MG/DL (ref 70–130)
GLUCOSE BLDC GLUCOMTR-MCNC: 205 MG/DL (ref 70–130)
GLUCOSE BLDC GLUCOMTR-MCNC: 226 MG/DL (ref 70–130)
GLUCOSE BLDC GLUCOMTR-MCNC: 240 MG/DL (ref 70–130)
GLUCOSE BLDC GLUCOMTR-MCNC: 55 MG/DL (ref 70–130)
GLUCOSE SERPL-MCNC: 150 MG/DL (ref 65–99)
GLUCOSE SERPL-MCNC: 154 MG/DL (ref 65–99)
GLUCOSE SERPL-MCNC: 158 MG/DL (ref 65–99)
GLUCOSE SERPL-MCNC: 165 MG/DL (ref 65–99)
GLUCOSE SERPL-MCNC: 175 MG/DL (ref 65–99)
GLUCOSE SERPL-MCNC: 186 MG/DL (ref 65–99)
GLUCOSE SERPL-MCNC: 211 MG/DL (ref 65–99)
GLUCOSE SERPL-MCNC: 246 MG/DL (ref 65–99)
GLUCOSE UR STRIP-MCNC: ABNORMAL MG/DL
GRAM STN SPEC: ABNORMAL
HADV DNA SPEC NAA+PROBE: NOT DETECTED
HCO3 BLDA-SCNC: 32.3 MMOL/L (ref 20–26)
HCO3 BLDA-SCNC: 33.6 MMOL/L (ref 20–26)
HCO3 BLDA-SCNC: 34.3 MMOL/L (ref 20–26)
HCOV 229E RNA SPEC QL NAA+PROBE: NOT DETECTED
HCOV HKU1 RNA SPEC QL NAA+PROBE: NOT DETECTED
HCOV NL63 RNA SPEC QL NAA+PROBE: NOT DETECTED
HCOV OC43 RNA SPEC QL NAA+PROBE: NOT DETECTED
HCT VFR BLD AUTO: 36.5 % (ref 37.5–51)
HCT VFR BLD AUTO: 36.8 % (ref 37.5–51)
HCT VFR BLD AUTO: 39.9 % (ref 37.5–51)
HCT VFR BLD AUTO: 40.1 % (ref 37.5–51)
HCT VFR BLD AUTO: 40.2 % (ref 37.5–51)
HCT VFR BLD AUTO: 40.2 % (ref 37.5–51)
HCT VFR BLD CALC: 36.4 % (ref 38–51)
HCT VFR BLD CALC: 36.4 % (ref 38–51)
HCT VFR BLD CALC: 38 % (ref 38–51)
HGB BLD-MCNC: 11.3 G/DL (ref 13–17.7)
HGB BLD-MCNC: 11.6 G/DL (ref 13–17.7)
HGB BLD-MCNC: 12.2 G/DL (ref 13–17.7)
HGB BLD-MCNC: 12.4 G/DL (ref 13–17.7)
HGB BLD-MCNC: 12.5 G/DL (ref 13–17.7)
HGB BLD-MCNC: 12.7 G/DL (ref 13–17.7)
HGB BLDA-MCNC: 11.9 G/DL (ref 14–18)
HGB BLDA-MCNC: 11.9 G/DL (ref 14–18)
HGB BLDA-MCNC: 12.4 G/DL (ref 14–18)
HGB UR QL STRIP.AUTO: NEGATIVE
HMPV RNA NPH QL NAA+NON-PROBE: NOT DETECTED
HOLD SPECIMEN: NORMAL
HOLD SPECIMEN: NORMAL
HPIV1 RNA ISLT QL NAA+PROBE: NOT DETECTED
HPIV2 RNA SPEC QL NAA+PROBE: NOT DETECTED
HPIV3 RNA NPH QL NAA+PROBE: NOT DETECTED
HPIV4 P GENE NPH QL NAA+PROBE: NOT DETECTED
HYPOCHROMIA BLD QL: ABNORMAL
HYPOCHROMIA BLD QL: ABNORMAL
IMM GRANULOCYTES # BLD AUTO: 0.08 10*3/MM3 (ref 0–0.05)
IMM GRANULOCYTES # BLD AUTO: 0.12 10*3/MM3 (ref 0–0.05)
IMM GRANULOCYTES # BLD AUTO: 0.16 10*3/MM3 (ref 0–0.05)
IMM GRANULOCYTES # BLD AUTO: 0.18 10*3/MM3 (ref 0–0.05)
IMM GRANULOCYTES NFR BLD AUTO: 0.8 % (ref 0–0.5)
IMM GRANULOCYTES NFR BLD AUTO: 1 % (ref 0–0.5)
IMM GRANULOCYTES NFR BLD AUTO: 1.3 % (ref 0–0.5)
IMM GRANULOCYTES NFR BLD AUTO: 1.7 % (ref 0–0.5)
INHALED O2 CONCENTRATION: 50 %
INHALED O2 CONCENTRATION: 50 %
INHALED O2 CONCENTRATION: 60 %
INR PPP: 1.24 (ref 0.9–1.1)
ISOLATED FROM: ABNORMAL
KETONES UR QL STRIP: ABNORMAL
L PNEUMO1 AG UR QL IA: NEGATIVE
LEFT ATRIUM VOLUME INDEX: 29.4 ML/M2
LEUKOCYTE ESTERASE UR QL STRIP.AUTO: NEGATIVE
LV EF 2D ECHO EST: 45 %
LYMPHOCYTES # BLD AUTO: 0.23 10*3/MM3 (ref 0.7–3.1)
LYMPHOCYTES # BLD AUTO: 0.27 10*3/MM3 (ref 0.7–3.1)
LYMPHOCYTES # BLD AUTO: 0.48 10*3/MM3 (ref 0.7–3.1)
LYMPHOCYTES # BLD AUTO: 1 10*3/MM3 (ref 0.7–3.1)
LYMPHOCYTES # BLD MANUAL: 0.61 10*3/MM3 (ref 0.7–3.1)
LYMPHOCYTES # BLD MANUAL: 1.06 10*3/MM3 (ref 0.7–3.1)
LYMPHOCYTES NFR BLD AUTO: 2.1 % (ref 19.6–45.3)
LYMPHOCYTES NFR BLD AUTO: 2.4 % (ref 19.6–45.3)
LYMPHOCYTES NFR BLD AUTO: 4.6 % (ref 19.6–45.3)
LYMPHOCYTES NFR BLD AUTO: 8.2 % (ref 19.6–45.3)
LYMPHOCYTES NFR BLD MANUAL: 3 % (ref 5–12)
LYMPHOCYTES NFR BLD MANUAL: 3 % (ref 5–12)
Lab: ABNORMAL
M PNEUMO IGG SER IA-ACNC: NOT DETECTED
MACROCYTES BLD QL SMEAR: ABNORMAL
MAGNESIUM SERPL-MCNC: 1.9 MG/DL (ref 1.6–2.4)
MAGNESIUM SERPL-MCNC: 2 MG/DL (ref 1.6–2.4)
MAGNESIUM SERPL-MCNC: 2.1 MG/DL (ref 1.6–2.4)
MAGNESIUM SERPL-MCNC: 2.1 MG/DL (ref 1.6–2.4)
MAGNESIUM SERPL-MCNC: 2.3 MG/DL (ref 1.6–2.4)
MAXIMAL PREDICTED HEART RATE: 144 BPM
MCH RBC QN AUTO: 25.5 PG (ref 26.6–33)
MCH RBC QN AUTO: 25.8 PG (ref 26.6–33)
MCH RBC QN AUTO: 25.9 PG (ref 26.6–33)
MCH RBC QN AUTO: 27.9 PG (ref 26.6–33)
MCHC RBC AUTO-ENTMCNC: 30.4 G/DL (ref 31.5–35.7)
MCHC RBC AUTO-ENTMCNC: 30.8 G/DL (ref 31.5–35.7)
MCHC RBC AUTO-ENTMCNC: 31 G/DL (ref 31.5–35.7)
MCHC RBC AUTO-ENTMCNC: 31.1 G/DL (ref 31.5–35.7)
MCHC RBC AUTO-ENTMCNC: 31.5 G/DL (ref 31.5–35.7)
MCHC RBC AUTO-ENTMCNC: 31.8 G/DL (ref 31.5–35.7)
MCV RBC AUTO: 80.9 FL (ref 79–97)
MCV RBC AUTO: 81.4 FL (ref 79–97)
MCV RBC AUTO: 81.9 FL (ref 79–97)
MCV RBC AUTO: 82.4 FL (ref 79–97)
MCV RBC AUTO: 84.8 FL (ref 79–97)
MCV RBC AUTO: 90.5 FL (ref 79–97)
METHGB BLD QL: 0 % (ref 0–3)
METHGB BLD QL: 0.1 % (ref 0–3)
METHGB BLD QL: 0.2 % (ref 0–3)
MODALITY: ABNORMAL
MONOCYTES # BLD AUTO: 0.36 10*3/MM3 (ref 0.1–0.9)
MONOCYTES # BLD AUTO: 0.44 10*3/MM3 (ref 0.1–0.9)
MONOCYTES # BLD AUTO: 0.49 10*3/MM3 (ref 0.1–0.9)
MONOCYTES # BLD AUTO: 0.86 10*3/MM3 (ref 0.1–0.9)
MONOCYTES # BLD: 0.36 10*3/MM3 (ref 0.1–0.9)
MONOCYTES # BLD: 0.46 10*3/MM3 (ref 0.1–0.9)
MONOCYTES NFR BLD AUTO: 3.7 % (ref 5–12)
MONOCYTES NFR BLD AUTO: 3.9 % (ref 5–12)
MONOCYTES NFR BLD AUTO: 4.3 % (ref 5–12)
MONOCYTES NFR BLD AUTO: 7 % (ref 5–12)
MRSA DNA SPEC QL NAA+PROBE: NEGATIVE
NEUTROPHILS # BLD AUTO: 11.19 10*3/MM3 (ref 1.7–7)
NEUTROPHILS # BLD AUTO: 13.67 10*3/MM3 (ref 1.7–7)
NEUTROPHILS NFR BLD AUTO: 10.03 10*3/MM3 (ref 1.7–7)
NEUTROPHILS NFR BLD AUTO: 11.76 10*3/MM3 (ref 1.7–7)
NEUTROPHILS NFR BLD AUTO: 8.94 10*3/MM3 (ref 1.7–7)
NEUTROPHILS NFR BLD AUTO: 81.7 % (ref 42.7–76)
NEUTROPHILS NFR BLD AUTO: 89.1 % (ref 42.7–76)
NEUTROPHILS NFR BLD AUTO: 9.2 10*3/MM3 (ref 1.7–7)
NEUTROPHILS NFR BLD AUTO: 92.4 % (ref 42.7–76)
NEUTROPHILS NFR BLD AUTO: 92.9 % (ref 42.7–76)
NEUTROPHILS NFR BLD MANUAL: 87 % (ref 42.7–76)
NEUTROPHILS NFR BLD MANUAL: 90 % (ref 42.7–76)
NEUTS BAND NFR BLD MANUAL: 5 % (ref 0–5)
NITRITE UR QL STRIP: NEGATIVE
NOTE: ABNORMAL
NOTIFIED BY: ABNORMAL
NOTIFIED WHO: ABNORMAL
NRBC BLD AUTO-RTO: 0 /100 WBC (ref 0–0.2)
NT-PROBNP SERPL-MCNC: 3902 PG/ML (ref 0–1800)
NT-PROBNP SERPL-MCNC: 461.9 PG/ML (ref 0–1800)
NT-PROBNP SERPL-MCNC: ABNORMAL PG/ML (ref 0–1800)
NT-PROBNP SERPL-MCNC: ABNORMAL PG/ML (ref 0–1800)
OXYHGB MFR BLDV: 84 % (ref 94–99)
OXYHGB MFR BLDV: 92 % (ref 94–99)
OXYHGB MFR BLDV: 96.5 % (ref 94–99)
PCO2 BLDA: 44.3 MM HG (ref 35–45)
PCO2 BLDA: 51.6 MM HG (ref 35–45)
PCO2 BLDA: 54.6 MM HG (ref 35–45)
PCO2 TEMP ADJ BLD: ABNORMAL MM[HG]
PEEP RESPIRATORY: 5 CM[H2O]
PEEP RESPIRATORY: 5 CM[H2O]
PH BLDA: 7.4 PH UNITS (ref 7.35–7.45)
PH BLDA: 7.43 PH UNITS (ref 7.35–7.45)
PH BLDA: 7.47 PH UNITS (ref 7.35–7.45)
PH UR STRIP.AUTO: 5.5 [PH] (ref 5–8)
PH, TEMP CORRECTED: ABNORMAL
PHOSPHATE SERPL-MCNC: 1.9 MG/DL (ref 2.5–4.5)
PHOSPHATE SERPL-MCNC: 3.1 MG/DL (ref 2.5–4.5)
PHOSPHATE SERPL-MCNC: 3.6 MG/DL (ref 2.5–4.5)
PHOSPHATE SERPL-MCNC: 4.9 MG/DL (ref 2.5–4.5)
PLAT MORPH BLD: NORMAL
PLAT MORPH BLD: NORMAL
PLATELET # BLD AUTO: 164 10*3/MM3 (ref 140–450)
PLATELET # BLD AUTO: 164 10*3/MM3 (ref 140–450)
PLATELET # BLD AUTO: 186 10*3/MM3 (ref 140–450)
PLATELET # BLD AUTO: 200 10*3/MM3 (ref 140–450)
PLATELET # BLD AUTO: 201 10*3/MM3 (ref 140–450)
PLATELET # BLD AUTO: 288 10*3/MM3 (ref 140–450)
PMV BLD AUTO: 8.9 FL (ref 6–12)
PMV BLD AUTO: 9.3 FL (ref 6–12)
PMV BLD AUTO: 9.3 FL (ref 6–12)
PMV BLD AUTO: 9.6 FL (ref 6–12)
PMV BLD AUTO: 9.6 FL (ref 6–12)
PMV BLD AUTO: 9.7 FL (ref 6–12)
PO2 BLDA: 48.3 MM HG (ref 83–108)
PO2 BLDA: 67.7 MM HG (ref 83–108)
PO2 BLDA: 96.9 MM HG (ref 83–108)
PO2 TEMP ADJ BLD: ABNORMAL MM[HG]
POTASSIUM SERPL-SCNC: 3.1 MMOL/L (ref 3.5–5.2)
POTASSIUM SERPL-SCNC: 3.2 MMOL/L (ref 3.5–5.2)
POTASSIUM SERPL-SCNC: 3.3 MMOL/L (ref 3.5–5.2)
POTASSIUM SERPL-SCNC: 4 MMOL/L (ref 3.5–5.2)
POTASSIUM SERPL-SCNC: 4.1 MMOL/L (ref 3.5–5.2)
POTASSIUM SERPL-SCNC: 4.1 MMOL/L (ref 3.5–5.2)
POTASSIUM SERPL-SCNC: 4.5 MMOL/L (ref 3.5–5.2)
POTASSIUM SERPL-SCNC: 4.6 MMOL/L (ref 3.5–5.2)
PROCALCITONIN SERPL-MCNC: 0.49 NG/ML (ref 0–0.25)
PROCALCITONIN SERPL-MCNC: 2.67 NG/ML (ref 0–0.25)
PROT SERPL-MCNC: 5.3 G/DL (ref 6–8.5)
PROT SERPL-MCNC: 5.4 G/DL (ref 6–8.5)
PROT SERPL-MCNC: 6 G/DL (ref 6–8.5)
PROT SERPL-MCNC: 6.1 G/DL (ref 6–8.5)
PROT SERPL-MCNC: 6.1 G/DL (ref 6–8.5)
PROT SERPL-MCNC: 6.8 G/DL (ref 6–8.5)
PROT UR QL STRIP: NEGATIVE
PROTHROMBIN TIME: 15.9 SECONDS (ref 12.1–14.7)
QT INTERVAL: 334 MS
QT INTERVAL: 420 MS
QTC INTERVAL: 472 MS
QTC INTERVAL: 517 MS
RBC # BLD AUTO: 4.43 10*6/MM3 (ref 4.14–5.8)
RBC # BLD AUTO: 4.44 10*6/MM3 (ref 4.14–5.8)
RBC # BLD AUTO: 4.55 10*6/MM3 (ref 4.14–5.8)
RBC # BLD AUTO: 4.73 10*6/MM3 (ref 4.14–5.8)
RBC # BLD AUTO: 4.9 10*6/MM3 (ref 4.14–5.8)
RBC # BLD AUTO: 4.91 10*6/MM3 (ref 4.14–5.8)
RHINOVIRUS RNA SPEC NAA+PROBE: NOT DETECTED
RSV RNA NPH QL NAA+NON-PROBE: NOT DETECTED
S AUREUS DNA SPEC QL NAA+PROBE: POSITIVE
S PNEUM AG SPEC QL LA: NEGATIVE
SAO2 % BLDCOA: 85.5 % (ref 94–99)
SAO2 % BLDCOA: 93.4 % (ref 94–99)
SAO2 % BLDCOA: 98 % (ref 94–99)
SARS-COV-2 RNA NPH QL NAA+NON-PROBE: NOT DETECTED
SCAN SLIDE: NORMAL
SET MECH RESP RATE: 18
SET MECH RESP RATE: 18
SODIUM SERPL-SCNC: 135 MMOL/L (ref 136–145)
SODIUM SERPL-SCNC: 136 MMOL/L (ref 136–145)
SODIUM SERPL-SCNC: 137 MMOL/L (ref 136–145)
SODIUM SERPL-SCNC: 139 MMOL/L (ref 136–145)
SODIUM SERPL-SCNC: 139 MMOL/L (ref 136–145)
SODIUM SERPL-SCNC: 140 MMOL/L (ref 136–145)
SODIUM SERPL-SCNC: 140 MMOL/L (ref 136–145)
SODIUM SERPL-SCNC: 143 MMOL/L (ref 136–145)
SP GR UR STRIP: >1.03 (ref 1–1.03)
STRESS TARGET HR: 122 BPM
TROPONIN T SERPL-MCNC: 0.01 NG/ML (ref 0–0.03)
TROPONIN T SERPL-MCNC: <0.01 NG/ML (ref 0–0.03)
UROBILINOGEN UR QL STRIP: ABNORMAL
VARIANT LYMPHS NFR BLD MANUAL: 5 % (ref 19.6–45.3)
VARIANT LYMPHS NFR BLD MANUAL: 7 % (ref 19.6–45.3)
VENTILATOR MODE: ABNORMAL
VT ON VENT VENT: 450 ML
VT ON VENT VENT: 450 ML
WBC NRBC COR # BLD: 10.33 10*3/MM3 (ref 3.4–10.8)
WBC NRBC COR # BLD: 12.16 10*3/MM3 (ref 3.4–10.8)
WBC NRBC COR # BLD: 12.26 10*3/MM3 (ref 3.4–10.8)
WBC NRBC COR # BLD: 12.72 10*3/MM3 (ref 3.4–10.8)
WBC NRBC COR # BLD: 15.19 10*3/MM3 (ref 3.4–10.8)
WBC NRBC COR # BLD: 9.63 10*3/MM3 (ref 3.4–10.8)
WHOLE BLOOD HOLD COAG: NORMAL
WHOLE BLOOD HOLD SPECIMEN: NORMAL

## 2022-01-01 PROCEDURE — 99285 EMERGENCY DEPT VISIT HI MDM: CPT

## 2022-01-01 PROCEDURE — 85610 PROTHROMBIN TIME: CPT | Performed by: EMERGENCY MEDICINE

## 2022-01-01 PROCEDURE — 80053 COMPREHEN METABOLIC PANEL: CPT | Performed by: INTERNAL MEDICINE

## 2022-01-01 PROCEDURE — 82962 GLUCOSE BLOOD TEST: CPT

## 2022-01-01 PROCEDURE — 87641 MR-STAPH DNA AMP PROBE: CPT | Performed by: INTERNAL MEDICINE

## 2022-01-01 PROCEDURE — 99222 1ST HOSP IP/OBS MODERATE 55: CPT | Performed by: NURSE PRACTITIONER

## 2022-01-01 PROCEDURE — 94799 UNLISTED PULMONARY SVC/PX: CPT

## 2022-01-01 PROCEDURE — 83735 ASSAY OF MAGNESIUM: CPT | Performed by: INTERNAL MEDICINE

## 2022-01-01 PROCEDURE — 71275 CT ANGIOGRAPHY CHEST: CPT | Performed by: RADIOLOGY

## 2022-01-01 PROCEDURE — 83880 ASSAY OF NATRIURETIC PEPTIDE: CPT | Performed by: INTERNAL MEDICINE

## 2022-01-01 PROCEDURE — 85025 COMPLETE CBC W/AUTO DIFF WBC: CPT | Performed by: INTERNAL MEDICINE

## 2022-01-01 PROCEDURE — 25010000002 FUROSEMIDE PER 20 MG: Performed by: INTERNAL MEDICINE

## 2022-01-01 PROCEDURE — 83605 ASSAY OF LACTIC ACID: CPT | Performed by: INTERNAL MEDICINE

## 2022-01-01 PROCEDURE — 85730 THROMBOPLASTIN TIME PARTIAL: CPT | Performed by: INTERNAL MEDICINE

## 2022-01-01 PROCEDURE — 25010000002 HEPARIN (PORCINE) PER 1000 UNITS: Performed by: INTERNAL MEDICINE

## 2022-01-01 PROCEDURE — 93306 TTE W/DOPPLER COMPLETE: CPT | Performed by: INTERNAL MEDICINE

## 2022-01-01 PROCEDURE — 80048 BASIC METABOLIC PNL TOTAL CA: CPT | Performed by: NURSE PRACTITIONER

## 2022-01-01 PROCEDURE — 99233 SBSQ HOSP IP/OBS HIGH 50: CPT | Performed by: INTERNAL MEDICINE

## 2022-01-01 PROCEDURE — 25010000002 LORAZEPAM PER 2 MG: Performed by: INTERNAL MEDICINE

## 2022-01-01 PROCEDURE — 25010000002 METHYLPREDNISOLONE PER 40 MG: Performed by: INTERNAL MEDICINE

## 2022-01-01 PROCEDURE — 0202U NFCT DS 22 TRGT SARS-COV-2: CPT | Performed by: EMERGENCY MEDICINE

## 2022-01-01 PROCEDURE — 25010000002 MORPHINE PER 10 MG: Performed by: INTERNAL MEDICINE

## 2022-01-01 PROCEDURE — 25010000002 MORPHINE PER 10 MG: Performed by: HOSPITALIST

## 2022-01-01 PROCEDURE — 81003 URINALYSIS AUTO W/O SCOPE: CPT | Performed by: EMERGENCY MEDICINE

## 2022-01-01 PROCEDURE — 83880 ASSAY OF NATRIURETIC PEPTIDE: CPT | Performed by: EMERGENCY MEDICINE

## 2022-01-01 PROCEDURE — 84100 ASSAY OF PHOSPHORUS: CPT | Performed by: INTERNAL MEDICINE

## 2022-01-01 PROCEDURE — 99291 CRITICAL CARE FIRST HOUR: CPT | Performed by: INTERNAL MEDICINE

## 2022-01-01 PROCEDURE — 71045 X-RAY EXAM CHEST 1 VIEW: CPT | Performed by: RADIOLOGY

## 2022-01-01 PROCEDURE — 87205 SMEAR GRAM STAIN: CPT | Performed by: INTERNAL MEDICINE

## 2022-01-01 PROCEDURE — 25010000002 HALOPERIDOL LACTATE PER 5 MG: Performed by: INTERNAL MEDICINE

## 2022-01-01 PROCEDURE — 87147 CULTURE TYPE IMMUNOLOGIC: CPT | Performed by: EMERGENCY MEDICINE

## 2022-01-01 PROCEDURE — 84145 PROCALCITONIN (PCT): CPT | Performed by: INTERNAL MEDICINE

## 2022-01-01 PROCEDURE — 36600 WITHDRAWAL OF ARTERIAL BLOOD: CPT

## 2022-01-01 PROCEDURE — 25010000002 PROPOFOL 200 MG/20ML EMULSION

## 2022-01-01 PROCEDURE — 82805 BLOOD GASES W/O2 SATURATION: CPT

## 2022-01-01 PROCEDURE — 94761 N-INVAS EAR/PLS OXIMETRY MLT: CPT

## 2022-01-01 PROCEDURE — 25010000002 MAGNESIUM SULFATE 2 GM/50ML SOLUTION: Performed by: INTERNAL MEDICINE

## 2022-01-01 PROCEDURE — 25010000002 DIPHENHYDRAMINE PER 50 MG: Performed by: INTERNAL MEDICINE

## 2022-01-01 PROCEDURE — 99222 1ST HOSP IP/OBS MODERATE 55: CPT

## 2022-01-01 PROCEDURE — 36415 COLL VENOUS BLD VENIPUNCTURE: CPT | Performed by: NURSE PRACTITIONER

## 2022-01-01 PROCEDURE — 71045 X-RAY EXAM CHEST 1 VIEW: CPT

## 2022-01-01 PROCEDURE — 87070 CULTURE OTHR SPECIMN AEROBIC: CPT | Performed by: INTERNAL MEDICINE

## 2022-01-01 PROCEDURE — 02HV33Z INSERTION OF INFUSION DEVICE INTO SUPERIOR VENA CAVA, PERCUTANEOUS APPROACH: ICD-10-PCS | Performed by: INTERNAL MEDICINE

## 2022-01-01 PROCEDURE — 92610 EVALUATE SWALLOWING FUNCTION: CPT

## 2022-01-01 PROCEDURE — 83880 ASSAY OF NATRIURETIC PEPTIDE: CPT

## 2022-01-01 PROCEDURE — 87186 SC STD MICRODIL/AGAR DIL: CPT

## 2022-01-01 PROCEDURE — 31500 INSERT EMERGENCY AIRWAY: CPT | Performed by: INTERNAL MEDICINE

## 2022-01-01 PROCEDURE — 83735 ASSAY OF MAGNESIUM: CPT | Performed by: EMERGENCY MEDICINE

## 2022-01-01 PROCEDURE — 84145 PROCALCITONIN (PCT): CPT | Performed by: EMERGENCY MEDICINE

## 2022-01-01 PROCEDURE — 63710000001 INSULIN ASPART PER 5 UNITS: Performed by: INTERNAL MEDICINE

## 2022-01-01 PROCEDURE — 5A1935Z RESPIRATORY VENTILATION, LESS THAN 24 CONSECUTIVE HOURS: ICD-10-PCS | Performed by: INTERNAL MEDICINE

## 2022-01-01 PROCEDURE — 99232 SBSQ HOSP IP/OBS MODERATE 35: CPT | Performed by: PSYCHIATRY & NEUROLOGY

## 2022-01-01 PROCEDURE — 83605 ASSAY OF LACTIC ACID: CPT | Performed by: EMERGENCY MEDICINE

## 2022-01-01 PROCEDURE — 83735 ASSAY OF MAGNESIUM: CPT | Performed by: NURSE PRACTITIONER

## 2022-01-01 PROCEDURE — 93970 EXTREMITY STUDY: CPT

## 2022-01-01 PROCEDURE — 25010000002 IOPAMIDOL 61 % SOLUTION: Performed by: INTERNAL MEDICINE

## 2022-01-01 PROCEDURE — 87186 SC STD MICRODIL/AGAR DIL: CPT | Performed by: EMERGENCY MEDICINE

## 2022-01-01 PROCEDURE — 87040 BLOOD CULTURE FOR BACTERIA: CPT | Performed by: EMERGENCY MEDICINE

## 2022-01-01 PROCEDURE — 99232 SBSQ HOSP IP/OBS MODERATE 35: CPT | Performed by: NURSE PRACTITIONER

## 2022-01-01 PROCEDURE — 87640 STAPH A DNA AMP PROBE: CPT | Performed by: INTERNAL MEDICINE

## 2022-01-01 PROCEDURE — 82375 ASSAY CARBOXYHB QUANT: CPT

## 2022-01-01 PROCEDURE — 99214 OFFICE O/P EST MOD 30 MIN: CPT | Performed by: NURSE PRACTITIONER

## 2022-01-01 PROCEDURE — 25010000002 MORPHINE PER 10 MG: Performed by: FAMILY MEDICINE

## 2022-01-01 PROCEDURE — 87040 BLOOD CULTURE FOR BACTERIA: CPT | Performed by: INTERNAL MEDICINE

## 2022-01-01 PROCEDURE — 84484 ASSAY OF TROPONIN QUANT: CPT | Performed by: EMERGENCY MEDICINE

## 2022-01-01 PROCEDURE — 25010000002 HEPARIN (PORCINE) PER 1000 UNITS: Performed by: STUDENT IN AN ORGANIZED HEALTH CARE EDUCATION/TRAINING PROGRAM

## 2022-01-01 PROCEDURE — 99203 OFFICE O/P NEW LOW 30 MIN: CPT | Performed by: INTERNAL MEDICINE

## 2022-01-01 PROCEDURE — 71275 CT ANGIOGRAPHY CHEST: CPT

## 2022-01-01 PROCEDURE — 85730 THROMBOPLASTIN TIME PARTIAL: CPT | Performed by: EMERGENCY MEDICINE

## 2022-01-01 PROCEDURE — 99232 SBSQ HOSP IP/OBS MODERATE 35: CPT | Performed by: INTERNAL MEDICINE

## 2022-01-01 PROCEDURE — 93005 ELECTROCARDIOGRAM TRACING: CPT | Performed by: EMERGENCY MEDICINE

## 2022-01-01 PROCEDURE — 94640 AIRWAY INHALATION TREATMENT: CPT

## 2022-01-01 PROCEDURE — 85025 COMPLETE CBC W/AUTO DIFF WBC: CPT | Performed by: EMERGENCY MEDICINE

## 2022-01-01 PROCEDURE — 87449 NOS EACH ORGANISM AG IA: CPT | Performed by: INTERNAL MEDICINE

## 2022-01-01 PROCEDURE — 94726 PLETHYSMOGRAPHY LUNG VOLUMES: CPT | Performed by: NURSE PRACTITIONER

## 2022-01-01 PROCEDURE — 85007 BL SMEAR W/DIFF WBC COUNT: CPT | Performed by: INTERNAL MEDICINE

## 2022-01-01 PROCEDURE — 84484 ASSAY OF TROPONIN QUANT: CPT | Performed by: INTERNAL MEDICINE

## 2022-01-01 PROCEDURE — 71260 CT THORAX DX C+: CPT

## 2022-01-01 PROCEDURE — 25010000002 CEFEPIME PER 500 MG: Performed by: INTERNAL MEDICINE

## 2022-01-01 PROCEDURE — 94664 DEMO&/EVAL PT USE INHALER: CPT

## 2022-01-01 PROCEDURE — 87077 CULTURE AEROBIC IDENTIFY: CPT

## 2022-01-01 PROCEDURE — 86140 C-REACTIVE PROTEIN: CPT | Performed by: EMERGENCY MEDICINE

## 2022-01-01 PROCEDURE — 25010000002 METHYLPREDNISOLONE PER 125 MG: Performed by: EMERGENCY MEDICINE

## 2022-01-01 PROCEDURE — 93970 EXTREMITY STUDY: CPT | Performed by: RADIOLOGY

## 2022-01-01 PROCEDURE — 99213 OFFICE O/P EST LOW 20 MIN: CPT | Performed by: INTERNAL MEDICINE

## 2022-01-01 PROCEDURE — 25010000002 LORAZEPAM PER 2 MG: Performed by: NURSE PRACTITIONER

## 2022-01-01 PROCEDURE — 85007 BL SMEAR W/DIFF WBC COUNT: CPT | Performed by: EMERGENCY MEDICINE

## 2022-01-01 PROCEDURE — 25010000002 MIDAZOLAM PER 1 MG

## 2022-01-01 PROCEDURE — 99213 OFFICE O/P EST LOW 20 MIN: CPT | Performed by: NURSE PRACTITIONER

## 2022-01-01 PROCEDURE — 83050 HGB METHEMOGLOBIN QUAN: CPT

## 2022-01-01 PROCEDURE — 99222 1ST HOSP IP/OBS MODERATE 55: CPT | Performed by: INTERNAL MEDICINE

## 2022-01-01 PROCEDURE — 36556 INSERT NON-TUNNEL CV CATH: CPT | Performed by: SURGERY

## 2022-01-01 PROCEDURE — 87150 DNA/RNA AMPLIFIED PROBE: CPT | Performed by: EMERGENCY MEDICINE

## 2022-01-01 PROCEDURE — 25010000002 CEFEPIME PER 500 MG: Performed by: EMERGENCY MEDICINE

## 2022-01-01 PROCEDURE — 93005 ELECTROCARDIOGRAM TRACING: CPT | Performed by: INTERNAL MEDICINE

## 2022-01-01 PROCEDURE — 99239 HOSP IP/OBS DSCHRG MGMT >30: CPT | Performed by: INTERNAL MEDICINE

## 2022-01-01 PROCEDURE — 36415 COLL VENOUS BLD VENIPUNCTURE: CPT

## 2022-01-01 PROCEDURE — 71260 CT THORAX DX C+: CPT | Performed by: RADIOLOGY

## 2022-01-01 PROCEDURE — 94002 VENT MGMT INPAT INIT DAY: CPT

## 2022-01-01 PROCEDURE — 25010000002 DIPHENHYDRAMINE PER 50 MG

## 2022-01-01 PROCEDURE — 0BH18EZ INSERTION OF ENDOTRACHEAL AIRWAY INTO TRACHEA, VIA NATURAL OR ARTIFICIAL OPENING ENDOSCOPIC: ICD-10-PCS | Performed by: INTERNAL MEDICINE

## 2022-01-01 PROCEDURE — 93306 TTE W/DOPPLER COMPLETE: CPT

## 2022-01-01 PROCEDURE — 94726 PLETHYSMOGRAPHY LUNG VOLUMES: CPT

## 2022-01-01 PROCEDURE — 87040 BLOOD CULTURE FOR BACTERIA: CPT | Performed by: NURSE PRACTITIONER

## 2022-01-01 PROCEDURE — 97163 PT EVAL HIGH COMPLEX 45 MIN: CPT

## 2022-01-01 PROCEDURE — 0 IOPAMIDOL PER 1 ML: Performed by: STUDENT IN AN ORGANIZED HEALTH CARE EDUCATION/TRAINING PROGRAM

## 2022-01-01 PROCEDURE — 87147 CULTURE TYPE IMMUNOLOGIC: CPT | Performed by: NURSE PRACTITIONER

## 2022-01-01 PROCEDURE — 80053 COMPREHEN METABOLIC PANEL: CPT | Performed by: EMERGENCY MEDICINE

## 2022-01-01 RX ORDER — ACETAMINOPHEN 500 MG
500 TABLET ORAL EVERY 6 HOURS PRN
COMMUNITY
End: 2022-01-01

## 2022-01-01 RX ORDER — AMLODIPINE BESYLATE 5 MG/1
5 TABLET ORAL
Status: DISCONTINUED | OUTPATIENT
Start: 2022-01-01 | End: 2022-01-01

## 2022-01-01 RX ORDER — IPRATROPIUM BROMIDE AND ALBUTEROL SULFATE 2.5; .5 MG/3ML; MG/3ML
3 SOLUTION RESPIRATORY (INHALATION) ONCE
Status: COMPLETED | OUTPATIENT
Start: 2022-01-01 | End: 2022-01-01

## 2022-01-01 RX ORDER — LISINOPRIL 20 MG/1
20 TABLET ORAL 2 TIMES DAILY
COMMUNITY
End: 2022-01-01

## 2022-01-01 RX ORDER — NITROGLYCERIN 0.4 MG/1
0.4 TABLET SUBLINGUAL
Status: CANCELLED | OUTPATIENT
Start: 2022-01-01

## 2022-01-01 RX ORDER — DIPHENHYDRAMINE HYDROCHLORIDE 50 MG/ML
50 INJECTION INTRAMUSCULAR; INTRAVENOUS ONCE
Status: COMPLETED | OUTPATIENT
Start: 2022-01-01 | End: 2022-01-01

## 2022-01-01 RX ORDER — DIPHENHYDRAMINE HYDROCHLORIDE 50 MG/ML
INJECTION INTRAMUSCULAR; INTRAVENOUS
Status: COMPLETED
Start: 2022-01-01 | End: 2022-01-01

## 2022-01-01 RX ORDER — IPRATROPIUM BROMIDE AND ALBUTEROL SULFATE 2.5; .5 MG/3ML; MG/3ML
3 SOLUTION RESPIRATORY (INHALATION) EVERY 6 HOURS PRN
Status: CANCELLED | OUTPATIENT
Start: 2022-01-01

## 2022-01-01 RX ORDER — MORPHINE SULFATE 2 MG/ML
2 INJECTION, SOLUTION INTRAMUSCULAR; INTRAVENOUS
Qty: 36 ML | Refills: 0 | Status: SHIPPED | OUTPATIENT
Start: 2022-01-01 | End: 2022-01-01

## 2022-01-01 RX ORDER — NOREPINEPHRINE BIT/0.9 % NACL 8 MG/250ML
.02-.3 INFUSION BOTTLE (ML) INTRAVENOUS
Status: DISCONTINUED | OUTPATIENT
Start: 2022-01-01 | End: 2022-01-01

## 2022-01-01 RX ORDER — SODIUM CHLORIDE 0.9 % (FLUSH) 0.9 %
10 SYRINGE (ML) INJECTION EVERY 12 HOURS SCHEDULED
Status: DISCONTINUED | OUTPATIENT
Start: 2022-01-01 | End: 2022-01-01 | Stop reason: HOSPADM

## 2022-01-01 RX ORDER — MAGNESIUM SULFATE HEPTAHYDRATE 40 MG/ML
2 INJECTION, SOLUTION INTRAVENOUS ONCE
Status: COMPLETED | OUTPATIENT
Start: 2022-01-01 | End: 2022-01-01

## 2022-01-01 RX ORDER — HEPARIN SODIUM 5000 [USP'U]/ML
30 INJECTION, SOLUTION INTRAVENOUS; SUBCUTANEOUS AS NEEDED
Status: DISCONTINUED | OUTPATIENT
Start: 2022-01-01 | End: 2022-01-01

## 2022-01-01 RX ORDER — ATORVASTATIN CALCIUM 10 MG/1
10 TABLET, FILM COATED ORAL DAILY
Status: CANCELLED | OUTPATIENT
Start: 2022-01-01

## 2022-01-01 RX ORDER — FUROSEMIDE 10 MG/ML
40 INJECTION INTRAMUSCULAR; INTRAVENOUS ONCE
Status: COMPLETED | OUTPATIENT
Start: 2022-01-01 | End: 2022-01-01

## 2022-01-01 RX ORDER — IPRATROPIUM BROMIDE AND ALBUTEROL SULFATE 2.5; .5 MG/3ML; MG/3ML
3 SOLUTION RESPIRATORY (INHALATION) EVERY 6 HOURS PRN
Status: DISCONTINUED | OUTPATIENT
Start: 2022-01-01 | End: 2022-01-01 | Stop reason: HOSPADM

## 2022-01-01 RX ORDER — BISACODYL 5 MG/1
5 TABLET, DELAYED RELEASE ORAL DAILY PRN
Status: DISCONTINUED | OUTPATIENT
Start: 2022-01-01 | End: 2022-01-01 | Stop reason: HOSPADM

## 2022-01-01 RX ORDER — LIDOCAINE 50 MG/G
1 PATCH TOPICAL
Status: DISCONTINUED | OUTPATIENT
Start: 2022-01-01 | End: 2022-01-01 | Stop reason: HOSPADM

## 2022-01-01 RX ORDER — METHYLPREDNISOLONE SODIUM SUCCINATE 40 MG/ML
40 INJECTION, POWDER, LYOPHILIZED, FOR SOLUTION INTRAMUSCULAR; INTRAVENOUS EVERY 12 HOURS
Status: DISCONTINUED | OUTPATIENT
Start: 2022-01-01 | End: 2022-01-01

## 2022-01-01 RX ORDER — SODIUM CHLORIDE 0.9 % (FLUSH) 0.9 %
10 SYRINGE (ML) INJECTION AS NEEDED
Status: DISCONTINUED | OUTPATIENT
Start: 2022-01-01 | End: 2022-01-01 | Stop reason: HOSPADM

## 2022-01-01 RX ORDER — CLOPIDOGREL BISULFATE 75 MG/1
75 TABLET ORAL DAILY
Status: CANCELLED | OUTPATIENT
Start: 2022-01-01 | End: 2022-12-27

## 2022-01-01 RX ORDER — IBUPROFEN 600 MG/1
600 TABLET ORAL EVERY 6 HOURS PRN
Status: CANCELLED | OUTPATIENT
Start: 2022-01-01

## 2022-01-01 RX ORDER — HYDROXYZINE HYDROCHLORIDE 25 MG/1
25 TABLET, FILM COATED ORAL 3 TIMES DAILY PRN
Status: DISCONTINUED | OUTPATIENT
Start: 2022-01-01 | End: 2022-01-01 | Stop reason: HOSPADM

## 2022-01-01 RX ORDER — GLYCOPYRROLATE 0.2 MG/ML
0.2 INJECTION INTRAMUSCULAR; INTRAVENOUS EVERY 4 HOURS PRN
Status: DISCONTINUED | OUTPATIENT
Start: 2022-01-01 | End: 2022-01-01 | Stop reason: HOSPADM

## 2022-01-01 RX ORDER — HYDROXYZINE PAMOATE 25 MG/1
25 CAPSULE ORAL EVERY 6 HOURS PRN
COMMUNITY
Start: 2022-01-01 | End: 2022-01-01

## 2022-01-01 RX ORDER — ACETAMINOPHEN 325 MG/1
650 TABLET ORAL EVERY 6 HOURS PRN
Status: DISCONTINUED | OUTPATIENT
Start: 2022-01-01 | End: 2022-01-01 | Stop reason: HOSPADM

## 2022-01-01 RX ORDER — FENTANYL CITRATE/PF 100MCG/2ML
SYRINGE (ML) INTRAVENOUS
Status: DISCONTINUED | OUTPATIENT
Start: 2022-01-01 | End: 2022-01-01

## 2022-01-01 RX ORDER — METHYLPREDNISOLONE SODIUM SUCCINATE 125 MG/2ML
125 INJECTION, POWDER, LYOPHILIZED, FOR SOLUTION INTRAMUSCULAR; INTRAVENOUS ONCE
Status: COMPLETED | OUTPATIENT
Start: 2022-01-01 | End: 2022-01-01

## 2022-01-01 RX ORDER — FAMOTIDINE 10 MG/ML
20 INJECTION, SOLUTION INTRAVENOUS 2 TIMES DAILY
Status: DISCONTINUED | OUTPATIENT
Start: 2022-01-01 | End: 2022-01-01 | Stop reason: HOSPADM

## 2022-01-01 RX ORDER — FLUTICASONE PROPIONATE 50 MCG
1 SPRAY, SUSPENSION (ML) NASAL EVERY 12 HOURS
Status: CANCELLED | OUTPATIENT
Start: 2022-01-01

## 2022-01-01 RX ORDER — GLIPIZIDE 5 MG/1
10 TABLET ORAL
Status: CANCELLED | OUTPATIENT
Start: 2022-01-01

## 2022-01-01 RX ORDER — OXYCODONE AND ACETAMINOPHEN 7.5; 325 MG/1; MG/1
1 TABLET ORAL ONCE
Status: COMPLETED | OUTPATIENT
Start: 2022-01-01 | End: 2022-01-01

## 2022-01-01 RX ORDER — MAGNESIUM SULFATE HEPTAHYDRATE 40 MG/ML
2 INJECTION, SOLUTION INTRAVENOUS AS NEEDED
Status: DISCONTINUED | OUTPATIENT
Start: 2022-01-01 | End: 2022-01-01

## 2022-01-01 RX ORDER — OXYCODONE AND ACETAMINOPHEN 7.5; 325 MG/1; MG/1
1 TABLET ORAL EVERY 6 HOURS PRN
Status: CANCELLED | OUTPATIENT
Start: 2022-01-01

## 2022-01-01 RX ORDER — HALOPERIDOL 5 MG/ML
1 INJECTION INTRAMUSCULAR ONCE
Status: COMPLETED | OUTPATIENT
Start: 2022-01-01 | End: 2022-01-01

## 2022-01-01 RX ORDER — DEXAMETHASONE 4 MG/1
4 TABLET ORAL
COMMUNITY
End: 2022-01-01

## 2022-01-01 RX ORDER — LORAZEPAM 2 MG/ML
0.5 INJECTION INTRAMUSCULAR EVERY 8 HOURS PRN
Status: DISCONTINUED | OUTPATIENT
Start: 2022-01-01 | End: 2022-01-01

## 2022-01-01 RX ORDER — ALUMINA, MAGNESIA, AND SIMETHICONE 2400; 2400; 240 MG/30ML; MG/30ML; MG/30ML
20 SUSPENSION ORAL EVERY 6 HOURS PRN
COMMUNITY
End: 2022-01-01

## 2022-01-01 RX ORDER — POLYETHYLENE GLYCOL 3350 17 G/17G
17 POWDER, FOR SOLUTION ORAL DAILY
Status: DISCONTINUED | OUTPATIENT
Start: 2022-01-01 | End: 2022-01-01 | Stop reason: HOSPADM

## 2022-01-01 RX ORDER — MORPHINE SULFATE 2 MG/ML
2 INJECTION, SOLUTION INTRAMUSCULAR; INTRAVENOUS ONCE
Status: COMPLETED | OUTPATIENT
Start: 2022-01-01 | End: 2022-01-01

## 2022-01-01 RX ORDER — CARVEDILOL 12.5 MG/1
12.5 TABLET ORAL 2 TIMES DAILY WITH MEALS
Qty: 60 TABLET | Refills: 1 | Status: SHIPPED | OUTPATIENT
Start: 2022-01-01 | End: 2022-01-01

## 2022-01-01 RX ORDER — SODIUM CHLORIDE 0.9 % (FLUSH) 0.9 %
20 SYRINGE (ML) INJECTION AS NEEDED
Status: DISCONTINUED | OUTPATIENT
Start: 2022-01-01 | End: 2022-01-01 | Stop reason: HOSPADM

## 2022-01-01 RX ORDER — MIDAZOLAM HYDROCHLORIDE 1 MG/ML
4 INJECTION INTRAMUSCULAR; INTRAVENOUS ONCE
Status: COMPLETED | OUTPATIENT
Start: 2022-01-01 | End: 2022-01-01

## 2022-01-01 RX ORDER — LORAZEPAM 2 MG/ML
1 INJECTION INTRAMUSCULAR EVERY 4 HOURS PRN
Status: DISCONTINUED | OUTPATIENT
Start: 2022-01-01 | End: 2022-01-01 | Stop reason: HOSPADM

## 2022-01-01 RX ORDER — ONDANSETRON 2 MG/ML
4 INJECTION INTRAMUSCULAR; INTRAVENOUS EVERY 6 HOURS PRN
Status: DISCONTINUED | OUTPATIENT
Start: 2022-01-01 | End: 2022-01-01 | Stop reason: HOSPADM

## 2022-01-01 RX ORDER — LISINOPRIL 10 MG/1
20 TABLET ORAL 2 TIMES DAILY
Status: CANCELLED | OUTPATIENT
Start: 2022-01-01

## 2022-01-01 RX ORDER — ALBUTEROL SULFATE 2.5 MG/3ML
2.5 SOLUTION RESPIRATORY (INHALATION) EVERY 4 HOURS PRN
Status: CANCELLED | OUTPATIENT
Start: 2022-01-01

## 2022-01-01 RX ORDER — HYDRALAZINE HYDROCHLORIDE 50 MG/1
50 TABLET, FILM COATED ORAL 3 TIMES DAILY
Status: CANCELLED | OUTPATIENT
Start: 2022-01-01

## 2022-01-01 RX ORDER — POTASSIUM CHLORIDE 750 MG/1
10 TABLET, FILM COATED, EXTENDED RELEASE ORAL DAILY
Qty: 5 TABLET | Refills: 0 | Status: SHIPPED | OUTPATIENT
Start: 2022-01-01 | End: 2022-01-01

## 2022-01-01 RX ORDER — DIPHENHYDRAMINE HYDROCHLORIDE 50 MG/ML
12.5 INJECTION INTRAMUSCULAR; INTRAVENOUS EVERY 6 HOURS PRN
Status: DISCONTINUED | OUTPATIENT
Start: 2022-01-01 | End: 2022-01-01 | Stop reason: HOSPADM

## 2022-01-01 RX ORDER — GUAIFENESIN 600 MG/1
1200 TABLET, EXTENDED RELEASE ORAL EVERY 12 HOURS SCHEDULED
Status: DISCONTINUED | OUTPATIENT
Start: 2022-01-01 | End: 2022-01-01 | Stop reason: HOSPADM

## 2022-01-01 RX ORDER — HYDROXYZINE HYDROCHLORIDE 25 MG/1
25 TABLET, FILM COATED ORAL 3 TIMES DAILY PRN
Status: CANCELLED | OUTPATIENT
Start: 2022-01-01

## 2022-01-01 RX ORDER — MIDAZOLAM HYDROCHLORIDE 1 MG/ML
INJECTION INTRAMUSCULAR; INTRAVENOUS
Status: COMPLETED
Start: 2022-01-01 | End: 2022-01-01

## 2022-01-01 RX ORDER — DILTIAZEM HCL IN NACL,ISO-OSM 125 MG/125
5-15 PLASTIC BAG, INJECTION (ML) INTRAVENOUS
Status: DISCONTINUED | OUTPATIENT
Start: 2022-01-01 | End: 2022-01-01

## 2022-01-01 RX ORDER — BUMETANIDE 0.5 MG/1
0.5 TABLET ORAL DAILY
Qty: 30 TABLET | Refills: 1 | Status: SHIPPED | OUTPATIENT
Start: 2022-01-01 | End: 2022-01-01

## 2022-01-01 RX ORDER — HEPARIN SOD,PORCINE/0.9 % NACL 25000/250
12 INTRAVENOUS SOLUTION INTRAVENOUS
Status: DISCONTINUED | OUTPATIENT
Start: 2022-01-01 | End: 2022-01-01

## 2022-01-01 RX ORDER — BISACODYL 5 MG/1
5 TABLET, DELAYED RELEASE ORAL DAILY PRN
COMMUNITY
End: 2022-01-01

## 2022-01-01 RX ORDER — POTASSIUM CHLORIDE 7.45 MG/ML
10 INJECTION INTRAVENOUS
Status: DISCONTINUED | OUTPATIENT
Start: 2022-01-01 | End: 2022-01-01

## 2022-01-01 RX ORDER — LORAZEPAM 0.5 MG/1
0.5 TABLET ORAL EVERY 6 HOURS PRN
Status: CANCELLED | OUTPATIENT
Start: 2022-01-01

## 2022-01-01 RX ORDER — GLYCOPYRROLATE 0.2 MG/ML
0.2 INJECTION INTRAMUSCULAR; INTRAVENOUS EVERY 4 HOURS PRN
Qty: 18 ML | Refills: 0 | Status: SHIPPED | OUTPATIENT
Start: 2022-01-01 | End: 2022-01-01

## 2022-01-01 RX ORDER — ERGOCALCIFEROL 1.25 MG/1
50000 CAPSULE ORAL WEEKLY
COMMUNITY
End: 2022-01-01

## 2022-01-01 RX ORDER — DILTIAZEM HYDROCHLORIDE 5 MG/ML
15 INJECTION INTRAVENOUS ONCE
Status: COMPLETED | OUTPATIENT
Start: 2022-01-01 | End: 2022-01-01

## 2022-01-01 RX ORDER — LISINOPRIL 20 MG/1
20 TABLET ORAL DAILY
Qty: 30 TABLET | Refills: 0 | Status: ON HOLD
Start: 2022-01-01 | End: 2022-01-01

## 2022-01-01 RX ORDER — HEPARIN SODIUM 5000 [USP'U]/ML
60 INJECTION, SOLUTION INTRAVENOUS; SUBCUTANEOUS AS NEEDED
Status: DISCONTINUED | OUTPATIENT
Start: 2022-01-01 | End: 2022-01-01

## 2022-01-01 RX ORDER — LORAZEPAM 2 MG/ML
1 INJECTION INTRAMUSCULAR EVERY 4 HOURS PRN
Qty: 9 ML | Refills: 0 | Status: SHIPPED | OUTPATIENT
Start: 2022-01-01 | End: 2022-01-01

## 2022-01-01 RX ORDER — BUMETANIDE 1 MG/1
0.5 TABLET ORAL DAILY
Status: CANCELLED | OUTPATIENT
Start: 2022-01-01

## 2022-01-01 RX ORDER — LORAZEPAM 0.5 MG/1
0.5 TABLET ORAL EVERY 6 HOURS PRN
COMMUNITY
End: 2022-01-01

## 2022-01-01 RX ORDER — DIPHENHYDRAMINE HCL 25 MG
25 CAPSULE ORAL EVERY 8 HOURS
COMMUNITY
End: 2022-01-01

## 2022-01-01 RX ORDER — INSULIN ASPART 100 [IU]/ML
0-9 INJECTION, SOLUTION INTRAVENOUS; SUBCUTANEOUS
Status: DISCONTINUED | OUTPATIENT
Start: 2022-01-01 | End: 2022-01-01

## 2022-01-01 RX ORDER — CHLORHEXIDINE GLUCONATE 0.12 MG/ML
15 RINSE ORAL EVERY 12 HOURS SCHEDULED
Status: DISCONTINUED | OUTPATIENT
Start: 2022-01-01 | End: 2022-01-01 | Stop reason: HOSPADM

## 2022-01-01 RX ORDER — PANTOPRAZOLE SODIUM 40 MG/1
40 TABLET, DELAYED RELEASE ORAL DAILY
Status: CANCELLED | OUTPATIENT
Start: 2022-01-01

## 2022-01-01 RX ORDER — HALOPERIDOL 5 MG/ML
0.5 INJECTION INTRAMUSCULAR EVERY 6 HOURS PRN
Status: DISCONTINUED | OUTPATIENT
Start: 2022-01-01 | End: 2022-01-01 | Stop reason: HOSPADM

## 2022-01-01 RX ORDER — OXYCODONE AND ACETAMINOPHEN 7.5; 325 MG/1; MG/1
1 TABLET ORAL EVERY 6 HOURS PRN
Status: DISCONTINUED | OUTPATIENT
Start: 2022-01-01 | End: 2022-01-01

## 2022-01-01 RX ORDER — SODIUM CHLORIDE FOR INHALATION 0.9 %
3 VIAL, NEBULIZER (ML) INHALATION
Status: DISCONTINUED | OUTPATIENT
Start: 2022-01-01 | End: 2022-01-01

## 2022-01-01 RX ORDER — HEPARIN SODIUM 5000 [USP'U]/ML
60 INJECTION, SOLUTION INTRAVENOUS; SUBCUTANEOUS ONCE
Status: COMPLETED | OUTPATIENT
Start: 2022-01-01 | End: 2022-01-01

## 2022-01-01 RX ORDER — MORPHINE SULFATE 2 MG/ML
2 INJECTION, SOLUTION INTRAMUSCULAR; INTRAVENOUS
Status: DISCONTINUED | OUTPATIENT
Start: 2022-01-01 | End: 2022-01-01 | Stop reason: HOSPADM

## 2022-01-01 RX ORDER — METHYLPREDNISOLONE SODIUM SUCCINATE 40 MG/ML
40 INJECTION, POWDER, LYOPHILIZED, FOR SOLUTION INTRAMUSCULAR; INTRAVENOUS DAILY
Status: DISCONTINUED | OUTPATIENT
Start: 2022-01-01 | End: 2022-01-01

## 2022-01-01 RX ORDER — MORPHINE SULFATE 2 MG/ML
2 INJECTION, SOLUTION INTRAMUSCULAR; INTRAVENOUS
Status: DISCONTINUED | OUTPATIENT
Start: 2022-01-01 | End: 2022-01-01

## 2022-01-01 RX ORDER — NAPROXEN 250 MG/1
250 TABLET ORAL 2 TIMES DAILY WITH MEALS
Status: CANCELLED | OUTPATIENT
Start: 2022-01-01

## 2022-01-01 RX ORDER — OXYCODONE AND ACETAMINOPHEN 7.5; 325 MG/1; MG/1
1 TABLET ORAL EVERY 4 HOURS PRN
Status: DISCONTINUED | OUTPATIENT
Start: 2022-01-01 | End: 2022-01-01 | Stop reason: HOSPADM

## 2022-01-01 RX ORDER — HALOPERIDOL 5 MG/ML
0.5 INJECTION INTRAMUSCULAR EVERY 6 HOURS PRN
Qty: 1.2 ML | Refills: 0 | Status: SHIPPED | OUTPATIENT
Start: 2022-01-01 | End: 2022-01-01

## 2022-01-01 RX ORDER — DEXTROSE MONOHYDRATE 25 G/50ML
25 INJECTION, SOLUTION INTRAVENOUS
Status: DISCONTINUED | OUTPATIENT
Start: 2022-01-01 | End: 2022-01-01

## 2022-01-01 RX ORDER — DIPHENHYDRAMINE HCL 25 MG
25 CAPSULE ORAL EVERY 8 HOURS
Status: CANCELLED | OUTPATIENT
Start: 2022-01-01

## 2022-01-01 RX ORDER — NITROGLYCERIN 0.4 MG/1
0.4 TABLET SUBLINGUAL
COMMUNITY
End: 2022-01-01

## 2022-01-01 RX ORDER — IPRATROPIUM BROMIDE AND ALBUTEROL SULFATE 2.5; .5 MG/3ML; MG/3ML
3 SOLUTION RESPIRATORY (INHALATION) EVERY 6 HOURS PRN
COMMUNITY

## 2022-01-01 RX ORDER — FLUTICASONE PROPIONATE 50 MCG
1 SPRAY, SUSPENSION (ML) NASAL EVERY 12 HOURS
COMMUNITY
End: 2022-01-01

## 2022-01-01 RX ORDER — DEXAMETHASONE 4 MG/1
4 TABLET ORAL
Status: CANCELLED | OUTPATIENT
Start: 2022-01-01

## 2022-01-01 RX ORDER — MAGNESIUM SULFATE HEPTAHYDRATE 40 MG/ML
4 INJECTION, SOLUTION INTRAVENOUS AS NEEDED
Status: DISCONTINUED | OUTPATIENT
Start: 2022-01-01 | End: 2022-01-01

## 2022-01-01 RX ORDER — GABAPENTIN 600 MG/1
600 TABLET ORAL EVERY 6 HOURS
COMMUNITY

## 2022-01-01 RX ORDER — IBUPROFEN 600 MG/1
600 TABLET ORAL EVERY 6 HOURS PRN
COMMUNITY
End: 2022-01-01

## 2022-01-01 RX ORDER — ALBUTEROL SULFATE 90 UG/1
2 AEROSOL, METERED RESPIRATORY (INHALATION) EVERY 4 HOURS PRN
COMMUNITY
End: 2022-01-01

## 2022-01-01 RX ORDER — IPRATROPIUM BROMIDE AND ALBUTEROL SULFATE 2.5; .5 MG/3ML; MG/3ML
3 SOLUTION RESPIRATORY (INHALATION)
Status: DISCONTINUED | OUTPATIENT
Start: 2022-01-01 | End: 2022-01-01

## 2022-01-01 RX ORDER — POTASSIUM CHLORIDE 1.5 G/1.77G
40 POWDER, FOR SOLUTION ORAL AS NEEDED
Status: DISCONTINUED | OUTPATIENT
Start: 2022-01-01 | End: 2022-01-01

## 2022-01-01 RX ORDER — POLYETHYLENE GLYCOL 3350 17 G/17G
17 POWDER, FOR SOLUTION ORAL DAILY
COMMUNITY

## 2022-01-01 RX ORDER — POTASSIUM CHLORIDE 1500 MG/1
40 TABLET, FILM COATED, EXTENDED RELEASE ORAL AS NEEDED
Status: DISCONTINUED | OUTPATIENT
Start: 2022-01-01 | End: 2022-01-01

## 2022-01-01 RX ORDER — ALUMINA, MAGNESIA, AND SIMETHICONE 2400; 2400; 240 MG/30ML; MG/30ML; MG/30ML
20 SUSPENSION ORAL EVERY 6 HOURS PRN
Status: CANCELLED | OUTPATIENT
Start: 2022-01-01

## 2022-01-01 RX ORDER — NICOTINE POLACRILEX 4 MG
15 LOZENGE BUCCAL
Status: DISCONTINUED | OUTPATIENT
Start: 2022-01-01 | End: 2022-01-01

## 2022-01-01 RX ORDER — LORAZEPAM 2 MG/ML
1 INJECTION INTRAMUSCULAR ONCE
Status: COMPLETED | OUTPATIENT
Start: 2022-01-01 | End: 2022-01-01

## 2022-01-01 RX ORDER — GABAPENTIN 300 MG/1
600 CAPSULE ORAL EVERY 6 HOURS
Status: DISCONTINUED | OUTPATIENT
Start: 2022-01-01 | End: 2022-01-01 | Stop reason: HOSPADM

## 2022-01-01 RX ORDER — CARVEDILOL 6.25 MG/1
12.5 TABLET ORAL 2 TIMES DAILY WITH MEALS
Status: CANCELLED | OUTPATIENT
Start: 2022-01-01

## 2022-01-01 RX ORDER — ONDANSETRON 2 MG/ML
4 INJECTION INTRAMUSCULAR; INTRAVENOUS EVERY 6 HOURS PRN
Qty: 24 ML | Refills: 0 | Status: SHIPPED | OUTPATIENT
Start: 2022-01-01 | End: 2022-01-01

## 2022-01-01 RX ADMIN — Medication 10 ML: at 21:09

## 2022-01-01 RX ADMIN — INSULIN ASPART 2 UNITS: 100 INJECTION, SOLUTION INTRAVENOUS; SUBCUTANEOUS at 09:01

## 2022-01-01 RX ADMIN — MORPHINE SULFATE 2 MG: 2 INJECTION, SOLUTION INTRAMUSCULAR; INTRAVENOUS at 15:48

## 2022-01-01 RX ADMIN — GUAIFENESIN 1200 MG: 600 TABLET, EXTENDED RELEASE ORAL at 09:51

## 2022-01-01 RX ADMIN — MORPHINE SULFATE 2 MG: 2 INJECTION, SOLUTION INTRAMUSCULAR; INTRAVENOUS at 21:07

## 2022-01-01 RX ADMIN — Medication 10 ML: at 20:26

## 2022-01-01 RX ADMIN — CEFEPIME HYDROCHLORIDE 2 G: 2 INJECTION, POWDER, FOR SOLUTION INTRAVENOUS at 21:01

## 2022-01-01 RX ADMIN — GUAIFENESIN 1200 MG: 600 TABLET, EXTENDED RELEASE ORAL at 20:56

## 2022-01-01 RX ADMIN — Medication: at 05:56

## 2022-01-01 RX ADMIN — Medication 10 ML: at 08:25

## 2022-01-01 RX ADMIN — MORPHINE SULFATE 2 MG: 2 INJECTION, SOLUTION INTRAMUSCULAR; INTRAVENOUS at 20:44

## 2022-01-01 RX ADMIN — HALOPERIDOL LACTATE 1 MG: 5 INJECTION, SOLUTION INTRAMUSCULAR at 23:56

## 2022-01-01 RX ADMIN — Medication 10 ML: at 08:09

## 2022-01-01 RX ADMIN — FAMOTIDINE 20 MG: 10 INJECTION, SOLUTION INTRAVENOUS at 20:26

## 2022-01-01 RX ADMIN — FAMOTIDINE 20 MG: 10 INJECTION, SOLUTION INTRAVENOUS at 09:51

## 2022-01-01 RX ADMIN — METOPROLOL TARTRATE 25 MG: 25 TABLET, FILM COATED ORAL at 21:00

## 2022-01-01 RX ADMIN — OXYCODONE HYDROCHLORIDE AND ACETAMINOPHEN 1 TABLET: 7.5; 325 TABLET ORAL at 00:24

## 2022-01-01 RX ADMIN — OXYCODONE HYDROCHLORIDE AND ACETAMINOPHEN 1 TABLET: 7.5; 325 TABLET ORAL at 10:05

## 2022-01-01 RX ADMIN — MAGNESIUM SULFATE HEPTAHYDRATE 2 G: 40 INJECTION, SOLUTION INTRAVENOUS at 09:11

## 2022-01-01 RX ADMIN — MORPHINE SULFATE 2 MG: 2 INJECTION, SOLUTION INTRAMUSCULAR; INTRAVENOUS at 06:52

## 2022-01-01 RX ADMIN — NYSTATIN 500000 UNITS: 100000 SUSPENSION ORAL at 17:35

## 2022-01-01 RX ADMIN — IOPAMIDOL 100 ML: 755 INJECTION, SOLUTION INTRAVENOUS at 07:48

## 2022-01-01 RX ADMIN — CEFEPIME HYDROCHLORIDE 2 G: 2 INJECTION, POWDER, FOR SOLUTION INTRAVENOUS at 15:25

## 2022-01-01 RX ADMIN — INSULIN ASPART 4 UNITS: 100 INJECTION, SOLUTION INTRAVENOUS; SUBCUTANEOUS at 17:47

## 2022-01-01 RX ADMIN — DEXMEDETOMIDINE HYDROCHLORIDE 1.4 MCG/KG/HR: 100 INJECTION, SOLUTION INTRAVENOUS at 18:49

## 2022-01-01 RX ADMIN — NYSTATIN 500000 UNITS: 100000 SUSPENSION ORAL at 21:09

## 2022-01-01 RX ADMIN — NYSTATIN 500000 UNITS: 100000 SUSPENSION ORAL at 11:12

## 2022-01-01 RX ADMIN — METHYLPREDNISOLONE SODIUM SUCCINATE 40 MG: 40 INJECTION, POWDER, FOR SOLUTION INTRAMUSCULAR; INTRAVENOUS at 05:12

## 2022-01-01 RX ADMIN — IPRATROPIUM BROMIDE AND ALBUTEROL SULFATE 3 ML: .5; 3 SOLUTION RESPIRATORY (INHALATION) at 06:20

## 2022-01-01 RX ADMIN — NYSTATIN 500000 UNITS: 100000 SUSPENSION ORAL at 17:15

## 2022-01-01 RX ADMIN — DIPHENHYDRAMINE HYDROCHLORIDE 50 MG: 50 INJECTION INTRAMUSCULAR; INTRAVENOUS at 23:47

## 2022-01-01 RX ADMIN — METHYLPREDNISOLONE SODIUM SUCCINATE 125 MG: 125 INJECTION, POWDER, FOR SOLUTION INTRAMUSCULAR; INTRAVENOUS at 05:42

## 2022-01-01 RX ADMIN — HEPARIN SODIUM 20 UNITS/KG/HR: 5000 INJECTION INTRAVENOUS; SUBCUTANEOUS at 00:34

## 2022-01-01 RX ADMIN — LIDOCAINE 1 PATCH: 50 PATCH CUTANEOUS at 09:00

## 2022-01-01 RX ADMIN — OXYCODONE HYDROCHLORIDE AND ACETAMINOPHEN 1 TABLET: 7.5; 325 TABLET ORAL at 04:38

## 2022-01-01 RX ADMIN — OXYCODONE HYDROCHLORIDE AND ACETAMINOPHEN 1 TABLET: 7.5; 325 TABLET ORAL at 05:21

## 2022-01-01 RX ADMIN — INSULIN ASPART 4 UNITS: 100 INJECTION, SOLUTION INTRAVENOUS; SUBCUTANEOUS at 12:51

## 2022-01-01 RX ADMIN — METOPROLOL TARTRATE 25 MG: 25 TABLET, FILM COATED ORAL at 14:41

## 2022-01-01 RX ADMIN — MORPHINE SULFATE 2 MG: 2 INJECTION, SOLUTION INTRAMUSCULAR; INTRAVENOUS at 03:43

## 2022-01-01 RX ADMIN — LIDOCAINE 1 PATCH: 50 PATCH CUTANEOUS at 08:25

## 2022-01-01 RX ADMIN — IPRATROPIUM BROMIDE AND ALBUTEROL SULFATE 3 ML: .5; 3 SOLUTION RESPIRATORY (INHALATION) at 12:52

## 2022-01-01 RX ADMIN — HEPARIN SODIUM 12 UNITS/KG/HR: 5000 INJECTION INTRAVENOUS; SUBCUTANEOUS at 08:16

## 2022-01-01 RX ADMIN — IPRATROPIUM BROMIDE AND ALBUTEROL SULFATE 3 ML: .5; 3 SOLUTION RESPIRATORY (INHALATION) at 07:00

## 2022-01-01 RX ADMIN — HYDROXYZINE HYDROCHLORIDE 25 MG: 25 TABLET ORAL at 12:39

## 2022-01-01 RX ADMIN — LIDOCAINE 1 PATCH: 50 PATCH CUTANEOUS at 09:10

## 2022-01-01 RX ADMIN — OXYCODONE HYDROCHLORIDE AND ACETAMINOPHEN 1 TABLET: 7.5; 325 TABLET ORAL at 12:51

## 2022-01-01 RX ADMIN — GUAIFENESIN 1200 MG: 600 TABLET, EXTENDED RELEASE ORAL at 20:31

## 2022-01-01 RX ADMIN — Medication 10 ML: at 20:31

## 2022-01-01 RX ADMIN — LORAZEPAM 1 MG: 2 INJECTION INTRAMUSCULAR; INTRAVENOUS at 15:48

## 2022-01-01 RX ADMIN — DOXYCYCLINE 100 MG: 100 INJECTION, POWDER, LYOPHILIZED, FOR SOLUTION INTRAVENOUS at 10:11

## 2022-01-01 RX ADMIN — Medication: at 11:13

## 2022-01-01 RX ADMIN — OXYCODONE HYDROCHLORIDE AND ACETAMINOPHEN 1 TABLET: 7.5; 325 TABLET ORAL at 14:03

## 2022-01-01 RX ADMIN — GUAIFENESIN 1200 MG: 600 TABLET, EXTENDED RELEASE ORAL at 08:09

## 2022-01-01 RX ADMIN — FUROSEMIDE 40 MG: 10 INJECTION, SOLUTION INTRAVENOUS at 23:33

## 2022-01-01 RX ADMIN — Medication 10 ML: at 20:57

## 2022-01-01 RX ADMIN — LORAZEPAM 0.5 MG: 2 INJECTION INTRAMUSCULAR at 20:31

## 2022-01-01 RX ADMIN — Medication 10 ML: at 20:27

## 2022-01-01 RX ADMIN — LIDOCAINE 1 PATCH: 50 PATCH CUTANEOUS at 04:01

## 2022-01-01 RX ADMIN — INSULIN ASPART 4 UNITS: 100 INJECTION, SOLUTION INTRAVENOUS; SUBCUTANEOUS at 17:32

## 2022-01-01 RX ADMIN — Medication 10 ML: at 09:51

## 2022-01-01 RX ADMIN — LORAZEPAM 1 MG: 2 INJECTION INTRAMUSCULAR; INTRAVENOUS at 11:43

## 2022-01-01 RX ADMIN — OXYCODONE HYDROCHLORIDE AND ACETAMINOPHEN 1 TABLET: 7.5; 325 TABLET ORAL at 19:49

## 2022-01-01 RX ADMIN — MORPHINE SULFATE 2 MG: 2 INJECTION, SOLUTION INTRAMUSCULAR; INTRAVENOUS at 20:32

## 2022-01-01 RX ADMIN — METHYLPREDNISOLONE SODIUM SUCCINATE 40 MG: 40 INJECTION, POWDER, FOR SOLUTION INTRAMUSCULAR; INTRAVENOUS at 05:02

## 2022-01-01 RX ADMIN — CHLORHEXIDINE GLUCONATE 15 ML: 1.2 RINSE ORAL at 08:24

## 2022-01-01 RX ADMIN — HALOPERIDOL LACTATE 0.5 MG: 5 INJECTION, SOLUTION INTRAMUSCULAR at 03:51

## 2022-01-01 RX ADMIN — APIXABAN 5 MG: 5 TABLET, FILM COATED ORAL at 21:09

## 2022-01-01 RX ADMIN — IOPAMIDOL 70 ML: 612 INJECTION, SOLUTION INTRAVENOUS at 08:56

## 2022-01-01 RX ADMIN — ACETAMINOPHEN 325MG 650 MG: 325 TABLET ORAL at 15:25

## 2022-01-01 RX ADMIN — DEXMEDETOMIDINE HYDROCHLORIDE 0.2 MCG/KG/HR: 100 INJECTION, SOLUTION INTRAVENOUS at 05:38

## 2022-01-01 RX ADMIN — HEPARIN SODIUM 18 UNITS/KG/HR: 5000 INJECTION INTRAVENOUS; SUBCUTANEOUS at 08:10

## 2022-01-01 RX ADMIN — NAFCILLIN INJECTION 12 G: 2 POWDER, FOR SOLUTION INTRAMUSCULAR; INTRAMUSCULAR; INTRAVENOUS at 20:26

## 2022-01-01 RX ADMIN — DILTIAZEM HYDROCHLORIDE 15 MG: 5 INJECTION INTRAVENOUS at 05:44

## 2022-01-01 RX ADMIN — DIPHENHYDRAMINE HYDROCHLORIDE 50 MG: 50 INJECTION INTRAMUSCULAR; INTRAVENOUS at 23:07

## 2022-01-01 RX ADMIN — Medication 0.02 MCG/KG/MIN: at 08:10

## 2022-01-01 RX ADMIN — SODIUM CHLORIDE 1000 ML: 9 INJECTION, SOLUTION INTRAVENOUS at 05:32

## 2022-01-01 RX ADMIN — OXYCODONE HYDROCHLORIDE AND ACETAMINOPHEN 1 TABLET: 7.5; 325 TABLET ORAL at 20:31

## 2022-01-01 RX ADMIN — INSULIN ASPART 2 UNITS: 100 INJECTION, SOLUTION INTRAVENOUS; SUBCUTANEOUS at 17:14

## 2022-01-01 RX ADMIN — DOXYCYCLINE 100 MG: 100 INJECTION, POWDER, LYOPHILIZED, FOR SOLUTION INTRAVENOUS at 11:23

## 2022-01-01 RX ADMIN — MIDAZOLAM HYDROCHLORIDE 4 MG: 1 INJECTION, SOLUTION INTRAMUSCULAR; INTRAVENOUS at 05:34

## 2022-01-01 RX ADMIN — Medication 10 ML: at 11:04

## 2022-01-01 RX ADMIN — CEFEPIME HYDROCHLORIDE 2 G: 2 INJECTION, POWDER, FOR SOLUTION INTRAVENOUS at 05:52

## 2022-01-01 RX ADMIN — NYSTATIN 500000 UNITS: 100000 SUSPENSION ORAL at 18:16

## 2022-01-01 RX ADMIN — LORAZEPAM 1 MG: 2 INJECTION INTRAMUSCULAR; INTRAVENOUS at 20:44

## 2022-01-01 RX ADMIN — LORAZEPAM 1 MG: 2 INJECTION INTRAMUSCULAR; INTRAVENOUS at 16:50

## 2022-01-01 RX ADMIN — FUROSEMIDE 40 MG: 10 INJECTION, SOLUTION INTRAMUSCULAR; INTRAVENOUS at 16:33

## 2022-01-01 RX ADMIN — FUROSEMIDE 40 MG: 10 INJECTION, SOLUTION INTRAVENOUS at 17:47

## 2022-01-01 RX ADMIN — NYSTATIN 500000 UNITS: 100000 SUSPENSION ORAL at 11:53

## 2022-01-01 RX ADMIN — MAGNESIUM SULFATE HEPTAHYDRATE 2 G: 40 INJECTION, SOLUTION INTRAVENOUS at 16:30

## 2022-01-01 RX ADMIN — AMLODIPINE BESYLATE 5 MG: 5 TABLET ORAL at 16:16

## 2022-01-01 RX ADMIN — NAFCILLIN INJECTION 12 G: 2 POWDER, FOR SOLUTION INTRAMUSCULAR; INTRAMUSCULAR; INTRAVENOUS at 10:49

## 2022-01-01 RX ADMIN — INSULIN ASPART 2 UNITS: 100 INJECTION, SOLUTION INTRAVENOUS; SUBCUTANEOUS at 11:01

## 2022-01-01 RX ADMIN — CHLORHEXIDINE GLUCONATE 15 ML: 1.2 RINSE ORAL at 09:11

## 2022-01-01 RX ADMIN — GUAIFENESIN 1200 MG: 600 TABLET, EXTENDED RELEASE ORAL at 15:25

## 2022-01-01 RX ADMIN — POTASSIUM PHOSPHATE, MONOBASIC AND POTASSIUM PHOSPHATE, DIBASIC 30 MMOL: 224; 236 INJECTION, SOLUTION, CONCENTRATE INTRAVENOUS at 05:39

## 2022-01-01 RX ADMIN — MORPHINE SULFATE 2 MG: 2 INJECTION, SOLUTION INTRAMUSCULAR; INTRAVENOUS at 12:39

## 2022-01-01 RX ADMIN — METOPROLOL TARTRATE 25 MG: 25 TABLET, FILM COATED ORAL at 09:51

## 2022-01-01 RX ADMIN — DOXYCYCLINE 100 MG: 100 INJECTION, POWDER, LYOPHILIZED, FOR SOLUTION INTRAVENOUS at 11:12

## 2022-01-01 RX ADMIN — LORAZEPAM 1 MG: 2 INJECTION INTRAMUSCULAR; INTRAVENOUS at 01:45

## 2022-01-01 RX ADMIN — NYSTATIN 500000 UNITS: 100000 SUSPENSION ORAL at 09:52

## 2022-01-01 RX ADMIN — Medication 10 ML: at 20:28

## 2022-01-01 RX ADMIN — CEFEPIME HYDROCHLORIDE 2 G: 2 INJECTION, POWDER, FOR SOLUTION INTRAVENOUS at 05:02

## 2022-01-01 RX ADMIN — IPRATROPIUM BROMIDE AND ALBUTEROL SULFATE 3 ML: .5; 3 SOLUTION RESPIRATORY (INHALATION) at 00:50

## 2022-01-01 RX ADMIN — GUAIFENESIN 1200 MG: 600 TABLET, EXTENDED RELEASE ORAL at 09:01

## 2022-01-01 RX ADMIN — GABAPENTIN 600 MG: 300 CAPSULE ORAL at 11:44

## 2022-01-01 RX ADMIN — DOXYCYCLINE 100 MG: 100 INJECTION, POWDER, LYOPHILIZED, FOR SOLUTION INTRAVENOUS at 00:27

## 2022-01-01 RX ADMIN — INSULIN ASPART 2 UNITS: 100 INJECTION, SOLUTION INTRAVENOUS; SUBCUTANEOUS at 06:38

## 2022-01-01 RX ADMIN — FAMOTIDINE 20 MG: 10 INJECTION, SOLUTION INTRAVENOUS at 21:00

## 2022-01-01 RX ADMIN — MORPHINE SULFATE 2 MG: 2 INJECTION, SOLUTION INTRAMUSCULAR; INTRAVENOUS at 00:03

## 2022-01-01 RX ADMIN — MORPHINE SULFATE 2 MG: 2 INJECTION, SOLUTION INTRAMUSCULAR; INTRAVENOUS at 05:35

## 2022-01-01 RX ADMIN — OXYCODONE HYDROCHLORIDE AND ACETAMINOPHEN 1 TABLET: 7.5; 325 TABLET ORAL at 21:05

## 2022-01-01 RX ADMIN — ACETAMINOPHEN 325MG 650 MG: 325 TABLET ORAL at 14:44

## 2022-01-01 RX ADMIN — HEPARIN SODIUM 2300 UNITS: 5000 INJECTION INTRAVENOUS; SUBCUTANEOUS at 11:26

## 2022-01-01 RX ADMIN — HEPARIN SODIUM 2300 UNITS: 5000 INJECTION INTRAVENOUS; SUBCUTANEOUS at 14:31

## 2022-01-01 RX ADMIN — FAMOTIDINE 20 MG: 10 INJECTION, SOLUTION INTRAVENOUS at 09:09

## 2022-01-01 RX ADMIN — DIPHENHYDRAMINE HYDROCHLORIDE 12.5 MG: 50 INJECTION INTRAMUSCULAR; INTRAVENOUS at 02:24

## 2022-01-01 RX ADMIN — METHYLPREDNISOLONE SODIUM SUCCINATE 40 MG: 40 INJECTION, POWDER, FOR SOLUTION INTRAMUSCULAR; INTRAVENOUS at 09:10

## 2022-01-01 RX ADMIN — NAFCILLIN INJECTION 12 G: 2 POWDER, FOR SOLUTION INTRAMUSCULAR; INTRAMUSCULAR; INTRAVENOUS at 19:26

## 2022-01-01 RX ADMIN — FAMOTIDINE 20 MG: 10 INJECTION, SOLUTION INTRAVENOUS at 08:25

## 2022-01-01 RX ADMIN — DOXYCYCLINE 100 MG: 100 INJECTION, POWDER, LYOPHILIZED, FOR SOLUTION INTRAVENOUS at 23:48

## 2022-01-01 RX ADMIN — LORAZEPAM 1 MG: 2 INJECTION INTRAMUSCULAR; INTRAVENOUS at 04:45

## 2022-01-01 RX ADMIN — MORPHINE SULFATE 2 MG: 2 INJECTION, SOLUTION INTRAMUSCULAR; INTRAVENOUS at 18:29

## 2022-01-01 RX ADMIN — ACETAMINOPHEN 325MG 650 MG: 325 TABLET ORAL at 09:01

## 2022-01-01 RX ADMIN — SODIUM CHLORIDE 500 ML: 9 INJECTION, SOLUTION INTRAVENOUS at 05:33

## 2022-01-01 RX ADMIN — IPRATROPIUM BROMIDE AND ALBUTEROL SULFATE 3 ML: .5; 3 SOLUTION RESPIRATORY (INHALATION) at 00:05

## 2022-01-01 RX ADMIN — HEPARIN SODIUM 4600 UNITS: 5000 INJECTION INTRAVENOUS; SUBCUTANEOUS at 21:16

## 2022-01-01 RX ADMIN — LORAZEPAM 1 MG: 2 INJECTION INTRAMUSCULAR; INTRAVENOUS at 21:09

## 2022-01-01 RX ADMIN — DOXYCYCLINE 100 MG: 100 INJECTION, POWDER, LYOPHILIZED, FOR SOLUTION INTRAVENOUS at 11:03

## 2022-01-01 RX ADMIN — Medication 10 ML: at 09:12

## 2022-01-01 RX ADMIN — DIPHENHYDRAMINE HYDROCHLORIDE 12.5 MG: 50 INJECTION INTRAMUSCULAR; INTRAVENOUS at 21:08

## 2022-01-01 RX ADMIN — GUAIFENESIN 1200 MG: 600 TABLET, EXTENDED RELEASE ORAL at 21:00

## 2022-01-01 RX ADMIN — IPRATROPIUM BROMIDE AND ALBUTEROL SULFATE 3 ML: .5; 3 SOLUTION RESPIRATORY (INHALATION) at 17:09

## 2022-01-01 RX ADMIN — GABAPENTIN 600 MG: 300 CAPSULE ORAL at 17:35

## 2022-01-01 RX ADMIN — INSULIN ASPART 4 UNITS: 100 INJECTION, SOLUTION INTRAVENOUS; SUBCUTANEOUS at 07:58

## 2022-01-01 RX ADMIN — MORPHINE SULFATE 2 MG: 2 INJECTION, SOLUTION INTRAMUSCULAR; INTRAVENOUS at 13:04

## 2022-01-01 RX ADMIN — IPRATROPIUM BROMIDE AND ALBUTEROL SULFATE 3 ML: .5; 3 SOLUTION RESPIRATORY (INHALATION) at 12:40

## 2022-01-01 RX ADMIN — DOXYCYCLINE 100 MG: 100 INJECTION, POWDER, LYOPHILIZED, FOR SOLUTION INTRAVENOUS at 23:43

## 2022-01-01 RX ADMIN — METHYLPREDNISOLONE SODIUM SUCCINATE 40 MG: 40 INJECTION, POWDER, FOR SOLUTION INTRAMUSCULAR; INTRAVENOUS at 17:48

## 2022-01-01 RX ADMIN — METHYLPREDNISOLONE SODIUM SUCCINATE 40 MG: 40 INJECTION, POWDER, FOR SOLUTION INTRAMUSCULAR; INTRAVENOUS at 08:25

## 2022-01-01 RX ADMIN — DOXYCYCLINE 100 MG: 100 INJECTION, POWDER, LYOPHILIZED, FOR SOLUTION INTRAVENOUS at 23:33

## 2022-01-01 RX ADMIN — LORAZEPAM 0.5 MG: 2 INJECTION INTRAMUSCULAR at 13:15

## 2022-01-01 RX ADMIN — OXYCODONE HYDROCHLORIDE AND ACETAMINOPHEN 1 TABLET: 7.5; 325 TABLET ORAL at 04:22

## 2022-01-01 RX ADMIN — NYSTATIN 500000 UNITS: 100000 SUSPENSION ORAL at 07:27

## 2022-01-01 RX ADMIN — NAFCILLIN INJECTION 12 G: 2 POWDER, FOR SOLUTION INTRAMUSCULAR; INTRAMUSCULAR; INTRAVENOUS at 10:12

## 2022-01-01 RX ADMIN — FUROSEMIDE 40 MG: 10 INJECTION, SOLUTION INTRAMUSCULAR; INTRAVENOUS at 08:09

## 2022-01-01 RX ADMIN — MORPHINE SULFATE 2 MG: 2 INJECTION, SOLUTION INTRAMUSCULAR; INTRAVENOUS at 18:21

## 2022-01-01 RX ADMIN — MORPHINE SULFATE 2 MG: 2 INJECTION, SOLUTION INTRAMUSCULAR; INTRAVENOUS at 10:02

## 2022-01-01 RX ADMIN — LIDOCAINE 1 PATCH: 50 PATCH CUTANEOUS at 10:48

## 2022-01-01 RX ADMIN — OXYCODONE HYDROCHLORIDE AND ACETAMINOPHEN 1 TABLET: 7.5; 325 TABLET ORAL at 00:25

## 2022-01-01 RX ADMIN — Medication 10 ML: at 09:01

## 2022-01-01 RX ADMIN — HEPARIN SODIUM 4600 UNITS: 5000 INJECTION INTRAVENOUS; SUBCUTANEOUS at 08:16

## 2022-01-01 RX ADMIN — GUAIFENESIN 1200 MG: 600 TABLET, EXTENDED RELEASE ORAL at 21:09

## 2022-01-01 RX ADMIN — METHYLPREDNISOLONE SODIUM SUCCINATE 40 MG: 40 INJECTION, POWDER, FOR SOLUTION INTRAMUSCULAR; INTRAVENOUS at 17:24

## 2022-01-01 RX ADMIN — NYSTATIN 500000 UNITS: 100000 SUSPENSION ORAL at 12:51

## 2022-01-01 RX ADMIN — NYSTATIN 500000 UNITS: 100000 SUSPENSION ORAL at 07:58

## 2022-01-01 RX ADMIN — MIDAZOLAM HYDROCHLORIDE 4 MG: 1 INJECTION INTRAMUSCULAR; INTRAVENOUS at 05:34

## 2022-01-01 RX ADMIN — NYSTATIN 500000 UNITS: 100000 SUSPENSION ORAL at 11:23

## 2022-01-01 RX ADMIN — Medication 5 MG/HR: at 05:43

## 2022-01-01 RX ADMIN — GUAIFENESIN 1200 MG: 600 TABLET, EXTENDED RELEASE ORAL at 09:09

## 2022-01-01 RX ADMIN — APIXABAN 5 MG: 5 TABLET, FILM COATED ORAL at 09:09

## 2022-01-01 RX ADMIN — NYSTATIN 500000 UNITS: 100000 SUSPENSION ORAL at 18:29

## 2022-01-01 RX ADMIN — LIDOCAINE 1 PATCH: 50 PATCH CUTANEOUS at 09:51

## 2022-01-01 RX ADMIN — OXYCODONE HYDROCHLORIDE AND ACETAMINOPHEN 1 TABLET: 7.5; 325 TABLET ORAL at 16:05

## 2022-01-05 NOTE — PROGRESS NOTES
Heart Failure Pharmacy Note  Patient Name: Kb Marshall  Referring Provider: HOWARD Macias  Primary Cardiologist: Jeff  Type of CHF: HFpEF    Medication Use:   Adherence: no issues   Hx of med intolerances: None related to HF    Affordability: no issues  Retail Rx Management: not at this time    Past Medical History:   Diagnosis Date   • Aneurysm (HCC)    • Arthritis    • Asthma    • Back pain    • Cancer ([facility])     lungs   • COPD (chronic obstructive pulmonary disease) ([facility])    • Coronary artery disease    • Diabetes mellitus (HCC)    • Dyslipidemia    • GERD (gastroesophageal reflux disease)    • Heart disease    • Hypertension    • Lung cancer ([facility]) 2011   • PAD (peripheral artery disease) ([facility])      ALLERGIES: Acetaminophen-codeine, Adhesive tape, and Indocin [indomethacin]  Current Outpatient Medications   Medication Sig Dispense Refill   • apixaban (ELIQUIS) 5 MG tablet tablet Take 5 mg by mouth 2 (Two) Times a Day.     • atorvastatin (LIPITOR) 10 MG tablet Take 10 mg by mouth Daily.     • budesonide-formoterol (SYMBICORT) 160-4.5 MCG/ACT inhaler Inhale 2 puffs 2 (Two) Times a Day.     • bumetanide (BUMEX) 0.5 MG tablet Take 1 tablet by mouth Daily. 30 tablet 1   • carvedilol (COREG) 12.5 MG tablet Take 1 tablet by mouth 2 (Two) Times a Day With Meals. 60 tablet 1   • clopidogrel (PLAVIX) 75 MG tablet Take 75 mg by mouth Daily.     • empagliflozin (Jardiance) 25 MG tablet tablet Take 25 mg by mouth Daily.     • gabapentin (NEURONTIN) 400 MG capsule Take 400 mg by mouth 4 (Four) Times a Day.     • glimepiride (AMARYL) 4 MG tablet Take 8 mg by mouth Every Morning Before Breakfast.     • hydrALAZINE (APRESOLINE) 50 MG tablet Take 50 mg by mouth 3 (Three) Times a Day.     • lisinopril (PRINIVIL,ZESTRIL) 20 MG tablet Take 1 tablet by mouth Daily. 30 tablet 0   • metFORMIN (GLUCOPHAGE) 500 MG tablet Take 1,000 mg by mouth 2 (Two) Times a Day With Meals.     • nabumetone (RELAFEN) 500 MG tablet Take 500 mg by mouth 2  (Two) Times a Day As Needed for Mild Pain . Only for gout flare up     • oxyCODONE-acetaminophen (PERCOCET) 7.5-325 MG per tablet Take 1 tablet by mouth Every 4 (Four) Hours As Needed for Moderate Pain .     • pantoprazole (Protonix) 40 MG EC tablet Take 1 tablet by mouth Daily. 30 tablet 1   • potassium chloride 10 MEQ CR tablet Take 1 tablet by mouth Daily for 5 days. 5 tablet 0   • vitamin D3 125 MCG (5000 UT) capsule capsule Take 5,000 Units by mouth Daily.       No current facility-administered medications for this encounter.       Vaccination History:   Pneumonia: needed  Annual Influenza: UTD  COVID: UTD    Objective  Vitals:    01/05/22 1213   BP: 104/64   BP Location: Left arm   Patient Position: Sitting   Cuff Size: Adult   Pulse: 78   Resp: 20   SpO2: 95%   Weight: 81.6 kg (179 lb 12.8 oz)     Wt Readings from Last 3 Encounters:   01/05/22 81.6 kg (179 lb 12.8 oz)   12/27/21 78.9 kg (173 lb 14.4 oz)   11/24/21 84.9 kg (187 lb 3.2 oz)         01/05/22  1213   Weight: 81.6 kg (179 lb 12.8 oz)     Lab Results   Component Value Date    GLUCOSE 158 (H) 01/05/2022    BUN 8 01/05/2022    CREATININE 0.73 (L) 01/05/2022    EGFRIFNONA 105 01/05/2022    BCR 11.0 01/05/2022    K 3.2 (L) 01/05/2022    CO2 29.2 (H) 01/05/2022    CALCIUM 8.9 01/05/2022    ALBUMIN 2.80 (L) 12/24/2021    LABIL2 1.7 05/06/2016    AST 12 12/24/2021    ALT 6 12/24/2021     Lab Results   Component Value Date    WBC 8.44 12/27/2021    HGB 9.0 (L) 12/27/2021    HCT 29.4 (L) 12/27/2021    MCV 93.6 12/27/2021     12/27/2021     Lab Results   Component Value Date    TROPONINT 0.016 12/27/2021     Lab Results   Component Value Date    PROBNP 3,902.0 (H) 01/05/2022     Results for orders placed during the hospital encounter of 10/30/21    Adult Transthoracic Echo Complete w/ Color, Spectral and Contrast if necessary per protocol    Interpretation Summary  · Normal left ventricular cavity size and wall thickness noted. All left ventricular  wall segments contract normally  · Left ventricular ejection fraction appears to be 56 - 60%.  · Left ventricular diastolic function is consistent with (grade I) impaired relaxation.  · The mitral valve is structurally normal with no significant stenosis present. Mild mitral valve regurgitation is present.  · The aortic valve is structurally normal with no regurgitation or stenosis present.  · Mild dilation of the aortic root is present(4.0cm). Moderate dilation of the descending aorta present.(4.84cm).  · Mild tricuspid valve regurgitation is present. Estimated right ventricular systolic pressure from tricuspid regurgitation is moderately elevated (45-55 mmHg).  · Moderate pulmonary hypertension is present.  · There is no evidence of pericardial effusion.           GDMT:       Class   Drug   Dose Last Dose Adjustment Additional Titration   ACEi/ARB/ARNI Lisinopril  20mg BID     Beta Blocker Coreg  12.5mg BID 11/04/21    SGLT2i Jardiance 25mg     MRA             Drug Therapy Problems    1. Drug-disease interaction - nabumetone  2. Confusion with medications   3. Hypotension   4. Hypokalemia   5. 1 Episode reported of Chest Pain     Recommendations:       1.. Educated Pt on potential risks of nabumetone or NSAIDS in HF.  Pt says he only uses if he has a gout flare.  Re-educated patient on risks again today.    2.. Pt was confused about a few medications because he had them  in another bag from his other meds. Most notable was Plavix. Patient was advised to continue taking medications from his latest discharge on 12/27 which had to continue Plavix unless he had been directed otherwise by MD. Patient denied any signs/symptoms of bleeding or being mentioned that he was to stop taking Plavix. Noted patients confusion regarding Plavix to Gifty.  Patient was also encouraged to keep cardiology follow-up on 1/7/22 and to take his medications to that appointment as well and make sure to discuss with cardiologist.    3. Pt home BP log had several SBP <100 recorded. Pt reports being asymptomatic and denied any dizziness, etc. Would possibly consider decreasing Lisinopril from BID dosing to daily dosing or could possibly consider changing Bumex to PRN since patient denied any increased SOB, swelling, etc and monitor for response and amount of PRN use of Bumex.   4. Would recommend K+ supplementation for a few days with close monitoring for correction.   5. Patient reported 1 episode of CP that was relieved with Tums. Patient unsure if it was heart burn or CP. Advised patient to seek emergency attention anytime he feels he is having CP.     Thank you for allowing me to participate in the care of your patient,    Joanne Moyer. Francesco, PharmD  01/05/22  15:11 EST

## 2022-01-05 NOTE — PROGRESS NOTES
[facility] CHF CLINIC OFFICE VISIT  Subjective:   Follow-up (CHF)    History of Present Illness  Kb Marshall is a 76-year-old  male who presents to the clinic today for heart failure follow-up. He is accompanied by his daughter.  He has a history of chronic diastolic congestive heart failure with an LVEF of 56 to 60% from echocardiogram on 10/31/2021 read by Dr. Aguilar.  He currently takes Bumex 0.5 mg daily, Coreg 12.5 mg twice daily, lisinopril 20 mg twice daily, hydralazine 50 mg 3 times daily.     Since his last visit in the heart failure clinic he has been hospitalized at the [facility] and most recently at [facility].  He was hospitalized at the [facility] and underwent surgical correction of his abdominal aortic aneurysm with Dr. Gusman and during that hospitalization developed atrial fibrillation in which cardiology was consulted and outpatient follow-up was arranged with Dr. Mustafa at [facility].  He was hospitalized at [facility] for odynophagia and underwent an EGD showing a esophageal food impaction.  He states overall he is doing well  He has had some confusions about medications recently and his PCP has tried to ensure he isn't taking duplicate medications. Patient has continued Coreg and discontinued metoprolol tartrate.  It is unclear if he has been taking both his Plavix and Eliquis since discharge at .  Home BP /60-70, denies dizziness, lightheadedness or syncope  Home HR   Home weight 172  He is chronically on 3 L of oxygen  Denies any abdominal or lower extremity swelling  Reports urine output is good  Former smoker  Does consume sodium at home, is trying to cut back but admits it is difficult     PCP: Dr. Mejia  Cardiologist:  Nephrologist:  Pulmonologist:    Hospitalizations: Discharged 11/4/2021, 12/27/2021(not HF related)    Past Medical History:   Diagnosis Date   • Aneurysm (HCC)    • Arthritis    • Asthma    • Back pain    •  Cancer (HCC)     lungs   • COPD (chronic obstructive pulmonary disease) (HCC)    • Coronary artery disease    • Diabetes mellitus (HCC)    • Dyslipidemia    • GERD (gastroesophageal reflux disease)    • Heart disease    • Hypertension    • Lung cancer (HCC) 2011   • PAD (peripheral artery disease) (HCC)      Past Surgical History:   Procedure Laterality Date   • ABDOMINAL AORTIC ANEURYSM REPAIR     • COLONOSCOPY N/A 10/11/2018    Procedure: COLONOSCOPY;  Surgeon: Calos Stockton MD;  Location: [facility] OR;  Service: Gastroenterology   • ENDOSCOPY N/A 12/24/2021    Procedure: ESOPHAGOGASTRODUODENOSCOPY;  Surgeon: Magui Murillo MD;  Location:  [facility] OR;  Service: General;  Laterality: N/A;   • FINGER SURGERY Left    • KNEE SURGERY Left    • LUNG REMOVAL, PARTIAL  11/29/2011   • LUNG SURGERY  11/28/2011     Social History     Socioeconomic History   • Marital status:    Tobacco Use   • Smoking status: Former Smoker     Packs/day: 3.00     Years: 50.00     Pack years: 150.00     Quit date: 11/2011     Years since quitting: 10.1   • Smokeless tobacco: Former User     Types: Chew   Substance and Sexual Activity   • Alcohol use: Not Currently     Comment: quit 40 years ago   • Drug use: No   • Sexual activity: Defer     Family History   Problem Relation Age of Onset   • Cancer Mother    • Cancer Brother      Allergies:  Allergies   Allergen Reactions   • Acetaminophen-Codeine    • Adhesive Tape Other (See Comments)     ADHESIVE CAUSES SKIN BLISTERS, PER PT   • Indocin [Indomethacin] Itching     Review of Systems   Constitutional: Negative for chills, fever, weight gain and weight loss.   HENT: Negative for congestion, hoarse voice and sore throat.    Eyes: Negative for blurred vision, pain and visual disturbance.   Cardiovascular: Negative for chest pain, claudication, cyanosis, dyspnea on exertion, irregular heartbeat, leg swelling, near-syncope, orthopnea, palpitations and syncope.   Respiratory:  Negative for cough, shortness of breath and wheezing.    Endocrine: Negative for cold intolerance, heat intolerance and polyuria.   Hematologic/Lymphatic: Negative for bleeding problem. Does not bruise/bleed easily.   Skin: Negative for color change, flushing and rash.   Musculoskeletal: Negative for arthritis, back pain, joint pain and myalgias.   Gastrointestinal: Negative for abdominal pain, constipation, diarrhea, nausea and vomiting.   Genitourinary: Negative for dysuria, frequency, hesitancy and urgency.   Neurological: Negative for excessive daytime sleepiness, dizziness, headaches, numbness, vertigo and weakness.   Psychiatric/Behavioral: Negative for depression. The patient does not have insomnia and is not nervous/anxious.    All other systems reviewed and are negative.    Current Outpatient Medications   Medication Sig Dispense Refill   • apixaban (ELIQUIS) 5 MG tablet tablet Take 5 mg by mouth 2 (Two) Times a Day.     • atorvastatin (LIPITOR) 10 MG tablet Take 10 mg by mouth Daily.     • budesonide-formoterol (SYMBICORT) 160-4.5 MCG/ACT inhaler Inhale 2 puffs 2 (Two) Times a Day.     • bumetanide (BUMEX) 0.5 MG tablet Take 1 tablet by mouth Daily. 30 tablet 1   • carvedilol (COREG) 12.5 MG tablet Take 1 tablet by mouth 2 (Two) Times a Day With Meals. 60 tablet 1   • clopidogrel (PLAVIX) 75 MG tablet Take 75 mg by mouth Daily.     • empagliflozin (Jardiance) 25 MG tablet tablet Take 25 mg by mouth Daily.     • gabapentin (NEURONTIN) 400 MG capsule Take 400 mg by mouth 4 (Four) Times a Day.     • glimepiride (AMARYL) 4 MG tablet Take 8 mg by mouth Every Morning Before Breakfast.     • hydrALAZINE (APRESOLINE) 50 MG tablet Take 50 mg by mouth 3 (Three) Times a Day.     • lisinopril (PRINIVIL,ZESTRIL) 20 MG tablet Take 1 tablet by mouth Daily. 30 tablet 0   • metFORMIN (GLUCOPHAGE) 500 MG tablet Take 1,000 mg by mouth 2 (Two) Times a Day With Meals.     • nabumetone (RELAFEN) 500 MG tablet Take 500 mg by  mouth 2 (Two) Times a Day As Needed for Mild Pain . Only for gout flare up     • oxyCODONE-acetaminophen (PERCOCET) 7.5-325 MG per tablet Take 1 tablet by mouth Every 4 (Four) Hours As Needed for Moderate Pain .     • pantoprazole (Protonix) 40 MG EC tablet Take 1 tablet by mouth Daily. 30 tablet 1   • potassium chloride 10 MEQ CR tablet Take 1 tablet by mouth Daily for 5 days. 5 tablet 0   • vitamin D3 125 MCG (5000 UT) capsule capsule Take 5,000 Units by mouth Daily.       No current facility-administered medications for this encounter.      Objective:     Vitals:    01/05/22 1213   BP: 104/64   BP Location: Left arm   Patient Position: Sitting   Cuff Size: Adult   Pulse: 78   Resp: 20   SpO2: 95%   Weight: 81.6 kg (179 lb 12.8 oz)   Body mass index is 28.16 kg/m².    ReDS Result:   Lab Results   Component Value Date    ABSOLUTELUNG 30 11/03/2021   Unable to obtain 11/24/2021    Wt Readings from Last 3 Encounters:   01/05/22 81.6 kg (179 lb 12.8 oz)   12/27/21 78.9 kg (173 lb 14.4 oz)   11/24/21 84.9 kg (187 lb 3.2 oz)        Vitals reviewed.   Constitutional:       Appearance: Normal appearance. Well-developed.      Comments: In a wheelchair today  3L of continuous oxygen via NC    Eyes:      Conjunctiva/sclera: Conjunctivae normal.   HENT:      Head: Normocephalic.   Neck:      Vascular: No JVD or JVR.   Pulmonary:      Effort: Pulmonary effort is normal.      Breath sounds: Normal breath sounds.   Cardiovascular:      Normal rate. Regular rhythm.   Edema:     Ankle: bilateral trace edema of the ankle.     Feet: bilateral trace edema of the feet.  Abdominal:      General: Bowel sounds are normal.      Palpations: Abdomen is soft. There is no hepatomegaly or splenomegaly.   Musculoskeletal: Normal range of motion.      Cervical back: Normal range of motion and neck supple. Skin:     General: Skin is warm and dry.   Neurological:      Mental Status: Alert and oriented to person, place, and time.   Psychiatric:          Attention and Perception: Attention normal.         Mood and Affect: Mood normal.         Speech: Speech normal.         Behavior: Behavior normal. Behavior is cooperative.         Cognition and Memory: Cognition normal.       Cardiographics  Results for orders placed during the hospital encounter of 10/30/21    Adult Transthoracic Echo Complete w/ Color, Spectral and Contrast if necessary per protocol    Interpretation Summary  · Normal left ventricular cavity size and wall thickness noted. All left ventricular wall segments contract normally  · Left ventricular ejection fraction appears to be 56 - 60%.  · Left ventricular diastolic function is consistent with (grade I) impaired relaxation.  · The mitral valve is structurally normal with no significant stenosis present. Mild mitral valve regurgitation is present.  · The aortic valve is structurally normal with no regurgitation or stenosis present.  · Mild dilation of the aortic root is present(4.0cm). Moderate dilation of the descending aorta present.(4.84cm).  · Mild tricuspid valve regurgitation is present. Estimated right ventricular systolic pressure from tricuspid regurgitation is moderately elevated (45-55 mmHg).  · Moderate pulmonary hypertension is present.  · There is no evidence of pericardial effusion.    Imaging  CT Chest With Contrast Diagnostic    Result Date: 1/4/2022  Advanced interstitial lung disease in both lungs. No evidence of recurrent tumor in the right lung is demonstrated. There are enlarged lymph nodes in the mediastinum that are unchanged from the earlier exam. The patient has had repair of the descending thoracic aortic aneurysm.        453.16 mGy.cm The radiation dose reduction device was utilized for each scan per the ALARA (as low as reasonably achievable) protocol.  This report was finalized on 1/4/2022 10:29 AM by Dr. Robert Samuel II, MD.      FL Esophagram Complete Single Contrast    Result Date: 12/24/2021  1. Large filling  defect in the distal esophagus. Recommend direct visualization 2. Poor peristaltic activity  This report was finalized on 12/24/2021 11:17 AM by Dr. Leif Taveras MD.      CT Angiogram Aorta    Result Date: 12/23/2021  No evidence of aortic leak. Extensive aortic stent graft extending from the aortic arch down to the common duct arteries. The native aneurysm is stable or slightly smaller when compared with 12/28/2020 No acute findings Mild prominence of lymph nodes in the mediastinum is noted but these are stable from one year ago Signer Name: Eben Cisneros MD  Signed: 12/23/2021 3:12 AM  Workstation [facility]: [facility]  [facility]    EKG:        Lab Review   Lab Results   Component Value Date    TSH 2.190 12/23/2021     Lab Results   Component Value Date    GLUCOSE 158 (H) 01/05/2022    BUN 8 01/05/2022    CREATININE 0.73 (L) 01/05/2022    EGFRIFNONA 105 01/05/2022    BCR 11.0 01/05/2022    K 3.2 (L) 01/05/2022    CO2 29.2 (H) 01/05/2022    CALCIUM 8.9 01/05/2022    ALBUMIN 2.80 (L) 12/24/2021    LABIL2 1.7 05/06/2016    AST 12 12/24/2021    ALT 6 12/24/2021     Lab Results   Component Value Date    WBC 8.44 12/27/2021    HGB 9.0 (L) 12/27/2021    HCT 29.4 (L) 12/27/2021    MCV 93.6 12/27/2021     12/27/2021     Lab Results   Component Value Date    TROPONINT 0.016 12/27/2021     Lab Results   Component Value Date    PROBNP 3,902.0 (H) 01/05/2022      The following portions of the patient's history were reviewed and updated as appropriate: allergies, current medications, past family history, past medical history, past social history, past surgical history and problem list.     Old records reviewed and pertinent information is included in the above objective data.     Assessment/Plan:      Diagnosis Plan   1. Chronic diastolic (congestive) heart failure (HCC)  Basic Metabolic Panel    BNP    Magnesium    Basic Metabolic Panel    Magnesium   2. Hypokalemia     3. Hypotension, unspecified  hypotension type     4. Atrial fibrillation, unspecified type (HCC)     5. Advanced COPD, on home oxygen.       BMP, pro-BNP and magnesium today  Discussed and reviewed lab results today  Decrease lisinopril from 20 mg twice daily to 20 mg daily due to hypotension  Potassium 10 mEq daily for 5 days due to hypokalemia  Provided medication education to patient and his daughter today, attempted to clarify his current medications using both  discharge summary and [facility] discharge summary.  Monitor BP and heart rate   Discussed at length the importance of keeping cardiology follow-up due to his new onset atrial fibrillation and follow-up with his cardiothoracic surgeon.  Advised if he has persistent or worsening chest pain he should seek immediate emergent medical attention or call 911.  He expresses understanding.   Counseled patient extensively on dietary Na+ intake, importance of activity, weight monitoring, compliance with medications and follow up appointments.  Follow-up in 4 weeks, sooner if needed

## 2022-01-05 NOTE — PROGRESS NOTES
Heart Failure Clinic    Date: 01/05/22     Vitals:    01/05/22 1213   BP: 104/64   Pulse: 78   Resp: 20   SpO2: 95%        Method of arrival: Other Wheelchair    Weighing self daily: Yes    Monitoring Heart Failure Zones: Yes    Today's HF Zone: Yellow     Taking medications as prescribed: Yes    Edema No    Shortness of Air: Yes    Number of pillows used at night:>3    Educational Materials given:                                                                           ReDS Value:   JIA Merritt MA 01/05/22 12:14 EST

## 2022-01-07 NOTE — OUTREACH NOTE
Medical Week 2 Survey      Responses   [facility] patient discharged from? [facility]   Does the patient have one of the following disease processes/diagnoses(primary or secondary)? Other   Week 2 attempt successful? Yes   Call start time 1536   Discharge diagnosis Odynophagia   Call end time 1539   Meds reviewed with patient/caregiver? Yes   Is the patient having any side effects they believe may be caused by any medication additions or changes? No   Does the patient have all medications ordered at discharge? Yes   Is the patient taking all medications as directed (includes completed medication regime)? Yes   Medication comments Red fixes his medications   Does the patient have a primary care provider?  Yes   Does the patient have an appointment with their PCP within 7 days of discharge? Yes   Has the patient kept scheduled appointments due by today? Yes   Comments 01/03/2022 Dr Mejia   Psychosocial issues? No   Week 2 Call Completed? Yes   Wrap up additional comments Brief call.          Lynnette Ramirez RN

## 2022-01-07 NOTE — OUTREACH NOTE
Medical Week 2 Survey      Responses   [facility] patient discharged from? [facility]   Does the patient have one of the following disease processes/diagnoses(primary or secondary)? Other   Week 2 attempt successful? No   Unsuccessful attempts Attempt 1          Leny Sanchez RN

## 2022-01-14 NOTE — OUTREACH NOTE
Medical Week 3 Survey      Responses   [facility] patient discharged from? [facility]   Does the patient have one of the following disease processes/diagnoses(primary or secondary)? Other   Week 3 attempt successful? Yes   Call start time 1704   Call end time 1713   Discharge diagnosis Odynophagia, EGD   Is patient permission given to speak with other caregiver? No   Meds reviewed with patient/caregiver? No  [Patient did not want to review his medication list. ]   Is the patient taking all medications as directed (includes completed medication regime)? Yes  [Patient states that he has his medication and taking as directed. ]   Does the patient have a primary care provider?  Yes   Comments regarding PCP Scar Mejia MD PCP. Patient states that he has seen PCP since discharg.    Has the patient kept scheduled appointments due by today? Yes   Has home health visited the patient within 72 hours of discharge? N/A   DME comments Patient states that he wears home O2 24/7.    Psychosocial issues? No   Did the patient receive a copy of their discharge instructions? Yes   What is the patient's perception of their health status since discharge? Improving   Is the patient/caregiver able to teach back signs and symptoms related to disease process for when to call PCP? Yes   Is the patient/caregiver able to teach back the hierarchy of who to call/visit for symptoms/problems? PCP, Specialist, Home health nurse, Urgent Care, ED, 911 Yes   If the patient is a current smoker, are they able to teach back resources for cessation? Not a smoker   Week 3 Call Completed? Yes          Nataliya Glaser RN

## 2022-01-21 NOTE — OUTREACH NOTE
Medical Week 4 Survey      Responses   [facility] patient discharged from? [facility]   Does the patient have one of the following disease processes/diagnoses(primary or secondary)? Other   Week 4 attempt successful? Yes   Call start time 1613   Call end time 1614   Discharge diagnosis Odynophagia, EGD   Meds reviewed with patient/caregiver? Yes   Is the patient having any side effects they believe may be caused by any medication additions or changes? No   Is the patient taking all medications as directed (includes completed medication regime)? Yes   Has the patient kept scheduled appointments due by today? Yes  [Saw PCP already]   Is the patient still receiving Home Health Services? N/A   Psychosocial issues? No   What is the patient's perception of their health status since discharge? Improving   Is the patient/caregiver able to teach back signs and symptoms related to disease process for when to call PCP? Yes   Is the patient/caregiver able to teach back signs and symptoms related to disease process for when to call 911? Yes   Is the patient/caregiver able to teach back the hierarchy of who to call/visit for symptoms/problems? PCP, Specialist, Home health nurse, Urgent Care, ED, 911 Yes   Week 4 Call Completed? Yes   Would the patient like one additional call? No   Graduated Yes   Is the patient interested in additional calls from an ambulatory ?  NOTE:  applies to high risk patients requiring additional follow-up. No   Did the patient feel the follow up calls were helpful during their recovery period? Yes   Was the number of calls appropriate? Yes          Zoraida Newman RN

## 2022-02-03 NOTE — OUTREACH NOTE
"Ambulatory Case Management Note    Patient Outreach    RN-ACM patient outreach.  Role of ACM explained to patient, agreeable to service. Patient on continuous oxygen 3 L, reports when he experiences increased shortness of breath he increases oxygen flow to 8 L for approximately 30 seconds, which seems to relieve this symptom. Uses Symbicort inhaler as prescribed.  Has nebulizer in home but does not use. Experiences \"burning\" pain across hips/lowerback and down to his lower extremities which he takes oxycodone QID. Medications reviewed with patient, questions answered. Patient states he is eating and hydrating well.  Denies SDOH at this time.    Care Plan: COPD   Updates made since 2/3/2022 12:00 AM      Problem: INSUFFICIENT KNOWLEDGE ABOUT COPD       Goal: Patient will have sufficient knowledge about COPD       Task: Educate patient on the COPD disease process    Responsible User: Chen Fang RN      Task: Patient is able to teach back an understanding of COPD and how to manage symptoms with diet modifications, medications, and lifestyle choices    Responsible User: Chen Fang RN      Task: Review more information about COPD and the stoplight tool available at lung.org (American Lung Association)    Responsible User: Chen Fang RN      Problem: COPD IS NOT SELF-MANAGED       Problem: INABILITY TO PERFORM ADLS       Problem: HOSPITAL ADMISSIONS       Goal: Reduce hospital admissions for COPD exacerbation       Task: Teach use of stop light tool (American Lung Association)    Responsible User: Chen Fang RN      Task: Teach to report any increased inhaler use as this may indicate worsening progression of COPD    Responsible User: Chen Fang RN      Task: Review exacerbation history with patient with severity    Responsible User: Chen Fang RN      Task: Educate on COPD triggers    Responsible User: Chen Fang RN      Task: Educate patient on appropriate use of fast acting versus maintenance " inhalers for symptom management    Responsible User: Chen Fang RN      Task: Discuss advanced care planning    Responsible User: Chen Fang RN      Task: RN and patient review stop light tool    Responsible User: Chen Fnag RN      Task: Consider referral for pulmonary rehabilitation    Responsible User: Chen Fang RN      Problem: MEDICATION ADHERENCE       Goal: Patient will adhere to medication regimen       Task: RN to educate inhaler technique with patient    Responsible User: Chen Fang RN      Task: Perform a full medication reconciliation    Responsible User: Chen Fang RN      Task: Assess affordability of medications    Responsible User: Chen Fang RN      Task: Assess and treat any underlying depression    Responsible User: Chen Fang RN      Task: Assist patients in obtaining medications    Responsible User: Chen Fang RN      Task: Provide education on the importance of medication adherence    Responsible User: Chen Fang RN      Problem: EXACERBATION OF COPD SYMPTOMS       Goal: Symptom management       Task: Review the stoplight tool available at lung.org (American Lung Association)    Responsible User: Chen Fang RN      Task: Provide education on daily breathing exercises to provide symptom relief    Responsible User: Chen Fang RN      Task: Review pursed lip breathing and diaphragmatic breathing techniques    Responsible User: Chen Fang RN      Task: Provide education on proactive hand-washing and hand  use, especially when in public    Responsible User: Chen Fang RN      Task: Provide education on avoiding crowds during flu season and asking family/friends to stay away when sick, keeping breathing equipment clean, and avoiding outside activity during weather alerts    Responsible User: Chen Fang RN      Task: Yearly influenza vaccination    Responsible User: Chen Fang RN      Task: Pneumococcal vaccination    Responsible User: Annalisa  Chen, RN          Chen Fang RN  Ambulatory Case Management    2/3/2022, 11:14 EST

## 2022-02-09 NOTE — PROGRESS NOTES
Heart Failure Clinic    Date: 02/09/22     Vitals:    02/09/22 1111   BP: 110/66   Pulse: 85   SpO2: 95%        Method of arrival: Other wheelchair    Weighing self daily: Most days    Monitoring Heart Failure Zones: Most days    Today's HF Zone: Yellow     Taking medications as prescribed: Yes    Edema No    Shortness of Air: Yes, with moving around, better today than it has been lately    Number of pillows used at night:<2    Educational Materials given:  avs                                                                         ReDS Value: 26%  25-35 Optimal Value Status      Dante Murrieta RN 02/09/22 11:12 EST

## 2022-02-09 NOTE — PROGRESS NOTES
[facility] CHF CLINIC OFFICE VISIT  Subjective:   Follow-up (HF)    History of Present Illness  Kb Marshall is a 76-year-old  male who presents to the clinic today for heart failure follow-up. He is accompanied by his daughter.  He has a history of chronic diastolic congestive heart failure with an LVEF of 56 to 60% from echocardiogram on 10/31/2021 read by Dr. Aguilar.  He currently takes Bumex 0.5 mg daily, Coreg 12.5 mg twice daily, lisinopril 20 mg daily, hydralazine 50 mg 3 times daily.     Has seen Dr. Gusman in follow up on his abdominal aortic aneurysm repair. Has recommended annual follow up. He cancelled his cardiology follow up with Dr. Mustafa at [facility] for atrial fibrillation which was noted during surgery.    He states overall he is doing well  After last visit his BP log was noted to have low readings, therefore it was recommended he decrease Lisinopril from BID to daily. He reports BP are better and brings in a log today with readings 110-130/70, home HR 70-80. Home weight stable at 172 lbs  He is chronically on 3 L of oxygen - awaiting to see pulmonology   Denies any abdominal or lower extremity swelling  Reports urine output is good  Former smoker  Does admit to consumption of sodium at home   Recently had routine annual follow up with Dr. Ferrer for lung CA      PCP: Dr. Mejia  Cardiologist:  Nephrologist:  Pulmonologist:    Hospitalizations: Discharged 11/4/2021, 12/27/2021(not HF related)    Past Medical History:   Diagnosis Date   • Aneurysm (HCC)    • Arthritis    • Asthma    • Back pain    • Cancer (HCC)     lungs   • COPD (chronic obstructive pulmonary disease) (HCC)    • Coronary artery disease    • Diabetes mellitus (HCC)    • Dyslipidemia    • GERD (gastroesophageal reflux disease)    • Heart disease    • Hypertension    • Lung cancer (HCC) 2011   • PAD (peripheral artery disease) (HCC)      Past Surgical History:   Procedure Laterality Date   • ABDOMINAL AORTIC ANEURYSM REPAIR     •  COLONOSCOPY N/A 10/11/2018    Procedure: COLONOSCOPY;  Surgeon: Calos Stockton MD;  Location: [facility] OR;  Service: Gastroenterology   • ENDOSCOPY N/A 12/24/2021    Procedure: ESOPHAGOGASTRODUODENOSCOPY;  Surgeon: Magui Murillo MD;  Location: [facility];  Service: General;  Laterality: N/A;   • FINGER SURGERY Left    • KNEE SURGERY Left    • LUNG REMOVAL, PARTIAL  11/29/2011   • LUNG SURGERY  11/28/2011     Social History     Socioeconomic History   • Marital status:    Tobacco Use   • Smoking status: Former Smoker     Packs/day: 3.00     Years: 50.00     Pack years: 150.00     Quit date: 11/2011     Years since quitting: 10.2   • Smokeless tobacco: Former User     Types: Chew   Vaping Use   • Vaping Use: Never used   Substance and Sexual Activity   • Alcohol use: Not Currently     Comment: quit 40 years ago   • Drug use: No   • Sexual activity: Defer     Family History   Problem Relation Age of Onset   • Cancer Mother    • Cancer Brother      Allergies:  Allergies   Allergen Reactions   • Acetaminophen-Codeine    • Adhesive Tape Other (See Comments)     ADHESIVE CAUSES SKIN BLISTERS, PER PT   • Indocin [Indomethacin] Itching     Review of Systems   Constitutional: Negative for chills, fever, weight gain and weight loss.   HENT: Negative for congestion, hoarse voice and sore throat.    Eyes: Negative for blurred vision, pain and visual disturbance.   Cardiovascular: Negative for chest pain, claudication, cyanosis, dyspnea on exertion, irregular heartbeat, leg swelling, near-syncope, orthopnea, palpitations and syncope.   Respiratory: Negative for cough, shortness of breath and wheezing.    Endocrine: Negative for cold intolerance, heat intolerance and polyuria.   Hematologic/Lymphatic: Negative for bleeding problem. Does not bruise/bleed easily.   Skin: Negative for color change, flushing and rash.   Musculoskeletal: Negative for arthritis, back pain, joint pain and myalgias.   Gastrointestinal:  Negative for abdominal pain, constipation, diarrhea, nausea and vomiting.   Genitourinary: Negative for dysuria, frequency, hesitancy and urgency.   Neurological: Negative for excessive daytime sleepiness, dizziness, headaches, numbness, vertigo and weakness.   Psychiatric/Behavioral: Negative for depression. The patient does not have insomnia and is not nervous/anxious.    All other systems reviewed and are negative.    Current Outpatient Medications   Medication Sig Dispense Refill   • albuterol sulfate  (90 Base) MCG/ACT inhaler Inhale 2 puffs Every 4 (Four) Hours As Needed for Wheezing.     • apixaban (ELIQUIS) 5 MG tablet tablet Take 5 mg by mouth 2 (Two) Times a Day.     • atorvastatin (LIPITOR) 10 MG tablet Take 10 mg by mouth Daily.     • bumetanide (BUMEX) 0.5 MG tablet Take 1 tablet by mouth Daily. 30 tablet 1   • carvedilol (COREG) 12.5 MG tablet Take 1 tablet by mouth 2 (Two) Times a Day With Meals. 60 tablet 1   • clopidogrel (PLAVIX) 75 MG tablet Take 75 mg by mouth Daily.     • empagliflozin (Jardiance) 25 MG tablet tablet Take 25 mg by mouth Daily.     • gabapentin (NEURONTIN) 400 MG capsule Take 400 mg by mouth 4 (Four) Times a Day.     • glimepiride (AMARYL) 4 MG tablet Take 8 mg by mouth Every Morning Before Breakfast.     • hydrALAZINE (APRESOLINE) 50 MG tablet Take 50 mg by mouth 3 (Three) Times a Day.     • linaclotide (LINZESS) 290 MCG capsule capsule Take 145 mcg by mouth Every Morning Before Breakfast.     • lisinopril (PRINIVIL,ZESTRIL) 20 MG tablet Take 1 tablet by mouth Daily. 30 tablet 0   • metFORMIN (GLUCOPHAGE) 500 MG tablet Take 1,000 mg by mouth 2 (Two) Times a Day With Meals.     • nabumetone (RELAFEN) 500 MG tablet Take 500 mg by mouth 2 (Two) Times a Day As Needed for Mild Pain . Only for gout flare up     • oxyCODONE-acetaminophen (PERCOCET) 7.5-325 MG per tablet Take 1 tablet by mouth Every 4 (Four) Hours As Needed for Moderate Pain .     • pantoprazole (Protonix) 40 MG  "EC tablet Take 1 tablet by mouth Daily. 30 tablet 1   • vitamin D3 125 MCG (5000 UT) capsule capsule Take 5,000 Units by mouth Daily.     • budesonide-formoterol (SYMBICORT) 160-4.5 MCG/ACT inhaler Inhale 2 puffs 2 (Two) Times a Day.       No current facility-administered medications for this encounter.      Objective:     Vitals:    02/09/22 1111   BP: 110/66   BP Location: Right arm   Patient Position: Sitting   Cuff Size: Adult   Pulse: 85   SpO2: 95%   Weight: 78.5 kg (173 lb)   Height: 170.2 cm (67\")   Body mass index is 27.1 kg/m².    ReDS Result:   Lab Results   Component Value Date    ABSOLUTELUNG 26 02/09/2022    ABSOLUTELUNG 30 11/03/2021   Unable to obtain 11/24/2021    Wt Readings from Last 3 Encounters:   02/09/22 78.5 kg (173 lb)   02/01/22 81.2 kg (179 lb)   01/05/22 81.6 kg (179 lb 12.8 oz)        Vitals reviewed.   Constitutional:       Appearance: Normal appearance. Well-developed.      Comments: In a wheelchair today  3L of continuous oxygen via NC    Eyes:      Conjunctiva/sclera: Conjunctivae normal.   HENT:      Head: Normocephalic.   Neck:      Vascular: No JVD or JVR.   Pulmonary:      Effort: Pulmonary effort is normal.      Breath sounds: Normal breath sounds.   Cardiovascular:      Normal rate. Regular rhythm.   Edema:     Ankle: bilateral trace edema of the ankle.     Feet: bilateral trace edema of the feet.  Abdominal:      General: Bowel sounds are normal.      Palpations: Abdomen is soft. There is no hepatomegaly or splenomegaly.   Musculoskeletal: Normal range of motion.      Cervical back: Normal range of motion and neck supple. Skin:     General: Skin is warm and dry.   Neurological:      Mental Status: Alert and oriented to person, place, and time.   Psychiatric:         Attention and Perception: Attention normal.         Mood and Affect: Mood normal.         Speech: Speech normal.         Behavior: Behavior normal. Behavior is cooperative.         Cognition and Memory: Cognition " normal.       Cardiographics  Results for orders placed during the hospital encounter of 10/30/21    Adult Transthoracic Echo Complete w/ Color, Spectral and Contrast if necessary per protocol    Interpretation Summary  · Normal left ventricular cavity size and wall thickness noted. All left ventricular wall segments contract normally  · Left ventricular ejection fraction appears to be 56 - 60%.  · Left ventricular diastolic function is consistent with (grade I) impaired relaxation.  · The mitral valve is structurally normal with no significant stenosis present. Mild mitral valve regurgitation is present.  · The aortic valve is structurally normal with no regurgitation or stenosis present.  · Mild dilation of the aortic root is present(4.0cm). Moderate dilation of the descending aorta present.(4.84cm).  · Mild tricuspid valve regurgitation is present. Estimated right ventricular systolic pressure from tricuspid regurgitation is moderately elevated (45-55 mmHg).  · Moderate pulmonary hypertension is present.  · There is no evidence of pericardial effusion.    EKG:        Lab Review   Lab Results   Component Value Date    TSH 2.190 12/23/2021     Lab Results   Component Value Date    GLUCOSE 186 (H) 02/09/2022    BUN 14 02/09/2022    CREATININE 0.85 02/09/2022    EGFRIFNONA 88 02/09/2022    BCR 16.5 02/09/2022    K 4.1 02/09/2022    CO2 24.0 02/09/2022    CALCIUM 9.2 02/09/2022    ALBUMIN 3.75 02/01/2022    LABIL2 1.7 05/06/2016    AST 17 02/01/2022    ALT 7 02/01/2022     Lab Results   Component Value Date    WBC 12.26 (H) 02/01/2022    HGB 12.4 (L) 02/01/2022    HCT 40.2 02/01/2022    MCV 90.5 02/01/2022     02/01/2022     Lab Results   Component Value Date    TROPONINT 0.016 12/27/2021     Lab Results   Component Value Date    PROBNP 461.9 02/09/2022      The following portions of the patient's history were reviewed and updated as appropriate: allergies, current medications, past family history, past medical  history, past social history, past surgical history and problem list.     Old records reviewed and pertinent information is included in the above objective data.     Assessment/Plan:      Diagnosis Plan   1. Chronic diastolic (congestive) heart failure (HCC)  Basic Metabolic Panel    BNP    Magnesium    ReDs Vest    Basic Metabolic Panel    Magnesium   2. Hypertension, unspecified type       BMP, pro-BNP and magnesium today  Discussed and reviewed lab results today  ReDs reviewed and discussed today  Continue on current medications   From HF standpoint, pt appears euvolemic and stable.  Monitor BP and heart rate   Refill Bumex   Discussed at length the importance of having cardiology follow-up for his atrial fibrillation. We reviewed the guidelines and treatment goals of being a patient in the Heart Failure Clinic. He expresses understanding and will further discuss with PCP.   Counseled patient extensively on dietary Na+ intake, importance of activity, weight monitoring, compliance with medications and follow up appointments.  Follow-up in 3 months, sooner if needed

## 2022-02-09 NOTE — PROGRESS NOTES
Heart Failure Pharmacy Note  Patient Name: Kb Marshall  Referring Provider: HOWARD Macias  Primary Cardiologist: Jeff  Type of CHF: HFpEF    Medication Use:   Adherence: no issues   Hx of med intolerances: None related to HF    Affordability: no issues  Retail Rx Management: not at this time    Past Medical History:   Diagnosis Date   • Aneurysm (HCC)    • Arthritis    • Asthma    • Back pain    • Cancer ([facility])     lungs   • COPD (chronic obstructive pulmonary disease) ([facility])    • Coronary artery disease    • Diabetes mellitus ([facility])    • Dyslipidemia    • GERD (gastroesophageal reflux disease)    • Heart disease    • Hypertension    • Lung cancer ([facility]) 2011   • PAD (peripheral artery disease) ([facility])      ALLERGIES: Acetaminophen-codeine, Adhesive tape, and Indocin [indomethacin]  Current Outpatient Medications   Medication Sig Dispense Refill   • albuterol sulfate  (90 Base) MCG/ACT inhaler Inhale 2 puffs Every 4 (Four) Hours As Needed for Wheezing.     • apixaban (ELIQUIS) 5 MG tablet tablet Take 5 mg by mouth 2 (Two) Times a Day.     • atorvastatin (LIPITOR) 10 MG tablet Take 10 mg by mouth Daily.     • bumetanide (BUMEX) 0.5 MG tablet Take 1 tablet by mouth Daily. 30 tablet 1   • carvedilol (COREG) 12.5 MG tablet Take 1 tablet by mouth 2 (Two) Times a Day With Meals. 60 tablet 1   • clopidogrel (PLAVIX) 75 MG tablet Take 75 mg by mouth Daily.     • empagliflozin (Jardiance) 25 MG tablet tablet Take 25 mg by mouth Daily.     • gabapentin (NEURONTIN) 400 MG capsule Take 400 mg by mouth 4 (Four) Times a Day.     • glimepiride (AMARYL) 4 MG tablet Take 8 mg by mouth Every Morning Before Breakfast.     • hydrALAZINE (APRESOLINE) 50 MG tablet Take 50 mg by mouth 3 (Three) Times a Day.     • linaclotide (LINZESS) 290 MCG capsule capsule Take 145 mcg by mouth Every Morning Before Breakfast.     • lisinopril (PRINIVIL,ZESTRIL) 20 MG tablet Take 1 tablet by mouth Daily. 30 tablet 0   • metFORMIN (GLUCOPHAGE) 500 MG tablet  "Take 1,000 mg by mouth 2 (Two) Times a Day With Meals.     • nabumetone (RELAFEN) 500 MG tablet Take 500 mg by mouth 2 (Two) Times a Day As Needed for Mild Pain . Only for gout flare up     • oxyCODONE-acetaminophen (PERCOCET) 7.5-325 MG per tablet Take 1 tablet by mouth Every 4 (Four) Hours As Needed for Moderate Pain .     • pantoprazole (Protonix) 40 MG EC tablet Take 1 tablet by mouth Daily. 30 tablet 1   • vitamin D3 125 MCG (5000 UT) capsule capsule Take 5,000 Units by mouth Daily.     • budesonide-formoterol (SYMBICORT) 160-4.5 MCG/ACT inhaler Inhale 2 puffs 2 (Two) Times a Day.       No current facility-administered medications for this encounter.       Vaccination History:   Pneumonia: UTD  Annual Influenza: UTD  COVID: UTD    Objective  Vitals:    02/09/22 1111   BP: 110/66   BP Location: Right arm   Patient Position: Sitting   Cuff Size: Adult   Pulse: 85   SpO2: 95%   Weight: 78.5 kg (173 lb)   Height: 170.2 cm (67\")     Wt Readings from Last 3 Encounters:   02/09/22 78.5 kg (173 lb)   02/01/22 81.2 kg (179 lb)   01/05/22 81.6 kg (179 lb 12.8 oz)         02/09/22  1111   Weight: 78.5 kg (173 lb)     Lab Results   Component Value Date    GLUCOSE 186 (H) 02/09/2022    BUN 14 02/09/2022    CREATININE 0.85 02/09/2022    EGFRIFNONA 88 02/09/2022    BCR 16.5 02/09/2022    K 4.1 02/09/2022    CO2 24.0 02/09/2022    CALCIUM 9.2 02/09/2022    ALBUMIN 3.75 02/01/2022    LABIL2 1.7 05/06/2016    AST 17 02/01/2022    ALT 7 02/01/2022     Lab Results   Component Value Date    WBC 12.26 (H) 02/01/2022    HGB 12.4 (L) 02/01/2022    HCT 40.2 02/01/2022    MCV 90.5 02/01/2022     02/01/2022     Lab Results   Component Value Date    TROPONINT 0.016 12/27/2021     Lab Results   Component Value Date    PROBNP 461.9 02/09/2022     Results for orders placed during the hospital encounter of 10/30/21    Adult Transthoracic Echo Complete w/ Color, Spectral and Contrast if necessary per protocol    Interpretation " Summary  · Normal left ventricular cavity size and wall thickness noted. All left ventricular wall segments contract normally  · Left ventricular ejection fraction appears to be 56 - 60%.  · Left ventricular diastolic function is consistent with (grade I) impaired relaxation.  · The mitral valve is structurally normal with no significant stenosis present. Mild mitral valve regurgitation is present.  · The aortic valve is structurally normal with no regurgitation or stenosis present.  · Mild dilation of the aortic root is present(4.0cm). Moderate dilation of the descending aorta present.(4.84cm).  · Mild tricuspid valve regurgitation is present. Estimated right ventricular systolic pressure from tricuspid regurgitation is moderately elevated (45-55 mmHg).  · Moderate pulmonary hypertension is present.  · There is no evidence of pericardial effusion.           GDMT:       Class   Drug   Dose Last Dose Adjustment Additional Titration   ACEi/ARB/ARNI Lisinopril  20mg daily     Beta Blocker Coreg  12.5mg BID 11/04/21    SGLT2i Jardiance 25mg     MRA             Drug Therapy Problems    1. Drug-disease interaction - nabumetone  2. Confusion with medications   3. Recalls having some bleeding issues  4. Refills needed on Bumex    Recommendations:       1. Educated Pt on potential risks of nabumetone or NSAIDS in HF.  Pt says he only uses if he has a gout flare and stated he uses maybe 2 a month.  Re-educated patient on risks again today.    2. Pt was confused about a few medications. He did have a list with him that matched majority of the medicines on his chart. He stated he was taking Eliquis once a day but the prescription is for twice a day. He is also on Plavix once a day and I believe that is what he was getting confused with. Patient was advised to continue taking medications that were on his list. Noted patients confusion regarding Eliquis to [facility]. Counseled the patient to follow up with his provider about how he  has been taking his Eliquis once a day so his provider is aware. Patient verbalized understanding.   3. Patient stated he had some bleeding episodes on his ear and arm that bled for a while before they stopped. Patient is on Eliquis and Plavix. Educated patient on S/Sx of bleeding and what they look like and to go to the ER with any concerns. He said he has not noticed any other bleeding. Also educated the patient to discuss these bleeding findings with his provider. Patient verbalized understanding.   4. Concerns noted to [facility]. Patient also needed refills on protonix, instructed patient to follow up with his provider. Patient verbalized understanding and said he would be able to get a refill through them.      Thank you for allowing me to participate in the care of your patient,    Shireen Carpio, PharmD  02/09/22  13:10 EST

## 2022-02-10 NOTE — ADDENDUM NOTE
Encounter addended by: Shireen Carpio, PharmD on: 2/10/2022 10:19 AM   Actions taken: SmartForm saved

## 2022-02-23 NOTE — PROGRESS NOTES
Subjective    Kb Marshall presents for the following COPD      History of Present Illness   Were you born premature?  no    Any Childhood infections? no      Breathing problems when you were a child? no    Any childhood allergies?    no             At what age did you begin smoking? 20    Smoking marijuana? no    Any IV drugs? no    How many packs per day? Former Smoker    Lung Function Test? no  Chest X-Ray? yes    CT Chest? yes Allergy Test? no    Family hx of Lung disease or Lung Cancer?yes    If FHx is posivitive for lung cancer, what is the relationship of the family member? Nephew and Niece have CF    Any hospitalization in the last year? yes    How far can you walk without getting short of breath? Wheelchair    Any coughing? no    Any wheezing? yes    Acid Reflux? yes    Do you snore? Unknown    Daytime Fatigue? yes    Any pets? no   Any pet allergies? no    Occupation? Retired    Have you been exposed to any chemicals at your job? no    What inhalers are you currently using? Albuterol, Symbicort    Have you had the Influenza Vaccine? no  Would you like to receive this Vaccine today? no    Have you had the Pneumonia Vaccine?  yes   Would you like to receive this Vaccine today? no  EXAM DATE:  11/2/2021 12:41 PM    CLINICAL HISTORY:  Respiratory illness, nondiagnostic xray; I50.9-Heart failure,  unspecified; J44.1-Chronic obstructive pulmonary disease with (acute)  exacerbation    TECHNIQUE:  Axial computed tomography images of the chest without intravenous  contrast. Sagittal and coronal reformatted images were created and  reviewed. This CT exam was performed using one or more of the following  dose reduction techniques: automated exposure control, adjustment of  the mA and/or kV according to patient size, and/or use of iterative  reconstruction technique.    COMPARISON:  10/30/2021    FINDINGS:  LUNGS: Subpleural fibrotic change noted.  PLEURAL SPACE: Unremarkable. No pneumothorax. No  significant  effusion.  HEART: Unremarkable. No cardiomegaly. No significant pericardial  effusion.  BONES/JOINTS: Unremarkable. No acute fracture. No dislocation.  SOFT TISSUES: Unremarkable.  VASCULATURE: Previously identified 6.6 cm descending thoracic aortic  aneurysm has not significantly changed in size.  LYMPH NODES: Unremarkable. No enlarged lymph nodes.   Report Conclusions  IMPRESSION:   1. Previously identified 6.6 cm descending thoracic aortic aneurysm has  not significantly changed in size.  2. Subpleural fibrotic change noted.    Above mentioned questionnaire reviewed by me in great detail.  CT chest images were reviewed and discussed with patient and patient's daughter in great detail.  Review of Systems   Constitutional: Negative for activity change, appetite change, chills, fatigue and unexpected weight change.   HENT: Negative for congestion, postnasal drip and rhinorrhea.    Respiratory: Positive for wheezing. Negative for apnea, cough, chest tightness and shortness of breath.    Cardiovascular: Negative for chest pain, palpitations and leg swelling.   Gastrointestinal: Negative for nausea.   Musculoskeletal: Negative for gait problem.   Skin: Negative for pallor.   Allergic/Immunologic: Negative for environmental allergies.   Neurological: Negative for syncope.   Psychiatric/Behavioral: Negative for confusion. The patient is not nervous/anxious.        Active Problems:  Problem List Items Addressed This Visit        Hematology and Neoplasia    Malignant neoplasm of upper lobe of right lung, status post right upper lobectomy in 2011, being followed by Dr. Johnson       Pulmonary and Pneumonias    Advanced COPD, on home oxygen.    History of Lung calcification diagnosed in November 2011, status post right upper lobectomy with no adjuvant chemotherapy.     SOB (shortness of breath)    Acute on chronic respiratory failure with hypoxia (HCC)      Other Visit Diagnoses     Chronic bronchitis, unspecified  chronic bronchitis type (HCC)    -  Primary    Relevant Orders    Full Pulmonary Function Test With Bronchodilator          Past Medical History:  Past Medical History:   Diagnosis Date   • Aneurysm (HCC)    • Arthritis    • Asthma    • Back pain    • Cancer (HCC)     lungs   • COPD (chronic obstructive pulmonary disease) (HCC)    • Coronary artery disease    • Diabetes mellitus (HCC)    • Dyslipidemia    • GERD (gastroesophageal reflux disease)    • Heart disease    • Hypertension    • Lung cancer (HCC) 2011   • PAD (peripheral artery disease) ([facility])        Family History:  Family History   Problem Relation Age of Onset   • Cancer Mother    • Cancer Brother        Social History:  Social History     Tobacco Use   • Smoking status: Former Smoker     Packs/day: 3.00     Quit date: 11/2011     Years since quitting: 10.3   • Smokeless tobacco: Former User     Types: Chew   Substance Use Topics   • Alcohol use: Not Currently     Comment: quit 40 years ago       Current Medications:  Current Outpatient Medications   Medication Sig Dispense Refill   • albuterol sulfate  (90 Base) MCG/ACT inhaler Inhale 2 puffs Every 4 (Four) Hours As Needed for Wheezing.     • apixaban (ELIQUIS) 5 MG tablet tablet Take 5 mg by mouth 2 (Two) Times a Day.     • atorvastatin (LIPITOR) 10 MG tablet Take 10 mg by mouth Daily.     • budesonide-formoterol (SYMBICORT) 160-4.5 MCG/ACT inhaler Inhale 2 puffs 2 (Two) Times a Day.     • bumetanide (BUMEX) 0.5 MG tablet Take 1 tablet by mouth Daily. 30 tablet 1   • carvedilol (COREG) 12.5 MG tablet Take 1 tablet by mouth 2 (Two) Times a Day With Meals. 60 tablet 1   • clopidogrel (PLAVIX) 75 MG tablet Take 75 mg by mouth Daily.     • empagliflozin (Jardiance) 25 MG tablet tablet Take 25 mg by mouth Daily.     • gabapentin (NEURONTIN) 400 MG capsule Take 400 mg by mouth 4 (Four) Times a Day.     • glimepiride (AMARYL) 4 MG tablet Take 8 mg by mouth Every Morning Before Breakfast.     •  "hydrALAZINE (APRESOLINE) 50 MG tablet Take 50 mg by mouth 3 (Three) Times a Day.     • linaclotide (LINZESS) 290 MCG capsule capsule Take 145 mcg by mouth Every Morning Before Breakfast.     • lisinopril (PRINIVIL,ZESTRIL) 20 MG tablet Take 1 tablet by mouth Daily. 30 tablet 0   • metFORMIN (GLUCOPHAGE) 500 MG tablet Take 1,000 mg by mouth 2 (Two) Times a Day With Meals.     • nabumetone (RELAFEN) 500 MG tablet Take 500 mg by mouth 2 (Two) Times a Day As Needed for Mild Pain . Only for gout flare up     • oxyCODONE-acetaminophen (PERCOCET) 7.5-325 MG per tablet Take 1 tablet by mouth Every 4 (Four) Hours As Needed for Moderate Pain .     • pantoprazole (Protonix) 40 MG EC tablet Take 1 tablet by mouth Daily. 30 tablet 1   • vitamin D3 125 MCG (5000 UT) capsule capsule Take 5,000 Units by mouth Daily.       No current facility-administered medications for this visit.       Allergies:  Allergies   Allergen Reactions   • Acetaminophen-Codeine    • Adhesive Tape Other (See Comments)     ADHESIVE CAUSES SKIN BLISTERS, PER PT   • Indocin [Indomethacin] Itching       Vitals:  /72   Pulse 96   Temp 97.3 °F (36.3 °C) (Temporal)   Ht 170.2 cm (67\")   Wt 73.9 kg (163 lb)   SpO2 94%   BMI 25.53 kg/m²     Imaging:    Imaging Results (Most Recent)     None          Pulmonary Functions Testing Results:    No results found for: FEV1, FVC, HPW5OJE, TLC, DLCO    Results for orders placed or performed during the hospital encounter of 02/09/22   BNP    Specimen: Arm, Left; Blood   Result Value Ref Range    proBNP 461.9 0.0-1,800.0 pg/mL   Basic Metabolic Panel    Specimen: Arm, Left; Blood   Result Value Ref Range    Glucose 186 (H) 65 - 99 mg/dL    BUN 14 8 - 23 mg/dL    Creatinine 0.85 0.76 - 1.27 mg/dL    Sodium 137 136 - 145 mmol/L    Potassium 4.1 3.5 - 5.2 mmol/L    Chloride 100 98 - 107 mmol/L    CO2 24.0 22.0 - 29.0 mmol/L    Calcium 9.2 8.6 - 10.5 mg/dL    eGFR Non African Amer 88 >60 mL/min/1.73    BUN/Creatinine " Ratio 16.5 7.0 - 25.0    Anion Gap 13.0 5.0 - 15.0 mmol/L   Magnesium    Specimen: Arm, Left; Blood   Result Value Ref Range    Magnesium 2.1 1.6 - 2.4 mg/dL   ReDs Vest   Result Value Ref Range    Absolute Lung Fluid Content 26 20 - 35 %       Objective   Physical Exam   General- elderly on oxygen   Mild respiratory distress     HEENT- pupils equally reactive to light, normal in size, no scleral icterus    Neck-supple    Respiratory-respirations normal-on auscultation no wheezing no crackles,     Cardiovascular-  Normal S1 and S2. No S3, S4 or murmurs. No JVD, no carotid bruit and no edema, pulses normal bilaterally     GI-nontender nondistended bowel sounds positive    CNS-nonfocal    Musculoskeletal -no edema  Extremities- no obvious deformity noticed     Psychiatric-mood good, good eye contact, alert awake oriented  Skin- no visible rash         Assessment/Plan      Abnormal CT chest-ILD-patient has IPF.  Has honeycombing on the CAT scan.  Does not want to try any of these therapies for IPF as they have side effects and he has enough going on and does not want to try any new therapy.  I asked him to reconsider it and if he changes his mind let us know.    Chronic bronchitis-we will get PFTs.  Continue current inhalers.  Inhalational technique not checked on this visit.    COPD-continue oxygen continue nebs.  We will get PFTs    Malignant neoplasm of right upper lobe-s/p right upper lobe lobectomy.  Stable for now.  Continue to monitor.    Shortness of breath-likely multifactorial due to patient's underlying lung disease and deconditioning.  Continue current treatment.  Continue oxygen.  She was educated that if she does not use oxygen she can feel lightheaded dizzy and can't pass out hit her head and can die.  Patient understood the conversation very well and will try and use oxygen all the time.          ICD-10-CM ICD-9-CM   1. Chronic bronchitis, unspecified chronic bronchitis type (HCC)  J42 491.9   2.  Malignant neoplasm of upper lobe of right lung, status post right upper lobectomy in 2011, being followed by Dr. Johnson  C34.11 162.3   3. Advanced COPD, on home oxygen.  J44.9 496   4. History of Lung calcification diagnosed in November 2011, status post right upper lobectomy with no adjuvant chemotherapy.   J98.4 518.89   5. SOB (shortness of breath)  R06.02 786.05   6. Acute on chronic respiratory failure with hypoxia (HCC)  J96.21 518.84     799.02       No follow-ups on file.

## 2022-03-08 NOTE — OUTREACH NOTE
AMBULATORY CASE MANAGEMENT NOTE    Name and Relationship of Patient/Support Person: bK Marshall M - Self    Patient Outreach    Spoke with patient 3/7/2022.  Patient states he is receiving hospice care.  3/8/2022 [facility] enrollment verified, activated 3/2/2022.    RON CRONIN RN  Ambulatory Case Management    3/8/2022, 10:05 EST

## 2022-03-10 NOTE — PROGRESS NOTES
I called to confirm patients PFT appointment he requested I cancel his appointment. Patient said he is now a HOSPICE Patient. Provider Notified.

## 2022-06-08 PROBLEM — I48.91 ATRIAL FIBRILLATION WITH RAPID VENTRICULAR RESPONSE (HCC): Status: ACTIVE | Noted: 2022-01-01

## 2022-06-08 NOTE — CONSULTS
Consults  Date of Admit: 6/8/2022  Date of Consult: 06/08/22  Provider, No Known  Kb Marshall  1945    Consulting Physician: Joss Monson MD    Cardiology consultation    Reason for consultation: Atrial fibrillation with RVR    Assessment:  1. Atrial fibrillation with RVR, resolved.  Patient is currently in sinus rhythm.  2. Acute on chronic combined diastolic and systolic heart failure.  Admission proBNP of 11k and absolute lung fluid content 41  3. Left-sided chest pain, likely pleuritic and/or related to left lobe PNM given that patient has increased pain with inspiration and has chest wall tenderness.  4. Sepsis secondary to left upper lobe pneumonia, followed per primary and pulmonology  5. Severe pulmonary hypertension  6. Coronary artery disease, exact details unknown  7. AAA, status post repair  8. TAAA, status post 4 vessel repair in December 2021  9. Lung CA, status post right lung resection, remote history when he      Recommendations:  1. Patient is currently in sinus rhythm.  He does require chronic anticoagulation and is on heparin.  This can be discontinued and switched to his usual home dose of Eliquis if no procedure planned.  It does however, appear that pulmonology may want to do bronchoscopy.  We will defer switch to their service.  2. When pt's respiratory status improves, we will discuss possible stress test if we verify that this was not completed before his recent TAAA repair at .  3. With regard to patient's acute on chronic heart failure, he does not appear to be in acute volume overload at this time.      History of Present Illness    Subjective     Chief Complaint   Patient presents with   • Shortness of Breath       Kb Marshall is a 77 y.o. male with past medical history significant for coronary artery disease, paroxysmal atrial fibrillation, dyslipidemia, hypertension, thoracic abdominal aortic aneurysm status post four-vessel thoracic abdominal aortic repair in December  "2021, lung cancer status post lung resection, type 2 diabetes, and former tobacco use.  He presented to the ED on 6/8/2022 with complaint of shortness of breath.  Patient is on chronic O2 at home and reported that over the last 2 weeks he increased his oxygen to 8 to 10 L per nasal cannula.  Patient was diagnosed with sepsis secondary to left upper lobe pneumonia.  Cardiology was consulted due to atrial fibrillation with RVR.    Patient was seen and examined.  He has a sitter at bedside due to suicidal ideation.  He has been seen by psychiatry earlier today and noted to be oriented only to self and place.  He is not oriented to time nor his situation.    In reviewing previous notes, the patient has complained of daily left sided chest pain.  Troponin has been negative.    Patient states that he came to the hospital because he was having trouble breathing and he continued to have this \"left-sided chest pain.\"  He tells me that the pain increases when he tries to take a deep breath.  He also has chest wall tenderness.    Last Echo: 6/8/2022    Interpretation Summary    · Estimated left ventricular EF = 45% Left ventricular ejection fraction appears to be 41 - 45%. Left ventricular systolic function is mildly decreased.  · Estimated right ventricular systolic pressure from tricuspid regurgitation is markedly elevated (>80 mmHg).  · Severe pulmonary hypertension is present.  · The right ventricular cavity is mildly dilated.  · Left ventricular diastolic function is consistent with (grade I) impaired relaxation.  · Pt has converted to sinus tachycardia prior to this study.  · Since prev study of 10/31/21 RV pressure has markedly increased from 45 to 80 mmHg          Last Stress: 1/22/2015    Baseline ECG:  Normal sinus rhythm. Non-specific ST-T wave changes.   Exercise Protocol:  Maximum heart rate was 103 bpm, 67% MPHR. RPP [id number].   Pharmacologic Protocol:  The patient was unable to exercise due to unspecified " debility.  Aminophylline 125 mg were given intravenously.  A 400 mcg dose of Regadenoson was administered by intravenous bolus  injection.     Stress ECG :  Normal sinus rhythm. There is non-specific ST-segment changes   Recovery ECG:  Normal sinus rhythm. There is non-specific ST segment changes 6 minutes  into recovery. Medications were administered during the recovery  period. SEE ABOVE   Hemodynamics                   Rest        Stress        Recovery      SBP         155 mmHg    144 mmHg      165 mmHg      DBP         106 mmHg    93 mmHg       102 mmHg       HR =       70 bpm      103 bpm       84 bpm        %MPHR                   67 %                           Imaging Protocol:  This was a gated SPECT myocardial perfusion imaging study. Attentuation  correction could not be performed on this study. A one day rest-stress  imaging protocol was followed using Tc-99m sestamibi (Cardiolite)  injected intravenously. For the rest portion of the study, 10.7 mCi of  the radiopharmaceutical was administered at 01/22/2015 08:20:13. For the  stress portion of the study, 28.1 mCi was administered at 01/22/2015  10:25:25.      Perfusion Interpretation:  TID Ratio 0.81.      Wall Motion Interpretation:  Gated imaging under post-stress conditions demonstrated normal wall  motion.   The patient's calculated post stress LVEF was 70%. The patient's end  diastolic volume was 128ml. The patient's end systolic volume was  38ml.      Study Limitations:  The overall quality of the study was good. There was no lung uptake of  the radiopharmaceutical.      Nuclear Cardiology Conclusion:  Normal LV perfusion.  Normal regadenoson myocardial perfusion   with Tc-99m sestamibi imaging.   Stress testing induced no symptoms and no ECG changes consistent with  ischemia.  Normal LV size. Global left ventricular systolic function was  normal, with an EF of 70%.  In addition, there was normal wall motion.      Stress Test  Conclusion:  Non-diagnosticNon-diagnostic stress test. .   Conclusions  *1) This is a negative stress test for evidence of inducible ischemia or  prior infarct.  *2) There is preserved LV function with a calculated EF of 70%.     Electronically signed by: Javier Reaves MD on 01/24/2015 14:04:06  Thank you for the courtesy of this referral.      Past Medical History:   Diagnosis Date   • Aneurysm (HCC)    • Arthritis    • Asthma    • Back pain    • Cancer (HCC)     lungs   • COPD (chronic obstructive pulmonary disease) (HCC)    • Coronary artery disease    • Diabetes mellitus (HCC)    • Dyslipidemia    • GERD (gastroesophageal reflux disease)    • Heart disease    • Hypertension    • Lung cancer (HCC) 2011   • PAD (peripheral artery disease) (HCC)      Past Surgical History:   Procedure Laterality Date   • ABDOMINAL AORTIC ANEURYSM REPAIR     • COLONOSCOPY N/A 10/11/2018    Procedure: COLONOSCOPY;  Surgeon: Calos Stockton MD;  Location: [facility];  Service: Gastroenterology   • ENDOSCOPY N/A 12/24/2021    Procedure: ESOPHAGOGASTRODUODENOSCOPY;  Surgeon: Magui Murillo MD;  Location: [facility];  Service: General;  Laterality: N/A;   • FINGER SURGERY Left    • KNEE SURGERY Left    • LUNG REMOVAL, PARTIAL  11/29/2011   • LUNG SURGERY  11/28/2011     Family History   Problem Relation Age of Onset   • Cancer Mother    • Cancer Brother      Social History     Tobacco Use   • Smoking status: Former Smoker     Packs/day: 3.00     Quit date: 11/2011     Years since quitting: 10.6   • Smokeless tobacco: Former User     Types: Chew   Vaping Use   • Vaping Use: Never used   Substance Use Topics   • Alcohol use: Not Currently     Comment: quit 40 years ago   • Drug use: No     Medications Prior to Admission   Medication Sig Dispense Refill Last Dose   • bumetanide (BUMEX) 0.5 MG tablet Take 1 tablet by mouth Daily. 30 tablet 1    • hydrALAZINE (APRESOLINE) 50 MG tablet Take 50 mg by mouth 3 (Three) Times a Day.      •  metFORMIN (GLUCOPHAGE) 500 MG tablet Take 1,000 mg by mouth 2 (Two) Times a Day With Meals.      • oxyCODONE-acetaminophen (PERCOCET) 7.5-325 MG per tablet Take 1 tablet by mouth Every 4 (Four) Hours As Needed for Moderate Pain .      • albuterol sulfate  (90 Base) MCG/ACT inhaler Inhale 2 puffs Every 4 (Four) Hours As Needed for Wheezing.      • apixaban (ELIQUIS) 5 MG tablet tablet Take 5 mg by mouth 2 (Two) Times a Day.      • atorvastatin (LIPITOR) 10 MG tablet Take 10 mg by mouth Daily.      • budesonide-formoterol (SYMBICORT) 160-4.5 MCG/ACT inhaler Inhale 2 puffs 2 (Two) Times a Day.      • carvedilol (COREG) 12.5 MG tablet Take 1 tablet by mouth 2 (Two) Times a Day With Meals. 60 tablet 1    • clopidogrel (PLAVIX) 75 MG tablet Take 75 mg by mouth Daily.      • dexamethasone (DECADRON) 4 MG tablet Take 4 mg by mouth Daily With Breakfast.      • diphenhydrAMINE (BENADRYL) 25 mg capsule Take 25 mg by mouth Every 8 (Eight) Hours.      • empagliflozin (Jardiance) 25 MG tablet tablet Take 25 mg by mouth Daily.      • gabapentin (NEURONTIN) 400 MG capsule Take 600 mg by mouth 4 (Four) Times a Day.      • glimepiride (AMARYL) 4 MG tablet Take 8 mg by mouth Every Morning Before Breakfast.      • hydrOXYzine pamoate (VISTARIL) 25 MG capsule TAKE 1 CAPSULE BY MOUTH EVERY 6 HOURS AS NEEDED      • ibuprofen (ADVIL,MOTRIN) 600 MG tablet Take 600 mg by mouth Every 6 (Six) Hours As Needed for Mild Pain .      • ipratropium-albuterol (DUO-NEB) 0.5-2.5 mg/3 ml nebulizer Take 3 mL by nebulization Every 6 (Six) Hours As Needed for Wheezing.      • linaclotide (LINZESS) 290 MCG capsule capsule Take 145 mcg by mouth Every Morning Before Breakfast.      • lisinopril (PRINIVIL,ZESTRIL) 20 MG tablet Take 1 tablet by mouth Daily. 30 tablet 0    • LORazepam (ATIVAN) 0.5 MG tablet Take 0.5 mg by mouth Every 6 (Six) Hours As Needed for Anxiety.      • nabumetone (RELAFEN) 500 MG tablet Take 500 mg by mouth 2 (Two) Times a Day As  Needed for Mild Pain . Only for gout flare up      • nitroglycerin (NITROSTAT) 0.4 MG SL tablet Place 0.4 mg under the tongue Every 5 (Five) Minutes As Needed for Chest Pain. Take no more than 3 doses in 15 minutes.      • pantoprazole (Protonix) 40 MG EC tablet Take 1 tablet by mouth Daily. 30 tablet 1    • vitamin D (ERGOCALCIFEROL) 1.25 MG (23561 UT) capsule capsule Take 50,000 Units by mouth 1 (One) Time Per Week.      • vitamin D3 125 MCG (5000 UT) capsule capsule Take 5,000 Units by mouth Daily.        Allergies:  Acetaminophen-codeine, Adhesive tape, and Indocin [indomethacin]    Review of Systems   Unable to perform ROS: Mental status change         Objective      Vital Signs  Temp:  [97 °F (36.1 °C)-98.6 °F (37 °C)] 97.8 °F (36.6 °C)  Heart Rate:  [] 84  Resp:  [20-35] 31  BP: ()/() 148/98  Body mass index is 26.63 kg/m².    Intake/Output Summary (Last 24 hours) at 6/8/2022 1524  Last data filed at 6/8/2022 1444  Gross per 24 hour   Intake 1600 ml   Output 450 ml   Net 1150 ml       Physical Exam  Vitals reviewed.   Constitutional:       Appearance: He is well-developed.   HENT:      Head: Normocephalic and atraumatic.   Eyes:      Pupils: Pupils are equal, round, and reactive to light.   Neck:      Vascular: No JVD.   Cardiovascular:      Rate and Rhythm: Normal rate and regular rhythm.      Heart sounds: No murmur heard.    No friction rub. No gallop.   Pulmonary:      Effort: Tachypnea present. No respiratory distress.      Breath sounds: Decreased air movement present. Wheezing present. No rales.      Comments: O2 on per nasal cannula  Abdominal:      Palpations: Abdomen is soft. There is no mass.      Tenderness: There is no abdominal tenderness.      Hernia: No hernia is present.   Skin:     General: Skin is warm and dry.   Neurological:      Mental Status: He is alert.         Telemetry: Sinus 80s    Results Review:   I reviewed the patient's new clinical results.  Results from last  7 days   Lab Units 06/08/22  0540   TROPONIN T ng/mL <0.010     Results from last 7 days   Lab Units 06/08/22  0540   WBC 10*3/mm3 15.19*   HEMOGLOBIN g/dL 12.7*   PLATELETS 10*3/mm3 200     Results from last 7 days   Lab Units 06/08/22  0540   SODIUM mmol/L 136   POTASSIUM mmol/L 4.5   CHLORIDE mmol/L 96*   CO2 mmol/L 24.8   BUN mg/dL 36*   CREATININE mg/dL 0.94   CALCIUM mg/dL 8.9   GLUCOSE mg/dL 246*   ALT (SGPT) U/L 8   AST (SGOT) U/L 11     Lab Results   Component Value Date    INR 1.24 (H) 06/08/2022    INR 1.20 (H) 12/23/2021    INR 1.1 12/14/2021    INR 1.1 12/14/2021    INR 1.00 03/29/2015     Lab Results   Component Value Date    MG 2.1 06/08/2022    MG 2.1 02/09/2022    MG 1.9 01/05/2022     Lab Results   Component Value Date    TSH 2.190 12/23/2021    TRIG 66 10/31/2021    HDL 48 10/31/2021     (H) 10/31/2021      Lab Results   Component Value Date    BNP 20 04/16/2015        EKG:         Imaging Results (Last 72 Hours)     Procedure Component Value Units Date/Time    CT Angiogram Chest Pulmonary Embolism [[id number]] Collected: 06/08/22 0749     Updated: 06/08/22 0801    Narrative:      CT ANGIOGRAM CHEST PULMONARY EMBOLISM-     CLINICAL INDICATION: sob, afib        COMPARISON: 01/04/2022      PROCEDURE: Thin cut axial images were acquired through the pulmonary  vessels during the rapid infusion of IV contrast.     Additional 3-D reformatted images obtained via post-processing for  improved diagnostic accuracy and procedural planning.     Radiation dose reduction techniques were utilized per ALARA protocol.  Automated exposure control was initiated through either or [employer] or  [employer] software packages by  protocol.           FINDINGS: Today's study demonstrates opacification of the central  pulmonary vessels.   There are no filling defects.   There is no truncation.     No evidence of a pulmonary embolus.     Dense pleural-based consolidation in the left lung.  Small nodules in  the right lung.     Diffuse interstitial lung disease..     Mediastinal lymph node enlargement similar to the previous exam.     No pericardial or pleural effusion.          Impression:      1. No evidence of a pulmonary embolus.,  2. Interval development of small nodules in the right lung and left  lower lobe, as well as dense consolidation pleural-based in the left  upper lobe.  3. Stable mediastinal lymph node enlargement.     This report was finalized on 6/8/2022 7:59 AM by Dr. Leif Taveras MD.       XR Chest 1 View [[id number]] Collected: 06/08/22 0555     Updated: 06/08/22 0558    Narrative:      CR Chest 1 Vw    INDICATION:   Shortness of air, sepsis     COMPARISON:    11/2/2021    FINDINGS:  Portable AP view(s) of the chest.  Mild interstitial prominence in the right lung stable. There are no focal infiltrates. There is a large amount of density in the left upper lobe measuring at least 8 cm in diameter. This is abutting the lateral part of  the chest. An aortic stent graft is present. The heart size is normal      Impression:      8 cm dense area left upper lobe suggesting dense pneumonia. Clinical correlation follow-up is recommended.    Mild diffuse initial prominence.    Signer Name: Eben Cisneros MD   Signed: 6/8/2022 5:55 AM   Workstation Name: [facility]-    [facility]             Thank you very much for asking us to be involved in this patient's care.  We will follow along with you.      Electronically signed by GOLDEN Allen, 06/08/22, 5:16 PM EDT.  .

## 2022-06-08 NOTE — H&P
"    [facility] Medicine Services  History & Physical    Patient Identification:  Name:  Kb Marshall  Age:  77 y.o.  Sex:  male  :  1945  MRN:  [id number]   Visit Number:  [id number]  Admit Date: 2022   Primary Care Physician:  Scar Mejia MD    Subjective     Chief complaint:   Chief Complaint   Patient presents with   • Shortness of Breath         History of presenting illness:      Kb Marshall is a 77 y.o. male with past medical history significant for hypertension, PAD, dyslipidemia, right upper lobe lung cancer status post right upper lobectomy in , COPD with oxygen dependency, thoracic ascending aortic aneurysm, aortic arch aneurysm with stenting, paroxysmal atrial fibrillation to the emergency department at [facility] on 2022 with complaints of shortness of breath. There was no family at bedside at the time of my elation and the patient is a rather poor historian. The patient states that he \"lost his breath\" around 2 weeks ago and this has progressively gotten worse. He wears oxygen at home at all times. He reported to me that he is using 8-10 liters at home. However, ED documentation states a reported 4 liters. He has a cough that is non-productive. He reports sharp left chest wall pain, that is intermittent in nature and worse with exertion. He denies palpitations or known history of atrial fibrillation. After review of his medical record, it appears the patient experienced an episode of atrial fibrillation during as admission on 2021. The patient is not chronically anticoagulated.    He denies any known sick contacts. He denies fevers, chills, diarrhea, abdominal pain. He reports an episode of vomiting, unknown when. He is lethargic at bedside. Increased respiratory effort with slight respiratory distress noted. He is on 10liters highflow with an oxygen saturation of 98%. He was started on a Cardizem drip in the ED, this was discontinued " at the time of my evaluation. He was noted to be in sinus rhythm with rate in the 80's.  Heparin drip infusing.       Upon arrival to the ED, vital signs were pulse 145, respirations 26, blood pressure 87/61, oxygen saturation 98% on 15 L via nonrebreather, temperature 97.  They run able to obtain an ABG in the ED due to patient refusal.  proBNP was elevated at 11,717, CRP significantly elevated to 43.58, lactate positive at 2.4, procalcitonin 2.67, white blood cell 15.19.  EKG showing atrial fibrillation with a rapid ventricular response, ST and T wave abnormalities.  His chest x-ray showing stable interstitial prominence of the right lung.  8 cm density in the left upper lobe that suggests a dense pneumonia.  CT angiogram chest PE protocol without evidence of a pulmonary embolism.  Interval development of small nodules in the right lung and left lower lobe.  Dense consolidation in the left upper lobe again seen.  The patient will be admitted to the progressive care unit for continued evaluation and therapy.    Known Emergency Department medications received prior to my evaluation included cefepime, Cardizem, heparin, Solu-Medrol, sepsis bolus.    Patient was in PCU room 211 at the time of my evaluation.     ---------------------------------------------------------------------------------------------------------------------   Review of Systems   Constitutional: Positive for activity change and fatigue. Negative for chills, diaphoresis and fever.   HENT: Negative for congestion.    Respiratory: Positive for cough and shortness of breath.    Cardiovascular: Positive for chest pain. Negative for palpitations and leg swelling.   Gastrointestinal: Positive for nausea and vomiting. Negative for abdominal pain and diarrhea.   Neurological: Positive for weakness. Negative for dizziness, numbness and headaches.   All other systems reviewed and are negative.      ---------------------------------------------------------------------------------------------------------------------   Past Medical History:   Diagnosis Date   • Aneurysm (HCC)    • Arthritis    • Asthma    • Back pain    • Cancer (HCC)     lungs   • COPD (chronic obstructive pulmonary disease) (HCC)    • Coronary artery disease    • Diabetes mellitus (HCC)    • Dyslipidemia    • GERD (gastroesophageal reflux disease)    • Heart disease    • Hypertension    • Lung cancer (HCC) 2011   • PAD (peripheral artery disease) (HCC)      Past Surgical History:   Procedure Laterality Date   • ABDOMINAL AORTIC ANEURYSM REPAIR     • COLONOSCOPY N/A 10/11/2018    Procedure: COLONOSCOPY;  Surgeon: Calos Stockton MD;  Location: [facility];  Service: Gastroenterology   • ENDOSCOPY N/A 12/24/2021    Procedure: ESOPHAGOGASTRODUODENOSCOPY;  Surgeon: Magui Murillo MD;  Location: [facility];  Service: General;  Laterality: N/A;   • FINGER SURGERY Left    • KNEE SURGERY Left    • LUNG REMOVAL, PARTIAL  11/29/2011   • LUNG SURGERY  11/28/2011     Family History   Problem Relation Age of Onset   • Cancer Mother    • Cancer Brother      Social History     Socioeconomic History   • Marital status:    Tobacco Use   • Smoking status: Former Smoker     Packs/day: 3.00     Quit date: 11/2011     Years since quitting: 10.6   • Smokeless tobacco: Former User     Types: Chew   Vaping Use   • Vaping Use: Never used   Substance and Sexual Activity   • Alcohol use: Not Currently     Comment: quit 40 years ago   • Drug use: No   • Sexual activity: Defer     ---------------------------------------------------------------------------------------------------------------------   Allergies:  Acetaminophen-codeine, Adhesive tape, and Indocin [indomethacin]  ---------------------------------------------------------------------------------------------------------------------   Home medications:    Medications below are reported home  medications pulling from within the system; at this time, these medications have not been reconciled unless otherwise specified and are in the verification process for further verifcation as current home medications.  (Not in a hospital admission)      Hospital Scheduled Meds:     dilTIAZem, 5-15 mg/hr, Last Rate: 5 mg/hr (06/08/22 0823)  heparin, 12 Units/kg/hr, Last Rate: 12 Units/kg/hr (06/08/22 0816)      Current listed hospital scheduled medications may not yet reflect those currently placed in orders that are signed and held awaiting patient's arrival to floor.   ---------------------------------------------------------------------------------------------------------------------     Objective     Vital Signs:  Temp:  [97 °F (36.1 °C)] 97 °F (36.1 °C)  Heart Rate:  [] 105  Resp:  [26-30] 30  BP: ()/() 123/94      06/08/22  0520   Weight: 77.1 kg (170 lb)     Body mass index is 26.63 kg/m².  ---------------------------------------------------------------------------------------------------------------------       Physical Exam  Vitals and nursing note reviewed.   Constitutional:       Appearance: He is ill-appearing.      Interventions: Nasal cannula in place.   HENT:      Head: Normocephalic and atraumatic.      Mouth/Throat:      Lips: Pink.      Mouth: Mucous membranes are dry.   Eyes:      General: Lids are normal.   Cardiovascular:      Rate and Rhythm: Normal rate and regular rhythm.      Pulses:           Dorsalis pedis pulses are 2+ on the right side and 2+ on the left side.        Posterior tibial pulses are 2+ on the right side and 2+ on the left side.      Heart sounds: Normal heart sounds, S1 normal and S2 normal.   Pulmonary:      Effort: Tachypnea and respiratory distress present.      Breath sounds: Decreased air movement present. Decreased breath sounds present.   Abdominal:      General: Abdomen is protuberant. Bowel sounds are normal.      Palpations: Abdomen is soft.    Musculoskeletal:      Cervical back: Normal range of motion and neck supple.      Right lower leg: No edema.      Left lower leg: No edema.   Feet:      Right foot:      Skin integrity: Skin integrity normal.      Left foot:      Skin integrity: Skin integrity normal.   Skin:     General: Skin is warm and dry.      Capillary Refill: Capillary refill takes less than 2 seconds.   Neurological:      General: No focal deficit present.      Mental Status: He is easily aroused. Mental status is at baseline. He is lethargic.      Motor: Weakness present.       ---------------------------------------------------------------------------------------------------------------------  EKG:        I have personally reviewed the above EKG.   ---------------------------------------------------------------------------------------------------------------------   Results from last 7 days   Lab Units 06/08/22  0540   CRP mg/dL 43.58*   LACTATE mmol/L 2.4*   WBC 10*3/mm3 15.19*   HEMOGLOBIN g/dL 12.7*   HEMATOCRIT % 39.9   MCV fL 81.4   MCHC g/dL 31.8   PLATELETS 10*3/mm3 200   INR  1.24*         Results from last 7 days   Lab Units 06/08/22  0540   SODIUM mmol/L 136   POTASSIUM mmol/L 4.5   MAGNESIUM mg/dL 2.1   CHLORIDE mmol/L 96*   CO2 mmol/L 24.8   BUN mg/dL 36*   CREATININE mg/dL 0.94   CALCIUM mg/dL 8.9   GLUCOSE mg/dL 246*   ALBUMIN g/dL 2.61*   BILIRUBIN mg/dL 0.7   ALK PHOS U/L 70   AST (SGOT) U/L 11   ALT (SGPT) U/L 8   Estimated Creatinine Clearance: 72.9 mL/min (by C-G formula based on SCr of 0.94 mg/dL).  No results found for: AMMONIA  Results from last 7 days   Lab Units 06/08/22  0540   TROPONIN T ng/mL <0.010     Results from last 7 days   Lab Units 06/08/22  0540   PROBNP pg/mL 11,717.0*     Lab Results   Component Value Date    HGBA1C 6.50 (H) 12/23/2021     Lab Results   Component Value Date    TSH 2.190 12/23/2021     No results found for: PREGTESTUR, PREGSERUM, HCG, HCGQUANT  Pain Management Panel    There is no  flowsheet data to display.       No results found for: BLOODCX  No results found for: URINECX  No results found for: WOUNDCX  No results found for: STOOLCX      ---------------------------------------------------------------------------------------------------------------------  Imaging Results (Last 7 Days)     Procedure Component Value Units Date/Time    CT Angiogram Chest Pulmonary Embolism [[id number]] Collected: 06/08/22 0749     Updated: 06/08/22 0801    Narrative:      CT ANGIOGRAM CHEST PULMONARY EMBOLISM-     CLINICAL INDICATION: sob, afib        COMPARISON: 01/04/2022      PROCEDURE: Thin cut axial images were acquired through the pulmonary  vessels during the rapid infusion of IV contrast.     Additional 3-D reformatted images obtained via post-processing for  improved diagnostic accuracy and procedural planning.     Radiation dose reduction techniques were utilized per ALARA protocol.  Automated exposure control was initiated through either or [employer] or  [employer] software packages by  protocol.           FINDINGS: Today's study demonstrates opacification of the central  pulmonary vessels.   There are no filling defects.   There is no truncation.     No evidence of a pulmonary embolus.     Dense pleural-based consolidation in the left lung.  Small nodules in the right lung.     Diffuse interstitial lung disease..     Mediastinal lymph node enlargement similar to the previous exam.     No pericardial or pleural effusion.          Impression:      1. No evidence of a pulmonary embolus.,  2. Interval development of small nodules in the right lung and left  lower lobe, as well as dense consolidation pleural-based in the left  upper lobe.  3. Stable mediastinal lymph node enlargement.     This report was finalized on 6/8/2022 7:59 AM by Dr. Leif Taveras MD.       XR Chest 1 View [[id number]] Collected: 06/08/22 0555     Updated: 06/08/22 0558    Narrative:      CR Chest 1 Vw    INDICATION:    Shortness of air, sepsis     COMPARISON:    11/2/2021    FINDINGS:  Portable AP view(s) of the chest.  Mild interstitial prominence in the right lung stable. There are no focal infiltrates. There is a large amount of density in the left upper lobe measuring at least 8 cm in diameter. This is abutting the lateral part of  the chest. An aortic stent graft is present. The heart size is normal      Impression:      8 cm dense area left upper lobe suggesting dense pneumonia. Clinical correlation follow-up is recommended.    Mild diffuse initial prominence.    Signer Name: Eben Cisneros MD   Signed: 6/8/2022 5:55 AM   Workstation Name: [email or web address]    [facility]          Cultures:  No results found for: BLOODCX, URINECX, WOUNDCX, MRSACX, RESPCX, STOOLCX    Last echocardiogram:  Results for orders placed during the hospital encounter of 10/30/21    Adult Transthoracic Echo Complete w/ Color, Spectral and Contrast if necessary per protocol    Interpretation Summary  · Normal left ventricular cavity size and wall thickness noted. All left ventricular wall segments contract normally  · Left ventricular ejection fraction appears to be 56 - 60%.  · Left ventricular diastolic function is consistent with (grade I) impaired relaxation.  · The mitral valve is structurally normal with no significant stenosis present. Mild mitral valve regurgitation is present.  · The aortic valve is structurally normal with no regurgitation or stenosis present.  · Mild dilation of the aortic root is present(4.0cm). Moderate dilation of the descending aorta present.(4.84cm).  · Mild tricuspid valve regurgitation is present. Estimated right ventricular systolic pressure from tricuspid regurgitation is moderately elevated (45-55 mmHg).  · Moderate pulmonary hypertension is present.  · There is no evidence of pericardial effusion.      I have personally reviewed the above radiology images and read the final radiology report on  06/08/22  ---------------------------------------------------------------------------------------------------------------------  Assessment / Plan     Active Hospital Problems    Diagnosis  POA   • Atrial fibrillation with rapid ventricular response (HCC) [I48.91]  Yes     -Sepsis 2/2 to Left upper lobe pneumonia (POA)  -Tachypnea, lactate 2.4, WBC 15.19,   -leukocytosis, elevated CRP, elevated Procalcitonin.   -COPD exacerbation   -hx of RUL lung cancer s/p RU lobectomy   -R&L lung nodules   -AAA, with repair graft  · Baseline oxygen requirement 4-10LNC (needs clarified by family), currently requiring 10LNC  · Reflex lacate to 2.0 after sepsis bolus   · Antimicrobial coverage with doxycycline and cefepime  · Titrate supplemental oxygen to keep SpO2 > 88%.   · Hospice patient, palliative care consult.   · atrovent nebs in the setting of afib with RVR  · continuous spO2 monitoring    -Atrial fibrillation with RVR (POA)  -elevated proBNP (11,717)  · Cardizem gtt in ED, stopped on admission with conversion to sinus rhythm.   · Updated EKG to capture SR.   · Heparin gtt for anticoagulation  · Cardiology consult   · Last ECHO at  on 12/20/2021 with EF of 50-60%. No formal documentation of CHF, but to note patients home medications include bumex, jardiance, and lisinopril   · ReDs Vest reading.   · continuous telemetry     -hypotension with history of essential hypertension  · Hypotensive on admission, improved after fluid bolus  · Will review home medications once reconciled but will plan to hold  bumex, coreg, lisinopril   · Routine vitals, monitor blood pressure closely, avoiding hypotension    -Type 2 diabetes, non-insulin dependent, with hyperglycemia  · Low dose SSI   · accuchecks TID AC  · Will hold amaryl and metformin on admission     -F/E/N  · No IVF   · Replace electrolytes PRN  Dietary Orders (From admission, onward)     Start     Ordered    06/08/22 0914  NPO Diet NPO Type: Sips with Meds  Diet  Effective Now        Question:  NPO Type  Answer:  Sips with Meds    06/08/22 0913            ·   ·     ----------  -DVT prophylaxis: heparin gtt to serve   -Activity: bedrest  -Expected length of stay: INPATIENT status due to the need for care which can only be reasonably provided in an hospital setting such as aggressive/expedited ancillary services and/or consultation services, the necessity for IV medications, close physician monitoring and/or the possible need for procedures.  In such, I feel patient’s risk for adverse outcomes and need for care warrant INPATIENT evaluation and predict the patient’s care encounter to likely last beyond 2 midnights.    -Disposition per hospital course     High risk secondary to sepsis, PNA, afib with RVR, hypotension in the setting of essential hypertension, previous lung CA with lobectomy      GOLDEN Francis   06/08/22  08:43 EDT

## 2022-06-08 NOTE — CONSULTS
[facility]    Reason for consult: Pneumonia, Acute on chronic hypoxemic respiratory failure, COPD    HPI:  Mr. Marshall is a 76yo male with COPD, chronic hypoxemic respiratory failure, and lung cancer s/p resection.  I reviewed office note by Dr. Mejia from 2/23/22.  PFT have been ordered, but they were not completed.      He was brought to the ER today for gradual onset of gradually worsening, constant shortness of breath with cough and increased need for oxygen for the past few days.  He notes no exacerbating factors, feels somewhat better with increased oxygen.  Symptoms became severe so EMS was called and he was brought to ER.  He has dense left sided pneumonia on CT imaging, elevated procal and elevated wbc count. He remains on high flow oxygen.  Pulmonary consultation has been requested    Past Medical History:   Diagnosis Date   • Aneurysm (HCC)    • Arthritis    • Asthma    • Back pain    • Cancer (HCC)     lungs   • COPD (chronic obstructive pulmonary disease) (HCC)    • Coronary artery disease    • Diabetes mellitus (HCC)    • Dyslipidemia    • GERD (gastroesophageal reflux disease)    • Heart disease    • Hypertension    • Lung cancer (HCC) 2011   • PAD (peripheral artery disease) ([facility])      Social History     Socioeconomic History   • Marital status:    Tobacco Use   • Smoking status: Former Smoker     Packs/day: 3.00     Quit date: 11/2011     Years since quitting: 10.6   • Smokeless tobacco: Former User     Types: Chew   Vaping Use   • Vaping Use: Never used   Substance and Sexual Activity   • Alcohol use: Not Currently     Comment: quit 40 years ago   • Drug use: No   • Sexual activity: Defer     Family History   Problem Relation Age of Onset   • Cancer Mother    • Cancer Brother      MEDS: reviewed as per chart  ALL: apap/codeine; adhesive tape; indocin  ROS: 12 point negative except as in HPI    Vital Sign Min/Max for last 24 hours  Temp  Min: 97 °F (36.1 °C)  Max: 98.6 °F (37  °C)   BP  Min: 84/66  Max: 142/100   Pulse  Min: 62  Max: 145   Resp  Min: 20  Max: 30   SpO2  Min: 90 %  Max: 100 %   Flow (L/min)  Min: 10  Max: 15   Weight  Min: 77.1 kg (170 lb)  Max: 77.1 kg (170 lb)     GEN:   appears ill,  AxOx3  HEENT: PERRL, EOMI, normal sclera, mmm, no jvd, trachea midline, neck supple  CHEST: decreased ae bilat, no wheezes, + crackles, no use of accessory muscles  CV: RRR, no m/g/r  ABD: soft, nt, nd +bs, no hepatosplenomegaly  EXT: no c/c/e  SKIN: no rashes, no xanthomas, nl turgor, warm, dry  LYMPH: no palpable cervical or supraclavicular lymphadenopathy  NEURO: CN 2-12 intact and symmetric bilaterally  PSYCH: nl affect, nl orientation, nl judgement, nl mood  MSK: no kyphoscoliosis, 5/5 strength ue and le bilaterally    Labs: 6/8: reviewed:  Lactate 2  Glucose 246  Bun 36  Cr 0.94  Bicarb 24  procal 2.67  probnp 19515  Wbc 15 (90% neutrophils)  hgb 12.7  plts 200  rpp negative    Ct chest: 6/8: reviewed, no pulmonary embolism, emphysema and likely some pulmonary fibrosis, given the subpleural involvement,   Dense left sided pneumonia    Nov 21, ct chest: reviewed, looks like some fibrosis on that one    A/P:  1. Acute on chronic hypoxemic respiratory failure -- titrate oxygen as able, doesn't appear to be a bad c02 retainer.    2. Pneumonia -- agree with current antibiotic selection -- will check urine strep/leg antigens.  3. COPD -- bronchodilators and pulmonary toilet  4. ILD - consistent with UIP -- I note Dr. Mejia had prior discuss with patient offering antifibrotics, patient declined at that time, can re-visit as outpatient.  5. Sepsis  6. afib with rvr -- better, bnp is up a bit, resume diuritics as able per hospitalist team  7. DMII  8. History of lung cancer s/p resection -- recommend repeat imaging in about three months time    Cough is wet but not productive, needs help with pulmonary toilet.  Will add chest physio, flutter, hypertonic saline nebs, mucinex.  He does not  appear fluid overloaded on exam.        Respiratory status too compromised at the moment for bronchoscopy

## 2022-06-08 NOTE — PLAN OF CARE
Goal Outcome Evaluation:  Plan of Care Reviewed With: patient        Progress: no change  Outcome Evaluation: VSS. Patient has complained of left anterior chest pain when coughing and deep breathing. PRN pain medications administered. Patient is now on 12L bubble but respirations remain labored. Heparin drip infusing. Patient is alert to self and place with sitter at bedside. Patient is resting at this time with suicide precautions in place. Will continue to monitor.

## 2022-06-08 NOTE — CONSULTS
Palliative Care Initial Consult     Attending Physician: Idris Apodaca, *  Referring Provider: Idris Apodaca    Palliative care reason for consult: GOC/ACP  Code Status:   Code Status and Medical Interventions:   Ordered at: 06/08/22 0818     Code Status (Patient has no pulse and is not breathing):    CPR (Attempt to Resuscitate)     Medical Interventions (Patient has pulse or is breathing):    Full Support      Advanced Directives: Advance Directive Status: Patient does not have advance directive   Healthcare surrogate: NOK daughter Bhavani and son Scot  Goals of Care: Pt is only alert to self and confused, daughter Bhavani and son Scot both state they would want their father to be resuscitated and to be put on vent and are okay with him returning home with hospice.    HPI:  Kb Marshall is a 77 y.o. male admitted on 6/8/2022 with shortness of breath. He has a past medical history of  hypertension, PAD, dyslipidemia, right upper lobe lung cancer status post right upper lobectomy in 2011, COPD with oxygen dependency, thoracic ascending aortic aneurysm, aortic arch aneurysm with stenting, paroxysmal atrial fibrillation. Pt stated that he began to lose his breath two weeks ago and that it has gotten worse ever since. Pt tells me that he has been wearing 10L O2 NC at home. Per hospice RN Kary that he has been going thru 10-15 Oxygen tanks weekly due to anxiety, in addition to his concentrator. Son Scot states his father leaves his oxygen tanks running and then panics when he can't find a full one.        ROS: Negative except as above in HPI.     Past Medical History:   Diagnosis Date   • Aneurysm (HCC)    • Arthritis    • Asthma    • Back pain    • Cancer (HCC)     lungs   • COPD (chronic obstructive pulmonary disease) (HCC)    • Coronary artery disease    • Diabetes mellitus (HCC)    • Dyslipidemia    • GERD (gastroesophageal reflux disease)    • Heart disease    • Hypertension    • Lung cancer (HCC)  2011   • PAD (peripheral artery disease) (HCC)      Past Surgical History:   Procedure Laterality Date   • ABDOMINAL AORTIC ANEURYSM REPAIR     • COLONOSCOPY N/A 10/11/2018    Procedure: COLONOSCOPY;  Surgeon: Calos Stockton MD;  Location:  [facility];  Service: Gastroenterology   • ENDOSCOPY N/A 12/24/2021    Procedure: ESOPHAGOGASTRODUODENOSCOPY;  Surgeon: Magui Murillo MD;  Location:  [facility];  Service: General;  Laterality: N/A;   • FINGER SURGERY Left    • KNEE SURGERY Left    • LUNG REMOVAL, PARTIAL  11/29/2011   • LUNG SURGERY  11/28/2011     Social History     Socioeconomic History   • Marital status:    Tobacco Use   • Smoking status: Former Smoker     Packs/day: 3.00     Quit date: 11/2011     Years since quitting: 10.6   • Smokeless tobacco: Former User     Types: Chew   Vaping Use   • Vaping Use: Never used   Substance and Sexual Activity   • Alcohol use: Not Currently     Comment: quit 40 years ago   • Drug use: No   • Sexual activity: Defer     Family History   Problem Relation Age of Onset   • Cancer Mother    • Cancer Brother        Allergies   Allergen Reactions   • Acetaminophen-Codeine    • Adhesive Tape Other (See Comments)     ADHESIVE CAUSES SKIN BLISTERS, PER PT   • Indocin [Indomethacin] Itching       Current Facility-Administered Medications   Medication Dose Route Frequency Provider Last Rate Last Admin   • cefepime (MAXIPIME) 2 g/100 mL 0.9% NS (mbp)  2 g Intravenous Q8H Idris Apodaca DO       • dextrose (D50W) (25 g/50 mL) IV injection 25 g  25 g Intravenous Q15 Min PRN Idris Apodaca DO       • dextrose (GLUTOSE) oral gel 15 g  15 g Oral Q15 Min PRN Idris Apodaca DO       • dilTIAZem (CARDIZEM) 125 mg in 125 mL 0.7% sodium chloride  infusion  5-15 mg/hr Intravenous Titrated Idris Apodaca DO   Stopped at 06/08/22 0917   • doxycycline (VIBRAMYCIN) 100 mg in sodium chloride 0.9 % 100 mL IVPB-VTB  100 mg Intravenous Q12H Constanza,  "Idris HUNT DO   100 mg at 06/08/22 1103   • glucagon (human recombinant) (GLUCAGEN DIAGNOSTIC) injection 1 mg  1 mg Intramuscular Q15 Min PRN Idris Apodaca DO       • guaiFENesin (MUCINEX) 12 hr tablet 1,200 mg  1,200 mg Oral Q12H Ramon Li MD       • heparin (porcine) 5000 UNIT/ML injection 2,300 Units  30 Units/kg Intravenous PRN Idris Apodaca DO       • heparin (porcine) 5000 UNIT/ML injection 4,600 Units  60 Units/kg Intravenous PRN Idris Apodaca, DO       • heparin 40941 units/250 mL (100 units/mL) in 0.9% NaCl infusion  12 Units/kg/hr Intravenous Titrated Idris Apodaca DO 9.25 mL/hr at 06/08/22 0816 12 Units/kg/hr at 06/08/22 0816   • Insulin Aspart (novoLOG) injection 0-9 Units  0-9 Units Subcutaneous TID AC Idris Apodaca DO       • ipratropium-albuterol (DUO-NEB) nebulizer solution 3 mL  3 mL Nebulization 4x Daily - RT Ramon Li MD       • methylPREDNISolone sodium succinate (SOLU-Medrol) injection 40 mg  40 mg Intravenous Q12H Idris Apodaca DO       • sodium chloride 0.9 % flush 10 mL  10 mL Intravenous PRN Idris Apodaca, DO       • sodium chloride 0.9 % flush 10 mL  10 mL Intravenous Q12H Idris Apodaca DO   10 mL at 06/08/22 1104   • sodium chloride 0.9 % flush 10 mL  10 mL Intravenous PRN Idris Apodaca DO       • sodium chloride 0.9 % nebulizer solution 3 mL  3 mL Nebulization BID - RT Ramon Li MD         dilTIAZem, 5-15 mg/hr, Last Rate: Stopped (06/08/22 0917)  heparin, 12 Units/kg/hr, Last Rate: 12 Units/kg/hr (06/08/22 0816)      dextrose  •  dextrose  •  glucagon (human recombinant)  •  heparin (porcine)  •  heparin (porcine)  •  sodium chloride  •  sodium chloride    Current medication reviewed for route, type, dose and frequency and are current per MAR.    Palliative Performance Scale Score:     /88   Pulse 85   Temp 97.8 °F (36.6 °C) (Axillary)   Resp 23   Ht 170.2 cm (67\")  "  Wt 77.1 kg (170 lb)   SpO2 97%   BMI 26.63 kg/m²     Intake/Output Summary (Last 24 hours) at 6/8/2022 1318  Last data filed at 6/8/2022 0638  Gross per 24 hour   Intake 1600 ml   Output --   Net 1600 ml       PE:     General Appearance:    Chronically ill appearing, alert to self not place or time, cooperative, NAD   HEENT:    NC/AT, without obvious abnormality, EOMI, anicteric    Neck:   supple, trachea midline, no JVD   Lungs:     Coarse upper lobe sounds with diminished bases, cough and shortness of breath, 10 L NC    Heart:    RRR, normal S1 and S2, no M/R/G   Abdomen:     Soft, NT, protuberant, NABS    Extremities:   Moves all extremities,  Trace BLE edema   Pulses:   Pulses palpable and equal bilaterally   Skin:   Warm, dry   Neurologic:   Alert to self only, confused   Psych:   Restless, appropriate       Labs:   Results from last 7 days   Lab Units 06/08/22  0540   WBC 10*3/mm3 15.19*   HEMOGLOBIN g/dL 12.7*   HEMATOCRIT % 39.9   PLATELETS 10*3/mm3 200     Results from last 7 days   Lab Units 06/08/22  0540   SODIUM mmol/L 136   POTASSIUM mmol/L 4.5   CHLORIDE mmol/L 96*   CO2 mmol/L 24.8   BUN mg/dL 36*   CREATININE mg/dL 0.94   GLUCOSE mg/dL 246*   CALCIUM mg/dL 8.9     Results from last 7 days   Lab Units 06/08/22  0540   SODIUM mmol/L 136   POTASSIUM mmol/L 4.5   CHLORIDE mmol/L 96*   CO2 mmol/L 24.8   BUN mg/dL 36*   CREATININE mg/dL 0.94   CALCIUM mg/dL 8.9   BILIRUBIN mg/dL 0.7   ALK PHOS U/L 70   ALT (SGPT) U/L 8   AST (SGOT) U/L 11   GLUCOSE mg/dL 246*     Imaging Results (Last 72 Hours)     Procedure Component Value Units Date/Time    CT Angiogram Chest Pulmonary Embolism [[id number]] Collected: 06/08/22 0749     Updated: 06/08/22 0801    Narrative:      CT ANGIOGRAM CHEST PULMONARY EMBOLISM-     CLINICAL INDICATION: sob, afib        COMPARISON: 01/04/2022      PROCEDURE: Thin cut axial images were acquired through the pulmonary  vessels during the rapid infusion of IV contrast.     Additional  3-D reformatted images obtained via post-processing for  improved diagnostic accuracy and procedural planning.     Radiation dose reduction techniques were utilized per ALARA protocol.  Automated exposure control was initiated through either or [employer] or  [employer] software packages by  protocol.           FINDINGS: Today's study demonstrates opacification of the central  pulmonary vessels.   There are no filling defects.   There is no truncation.     No evidence of a pulmonary embolus.     Dense pleural-based consolidation in the left lung.  Small nodules in the right lung.     Diffuse interstitial lung disease..     Mediastinal lymph node enlargement similar to the previous exam.     No pericardial or pleural effusion.          Impression:      1. No evidence of a pulmonary embolus.,  2. Interval development of small nodules in the right lung and left  lower lobe, as well as dense consolidation pleural-based in the left  upper lobe.  3. Stable mediastinal lymph node enlargement.     This report was finalized on 6/8/2022 7:59 AM by Dr. eLif Taveras MD.       XR Chest 1 View [[id number]] Collected: 06/08/22 0555     Updated: 06/08/22 0558    Narrative:      CR Chest 1 Vw    INDICATION:   Shortness of air, sepsis     COMPARISON:    11/2/2021    FINDINGS:  Portable AP view(s) of the chest.  Mild interstitial prominence in the right lung stable. There are no focal infiltrates. There is a large amount of density in the left upper lobe measuring at least 8 cm in diameter. This is abutting the lateral part of  the chest. An aortic stent graft is present. The heart size is normal      Impression:      8 cm dense area left upper lobe suggesting dense pneumonia. Clinical correlation follow-up is recommended.    Mild diffuse initial prominence.    Signer Name: Eben Cisneros MD   Signed: 6/8/2022 5:55 AM   Workstation Name: [facility]    [facility]          Diagnostics: Reviewed    A: Kb  TOYIN Marshall is a 77 y.o. male admitted on 6/8/2022 with shortness of breath. He has a past medical history of  hypertension, PAD, dyslipidemia, right upper lobe lung cancer status post right upper lobectomy in 2011, COPD with oxygen dependency, thoracic ascending aortic aneurysm, aortic arch aneurysm with stenting, paroxysmal atrial fibrillation. Pt stated that he began to lose his breath two weeks ago and that it has gotten worse ever since. Pt tells me that he has been wearing 10L O2 NC at home. Per hospice RN Kary that he has been going thru 10-15 Oxygen tanks weekly due to anxiety, in addition to his concentrator. Son Scot states his father leaves his oxygen tanks running and then panics when he can't find a full one.             P: Palliative was consulted to discuss GOC/ACP question if pt wants to return home with hospice. Pt was only alert to self, confused, did not understand situation. When I spoke with pt daughter and Son Scot, they state that they would want everything done to save their father despite his progressively declining COPD would want him to be put on a ventilator. Scot states that I know my daddy's breathing problem is only gonna get worse but he still wants to give him a chance. He would like us to continue to treat his father and try to alleviate his shortness of breath and at discharge would like to bring him back home with hospice. I let Devan VALLE and Dr Apodaca know of conversation.     We appreciate the consult and the opportunity to participate in Kb Marshall's care. We will continue to follow along. Please do not hesitate to contact us regarding further symptom management or goals of care needs, including after hours or on weekends via our on call provider at [phone].     Time:   40 minutes spent reviewing medical and medication records, assessing and examining patient, discussing with family, answering questions, providing some guidance about a plan and documentation of care, and  coordinating care with other healthcare members, with > 50% time spent face to face.     Janene Allison, APRN    6/8/2022

## 2022-06-08 NOTE — CONSULTS
Referring Provider: Dr. Apodaca  Reason for Consultation: Capacity/Depression      Chief complaint/Focus of Exam: Capacity/Depression    Subjective .     History of present illness: Patient is a 77-year-old male with a history of COPD, chronic respiratory failure, lung cancer status post resection, hypertension, PAD, dyslipidemia, and thoracic ascending aortic aneurysm who presented to the hospital with complaints of shortness of breath and was subsequently found to have sepsis related to pneumonia.  Psychiatry was consulted to evaluate patient for depression and for capacity for medical decision-making.  I evaluated patient at bedside.  He was unable to answer any orientation question aside from self and current city.  He did not know the year, the president, or the situation for hospitalization.  He stated that he was hospitalized because he wanted to shoot himself in the head.  He states that he has felt this way for 2 weeks.  He denies ever having any thoughts of wanting to harm himself in the past and reports he only has those thoughts because he has chronic left-sided chest pain starting in the afternoon on a daily basis that lasts through the night.  He states that the pain causes day-to-day functioning to be unbearable and he is miserable living this way.  Patient also reports poor sleep and poor appetite.  He is unable to tell me what medical problems he has and the treatments he requires for his medical problems.  He just states that he cannot remember or that he does not know when I ask about further details.  He currently states that he feels safe in the hospital setting and has no plan to harm himself while hospitalized.  I had a discussion with him about medications that may be helpful for his mood and patient states that medications for mood will not be beneficial unless they can alleviate his pain which is his primary reason for feeling that he wants to harm himself.    Review of  Systems  Pertinent items are noted in HPI    History  Past Medical History:   Diagnosis Date   • Aneurysm (HCC)    • Arthritis    • Asthma    • Back pain    • Cancer (HCC)     lungs   • COPD (chronic obstructive pulmonary disease) (HCC)    • Coronary artery disease    • Diabetes mellitus (HCC)    • Dyslipidemia    • GERD (gastroesophageal reflux disease)    • Heart disease    • Hypertension    • Lung cancer (HCC) 2011   • PAD (peripheral artery disease) (HCC)    ,   Past Surgical History:   Procedure Laterality Date   • ABDOMINAL AORTIC ANEURYSM REPAIR     • COLONOSCOPY N/A 10/11/2018    Procedure: COLONOSCOPY;  Surgeon: Calos Stockton MD;  Location: [facility];  Service: Gastroenterology   • ENDOSCOPY N/A 12/24/2021    Procedure: ESOPHAGOGASTRODUODENOSCOPY;  Surgeon: Magui Murillo MD;  Location: [facility];  Service: General;  Laterality: N/A;   • FINGER SURGERY Left    • KNEE SURGERY Left    • LUNG REMOVAL, PARTIAL  11/29/2011   • LUNG SURGERY  11/28/2011   ,   Family History   Problem Relation Age of Onset   • Cancer Mother    • Cancer Brother    ,   Social History     Socioeconomic History   • Marital status:    Tobacco Use   • Smoking status: Former Smoker     Packs/day: 3.00     Quit date: 11/2011     Years since quitting: 10.6   • Smokeless tobacco: Former User     Types: Chew   Vaping Use   • Vaping Use: Never used   Substance and Sexual Activity   • Alcohol use: Not Currently     Comment: quit 40 years ago   • Drug use: No   • Sexual activity: Defer     E-cigarette/Vaping   • E-cigarette/Vaping Use Never User    • Passive Exposure No    • Counseling Given No      E-cigarette/Vaping Substances   • Nicotine No    • THC No    • CBD No    • Flavoring No      E-cigarette/Vaping Devices   • Disposable No    • Pre-filled or Refillable Cartridge No    • Refillable Tank No    • Pre-filled Pod No        ,   Medications Prior to Admission   Medication Sig Dispense Refill Last Dose   • bumetanide  (BUMEX) 0.5 MG tablet Take 1 tablet by mouth Daily. 30 tablet 1    • hydrALAZINE (APRESOLINE) 50 MG tablet Take 50 mg by mouth 3 (Three) Times a Day.      • metFORMIN (GLUCOPHAGE) 500 MG tablet Take 1,000 mg by mouth 2 (Two) Times a Day With Meals.      • oxyCODONE-acetaminophen (PERCOCET) 7.5-325 MG per tablet Take 1 tablet by mouth Every 4 (Four) Hours As Needed for Moderate Pain .      • albuterol sulfate  (90 Base) MCG/ACT inhaler Inhale 2 puffs Every 4 (Four) Hours As Needed for Wheezing.      • apixaban (ELIQUIS) 5 MG tablet tablet Take 5 mg by mouth 2 (Two) Times a Day.      • atorvastatin (LIPITOR) 10 MG tablet Take 10 mg by mouth Daily.      • budesonide-formoterol (SYMBICORT) 160-4.5 MCG/ACT inhaler Inhale 2 puffs 2 (Two) Times a Day.      • carvedilol (COREG) 12.5 MG tablet Take 1 tablet by mouth 2 (Two) Times a Day With Meals. 60 tablet 1    • clopidogrel (PLAVIX) 75 MG tablet Take 75 mg by mouth Daily.      • dexamethasone (DECADRON) 4 MG tablet Take 4 mg by mouth Daily With Breakfast.      • diphenhydrAMINE (BENADRYL) 25 mg capsule Take 25 mg by mouth Every 8 (Eight) Hours.      • empagliflozin (Jardiance) 25 MG tablet tablet Take 25 mg by mouth Daily.      • gabapentin (NEURONTIN) 400 MG capsule Take 600 mg by mouth 4 (Four) Times a Day.      • glimepiride (AMARYL) 4 MG tablet Take 8 mg by mouth Every Morning Before Breakfast.      • hydrOXYzine pamoate (VISTARIL) 25 MG capsule TAKE 1 CAPSULE BY MOUTH EVERY 6 HOURS AS NEEDED      • ibuprofen (ADVIL,MOTRIN) 600 MG tablet Take 600 mg by mouth Every 6 (Six) Hours As Needed for Mild Pain .      • ipratropium-albuterol (DUO-NEB) 0.5-2.5 mg/3 ml nebulizer Take 3 mL by nebulization Every 6 (Six) Hours As Needed for Wheezing.      • linaclotide (LINZESS) 290 MCG capsule capsule Take 145 mcg by mouth Every Morning Before Breakfast.      • lisinopril (PRINIVIL,ZESTRIL) 20 MG tablet Take 1 tablet by mouth Daily. 30 tablet 0    • LORazepam (ATIVAN) 0.5 MG  tablet Take 0.5 mg by mouth Every 6 (Six) Hours As Needed for Anxiety.      • nabumetone (RELAFEN) 500 MG tablet Take 500 mg by mouth 2 (Two) Times a Day As Needed for Mild Pain . Only for gout flare up      • nitroglycerin (NITROSTAT) 0.4 MG SL tablet Place 0.4 mg under the tongue Every 5 (Five) Minutes As Needed for Chest Pain. Take no more than 3 doses in 15 minutes.      • pantoprazole (Protonix) 40 MG EC tablet Take 1 tablet by mouth Daily. 30 tablet 1    • vitamin D (ERGOCALCIFEROL) 1.25 MG (72644 UT) capsule capsule Take 50,000 Units by mouth 1 (One) Time Per Week.      • vitamin D3 125 MCG (5000 UT) capsule capsule Take 5,000 Units by mouth Daily.      , Scheduled Meds:  cefepime, 2 g, Intravenous, Q8H  doxycycline, 100 mg, Intravenous, Q12H  guaiFENesin, 1,200 mg, Oral, Q12H  Insulin Aspart, 0-9 Units, Subcutaneous, TID AC  ipratropium-albuterol, 3 mL, Nebulization, 4x Daily - RT  methylPREDNISolone sodium succinate, 40 mg, Intravenous, Q12H  sodium chloride, 10 mL, Intravenous, Q12H  sodium chloride, 3 mL, Nebulization, BID - RT    , Continuous Infusions:  dilTIAZem, 5-15 mg/hr, Last Rate: Stopped (06/08/22 0917)  heparin, 12 Units/kg/hr, Last Rate: 12 Units/kg/hr (06/08/22 0816)    , PRN Meds:  dextrose  •  dextrose  •  glucagon (human recombinant)  •  heparin (porcine)  •  heparin (porcine)  •  sodium chloride  •  sodium chloride and Allergies:  Acetaminophen-codeine, Adhesive tape, and Indocin [indomethacin]    Objective     Vital Signs   Temp:  [97 °F (36.1 °C)-98.6 °F (37 °C)] 97.8 °F (36.6 °C)  Heart Rate:  [] 85  Resp:  [20-30] 23  BP: ()/() 133/88    Mental Status Exam:   Mental Status Exam:    Hygiene:   good  Cooperation:  Cooperative  Eye Contact:  Good  Psychomotor Behavior:  Appropriate  Affect:  Restricted  Hopelessness: 8  Speech:  Normal  Thought Progress:  Goal directed  Thought Content:  Mood congruent  Suicidal:  Suicidal Ideation  Homicidal:  None  Hallucinations:   None  Delusion:  None  Memory:  Deficits  Orientation:  Person and Place  Reliability:  poor  Insight:  Poor  Judgement:  Poor  Impulse Control:  Good    Results Review:   I reviewed the patient's new clinical results.  Lab Results (last 24 hours)     Procedure Component Value Units Date/Time    MRSA Screen, PCR (Inpatient) - Swab, Nares [[id number]]  (Abnormal) Collected: 06/08/22 1104    Specimen: Swab from Nares Updated: 06/08/22 1237     MRSA PCR Negative     Staph aureus by PCR Positive    POC Glucose Once [[id number]]  (Abnormal) Collected: 06/08/22 1054    Specimen: Blood Updated: 06/08/22 1211     Glucose 195 mg/dL      Comment: Meter: [id number] : [id number]       STAT Lactic Acid, Reflex [[id number]]  (Normal) Collected: 06/08/22 0850    Specimen: Blood from Hand, Right Updated: 06/08/22 0929     Lactate 2.0 mmol/L     Urinalysis With Culture If Indicated - Urine, Clean Catch [[id number]]  (Abnormal) Collected: 06/08/22 0849    Specimen: Urine, Clean Catch Updated: 06/08/22 0902     Color, UA Yellow     Appearance, UA Clear     pH, UA 5.5     Specific Gravity, UA >1.030     Glucose, UA >=1000 mg/dL (3+)     Ketones, UA Trace     Bilirubin, UA Negative     Blood, UA Negative     Protein, UA Negative     Leuk Esterase, UA Negative     Nitrite, UA Negative     Urobilinogen, UA 0.2 E.U./dL    Narrative:      In absence of clinical symptoms, the presence of pyuria, bacteria, and/or nitrites on the urinalysis result does not correlate with infection.  Urine microscopic not indicated.    [facility] Draw [[id number]] Collected: 06/08/22 0540    Specimen: Blood Updated: 06/08/22 0648    Narrative:      The following orders were created for panel order [facility] Draw.  Procedure                               Abnormality         Status                     ---------                               -----------         ------                     Green Top (Gel)[[id number]]                                  Final  result               Lavender Top[[id number]]                                     Final result               Gold Top - SST[[id number]]                                   Final result               Light Blue Top[[id number]]                                   Final result                 Please view results for these tests on the individual orders.    Green Top (Gel) [[id number]] Collected: 06/08/22 0540    Specimen: Blood Updated: 06/08/22 0648     Extra Tube Hold for add-ons.     Comment: Auto resulted.       Lavender Top [[id number]] Collected: 06/08/22 0540    Specimen: Blood Updated: 06/08/22 0648     Extra Tube hold for add-on     Comment: Auto resulted       Light Blue Top [[id number]] Collected: 06/08/22 0540    Specimen: Blood Updated: 06/08/22 0648     Extra Tube Hold for add-ons.     Comment: Auto resulted       Gold Top - SST [[id number]] Collected: 06/08/22 0540    Specimen: Blood Updated: 06/08/22 0648     Extra Tube Hold for add-ons.     Comment: Auto resulted.       C-reactive Protein [[id number]]  (Abnormal) Collected: 06/08/22 0540    Specimen: Blood Updated: 06/08/22 0640     C-Reactive Protein 43.58 mg/dL     Lactic Acid, Plasma [[id number]]  (Abnormal) Collected: 06/08/22 0540    Specimen: Blood Updated: 06/08/22 0634     Lactate 2.4 mmol/L     Comprehensive Metabolic Panel [[id number]]  (Abnormal) Collected: 06/08/22 0540    Specimen: Blood Updated: 06/08/22 0628     Glucose 246 mg/dL      BUN 36 mg/dL      Creatinine 0.94 mg/dL      Sodium 136 mmol/L      Potassium 4.5 mmol/L      Chloride 96 mmol/L      CO2 24.8 mmol/L      Calcium 8.9 mg/dL      Total Protein 6.0 g/dL      Albumin 2.61 g/dL      ALT (SGPT) 8 U/L      AST (SGOT) 11 U/L      Alkaline Phosphatase 70 U/L      Total Bilirubin 0.7 mg/dL      Globulin 3.4 gm/dL      A/G Ratio 0.8 g/dL      BUN/Creatinine Ratio 38.3     Anion Gap 15.2 mmol/L      eGFR 84.0 mL/min/1.73      Comment: National Kidney Foundation and American Society of Nephrology  "(ASN) Task Force recommended calculation based on the Chronic Kidney Disease Epidemiology Collaboration (CKD-EPI) equation refit without adjustment for race.       Narrative:      GFR Normal >60  Chronic Kidney Disease <60  Kidney Failure <15      Magnesium [[id number]]  (Normal) Collected: 06/08/22 0540    Specimen: Blood Updated: 06/08/22 0628     Magnesium 2.1 mg/dL     Troponin [[id number]]  (Normal) Collected: 06/08/22 0540    Specimen: Blood Updated: 06/08/22 0617     Troponin T <0.010 ng/mL     Narrative:      Troponin T Reference Range:  <= 0.03 ng/mL-   Negative for AMI  >0.03 ng/mL-     Abnormal for myocardial necrosis.  Clinicians would have to utilize clinical acumen, EKG, Troponin and serial changes to determine if it is an Acute Myocardial Infarction or myocardial injury due to an underlying chronic condition.       Results may be falsely decreased if patient taking Biotin.      Procalcitonin [[id number]]  (Abnormal) Collected: 06/08/22 0540    Specimen: Blood Updated: 06/08/22 0616     Procalcitonin 2.67 ng/mL     Narrative:      As a Marker for Sepsis (Non-Neonates):    1. <0.5 ng/mL represents a low risk of severe sepsis and/or septic shock.  2. >2 ng/mL represents a high risk of severe sepsis and/or septic shock.    As a Marker for Lower Respiratory Tract Infections that require antibiotic therapy:    PCT on Admission    Antibiotic Therapy       6-12 Hrs later    >0.5                Strongly Recommended  >0.25 - <0.5        Recommended   0.1 - 0.25          Discouraged              Remeasure/reassess PCT  <0.1                Strongly Discouraged     Remeasure/reassess PCT    As 28 day mortality risk marker: \"Change in Procalcitonin Result\" (>80% or <=80%) if Day 0 (or Day 1) and Day 4 values are available. Refer to http://www.[employer]-pct-calculator.com    Change in PCT <=80%  A decrease of PCT levels below or equal to 80% defines a positive change in PCT test result representing a higher risk for " 28-day all-cause mortality of patients diagnosed with severe sepsis for septic shock.    Change in PCT >80%  A decrease of PCT levels of more than 80% defines a negative change in PCT result representing a lower risk for 28-day all-cause mortality of patients diagnosed with severe sepsis or septic shock.       BNP [[id number]]  (Abnormal) Collected: 06/08/22 0540    Specimen: Blood Updated: 06/08/22 0616     proBNP 11,717.0 pg/mL     Narrative:      Among patients with dyspnea, NT-proBNP is highly sensitive for the detection of acute congestive heart failure. In addition NT-proBNP of <300 pg/ml effectively rules out acute congestive heart failure with 99% negative predictive value.    Results may be falsely decreased if patient taking Biotin.      Respiratory Panel PCR w/COVID-19(SARS-CoV-2) [facility]AT/PIPPA/MERY/PAD/COR/MAD/MICK In-House, NP Swab in UTM/VTM, 3-4 HR TAT - Swab, Nasopharynx [[id number]]  (Normal) Collected: 06/08/22 0525    Specimen: Swab from Nasopharynx Updated: 06/08/22 0616     ADENOVIRUS, PCR Not Detected     Coronavirus 229E Not Detected     Coronavirus HKU1 Not Detected     Coronavirus NL63 Not Detected     Coronavirus OC43 Not Detected     COVID19 Not Detected     Human Metapneumovirus Not Detected     Human Rhinovirus/Enterovirus Not Detected     Influenza A PCR Not Detected     Influenza B PCR Not Detected     Parainfluenza Virus 1 Not Detected     Parainfluenza Virus 2 Not Detected     Parainfluenza Virus 3 Not Detected     Parainfluenza Virus 4 Not Detected     RSV, PCR Not Detected     Bordetella pertussis pcr Not Detected     Bordetella parapertussis PCR Not Detected     Chlamydophila pneumoniae PCR Not Detected     Mycoplasma pneumo by PCR Not Detected    Narrative:      In the setting of a positive respiratory panel with a viral infection PLUS a negative procalcitonin without other underlying concern for bacterial infection, consider observing off antibiotics or discontinuation of antibiotics and  continue supportive care. If the respiratory panel is positive for atypical bacterial infection (Bordetella pertussis, Chlamydophila pneumoniae, or Mycoplasma pneumoniae), consider antibiotic de-escalation to target atypical bacterial infection.    Manual Differential [[id number]]  (Abnormal) Collected: 06/08/22 0540    Specimen: Blood Updated: 06/08/22 0612     Neutrophil % 90.0 %      Lymphocyte % 7.0 %      Monocyte % 3.0 %      Neutrophils Absolute 13.67 10*3/mm3      Lymphocytes Absolute 1.06 10*3/mm3      Monocytes Absolute 0.46 10*3/mm3      Dacrocytes Slight/1+     Hypochromia Slight/1+     Macrocytes Slight/1+     Platelet Morphology Normal    CBC & Differential [[id number]]  (Abnormal) Collected: 06/08/22 0540    Specimen: Blood Updated: 06/08/22 0612    Narrative:      The following orders were created for panel order CBC & Differential.  Procedure                               Abnormality         Status                     ---------                               -----------         ------                     CBC Auto Differential[[id number]]        Abnormal            Final result               Scan Slide[[id number]]                                       Final result                 Please view results for these tests on the individual orders.    CBC Auto Differential [[id number]]  (Abnormal) Collected: 06/08/22 0540    Specimen: Blood Updated: 06/08/22 0612     WBC 15.19 10*3/mm3      RBC 4.90 10*6/mm3      Hemoglobin 12.7 g/dL      Hematocrit 39.9 %      MCV 81.4 fL      MCH 25.9 pg      MCHC 31.8 g/dL      RDW 20.1 %      RDW-SD 58.5 fl      MPV 9.3 fL      Platelets 200 10*3/mm3     Scan Slide [[id number]] Collected: 06/08/22 0540    Specimen: Blood Updated: 06/08/22 0612     Scan Slide --     Comment: See Manual Differential Results       Protime-INR [[id number]]  (Abnormal) Collected: 06/08/22 0540    Specimen: Blood Updated: 06/08/22 0558     Protime 15.9 Seconds      INR 1.24    Narrative:       Suggested INR therapeutic range for stable oral anticoagulant therapy:    Low Intensity therapy:   1.5-2.0  Moderate Intensity therapy:   2.0-3.0  High Intensity therapy:   2.5-4.0    aPTT [[id number]]  (Normal) Collected: 06/08/22 0540    Specimen: Blood Updated: 06/08/22 0558     PTT 32.4 seconds     Narrative:      PTT Heparin Therapeutic Range:  59 - 95 seconds      Blood Culture - Blood, Arm, Right [[id number]] Collected: 06/08/22 0540    Specimen: Blood from Arm, Right Updated: 06/08/22 0545    Blood Culture - Blood, Arm, Left [[id number]] Collected: 06/08/22 0540    Specimen: Blood from Arm, Left Updated: 06/08/22 0545        Imaging Results (Last 24 Hours)     Procedure Component Value Units Date/Time    CT Angiogram Chest Pulmonary Embolism [[id number]] Collected: 06/08/22 0749     Updated: 06/08/22 0801    Narrative:      CT ANGIOGRAM CHEST PULMONARY EMBOLISM-     CLINICAL INDICATION: sob, afib        COMPARISON: 01/04/2022      PROCEDURE: Thin cut axial images were acquired through the pulmonary  vessels during the rapid infusion of IV contrast.     Additional 3-D reformatted images obtained via post-processing for  improved diagnostic accuracy and procedural planning.     Radiation dose reduction techniques were utilized per ALARA protocol.  Automated exposure control was initiated through either or [employer] or  DoseRight software packages by  protocol.           FINDINGS: Today's study demonstrates opacification of the central  pulmonary vessels.   There are no filling defects.   There is no truncation.     No evidence of a pulmonary embolus.     Dense pleural-based consolidation in the left lung.  Small nodules in the right lung.     Diffuse interstitial lung disease..     Mediastinal lymph node enlargement similar to the previous exam.     No pericardial or pleural effusion.          Impression:      1. No evidence of a pulmonary embolus.,  2. Interval development of small nodules in the right  lung and left  lower lobe, as well as dense consolidation pleural-based in the left  upper lobe.  3. Stable mediastinal lymph node enlargement.     This report was finalized on 6/8/2022 7:59 AM by Dr. Leif Taveras MD.       XR Chest 1 View [[id number]] Collected: 06/08/22 0555     Updated: 06/08/22 0558    Narrative:      CR Chest 1 Vw    INDICATION:   Shortness of air, sepsis     COMPARISON:    11/2/2021    FINDINGS:  Portable AP view(s) of the chest.  Mild interstitial prominence in the right lung stable. There are no focal infiltrates. There is a large amount of density in the left upper lobe measuring at least 8 cm in diameter. This is abutting the lateral part of  the chest. An aortic stent graft is present. The heart size is normal      Impression:      8 cm dense area left upper lobe suggesting dense pneumonia. Clinical correlation follow-up is recommended.    Mild diffuse initial prominence.    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  He states that he has only had these thoughts for the past 2 weeks as his pain has been worse and reports left-sided chest pain over the course of those 2 weeks making day-to-day activities unbearable.  This is somewhat consistent with his imaging as he has small nodules in the left lower lobe and a dense consolidation in the left upper lobe consistent with where he demonstrates his chest pain to be.  -Patient states that he has thought of killing himself but reports no plan or intent while hospitalized, likely okay to discontinue sitter for now and continue to monitor prospectively  -Discussed treatment options for depression and patient was reluctant and stated that unless it helped with his  "pain, it would not make a difference.  I do feel that mirtazapine at low dose may be beneficial to the patient as he does report low appetite, poor sleep, and mood symptoms related to current medical condition    Evaluation for medical decision making capacity  -At this point in time, I do not feel the patient has capacity for medical decision making.  He reports that he does not have anyone that makes medical decisions for him but he is oriented only to person and place and cannot tell me the date, situation for hospitalization, or discuss any of his current medical conditions.  When asked about his medical problems he stated either, \"I do not know,\" or, \"I am having trouble thinking of that right now.\"  Given that patient does have current medical decline with sepsis, he may be experiencing some delirium or confusion related to illness.  It may be beneficial to hold and see if he recovers any mental capacity with appropriate treatment and improvement of medical situation though patient has an unclear baseline.    I discussed the patients findings and my recommendations with patient    Wilfredo Pérez MD  06/08/22  13:34 EDT        "

## 2022-06-08 NOTE — CONSULTS
Duplicate order for palliative care consult, see 6/8 consult note  NO inpatient hospice available.

## 2022-06-08 NOTE — THERAPY EVALUATION
Acute Care - Speech Language Pathology   Swallow Initial Evaluation  David   CLINICAL DYSPHAGIA ASSESSMENT     Patient Name: Kb Marshall  : 1945  MRN: [id number]  Today's Date: 2022             Admit Date: 2022    Visit Dx:     ICD-10-CM ICD-9-CM   1. Atrial fibrillation with rapid ventricular response (HCC)  I48.91 427.31   2. Pneumonia due to infectious organism, unspecified laterality, unspecified part of lung  J18.9 486   3. 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   • Advanced COPD, on home oxygen.   • History of Lung calcification diagnosed in 2011, status post right upper lobectomy with no adjuvant chemotherapy.    • Type 2 diabetes mellitus (HCC)   • Hypertension, which is well controlled.   • Dizziness   • SOB (shortness of breath)   • PAD (peripheral artery disease) (HCC)   • Dyslipidemia   • Malignant neoplasm of upper lobe of right lung, status post right upper lobectomy in , being followed by Dr. Johnson   • Rectal bleeding   • Acute on chronic respiratory failure with hypoxia (HCC)   • Atrial fibrillation with rapid ventricular response (HCC)     Past Medical History:   Diagnosis Date   • Aneurysm (HCC)    • Arthritis    • Asthma    • Back pain    • Cancer (HCC)     lungs   • COPD (chronic obstructive pulmonary disease) (HCC)    • Coronary artery disease    • Diabetes mellitus (HCC)    • Dyslipidemia    • GERD (gastroesophageal reflux disease)    • Heart disease    • Hypertension    • Lung cancer (HCC)    • PAD (peripheral artery disease) (HCC)      Past Surgical History:   Procedure Laterality Date   • ABDOMINAL AORTIC ANEURYSM REPAIR     • COLONOSCOPY N/A 10/11/2018    Procedure: COLONOSCOPY;  Surgeon: Calos Stockton  "MD Jamil;  Location: [facility];  Service: Gastroenterology   • ENDOSCOPY N/A 12/24/2021    Procedure: ESOPHAGOGASTRODUODENOSCOPY;  Surgeon: Magui Murillo MD;  Location: [facility];  Service: General;  Laterality: N/A;   • FINGER SURGERY Left    • KNEE SURGERY Left    • LUNG REMOVAL, PARTIAL  11/29/2011   • LUNG SURGERY  11/28/2011     Kb Marshall  is seen at bedside this am on PCU to assess safety/efficacy of swallowing fnx, determine safest/least restrictive diet tolerance. He is NPO pending this assessment. Sitter is present at bedside.     Mr. Marshall presented to [facility] [place] ED w/ increasing sob, cough. He is admitted w/ sepsis 2/2 MOMO PNA, COPD exacerbation, afib w/ RVR. He is referred to r/o dysphagia as he did report \"food getting stuck\" to admitting APRN. Upon questioning/discussion, he reports occasional globus sensation near the level of the suprasternal notch w/ po intake.     Social History     Socioeconomic History   • Marital status:    Tobacco Use   • Smoking status: Former Smoker     Packs/day: 3.00     Quit date: 11/2011     Years since quitting: 10.6   • Smokeless tobacco: Former User     Types: Chew   Vaping Use   • Vaping Use: Never used   Substance and Sexual Activity   • Alcohol use: Not Currently     Comment: quit 40 years ago   • Drug use: No   • Sexual activity: Defer      CT ANGIOGRAM CHEST PULMONARY EMBOLISM-     CLINICAL INDICATION: sob, afib     COMPARISON: 01/04/2022      PROCEDURE: Thin cut axial images were acquired through the pulmonary  vessels during the rapid infusion of IV contrast.     Additional 3-D reformatted images obtained via post-processing for  improved diagnostic accuracy and procedural planning.     Radiation dose reduction techniques were utilized per ALARA protocol.  Automated exposure control was initiated through either or CareDose or  [employer]ose[employer] software packages by  protocol.        FINDINGS: Today's study demonstrates opacification of the " "central  pulmonary vessels.   There are no filling defects.   There is no truncation.     No evidence of a pulmonary embolus.     Dense pleural-based consolidation in the left lung.  Small nodules in the right lung.     Diffuse interstitial lung disease..     Mediastinal lymph node enlargement similar to the previous exam.     No pericardial or pleural effusion.     IMPRESSION:  1. No evidence of a pulmonary embolus.,  2. Interval development of small nodules in the right lung and left  lower lobe, as well as dense consolidation pleural-based in the left  upper lobe.  3. Stable mediastinal lymph node enlargement.     This report was finalized on 6/8/2022 7:59 AM by Dr. Leif Taveras MD.    Diet Orders (active) (From admission, onward)     Start     Ordered    06/08/22 1201  Diet Pureed; Thin  Diet Effective Now         06/08/22 1200    06/08/22 1200  DIET MESSAGE Please send \"SAFE TRAY\".  Daily With Breakfast, Lunch & Dinner      Comments: Please send \"SAFE TRAY\".    06/08/22 1129              He is observed on 11L O2 via bubble HF NC w/o complications. He does appear moderately sob at baseline across this assessment.     Pt is positioned upright and centered in bed to accept multiple po presentations of ice chips, solid cracker x1 only, puree and thin liquids via spoon, cup and straw. He is able to self feed.     Facial/oral structures are symmetrical upon observation. Lingual protrusion reveals no deviation. Oral mucosa are mildly xerostomic, pink, and clean. Secretions are clear, thin, and well controlled. OROM/KEVIN is adequate to imitate oral postures. He is edentulous. Gag is not assessed. Volitional cough is intact w/ adequate intensity, slightly congestive in quality, non-productive. He does evidence w/ baseline sporadic cough before, during and after this assessment, no direct correlation to po presentations. Voice is adequate in intensity, clear in quality w/ intelligible speech. He is oriented to person, " place and time, follows directives and participates in conversational exchange.     Upon po presentations, adequate bolus anticipation and acceptance w/ good labial seal for bolus clearance via spoon bowl, cup rim stability and suction via straw. Bolus formation, manipulation and control are mildly weak but adequate, w/ moderately increased oral prep time w/ solid, mixed phasic/rotary mastication pattern. He declines further solid presentations at this time 2/2 fatigue effects per respiratory status. A-p transit is timely w/o oral residue. No overt s/s aspiration evidenced before the swallow.     Pharyngeal swallow is timely w/ adequate hyolaryngeal elevation per palpation. No overt s/s aspiration evidenced across this assessment. No silent aspiration suspected as pt is w/o changes in vocal quality, respirations or secretions post po presentations. He denies odynophagia and globus sensation across this assessment.    Impression: Per this assessment, Mr. Marshall presents w/ a mild oral weakness, moderate fatigue w/ solids, wfl pharyngeal swallow w/o s/s aspiration. No odynophagia or globus sensation reported across this assessment.    Per current respiratory status and fatigue effects, he is felt to most benefit from modified po diet of puree consistency, thin liquids at this time. SLP to f/u for diet safety/advancement.       SLP Recommendation and Plan  1. Puree consistency, thin liquids.    2. Meds whole in puree, single presentation please.   3. Upright and centered for all po intake.  4. GORAN precautions.  5. Oral care protocol.  SLP to f/u for diet safety/advancement.     D/w pt results and recommendations w/ verbal agreement.    D/w RN results and recommendations w/ verbal agreement.    Communicated results and recommendations to MD.     Thank you for allowing me to participate in the care of your patient-  Tiffanie Marshall M.A., CCC-SLP      EDUCATION  The patient has been educated in the following areas:    Dysphagia (Swallowing Impairment) Modified Diet Instruction.      Time Calculation:    Time Calculation- SLP     Row Name 06/08/22 1154             Time Calculation- SLP    SLP Received On 06/08/22  -      SLP - Next Appointment 06/09/22  -            User Key  (r) = Recorded By, (t) = Taken By, (c) = Cosigned By    Initials Name Provider Type     Tiffanie Marshall MA,CCC-SLP Speech and Language Pathologist              Therapy Charges for Today     Code Description Service Date Service Provider Modifiers Qty    [id number]  ST EVAL ORAL PHARYNG SWALLOW 4 6/8/2022 Tiffanie Marshall MA,CCC-SLP GN 1        Tiffanie Marshall MA,CCC-SLP  6/8/2022

## 2022-06-08 NOTE — ED PROVIDER NOTES
Subjective   77-year-old male with past medical history of COPD lung cancer with resection of his right lower lobe diabetes hypertension coming in with worsening shortness of breath for the past few days.  States he has a cough but it is dry nothing comes up, no fevers chills nausea vomiting.  No sick contacts.  He typically uses 4 L of oxygen at home but titrates it as needed for himself.  When EMS arrived he stated he was on 10 L with saturations in the low 80s.  They put him on a nonrebreather which brought his sats up to the high 80s.    Family member arrived stated that the patient went 3 days ago was being posted in the garden and moving some soil around, after that he was having a lot of pain to his hospice nurse that increase his pain medicines, this made him more somnolent where he was sleeping all day and using all of his oxygen at home.  States that they had increased his pain medicines then they decreased to he was doing better until midday yesterday when his respiratory drive started to decline and he started having worsening cough.          Review of Systems   Constitutional: Negative for activity change, appetite change, chills, diaphoresis, fatigue, fever and unexpected weight change.   HENT: Negative for congestion and sore throat.    Eyes: Negative for discharge, itching and visual disturbance.   Respiratory: Positive for shortness of breath.    Cardiovascular: Negative for chest pain.   Gastrointestinal: Negative for abdominal distention, abdominal pain, constipation, diarrhea, nausea, rectal pain and vomiting.   Genitourinary: Negative for decreased urine volume, dysuria and testicular pain.   Musculoskeletal: Negative for arthralgias, myalgias and neck stiffness.   Skin: Negative for rash.   Neurological: Negative for dizziness.   Hematological: Negative for adenopathy.   Psychiatric/Behavioral: Negative for agitation.   All other systems reviewed and are negative.      Past Medical History:    Diagnosis Date   • Aneurysm (HCC)    • Arthritis    • Asthma    • Back pain    • Cancer (HCC)     lungs   • COPD (chronic obstructive pulmonary disease) (HCC)    • Coronary artery disease    • Diabetes mellitus (HCC)    • Dyslipidemia    • GERD (gastroesophageal reflux disease)    • Heart disease    • Hypertension    • Lung cancer (HCC) 2011   • PAD (peripheral artery disease) (HCC)        Allergies   Allergen Reactions   • Acetaminophen-Codeine    • Adhesive Tape Other (See Comments)     ADHESIVE CAUSES SKIN BLISTERS, PER PT   • Indocin [Indomethacin] Itching       Past Surgical History:   Procedure Laterality Date   • ABDOMINAL AORTIC ANEURYSM REPAIR     • COLONOSCOPY N/A 10/11/2018    Procedure: COLONOSCOPY;  Surgeon: Calos Stockton MD;  Location: [facility];  Service: Gastroenterology   • ENDOSCOPY N/A 12/24/2021    Procedure: ESOPHAGOGASTRODUODENOSCOPY;  Surgeon: Magui Murillo MD;  Location: [facility];  Service: General;  Laterality: N/A;   • FINGER SURGERY Left    • KNEE SURGERY Left    • LUNG REMOVAL, PARTIAL  11/29/2011   • LUNG SURGERY  11/28/2011       Family History   Problem Relation Age of Onset   • Cancer Mother    • Cancer Brother        Social History     Socioeconomic History   • Marital status:    Tobacco Use   • Smoking status: Former Smoker     Packs/day: 3.00     Quit date: 11/2011     Years since quitting: 10.6   • Smokeless tobacco: Former User     Types: Chew   Vaping Use   • Vaping Use: Never used   Substance and Sexual Activity   • Alcohol use: Not Currently     Comment: quit 40 years ago   • Drug use: No   • Sexual activity: Defer           Objective   Physical Exam  Vitals and nursing note reviewed. Exam conducted with a chaperone present.   Constitutional:       General: He is not in acute distress.     Appearance: He is well-developed. He is not ill-appearing or toxic-appearing.      Interventions: He is not intubated.  HENT:      Head: Normocephalic and atraumatic.       Mouth/Throat:      Mouth: Mucous membranes are moist.      Pharynx: Oropharynx is clear.   Eyes:      Extraocular Movements: Extraocular movements intact.      Pupils: Pupils are equal, round, and reactive to light.   Cardiovascular:      Rate and Rhythm: Tachycardia present. Rhythm irregular.      Heart sounds: Normal heart sounds. No murmur heard.    No friction rub. No gallop.   Pulmonary:      Effort: Tachypnea and accessory muscle usage present. No bradypnea. He is not intubated.      Breath sounds: No stridor. No decreased breath sounds, wheezing, rhonchi or rales.   Chest:      Chest wall: No mass, deformity, tenderness, crepitus or edema. There is no dullness to percussion.   Abdominal:      General: Abdomen is flat. Bowel sounds are normal. There is no distension.      Palpations: Abdomen is soft.      Tenderness: There is no abdominal tenderness.      Hernia: No hernia is present.   Skin:     General: Skin is dry.      Capillary Refill: Capillary refill takes less than 2 seconds.      Coloration: Skin is not cyanotic.      Findings: No rash.   Neurological:      General: No focal deficit present.      Mental Status: He is alert.   Psychiatric:         Mood and Affect: Mood normal.         Behavior: Behavior normal.         Procedures           ED Course  ED Course as of 06/08/22 0812   Wed Jun 08, 2022   0524 EKG A. fib with  bpm no acute signs of ischemia [JM]   0811 I assumed care of this patient at shift change.  Recommend admission for further work up and treatment.  Hospitalist team consulted and made aware of the patient.  Consults and orders placed per hospitalist request.  Patient was agreeable to admission plan.  Vitals stable on admission. [SF]      ED Course User Index  [JM] Jesus Apodaca MD  [SF] Julio C Oscar DO                                                 [facility]    Final diagnoses:   Atrial fibrillation with rapid ventricular response (HCC)   Pneumonia due to infectious  organism, unspecified laterality, unspecified part of lung   Congestive heart failure, unspecified HF chronicity, unspecified heart failure type (HCC)       ED Disposition  ED Disposition     ED Disposition   Decision to Admit    Condition   --    Comment   Level of Care: Progressive Care [20]   Diagnosis: Atrial fibrillation with rapid ventricular response (HCC) [871220]   Admitting Physician: NIMCO BERMUDEZ [[id number]]   Certification: I Certify That Inpatient Hospital Services Are Medically Necessary For Greater Than 2 Midnights               No follow-up provider specified.       Medication List      No changes were made to your prescriptions during this visit.          Julio C Oscar DO  06/08/22 0812

## 2022-06-08 NOTE — PROGRESS NOTES
ReDS Value     Date: 06/08/22     ReDS Value: 41    Over 41 Hypervolemic Status        Chris Bhatti, RRT

## 2022-06-09 NOTE — THERAPY EVALUATION
Acute Care - Physical Therapy Initial Evaluation  [facility]     Patient Name: Kb Marshall  : 1945  MRN: [id number]  Today's Date: 2022   Onset of Illness/Injury or Date of Surgery: 22  Visit Dx:     ICD-10-CM ICD-9-CM   1. Atrial fibrillation with rapid ventricular response (HCC)  I48.91 427.31   2. Pneumonia due to infectious organism, unspecified laterality, unspecified part of lung  J18.9 486   3. 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   • Advanced COPD, on home oxygen.   • History of Lung calcification diagnosed in 2011, status post right upper lobectomy with no adjuvant chemotherapy.    • Type 2 diabetes mellitus (HCC)   • Hypertension, which is well controlled.   • Dizziness   • SOB (shortness of breath)   • PAD (peripheral artery disease) (HCC)   • Dyslipidemia   • Malignant neoplasm of upper lobe of right lung, status post right upper lobectomy in , being followed by Dr. Johnson   • Rectal bleeding   • Acute on chronic respiratory failure with hypoxia (HCC)   • Atrial fibrillation with rapid ventricular response (HCC)     Past Medical History:   Diagnosis Date   • Aneurysm (HCC)    • Arthritis    • Asthma    • Back pain    • Cancer (HCC)     lungs   • COPD (chronic obstructive pulmonary disease) (HCC)    • Coronary artery disease    • Diabetes mellitus (HCC)    • Dyslipidemia    • GERD (gastroesophageal reflux disease)    • Heart disease    • Hypertension    • Lung cancer (HCC)    • PAD (peripheral artery disease) (HCC)      Past Surgical History:   Procedure Laterality Date   • ABDOMINAL AORTIC ANEURYSM REPAIR     • COLONOSCOPY N/A 10/11/2018    Procedure: COLONOSCOPY;  Surgeon: Calos Stockton MD;  Location: [facility] OR;   Service: Gastroenterology   • ENDOSCOPY N/A 12/24/2021    Procedure: ESOPHAGOGASTRODUODENOSCOPY;  Surgeon: Magui Murillo MD;  Location: [facility];  Service: General;  Laterality: N/A;   • FINGER SURGERY Left    • KNEE SURGERY Left    • LUNG REMOVAL, PARTIAL  11/29/2011   • LUNG SURGERY  11/28/2011     PT Assessment (last 12 hours)     PT Evaluation and Treatment     Row Name 06/09/22 0845          Physical Therapy Time and Intention    Subjective Information complains of;dyspnea  -CS     Document Type evaluation  -CS     Mode of Treatment physical therapy  -CS     Patient Effort adequate  -CS     Symptoms Noted During/After Treatment shortness of breath  -CS     Comment Pt seen bedside this AM.  Pt reports living w/ his daughter and having supplemental O2 at home.  Pt reports being ambulatory for short household distances.  -CS     Row Name 06/09/22 0845          General Information    Patient Profile Reviewed yes  -CS     Onset of Illness/Injury or Date of Surgery 06/08/22  -CS     Referring Physician Constanza  -CS     Patient Observations alert;cooperative;agree to therapy  -CS     General Observations of Patient Pt supine w/ HOB elevated, all PCU lines intact, restless  -CS     Risks Reviewed patient:;LOB;dizziness;change in vital signs  -CS     Benefits Reviewed patient:;improve function;increase strength;improve skin integrity  -CS     Barriers to Rehab medically complex  -CS     Row Name 06/09/22 0845          Pain    Additional Documentation --  no c/o  -CS     Row Name 06/09/22 0845          Cognition    Orientation Status (Cognition) oriented to;person;place  -CS     Row Name 06/09/22 0845          Range of Motion Comprehensive    Comment, General Range of Motion (B)LE WFL  -CS     Row Name 06/09/22 0845          Strength Comprehensive (MMT)    Comment, General Manual Muscle Testing (MMT) Assessment (B)LE grossly 3+/5  -CS     Row Name 06/09/22 0845          Bed Mobility    Bed Mobility bed mobility  (all) activities  -CS     All Activities, [place] (Bed Mobility) standby assist  -CS     Assistive Device (Bed Mobility) bed rails;draw sheet;head of bed elevated  -CS     Row Name 06/09/22 0845          Transfers    Transfers sit-stand transfer;stand-sit transfer  -CS     Comment, (Transfers) Unable to test 2/2 SOA.  -CS     Sit-Stand [place] (Transfers) not tested;unable to assess  -CS     Stand-Sit [place] (Transfers) unable to assess;not tested  -CS     Row Name 06/09/22 0845          Sit-Stand Transfer    Comment, (Sit-Stand Transfer) Unable to test 2/2 SOA.  -CS     Row Name 06/09/22 0845          Stand-Sit Transfer    Comment, (Stand-Sit Transfer) Unable to test 2/2 SOA.  -CS     Row Name 06/09/22 0845          Gait/Stairs (Locomotion)    Gait/Stairs Locomotion gait/ambulation independence;gait/ambulation assistive device;distance ambulated  -CS     Comment, (Gait/Stairs) Unable to test 2/2 SOA.  -CS     Row Name 06/09/22 0845          Plan of Care Review    Plan of Care Reviewed With patient  -CS     Progress no change  -CS     Outcome Evaluation Pt is standby assist for bed mobility but unable to tolerate standing mobility 2/2 SOA.  Will continue to follow and progress w/i tolerance.  -CS     Row Name 06/09/22 0845          Positioning and Restraints    Pre-Treatment Position in bed  -CS     Post Treatment Position bed  -CS     In Bed with nsg  -CS     Row Name 06/09/22 0845          Therapy Assessment/Plan (PT)    Functional Level at Time of Evaluation (PT) SBA  -CS     PT Diagnosis (PT) impaired functional mobility  -CS     Rehab Potential (PT) fair, will monitor progress closely  -CS     Criteria for Skilled Interventions Met (PT) yes;meets criteria;skilled treatment is necessary  -CS     Therapy Frequency (PT) 3 times/wk  -CS     Predicted Duration of Therapy Intervention (PT) while in house.  -CS     Problem List (PT) problems related to;mobility  -CS     Activity Limitations Related to  Problem List (PT) unable to ambulate safely;unable to transfer safely  -CS     Comment, Therapy Assessment/Plan (PT) Pt is standby assist for bed mobility but unable to tolerate standing mobility 2/2 SOA.  Will continue to follow and progress w/i tolerance.  -     Row Name 06/09/22 0845          PT Evaluation Complexity    History, PT Evaluation Complexity 3 or more personal factors and/or comorbidities  -CS     Examination of Body Systems (PT Eval Complexity) total of 4 or more elements  -CS     Clinical Presentation (PT Evaluation Complexity) unstable  -CS     Clinical Decision Making (PT Evaluation Complexity) high complexity  -CS     Overall Complexity (PT Evaluation Complexity) high complexity  -CS     Row Name 06/09/22 0845          Therapy Plan Review/Discharge Plan (PT)    Therapy Plan Review (PT) evaluation/treatment results reviewed;care plan/treatment goals reviewed;risks/benefits reviewed;current/potential barriers reviewed;participants voiced agreement with care plan;participants included;patient  -     Row Name 06/09/22 0845          Physical Therapy Goals    Transfer Goal Selection (PT) transfer, PT goal 1  -     Gait Training Goal Selection (PT) gait training, PT goal 1  -     Row Name 06/09/22 0845          Transfer Goal 1 (PT)    Activity/Assistive Device (Transfer Goal 1, PT) transfers, all  -CS     [place] Level/Cues Needed (Transfer Goal 1, PT) standby assist  -CS     Time Frame (Transfer Goal 1, PT) long term goal (LTG);by discharge  -     Row Name 06/09/22 0845          Gait Training Goal 1 (PT)    Activity/Assistive Device (Gait Training Goal 1, PT) gait (walking locomotion);assistive device use  -CS     [place] Level (Gait Training Goal 1, PT) standby assist  -CS     Distance (Gait Training Goal 1, PT) 50'  -CS     Time Frame (Gait Training Goal 1, PT) long term goal (LTG);by discharge  -           User Key  (r) = Recorded By, (t) = Taken By, (c) = Cosigned By     Initials Name Provider Type    CS Jacques Brown PT Physical Therapist                Physical Therapy Education                 Title: PT OT SLP Therapies (Done)     Topic: Physical Therapy (Done)     Point: Mobility training (Done)     Learning Progress Summary           Patient Acceptance, E,TB, VU by  at 6/9/2022 1056                   Point: Home exercise program (Done)     Learning Progress Summary           Patient Acceptance, E,TB, VU by  at 6/9/2022 1056                   Point: Body mechanics (Done)     Learning Progress Summary           Patient Acceptance, E,TB, VU by  at 6/9/2022 1056                   Point: Precautions (Done)     Learning Progress Summary           Patient Acceptance, E,TB, VU by  at 6/9/2022 1056                               User Key     Initials Effective Dates Name Provider Type Discipline     05/24/22 -  Jacques Brown PT Physical Therapist PT              PT Recommendation and Plan  Planned Therapy Interventions (PT): balance training, bed mobility training, gait training, home exercise program, neuromuscular re-education, patient/family education, strengthening, transfer training  Therapy Frequency (PT): 3 times/wk  Plan of Care Reviewed With: patient  Progress: no change  Outcome Evaluation: Pt is standby assist for bed mobility but unable to tolerate standing mobility 2/2 SOA.  Will continue to follow and progress w/i tolerance.       Time Calculation:    PT Charges     Row Name 06/09/22 1057             Time Calculation    PT Received On 06/09/22  -      PT Goal Re-Cert Due Date 06/23/22  -            User Key  (r) = Recorded By, (t) = Taken By, (c) = Cosigned By    Initials Name Provider Type     Jacques Brown PT Physical Therapist              Therapy Charges for Today     Code Description Service Date Service Provider Modifiers Qty    [id number] HC PT EVAL HIGH COMPLEXITY 4 6/9/2022 Jacques Brown, PT GP 1               Jacques Brown PT  6/9/2022

## 2022-06-09 NOTE — PLAN OF CARE
Goal Outcome Evaluation:           Progress: no change  Outcome Evaluation: VSS. 12L bubble. Patient still remains SOB with pursed lip breathing at times. Lasix and one dose of ativan given. Patient complains of rib pain, PRN pain meds given. Heparin gtt and continuous abx infusing. Sitter remains at bedside. Patient has been very restless today, but is resting now at this time. Call light within reach.

## 2022-06-09 NOTE — PROGRESS NOTES
[facility] HOSPITALIST PROGRESS NOTE    Subjective     History:   Kb Marshall is a 77 y.o. male admitted on 6/8/2022 secondary to <principal problem not specified>     Procedures: None      CC: Follow up sepsis, pneumonia, resp failure    Patient seen and examined with LEONARD Hartman. Awake and alert. Remains confused. Reports pain and dyspnea. Sitter at bedside states he has been making statements about shooting himself in the head. Remains on bubble HFNC. Good UOP noted. No acute events overnight per RN.     History taken from: patient, chart, and RN.      Objective     Vital Signs  Temp:  [96.6 °F (35.9 °C)-97.9 °F (36.6 °C)] 96.6 °F (35.9 °C)  Heart Rate:  [66-96] 81  Resp:  [9-26] 18  BP: (108-167)/() 148/94    Intake/Output Summary (Last 24 hours) at 6/9/2022 1532  Last data filed at 6/9/2022 1523  Gross per 24 hour   Intake 1132.75 ml   Output 3325 ml   Net -2192.25 ml         Physical Exam:  General:    Awake, alert but confused, appears uncomfortable and in respiratory distress, chronically ill appearing    Heart:      Normal S1 and S2. Regular rate and rhythm. No significant murmur, rubs or gallops appreciated.   Lungs:     Respirations appear labored. Tachypneic. Diminished breath sounds throughout with scattered rales and crackles. No wheezes.     Abdomen:   Soft and nontender. No guarding, rebound tenderness or  organomegaly noted. Bowel sounds present x 4.   Extremities:  Trace-1+ bilateral lower extremity edema.      Results Review:    Results from last 7 days   Lab Units 06/09/22  0324 06/08/22  0540   WBC 10*3/mm3 12.16* 15.19*   HEMOGLOBIN g/dL 12.2* 12.7*   PLATELETS 10*3/mm3 186 200     Results from last 7 days   Lab Units 06/09/22  0324 06/08/22  0540   SODIUM mmol/L 135* 136   POTASSIUM mmol/L 4.6 4.5   CHLORIDE mmol/L 98 96*   CO2 mmol/L 22.1 24.8   BUN mg/dL 38* 36*   CREATININE mg/dL 0.81 0.94   CALCIUM mg/dL 8.7 8.9   GLUCOSE mg/dL 154* 246*     Results from last 7  days   Lab Units 06/09/22  0324 06/08/22  0540   BILIRUBIN mg/dL 0.5 0.7   ALK PHOS U/L 73 70   AST (SGOT) U/L 13 11   ALT (SGPT) U/L 10 8     Results from last 7 days   Lab Units 06/09/22  0324 06/08/22  0540   MAGNESIUM mg/dL 2.3 2.1     Results from last 7 days   Lab Units 06/08/22  0540   INR  1.24*     Results from last 7 days   Lab Units 06/08/22  0540   TROPONIN T ng/mL <0.010       Imaging Results (Last 24 Hours)     Procedure Component Value Units Date/Time    XR Chest 1 View [[id number]] Collected: 06/09/22 0836     Updated: 06/09/22 0850    Narrative:      XR CHEST 1 VW-     CLINICAL INDICATION: Resp failure; I48.91-Unspecified atrial  fibrillation; J18.9-Pneumonia, unspecified organism; I50.9-Heart  failure, unspecified        COMPARISON: 06/08/2022      TECHNIQUE: Single frontal view of the chest.     FINDINGS:      LUNGS: Consolidation diffusely in the left lung similar to the previous  exam. Minimal right basilar airspace disease      HEART AND MEDIASTINUM: Heart and mediastinal contours are unremarkable        SKELETON: Bony and soft tissue structures are unremarkable.             Impression:      The overall appearance is very similar to the prior study     This report was finalized on 6/9/2022 8:37 AM by Dr. Leif Taveras MD.               Medications:  doxycycline, 100 mg, Intravenous, Q12H  guaiFENesin, 1,200 mg, Oral, Q12H  Insulin Aspart, 0-9 Units, Subcutaneous, TID AC  ipratropium-albuterol, 3 mL, Nebulization, 4x Daily - RT  lidocaine, 1 patch, Transdermal, Q24H  methylPREDNISolone sodium succinate, 40 mg, Intravenous, Q12H  nafcillin (UNIPEN) 12 g in  mL continuous infusion, 12 g, Intravenous, Q24H  sodium chloride, 10 mL, Intravenous, Q12H  sodium chloride, 3 mL, Nebulization, BID - RT      dilTIAZem, 5-15 mg/hr, Last Rate: Stopped (06/08/22 0917)  heparin, 12 Units/kg/hr, Last Rate: 20 Units/kg/hr (06/09/22 1125)            Assessment & Plan   Severe sepsis (present on admission):  Likely 2/2 pneumonia and bacteremia. Afebrile.  Hypotensive upon presentation but BP now improved. WBC improved. CRP and procal elevated. Lactate has normalized. Cont Doxycycline and nafcillin per ID recs. Follow cultures and repeat labs in the AM. ID input appreciated.     Dense MOMO pneumonia: Strep pneumo and legionella antigens negative. (+) MSSA PCR. Cont antibiotics as above and nebs.     MSSA bacteremia: Culture from 6/8 revealing MSSA. Repeat cultures ordered. ID consulted and has adjusted antibiotics as above. TTE reveals an EF of 41-45%, grade I diastolic dysfunction and severe pulmonary HTN. Follow cultures.     Acute exacerbation of COPD: Cont antibiotics as above, steroids and nebs.     Acute on chronic combined systolic and diastolic CHF: Echo as above. Diuresing well. Order additional IV Lasix today. Monitor volume status.     Acute on chronic hypoxic respiratory failure: Likely multifactorial in the setting of above. No evidence of PE on CT chest. Cont treatment as outlined above. Wean supplemental O2 as tolerated. Pulm input appreciated.     Afib with RVR: Initially required Cardizem gtt but HR quickly improved and converted back to NSR. Cardizem gtt stopped. Treatment of sepsis as above. Echo as above. Cont heparin gtt for stroke prevention. Monitor on telemetry. Cardiology input appreciated.     Essential HTN with hypotension upon admission: BP quickly improved and overall stable today. Cont to monitor.     Interstitial lung disease: Antifibrotics previously discussed. Will need outpatient follow up.     Severe pulmonary HTN: Cont diuresis and management as above.     DM II, non-insulin dependent with hyperglycemia: Steroids likely contributing. Cont SSI with Accuchecks.     Hx of RUL lung CA s/p lobectomy: Will need outpatient follow up.     Depression with reports of SI: Psychiatry consulted and feels pt's presentation may be related to his acute condition and chronic pain. Cont to monitor  closely. Psych input appreciated.     DVT PPX: Heparin gtt    GOC: Palliative care and I had an extensive discussion with pt's family today. They wish to continue all aggressive measures. Cont ongoing discussions and provide family support. Palliative care input appreciated.     Discussed with Dr. Li and palliative care APRN.     Pt is at high risk 2/2 severe sepsis, pneumonia, bacteremia, COPD exacerbation, acute on chronic CHF, acute on chronic hypoxic resp failure, Afib with RVR, severe pulmonary HTN, hx of lung CA.       Disposition: Unclear at present in the setting of acute illness.       Idris Apodaca DO  06/09/22  15:32 EDT

## 2022-06-09 NOTE — PLAN OF CARE
Goal Outcome Evaluation:  Plan of Care Reviewed With: patient        Progress: no change  Outcome Evaluation: Pt is standby assist for bed mobility but unable to tolerate standing mobility 2/2 SOA.  Will continue to follow and progress w/i tolerance.

## 2022-06-09 NOTE — PROGRESS NOTES
"Palliative Care Daily Progress Note     S: Medical record reviewed, followed up with Primary RN Devan and Dr. Apodaca regarding patient's condition. When palliative entered the room the pt was sitting edge of bed with PT and was pursed lip breathing and unable to do anything more than sit edge of bed. Pt says he is in pain but is having difficult time telling me where due to shortness of breath. Sitter is at bedside suicide precautions.      O:   Palliative Performance Scale Score:     /94   Pulse 81   Temp 96.6 °F (35.9 °C) (Axillary)   Resp 18   Ht 170.2 cm (67\")   Wt 76.6 kg (168 lb 14.4 oz)   SpO2 98%   BMI 26.45 kg/m²     Intake/Output Summary (Last 24 hours) at 6/9/2022 1542  Last data filed at 6/9/2022 1523  Gross per 24 hour   Intake 1132.75 ml   Output 3325 ml   Net -2192.25 ml       PE:  General Appearance:    Chronically ill appearing, alert to self not place or time, cooperative, NAD   HEENT:    NC/AT, without obvious abnormality, EOMI, anicteric    Neck:   supple, trachea midline, no JVD   Lungs:     Coarse upper lobe sounds with diminished bases, cough and shortness of breath, 10 L NC    Heart:    RRR, normal S1 and S2, no M/R/G   Abdomen:     Soft, NT, protuberant, NABS    Extremities:   Moves all extremities,  Trace BLE edema   Pulses:   Pulses palpable and equal bilaterally   Skin:   Warm, dry   Neurologic:   Alert to self only, confused   Psych:   Restless, appropriate            Meds: Reviewed and changes noted.    Labs:   Results from last 7 days   Lab Units 06/09/22  0324   WBC 10*3/mm3 12.16*   HEMOGLOBIN g/dL 12.2*   HEMATOCRIT % 40.1   PLATELETS 10*3/mm3 186     Results from last 7 days   Lab Units 06/09/22  0324   SODIUM mmol/L 135*   POTASSIUM mmol/L 4.6   CHLORIDE mmol/L 98   CO2 mmol/L 22.1   BUN mg/dL 38*   CREATININE mg/dL 0.81   GLUCOSE mg/dL 154*   CALCIUM mg/dL 8.7     Results from last 7 days   Lab Units 06/09/22  0324   SODIUM mmol/L 135*   POTASSIUM mmol/L 4.6 "   CHLORIDE mmol/L 98   CO2 mmol/L 22.1   BUN mg/dL 38*   CREATININE mg/dL 0.81   CALCIUM mg/dL 8.7   BILIRUBIN mg/dL 0.5   ALK PHOS U/L 73   ALT (SGPT) U/L 10   AST (SGOT) U/L 13   GLUCOSE mg/dL 154*     Imaging Results (Last 72 Hours)     Procedure Component Value Units Date/Time    XR Chest 1 View [[id number]] Collected: 06/09/22 0836     Updated: 06/09/22 0850    Narrative:      XR CHEST 1 VW-     CLINICAL INDICATION: Resp failure; I48.91-Unspecified atrial  fibrillation; J18.9-Pneumonia, unspecified organism; I50.9-Heart  failure, unspecified        COMPARISON: 06/08/2022      TECHNIQUE: Single frontal view of the chest.     FINDINGS:      LUNGS: Consolidation diffusely in the left lung similar to the previous  exam. Minimal right basilar airspace disease      HEART AND MEDIASTINUM: Heart and mediastinal contours are unremarkable        SKELETON: Bony and soft tissue structures are unremarkable.             Impression:      The overall appearance is very similar to the prior study     This report was finalized on 6/9/2022 8:37 AM by Dr. Leif Taveras MD.       CT Angiogram Chest Pulmonary Embolism [[id number]] Collected: 06/08/22 0749     Updated: 06/08/22 0801    Narrative:      CT ANGIOGRAM CHEST PULMONARY EMBOLISM-     CLINICAL INDICATION: sob, afib        COMPARISON: 01/04/2022      PROCEDURE: Thin cut axial images were acquired through the pulmonary  vessels during the rapid infusion of IV contrast.     Additional 3-D reformatted images obtained via post-processing for  improved diagnostic accuracy and procedural planning.     Radiation dose reduction techniques were utilized per ALARA protocol.  Automated exposure control was initiated through either or [employer]ose or  [email or web address] software packages by  protocol.           FINDINGS: Today's study demonstrates opacification of the central  pulmonary vessels.   There are no filling defects.   There is no truncation.     No evidence of a pulmonary  embolus.     Dense pleural-based consolidation in the left lung.  Small nodules in the right lung.     Diffuse interstitial lung disease..     Mediastinal lymph node enlargement similar to the previous exam.     No pericardial or pleural effusion.          Impression:      1. No evidence of a pulmonary embolus.,  2. Interval development of small nodules in the right lung and left  lower lobe, as well as dense consolidation pleural-based in the left  upper lobe.  3. Stable mediastinal lymph node enlargement.     This report was finalized on 6/8/2022 7:59 AM by Dr. Leif Taveras MD.       XR Chest 1 View [[id number]] Collected: 06/08/22 0555     Updated: 06/08/22 0558    Narrative:      CR Chest 1 Vw    INDICATION:   Shortness of air, sepsis     COMPARISON:    11/2/2021    FINDINGS:  Portable AP view(s) of the chest.  Mild interstitial prominence in the right lung stable. There are no focal infiltrates. There is a large amount of density in the left upper lobe measuring at least 8 cm in diameter. This is abutting the lateral part of  the chest. An aortic stent graft is present. The heart size is normal      Impression:      8 cm dense area left upper lobe suggesting dense pneumonia. Clinical correlation follow-up is recommended.    Mild diffuse initial prominence.    Signer Name: Eben Cisneros MD   Signed: 6/8/2022 5:55 AM   Workstation Name: [id number]    [facility]            Diagnostics: Reviewed    A: Kb Marshall is a 77 y.o. male admitted on 6/8/2022 with shortness of breath. He has a past medical history of  hypertension, PAD, dyslipidemia, right upper lobe lung cancer status post right upper lobectomy in 2011, COPD with oxygen dependency, thoracic ascending aortic aneurysm, aortic arch aneurysm with stenting, paroxysmal atrial fibrillation. Pt stated that he began to lose his breath two weeks ago and that it has gotten worse ever since. Pt tells me that he has been wearing 10L O2 NC at  home. Per hospice RN Kary that he has been going thru 10-15 Oxygen tanks weekly due to anxiety, in addition to his concentrator. Son Scot states his father leaves his oxygen tanks running and then panics when he can't find a full one.         P:  Palliative and Dr. Apodaca had a meeting with patients son Scot, his wife, daughter Bhavani and hospice  Beatriz were present. Dr Apodaca thoroughly explained Mr Marshall condition and the multiple issues he is having, PNA /COPD, pain control, gram + infection from his blood cultures. Also explained the purpose of meeting was two sided, one was to inform family of how sick Kb is but also to make sure we are doing what Kb wants us to do. Pts son states that they would want everything done to keep his father alive including being put on a vent if necessary even understanding that pt may not be able to come off ventilator  Successfully. They sate they are aware and they would still at least want to try.    We will continue to follow along. Please do not hesitate to contact us regarding further sx mgmt or GOC needs, including after hours or on weekends via our on call provider at [phone].     Janene Allison, APRN    6/9/2022

## 2022-06-09 NOTE — CASE MANAGEMENT/SOCIAL WORK
Discharge Planning Assessment   [place]     Patient Name: Kb Marshall  MRN: [id number]  Today's Date: 6/9/2022    Admit Date: 6/8/2022     Discharge Needs Assessment     Row Name 06/09/22 1234       Living Environment    Current Living Arrangements home    Primary Care Provided by child(gilbert);homecare agency    Provides Primary Care For no one, unable/limited ability to care for self    Family Caregiver if Needed child(gilbert), adult    Quality of Family Relationships helpful;involved;supportive    Able to Return to Prior Arrangements yes       Resource/Environmental Concerns    Resource/Environmental Concerns none    Transportation Concerns none       Transition Planning    Patient/Family Anticipates Transition to home with family    Transportation Anticipated family or friend will provide       Discharge Needs Assessment    Current Outpatient/Agency/Support Group home hospice    Equipment Currently Used at Home wheelchair;oxygen  O2 @ 10L    Concerns to be Addressed no discharge needs identified    Anticipated Changes Related to Illness none    Equipment Needed After Discharge none    Outpatient/Agency/Support Group Needs home hospice               Discharge Plan     Row Name 06/09/22 5091       Plan    Plan SS attempted to contact pt's son Wesley, was not home at this time. SS briefly spoke with pt although pt is confused, told SS son was not in his room at this time. Pt is on suicide precautions. Pt receives home hospice services. SS contacted Ramon at [facility] to see if they were pt's provider. Pt has a wheelchair and home oxygen at 10L. Pt's family plans for him to return home with hospice at time of discharge. Psych following. SS to follow and assist.    13:01: SS received call back from Ramon with [facility], confirmed they have pt and are agreeable to continue services post discharge.     Row Name 06/09/22 1234       Plan    Patient/Family in Agreement with Plan yes               Demographic Summary      Row Name 06/09/22 1234       General Information    Referral Source nursing    Reason for Consult --  dc planning              DIANE Lugo

## 2022-06-09 NOTE — CONSULTS
INFECTIOUS DISEASE CONSULTATION REPORT        Patient Identification:  Name:  Kb Marshall  Age:  77 y.o.  Sex:  male  :  1945  MRN:  [id number]   Visit Number:  [id number]  Primary Care Physician:  Scar Mejia MD       LOS: 1 day        Subjective       Subjective     History of present illness:      Thank you Dr. Apodaca for allowing us to participate in the care of your patient.  As you well know, Mr. Kb Marshall is a 77 y.o. male with past medical history significant for hypertension, PAD, dyslipidemia, right upper lobe lung cancer status post right upper lobectomy in , COPD with with home oxygen use, thoracic ascending aortic aneurysm, aortic aneurysm with stenting, A. fib, who presented to [facility] Emergency Department on 2022 for shortness of breath. WBC 12.16.  CRP 43.58.  Lactic acid 2.4 on admission.  Respiratory panel PCR negative.  Strep pneumo antigen negative.  Legionella negative.  Urinalysis from 2022 negative.  Procalcitonin from 2022 elevated at 2.67.  MRSA PCR negative, MSSA PCR positive.  Blood cultures from 2022 1 out of 2 sets positive for MSSA.  Repeat blood cultures from 2022 currently in process.  2D echo from 2022 reports no evidence of vegetation.  Chest x-ray from 2022 reports consolidation diffusely in the left lung similar to previous exam, minimal right basilar airspace disease.  CT angiogram from 2022 reports no evidence of PE, interval development of small nodules in the right lung and left lower lobe as well as dense consolidation in the pleural-based left upper lobe, stable mediastinal lymph node enlargement.      Infectious Disease consultation was requested for antimicrobial management.      ---------------------------------------------------------------------------------------------------------------------     Review Of Systems:    Constitutional: no fever, chills and night sweats. No appetite change or  unexpected weight change. No fatigue.  Eyes: no eye drainage, itching or redness.  HEENT: no mouth sores, dysphagia or nose bleed.  Respiratory: Positive shortness of breath, cough. No production of sputum.  Cardiovascular: no chest pain, no palpitations, no orthopnea.  Gastrointestinal: no nausea, vomiting or diarrhea. No abdominal pain, hematemesis or rectal bleeding.  Genitourinary: no dysuria or polyuria.  Hematologic/lymphatic: no lymph node abnormalities, no easy bruising or easy bleeding.  Musculoskeletal: no muscle or joint pain.  Skin: No rash and no itching.  Neurological: no loss of consciousness, no seizure, no headache.  Behavioral/Psych: no depression or suicidal ideation.  Endocrine: no hot flashes.  Immunologic: negative.    ---------------------------------------------------------------------------------------------------------------------     Past Medical History    Past Medical History:   Diagnosis Date    Aneurysm (HCC)     Arthritis     Asthma     Back pain     Cancer (HCC)     lungs    COPD (chronic obstructive pulmonary disease) (HCC)     Coronary artery disease     Diabetes mellitus (HCC)     Dyslipidemia     GERD (gastroesophageal reflux disease)     Heart disease     Hypertension     Lung cancer (HCC) 2011    PAD (peripheral artery disease) (HCC)        Past Surgical History    Past Surgical History:   Procedure Laterality Date    ABDOMINAL AORTIC ANEURYSM REPAIR      COLONOSCOPY N/A 10/11/2018    Procedure: COLONOSCOPY;  Surgeon: Calos Stockton MD;  Location: [facility];  Service: Gastroenterology    ENDOSCOPY N/A 12/24/2021    Procedure: ESOPHAGOGASTRODUODENOSCOPY;  Surgeon: Magui Murillo MD;  Location: [facility];  Service: General;  Laterality: N/A;    FINGER SURGERY Left     KNEE SURGERY Left     LUNG REMOVAL, PARTIAL  11/29/2011    LUNG SURGERY  11/28/2011       Family History    Family History   Problem Relation Age of Onset    Cancer Mother     Cancer Brother        Social  History    Social History     Tobacco Use    Smoking status: Former Smoker     Packs/day: 3.00     Quit date: 11/2011     Years since quitting: 10.6    Smokeless tobacco: Former User     Types: Chew   Vaping Use    Vaping Use: Never used   Substance Use Topics    Alcohol use: Not Currently     Comment: quit 40 years ago    Drug use: No       Allergies    Acetaminophen-codeine, Adhesive tape, and Indocin [indomethacin]  ---------------------------------------------------------------------------------------------------------------------     Home Medications:    Prior to Admission Medications       Prescriptions Last Dose Informant Patient Reported? Taking?    bumetanide (BUMEX) 0.5 MG tablet   No Yes    Take 1 tablet by mouth Daily.    hydrALAZINE (APRESOLINE) 50 MG tablet  Pharmacy Yes Yes    Take 50 mg by mouth 3 (Three) Times a Day.    metFORMIN (GLUCOPHAGE) 500 MG tablet  Pharmacy Yes Yes    Take 1,000 mg by mouth 2 (Two) Times a Day With Meals.    oxyCODONE-acetaminophen (PERCOCET) 7.5-325 MG per tablet  Pharmacy Yes Yes    Take 1 tablet by mouth Every 4 (Four) Hours As Needed for Moderate Pain .    albuterol sulfate  (90 Base) MCG/ACT inhaler   Yes No    Inhale 2 puffs Every 4 (Four) Hours As Needed for Wheezing.    apixaban (ELIQUIS) 5 MG tablet tablet   Yes No    Take 5 mg by mouth 2 (Two) Times a Day.    atorvastatin (LIPITOR) 10 MG tablet  Pharmacy Yes No    Take 10 mg by mouth Daily.    budesonide-formoterol (SYMBICORT) 160-4.5 MCG/ACT inhaler  Pharmacy Yes No    Inhale 2 puffs 2 (Two) Times a Day.    carvedilol (COREG) 12.5 MG tablet   No No    Take 1 tablet by mouth 2 (Two) Times a Day With Meals.    clopidogrel (PLAVIX) 75 MG tablet  Pharmacy Yes No    Take 75 mg by mouth Daily.    dexamethasone (DECADRON) 4 MG tablet   Yes No    Take 4 mg by mouth Daily With Breakfast.    diphenhydrAMINE (BENADRYL) 25 mg capsule   Yes No    Take 25 mg by mouth Every 8 (Eight) Hours.    empagliflozin (Jardiance)  25 MG tablet tablet   Yes No    Take 25 mg by mouth Daily.    gabapentin (NEURONTIN) 400 MG capsule  Pharmacy Yes No    Take 600 mg by mouth 4 (Four) Times a Day.    glimepiride (AMARYL) 4 MG tablet  Pharmacy Yes No    Take 8 mg by mouth Every Morning Before Breakfast.    hydrOXYzine pamoate (VISTARIL) 25 MG capsule   Yes No    TAKE 1 CAPSULE BY MOUTH EVERY 6 HOURS AS NEEDED    ibuprofen (ADVIL,MOTRIN) 600 MG tablet   Yes No    Take 600 mg by mouth Every 6 (Six) Hours As Needed for Mild Pain .    ipratropium-albuterol (DUO-NEB) 0.5-2.5 mg/3 ml nebulizer   Yes No    Take 3 mL by nebulization Every 6 (Six) Hours As Needed for Wheezing.    linaclotide (LINZESS) 290 MCG capsule capsule   Yes No    Take 145 mcg by mouth Every Morning Before Breakfast.    lisinopril (PRINIVIL,ZESTRIL) 20 MG tablet   No No    Take 1 tablet by mouth Daily.    LORazepam (ATIVAN) 0.5 MG tablet   Yes No    Take 0.5 mg by mouth Every 6 (Six) Hours As Needed for Anxiety.    nabumetone (RELAFEN) 500 MG tablet  Pharmacy Yes No    Take 500 mg by mouth 2 (Two) Times a Day As Needed for Mild Pain . Only for gout flare up    nitroglycerin (NITROSTAT) 0.4 MG SL tablet   Yes No    Place 0.4 mg under the tongue Every 5 (Five) Minutes As Needed for Chest Pain. Take no more than 3 doses in 15 minutes.    pantoprazole (Protonix) 40 MG EC tablet   No No    Take 1 tablet by mouth Daily.    vitamin D (ERGOCALCIFEROL) 1.25 MG (35995 UT) capsule capsule   Yes No    Take 50,000 Units by mouth 1 (One) Time Per Week.    vitamin D3 125 MCG (5000 UT) capsule capsule  Pharmacy Yes No    Take 5,000 Units by mouth Daily.          ---------------------------------------------------------------------------------------------------------------------    Objective       Objective     Hospital Scheduled Meds:  doxycycline, 100 mg, Intravenous, Q12H  guaiFENesin, 1,200 mg, Oral, Q12H  Insulin Aspart, 0-9 Units, Subcutaneous, TID AC  ipratropium-albuterol, 3 mL, Nebulization, 4x  Daily - RT  lidocaine, 1 patch, Transdermal, Q24H  methylPREDNISolone sodium succinate, 40 mg, Intravenous, Q12H  nafcillin (UNIPEN) 12 g in  mL continuous infusion, 12 g, Intravenous, Q24H  sodium chloride, 10 mL, Intravenous, Q12H  sodium chloride, 3 mL, Nebulization, BID - RT      dilTIAZem, 5-15 mg/hr, Last Rate: Stopped (06/08/22 0917)  heparin, 12 Units/kg/hr, Last Rate: 20 Units/kg/hr (06/09/22 1125)      ---------------------------------------------------------------------------------------------------------------------   Vital Signs:  Temp:  [96.6 °F (35.9 °C)-97.9 °F (36.6 °C)] 96.6 °F (35.9 °C)  Heart Rate:  [66-96] 77  Resp:  [15-35] 22  BP: (108-167)/() 151/92  Mean Arterial Pressure (Non-Invasive) for the past 24 hrs (Last 3 readings):   Noninvasive MAP (mmHg)   06/09/22 1203 130   06/09/22 1102 75   06/09/22 1003 127     SpO2 Percentage    06/09/22 1003 06/09/22 1102 06/09/22 1203   SpO2: 96% 99% 100%     SpO2:  [90 %-100 %] 100 %  on  Flow (L/min):  [8-12] 12;   Device (Oxygen Therapy): bubble;high-flow nasal cannula    Body mass index is 26.45 kg/m².  Wt Readings from Last 3 Encounters:   06/09/22 76.6 kg (168 lb 14.4 oz)   02/23/22 73.9 kg (163 lb)   02/09/22 78.5 kg (173 lb)     ---------------------------------------------------------------------------------------------------------------------     Physical Exam:    Constitutional: Obese, chronically ill-appearing.  Currently on 12 L bubble nasal cannula.   HENT:  Head: Normocephalic and atraumatic.  Mouth:  Moist mucous membranes.    Eyes:  Conjunctivae and EOM are normal.  No scleral icterus.  Neck:  Neck supple.  No JVD present.    Cardiovascular:  Normal rate, regular rhythm and normal heart sounds with no murmur. No edema.  Pulmonary/Chest: Coarse lung sounds noted bilaterally.  Tachypneic.  Abdominal:  Soft.  Bowel sounds are normal.  No distension and no tenderness.   Musculoskeletal:  No edema, no tenderness, and no deformity.   No swelling or redness of joints.  Neurological:  Alert and oriented to person, place, and time.  No facial droop.  No slurred speech.   Skin:  Skin is warm and dry.  No rash noted.  No pallor.   Psychiatric:  Normal mood and affect.  Behavior is normal.    ---------------------------------------------------------------------------------------------------------------------    Results from last 7 days   Lab Units 06/08/22  0540   TROPONIN T ng/mL <0.010     Results from last 7 days   Lab Units 06/08/22  0540   PROBNP pg/mL 11,717.0*         Results from last 7 days   Lab Units 06/09/22  0324 06/08/22  0850 06/08/22  0540   CRP mg/dL  --   --  43.58*   LACTATE mmol/L  --  2.0 2.4*   WBC 10*3/mm3 12.16*  --  15.19*   HEMOGLOBIN g/dL 12.2*  --  12.7*   HEMATOCRIT % 40.1  --  39.9   MCV fL 84.8  --  81.4   MCHC g/dL 30.4*  --  31.8   PLATELETS 10*3/mm3 186  --  200   INR   --   --  1.24*     Results from last 7 days   Lab Units 06/09/22 0324 06/08/22  0540   SODIUM mmol/L 135* 136   POTASSIUM mmol/L 4.6 4.5   MAGNESIUM mg/dL 2.3 2.1   CHLORIDE mmol/L 98 96*   CO2 mmol/L 22.1 24.8   BUN mg/dL 38* 36*   CREATININE mg/dL 0.81 0.94   CALCIUM mg/dL 8.7 8.9   GLUCOSE mg/dL 154* 246*   ALBUMIN g/dL 2.04* 2.61*   BILIRUBIN mg/dL 0.5 0.7   ALK PHOS U/L 73 70   AST (SGOT) U/L 13 11   ALT (SGPT) U/L 10 8   Estimated Creatinine Clearance: 84.1 mL/min (by C-G formula based on SCr of 0.81 mg/dL).  No results found for: AMMONIA    Glucose   Date/Time Value Ref Range Status   06/09/2022 1050 189 (H) 70 - 130 mg/dL Final     Comment:     Meter: [id number] : [id number] DENEEN EDOUARD   06/09/2022 0619 159 (H) 70 - 130 mg/dL Final     Comment:     Meter: [id number] : [id number] WALDO IRIZARRY   06/08/2022 1654 226 (H) 70 - 130 mg/dL Final     Comment:     Meter: [id number] : [id number] DENEEN EDOUARD   06/08/2022 1054 195 (H) 70 - 130 mg/dL Final     Comment:     Meter: [id number] : [id number] DENEEN EDOUARD     Lab Results    Component Value Date    HGBA1C 6.50 (H) 12/23/2021     Lab Results   Component Value Date    TSH 2.190 12/23/2021       Blood Culture   Date Value Ref Range Status   06/08/2022 No growth at 24 hours  Preliminary   06/08/2022 Abnormal Stain (C)  Preliminary     No results found for: URINECX  No results found for: WOUNDCX  No results found for: STOOLCX  No results found for: RESPCX  Pain Management Panel    There is no flowsheet data to display.       I have personally reviewed the above laboratory results.   ---------------------------------------------------------------------------------------------------------------------  Imaging Results (Last 7 Days)       Procedure Component Value Units Date/Time    XR Chest 1 View [[id number]] Collected: 06/09/22 0836     Updated: 06/09/22 0850    Narrative:      XR CHEST 1 VW-     CLINICAL INDICATION: Resp failure; I48.91-Unspecified atrial  fibrillation; J18.9-Pneumonia, unspecified organism; I50.9-Heart  failure, unspecified        COMPARISON: 06/08/2022      TECHNIQUE: Single frontal view of the chest.     FINDINGS:      LUNGS: Consolidation diffusely in the left lung similar to the previous  exam. Minimal right basilar airspace disease      HEART AND MEDIASTINUM: Heart and mediastinal contours are unremarkable        SKELETON: Bony and soft tissue structures are unremarkable.             Impression:      The overall appearance is very similar to the prior study     This report was finalized on 6/9/2022 8:37 AM by Dr. Leif Taveras MD.       CT Angiogram Chest Pulmonary Embolism [[id number]] Collected: 06/08/22 0749     Updated: 06/08/22 0801    Narrative:      CT ANGIOGRAM CHEST PULMONARY EMBOLISM-     CLINICAL INDICATION: sob, afib        COMPARISON: 01/04/2022      PROCEDURE: Thin cut axial images were acquired through the pulmonary  vessels during the rapid infusion of IV contrast.     Additional 3-D reformatted images obtained via post-processing for  improved  diagnostic accuracy and procedural planning.     Radiation dose reduction techniques were utilized per ALARA protocol.  Automated exposure control was initiated through either or [employer] or  D[employer] software packages by  protocol.           FINDINGS: Today's study demonstrates opacification of the central  pulmonary vessels.   There are no filling defects.   There is no truncation.     No evidence of a pulmonary embolus.     Dense pleural-based consolidation in the left lung.  Small nodules in the right lung.     Diffuse interstitial lung disease..     Mediastinal lymph node enlargement similar to the previous exam.     No pericardial or pleural effusion.          Impression:      1. No evidence of a pulmonary embolus.,  2. Interval development of small nodules in the right lung and left  lower lobe, as well as dense consolidation pleural-based in the left  upper lobe.  3. Stable mediastinal lymph node enlargement.     This report was finalized on 6/8/2022 7:59 AM by Dr. Leif Taveras MD.       XR Chest 1 View [[id number]] Collected: 06/08/22 0555     Updated: 06/08/22 0558    Narrative:      CR Chest 1 Vw    INDICATION:   Shortness of air, sepsis     COMPARISON:    11/2/2021    FINDINGS:  Portable AP view(s) of the chest.  Mild interstitial prominence in the right lung stable. There are no focal infiltrates. There is a large amount of density in the left upper lobe measuring at least 8 cm in diameter. This is abutting the lateral part of  the chest. An aortic stent graft is present. The heart size is normal      Impression:      8 cm dense area left upper lobe suggesting dense pneumonia. Clinical correlation follow-up is recommended.    Mild diffuse initial prominence.    Signer Name: Eben Cisneros MD   Signed: 6/8/2022 5:55 AM   Workstation Name: [facility]    [facility]          I have personally reviewed the above radiology results.    ---------------------------------------------------------------------------------------------------------------------      Assessment & Plan          ASSESSMENT:    1.  Severe sepsis with lactic acid greater than 2 on admission  2.  MSSA bacteremia  3.  Pneumonia    PLAN:    Patient presents with shortness of breath. WBC 12.16.  CRP 43.58.  Lactic acid 2.4 on admission.  Respiratory panel PCR negative.  Strep pneumo antigen negative.  Legionella negative.  Urinalysis from 6/8/2022 negative.  Procalcitonin from 6/8/2022 elevated at 2.67.  MRSA PCR negative, MSSA PCR positive.  Blood cultures from 6/8/2022 1 out of 2 sets positive for MSSA.  Repeat blood cultures from 6/9/2022 currently in process.  2D echo from 6/8/2022 reports no evidence of vegetation.  Chest x-ray from 6/9/2022 reports consolidation diffusely in the left lung similar to previous exam, minimal right basilar airspace disease.  CT angiogram from 6/8/2022 reports no evidence of PE, interval development of small nodules in the right lung and left lower lobe as well as dense consolidation in the pleural-based left upper lobe, stable mediastinal lymph node enlargement.    Based off updated blood culture results showing growth of MSSA cefepime was discontinued.  Nafcillin 12 g IV continuous drip was initiated to continue with doxycycline 100 mg IV every 12 hours.  Recommend bronchoscopy for both diagnostic and therapeutic purposes.  We will continue to follow closely and adjust antibiotic therapy as needed.    Again, thank you Dr. Apodaca for allowing us to participate in the care of your patient and please feel free to call for any questions you may have.        Code Status:     Code Status and Medical Interventions:   Ordered at: 06/08/22 0818     Code Status (Patient has no pulse and is not breathing):    CPR (Attempt to Resuscitate)     Medical Interventions (Patient has pulse or is breathing):    Full Support         Lisette Mearz  GOLDEN  06/09/22  12:55 EDT

## 2022-06-09 NOTE — THERAPY RE-EVALUATION
Acute Care - Speech Language Pathology   Swallow Re-Assessment  David     Patient Name: Kb Marshall  : 1945  MRN: [id number]  Today's Date: 2022  Onset of Illness/Injury or Date of Surgery: 22     Referring Physician: Constanza      Admit Date: 2022    Visit Dx:     ICD-10-CM ICD-9-CM   1. Atrial fibrillation with rapid ventricular response (HCC)  I48.91 427.31   2. Pneumonia due to infectious organism, unspecified laterality, unspecified part of lung  J18.9 486   3. 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   • Advanced COPD, on home oxygen.   • History of Lung calcification diagnosed in 2011, status post right upper lobectomy with no adjuvant chemotherapy.    • Type 2 diabetes mellitus (HCC)   • Hypertension, which is well controlled.   • Dizziness   • SOB (shortness of breath)   • PAD (peripheral artery disease) (HCC)   • Dyslipidemia   • Malignant neoplasm of upper lobe of right lung, status post right upper lobectomy in , being followed by Dr. Johnson   • Rectal bleeding   • Acute on chronic respiratory failure with hypoxia (HCC)   • Atrial fibrillation with rapid ventricular response (HCC)     Past Medical History:   Diagnosis Date   • Aneurysm (HCC)    • Arthritis    • Asthma    • Back pain    • Cancer (HCC)     lungs   • COPD (chronic obstructive pulmonary disease) (HCC)    • Coronary artery disease    • Diabetes mellitus (HCC)    • Dyslipidemia    • GERD (gastroesophageal reflux disease)    • Heart disease    • Hypertension    • Lung cancer (HCC)    • PAD (peripheral artery disease) (HCC)      Past Surgical History:   Procedure Laterality Date   • ABDOMINAL AORTIC ANEURYSM REPAIR     • COLONOSCOPY N/A 10/11/2018     Procedure: COLONOSCOPY;  Surgeon: Calos Stockton MD;  Location:  [facility];  Service: Gastroenterology   • ENDOSCOPY N/A 12/24/2021    Procedure: ESOPHAGOGASTRODUODENOSCOPY;  Surgeon: Magui Murillo MD;  Location:  [facility];  Service: General;  Laterality: N/A;   • FINGER SURGERY Left    • KNEE SURGERY Left    • LUNG REMOVAL, PARTIAL  11/29/2011   • LUNG SURGERY  11/28/2011     Mr. Marshall is seen at bedside on PCU for diet safety/reassessment. He is s/p clinical dysphagia assessment 6/8/22 w/ recommendation for modified po diet of puree consistency, thin liquids.    He is a/a, upright and centered in bed. Multiple family members are present at bedside today. Mr. Marshall is self providing po presentations of vanilla ice cream upon SLP entry.     He accepts multiple po presentations of vanilla pudding and vanilla ice cream via tsp, thin water via straw. He is able to self provide.     He is observed on 12L O2 via bubble HF NC today. He does continue sob, however, overall work of breathing appears somewhat improved today. He does fatigue easily/quickly across po presentations. Per this status, solid po trials deferred at this time.     Upon po presentations, adequate bolus anticipation and acceptance. Bolus formation, manipulation and control are mildly weak in general. A-p transit is timely w/o oral residue. No overt s/s aspiration evidenced before the swallow.      Pharyngeal swallow is timely w/ adequate hyolaryngeal elevation per palpation. No overt s/s aspiration evidenced across this assessment. No silent aspiration suspected.      Impression: Mr. Marshall is accepting what is felt to be his least restrictive modified po diet at this time w/o overt s/s aspiration. He continues w/ a mild oral weakness felt to be most likely related to fatigue effects per current medical/respiratory status.      SLP Recommendation and Plan  1. Puree consistency, thin liquids.    2. Meds whole in puree, single presentation  please. Crush prn.   3. Upright and centered for all po intake.  4. GORAN precautions.  5. Oral care protocol.  SLP to f/u for continued diet safety/advancement.      D/w pt and pt's family results and recommendations w/ verbal agreement.     D/w RN results and recommendations w/ verbal agreement.     Thank you for allowing me to participate in the care of your patient-  Tiffanie Marshall M.A., CCC-SLP    EDUCATION  The patient has been educated in the following areas:   Dysphagia (Swallowing Impairment) Modified Diet Instruction.     Time Calculation:    Time Calculation- SLP     Row Name 06/09/22 1120             Time Calculation- SLP    SLP - Next Appointment 06/10/22  -[facility]            User Key  (r) = Recorded By, (t) = Taken By, (c) = Cosigned By    Initials Name Provider Type    Tiffanie Webb MA,CCC-SLP Speech and Language Pathologist              Therapy Charges for Today     Code Description Service Date Service Provider Modifiers Qty    [id number] HC ST EVAL ORAL PHARYNG SWALLOW 4 6/8/2022 Tiffanie Marshall MA,CCC-SLP GN 1    [id number] HC ST EVAL ORAL PHARYNG SWALLOW 3 6/9/2022 Tiffanie Marshall MA,CCC-SLP GN 1        Tiffanie Marshall MA,CCC-SLP  6/9/2022

## 2022-06-09 NOTE — PROGRESS NOTES
Pharmacy was consulted to dose Nafcillin for MSSA bacteremia.  Will give a continuous infusion of 12g over 24hrs.  Pharmacy was also consulted for vancomycin for PNA.  Since MRSA PCR is negative and S aureus PCR is positive, Vancomycin is not needed at this time per ID, continue with Nafcillin drip.  Pharmacy will continue to follow, thank you.

## 2022-06-09 NOTE — PROGRESS NOTES
[facility]     Mar/chart reviewed  Follow up Pneumonia, AHRF, COPD, pulmonary fibrosis, lung cancer s/p resection  Still with cough and shortness of breath    Vital Sign Min/Max for last 24 hours  Temp  Min: 96.6 °F (35.9 °C)  Max: 97.9 °F (36.6 °C)   BP  Min: 116/93  Max: 160/107   Pulse  Min: 66  Max: 93   Resp  Min: 15  Max: 35   SpO2  Min: 90 %  Max: 100 %   Flow (L/min)  Min: 8  Max: 12   Weight  Min: 76.6 kg (168 lb 14.4 oz)  Max: 76.6 kg (168 lb 14.4 oz)   772/2100  Appears ill, axox3,   perrl, eomi, normal sclera,  mmm, no jvd, trachea midline, neck supple,  chest decreased ae bilaterally, + crackles, no wheezes,   Irrg, rate ok  soft, nt, nd +bs,  no c/c/ 1+edema  Skin warm, dry no rashes    Labs: 6/9: reviewed:  Glucose 154  Bun 38  Cr 0.81  Na 135  Bicarb 22  Wbc 12 (87%neutrophils)  hgb 12.2  plts 186  mrsa nares pcr +  Blood culture 1/2 + gram +cocci  6/9: CXR: extensive consolidation on the right upper lobe    A/P:  1. Acute on chronic hypoxemic respiratory failure -- titrate oxygen as able, doesn't appear to be a bad c02 retainer.    2. Pneumonia -- --  urine strep/leg antigens are negative; mrsa screen +-- will add vanco if ok with primary team  3. COPD -- bronchodilators and pulmonary toilet  4. ILD - consistent with UIP -- I note Dr. Mejia had prior discuss with patient offering antifibrotics, patient declined at that time, can re-visit as outpatient.  5. Sepsis  6. afib with rvr -- better, bnp is up a bit, resume diuritics as able per hospitalist team  7. DMII  8. History of lung cancer s/p resection -- recommend repeat imaging in about three months time  9. Pulmonary hypertension -- diuresis for now, recommend repeat echo when improved, he's not the best candidate for pulmonary vasodilators given ILD/COPD but if anyone would be  Used would be tyvaso would  Have to have RHC prior to starting and would have to start as outpatient, would not purse at this time unless failure to  improve despite resolution of pneumonia and would repeat echo prior to going with RHC    Overall prognosis is poor, note ongoing goals of care discussions.

## 2022-06-09 NOTE — PAYOR COMM NOTE
"CONTACT:  Caro Cortez RN    Utilization Management Dept.   [facility]  [address] [place] [address]    Phone:[phone]  Fax: [phone]    REQUEST FOR INPATIENT AUTHORIZATION  REF # [id number]  ICD 10: A41.9,,I48.91   ATTENDING MD: Idris Apodaca    NPI:[id number]  FACILITY NPI: [id number]  ADM DATE: 6/8/22      Jenniffer Marshall (77 y.o. Male)             Date of Birth   1945    Social Security Number       Address   [address] [place], [place] [address]    Home Phone   [phone]    MRN   [id number]       [facility]    Marital Status                               Admission Date   6/8/22    Admission Type   Emergency    Admitting Provider   Idris Apodaca DO    Attending Provider   Idris Apodaca DO    Department, Room/Bed   [facility] PROGRESS CARE, P211/1P       Discharge Date       Discharge Disposition       Discharge Destination                               Attending Provider: Idris Apodaca DO    Allergies: Acetaminophen-codeine, Adhesive Tape, Indocin [Indomethacin]    Isolation: None   Infection: None   Code Status: CPR   Advance Care Planning Activity    Ht: 170.2 cm (67\")   Wt: 76.6 kg (168 lb 14.4 oz)    Admission Cmt: None   Principal Problem: None                Active Insurance as of 6/8/2022     Primary Coverage     Payor Plan Insurance Group Employer/Plan Group    ANTHEM MEDICARE REPLACEMENT ANTHEM MEDICARE ADVANTAGE [id number]     Payor Plan Address Payor Plan Phone Number Payor Plan Fax Number Effective Dates    [address] [phone]  1/1/2020 - None Entered    [facility] [address]       Subscriber Name Subscriber Birth Date Member ID       JENNIFFER MARSHALL 1945 [id number]           Secondary Coverage     Payor Plan Insurance Group Employer/Plan Group    [place]      Payor Plan Address Payor Plan Phone Number Payor Plan Fax Number Effective Dates    [address] [phone]  2017 - None Entered    [place] [address]       Subscriber Name Subscriber Birth Date Member ID       KB CARTAGENA 1945 [id number]                 Emergency Contacts      (Rel.) Home Phone Work Phone Mobile Phone    Suzie Yu (Sister) [phone] -- --    MAXWELL CARTAGENA (Son) [phone] -- --               History & Physical      Heidi Cheng APRN at 22 0842     Attestation signed by Idris Apodaca DO at 22 5610    I have reviewed this documentation and agree. Pt seen and examined with LEONARD Hartman. Cont broad spectrum IV antibiotics and steroids. HR improved and Cardizem gtt stopped. Appears overall compensated clinically but ReDs vest reading is elevated. Will order a dose of Lasix now that BP is improved. Pt reporting depression with SI and psychiatry consulted. Pulm consulted in the setting of acute on chronic hypoxic resp failure, COPD exacerbation and dense pneumonia. Palliative care also consulted to assist with GOC discussions. Cardiology consulted in the setting of Afib with RVR. Input from consultants appreciated. Monitor closely in the PCU.                       [facility] Medicine Services  History & Physical    Patient Identification:  Name:  Kb Cartagena  Age:  77 y.o.  Sex:  male  :  1945  MRN:  [id number]   Visit Number:  [id number]  Admit Date: 2022   Primary Care Physician:  Scar Mejia MD    Subjective     Chief complaint:   Chief Complaint   Patient presents with   • Shortness of Breath         History of presenting illness:      Kb Cartaegna is a 77 y.o. male with past medical history significant for hypertension, PAD, dyslipidemia, right upper lobe lung cancer status post right upper lobectomy in , COPD with oxygen dependency, thoracic ascending aortic aneurysm, aortic arch aneurysm with stenting, paroxysmal atrial fibrillation to the emergency department at [facility]  "on 6/8/2022 with complaints of shortness of breath. There was no family at bedside at the time of my elation and the patient is a rather poor historian. The patient states that he \"lost his breath\" around 2 weeks ago and this has progressively gotten worse. He wears oxygen at home at all times. He reported to me that he is using 8-10 liters at home. However, ED documentation states a reported 4 liters. He has a cough that is non-productive. He reports sharp left chest wall pain, that is intermittent in nature and worse with exertion. He denies palpitations or known history of atrial fibrillation. After review of his medical record, it appears the patient experienced an episode of atrial fibrillation during as admission on 12/23/2021. The patient is not chronically anticoagulated.    He denies any known sick contacts. He denies fevers, chills, diarrhea, abdominal pain. He reports an episode of vomiting, unknown when. He is lethargic at bedside. Increased respiratory effort with slight respiratory distress noted. He is on 10liters highflow with an oxygen saturation of 98%. He was started on a Cardizem drip in the ED, this was discontinued at the time of my evaluation. He was noted to be in sinus rhythm with rate in the 80's.  Heparin drip infusing.       Upon arrival to the ED, vital signs were pulse 145, respirations 26, blood pressure 87/61, oxygen saturation 98% on 15 L via nonrebreather, temperature 97.  They run able to obtain an ABG in the ED due to patient refusal.  proBNP was elevated at 11,717, CRP significantly elevated to 43.58, lactate positive at 2.4, procalcitonin 2.67, white blood cell 15.19.  EKG showing atrial fibrillation with a rapid ventricular response, ST and T wave abnormalities.  His chest x-ray showing stable interstitial prominence of the right lung.  8 cm density in the left upper lobe that suggests a dense pneumonia.  CT angiogram chest PE protocol without evidence of a pulmonary embolism.  " Interval development of small nodules in the right lung and left lower lobe.  Dense consolidation in the left upper lobe again seen.  The patient will be admitted to the progressive care unit for continued evaluation and therapy.    Known Emergency Department medications received prior to my evaluation included cefepime, Cardizem, heparin, Solu-Medrol, sepsis bolus.    Patient was in PCU room 211 at the time of my evaluation.     ---------------------------------------------------------------------------------------------------------------------   Review of Systems   Constitutional: Positive for activity change and fatigue. Negative for chills, diaphoresis and fever.   HENT: Negative for congestion.    Respiratory: Positive for cough and shortness of breath.    Cardiovascular: Positive for chest pain. Negative for palpitations and leg swelling.   Gastrointestinal: Positive for nausea and vomiting. Negative for abdominal pain and diarrhea.   Neurological: Positive for weakness. Negative for dizziness, numbness and headaches.   All other systems reviewed and are negative.     ---------------------------------------------------------------------------------------------------------------------   Past Medical History:   Diagnosis Date   • Aneurysm (HCC)    • Arthritis    • Asthma    • Back pain    • Cancer (HCC)     lungs   • COPD (chronic obstructive pulmonary disease) (HCC)    • Coronary artery disease    • Diabetes mellitus (HCC)    • Dyslipidemia    • GERD (gastroesophageal reflux disease)    • Heart disease    • Hypertension    • Lung cancer (HCC) 2011   • PAD (peripheral artery disease) (HCC)      Past Surgical History:   Procedure Laterality Date   • ABDOMINAL AORTIC ANEURYSM REPAIR     • COLONOSCOPY N/A 10/11/2018    Procedure: COLONOSCOPY;  Surgeon: Calos Stockton MD;  Location: [facility];  Service: Gastroenterology   • ENDOSCOPY N/A 12/24/2021    Procedure: ESOPHAGOGASTRODUODENOSCOPY;  Surgeon: Lidia  Magui HUNT MD;  [facility];  Service: General;  Laterality: N/A;   • FINGER SURGERY Left    • KNEE SURGERY Left    • LUNG REMOVAL, PARTIAL  11/29/2011   • LUNG SURGERY  11/28/2011     Family History   Problem Relation Age of Onset   • Cancer Mother    • Cancer Brother      Social History     Socioeconomic History   • Marital status:    Tobacco Use   • Smoking status: Former Smoker     Packs/day: 3.00     Quit date: 11/2011     Years since quitting: 10.6   • Smokeless tobacco: Former User     Types: Chew   Vaping Use   • Vaping Use: Never used   Substance and Sexual Activity   • Alcohol use: Not Currently     Comment: quit 40 years ago   • Drug use: No   • Sexual activity: Defer     ---------------------------------------------------------------------------------------------------------------------   Allergies:  Acetaminophen-codeine, Adhesive tape, and Indocin [indomethacin]  ---------------------------------------------------------------------------------------------------------------------   Home medications:    Medications below are reported home medications pulling from within the system; at this time, these medications have not been reconciled unless otherwise specified and are in the verification process for further verifcation as current home medications.  (Not in a hospital admission)      Hospital Scheduled Meds:     dilTIAZem, 5-15 mg/hr, Last Rate: 5 mg/hr (06/08/22 0823)  heparin, 12 Units/kg/hr, Last Rate: 12 Units/kg/hr (06/08/22 0816)      Current listed hospital scheduled medications may not yet reflect those currently placed in orders that are signed and held awaiting patient's arrival to floor.   ---------------------------------------------------------------------------------------------------------------------     Objective     Vital Signs:  Temp:  [97 °F (36.1 °C)] 97 °F (36.1 °C)  Heart Rate:  [] 105  Resp:  [26-30] 30  BP: ()/() 123/94      06/08/22  0541    Weight: 77.1 kg (170 lb)     Body mass index is 26.63 kg/m².  ---------------------------------------------------------------------------------------------------------------------       Physical Exam  Vitals and nursing note reviewed.   Constitutional:       Appearance: He is ill-appearing.      Interventions: Nasal cannula in place.   HENT:      Head: Normocephalic and atraumatic.      Mouth/Throat:      Lips: Pink.      Mouth: Mucous membranes are dry.   Eyes:      General: Lids are normal.   Cardiovascular:      Rate and Rhythm: Normal rate and regular rhythm.      Pulses:           Dorsalis pedis pulses are 2+ on the right side and 2+ on the left side.        Posterior tibial pulses are 2+ on the right side and 2+ on the left side.      Heart sounds: Normal heart sounds, S1 normal and S2 normal.   Pulmonary:      Effort: Tachypnea and respiratory distress present.      Breath sounds: Decreased air movement present. Decreased breath sounds present.   Abdominal:      General: Abdomen is protuberant. Bowel sounds are normal.      Palpations: Abdomen is soft.   Musculoskeletal:      Cervical back: Normal range of motion and neck supple.      Right lower leg: No edema.      Left lower leg: No edema.   Feet:      Right foot:      Skin integrity: Skin integrity normal.      Left foot:      Skin integrity: Skin integrity normal.   Skin:     General: Skin is warm and dry.      Capillary Refill: Capillary refill takes less than 2 seconds.   Neurological:      General: No focal deficit present.      Mental Status: He is easily aroused. Mental status is at baseline. He is lethargic.      Motor: Weakness present.       ---------------------------------------------------------------------------------------------------------------------  EKG:        I have personally reviewed the above EKG.   ---------------------------------------------------------------------------------------------------------------------   Results from  last 7 days   Lab Units 06/08/22  0540   CRP mg/dL 43.58*   LACTATE mmol/L 2.4*   WBC 10*3/mm3 15.19*   HEMOGLOBIN g/dL 12.7*   HEMATOCRIT % 39.9   MCV fL 81.4   MCHC g/dL 31.8   PLATELETS 10*3/mm3 200   INR  1.24*         Results from last 7 days   Lab Units 06/08/22  0540   SODIUM mmol/L 136   POTASSIUM mmol/L 4.5   MAGNESIUM mg/dL 2.1   CHLORIDE mmol/L 96*   CO2 mmol/L 24.8   BUN mg/dL 36*   CREATININE mg/dL 0.94   CALCIUM mg/dL 8.9   GLUCOSE mg/dL 246*   ALBUMIN g/dL 2.61*   BILIRUBIN mg/dL 0.7   ALK PHOS U/L 70   AST (SGOT) U/L 11   ALT (SGPT) U/L 8   Estimated Creatinine Clearance: 72.9 mL/min (by C-G formula based on SCr of 0.94 mg/dL).  No results found for: AMMONIA  Results from last 7 days   Lab Units 06/08/22  0540   TROPONIN T ng/mL <0.010     Results from last 7 days   Lab Units 06/08/22  0540   PROBNP pg/mL 11,717.0*     Lab Results   Component Value Date    HGBA1C 6.50 (H) 12/23/2021     Lab Results   Component Value Date    TSH 2.190 12/23/2021     No results found for: PREGTESTUR, PREGSERUM, HCG, HCGQUANT  Pain Management Panel    There is no flowsheet data to display.       No results found for: BLOODCX  No results found for: URINECX  No results found for: WOUNDCX  No results found for: STOOLCX      ---------------------------------------------------------------------------------------------------------------------  Imaging Results (Last 7 Days)     Procedure Component Value Units Date/Time    CT Angiogram Chest Pulmonary Embolism [[id number]] Collected: 06/08/22 0749     Updated: 06/08/22 0801    Narrative:      CT ANGIOGRAM CHEST PULMONARY EMBOLISM-     CLINICAL INDICATION: sob, afib        COMPARISON: 01/04/2022      PROCEDURE: Thin cut axial images were acquired through the pulmonary  vessels during the rapid infusion of IV contrast.     Additional 3-D reformatted images obtained via post-processing for  improved diagnostic accuracy and procedural planning.     Radiation dose reduction  techniques were utilized per ALARA protocol.  Automated exposure control was initiated through either or [employer] or  DoseRight software packages by  protocol.           FINDINGS: Today's study demonstrates opacification of the central  pulmonary vessels.   There are no filling defects.   There is no truncation.     No evidence of a pulmonary embolus.     Dense pleural-based consolidation in the left lung.  Small nodules in the right lung.     Diffuse interstitial lung disease..     Mediastinal lymph node enlargement similar to the previous exam.     No pericardial or pleural effusion.          Impression:      1. No evidence of a pulmonary embolus.,  2. Interval development of small nodules in the right lung and left  lower lobe, as well as dense consolidation pleural-based in the left  upper lobe.  3. Stable mediastinal lymph node enlargement.     This report was finalized on 6/8/2022 7:59 AM by Dr. Leif Taveras MD.       XR Chest 1 View [[id number]] Collected: 06/08/22 0555     Updated: 06/08/22 0558    Narrative:      CR Chest 1 Vw    INDICATION:   Shortness of air, sepsis     COMPARISON:    11/2/2021    FINDINGS:  Portable AP view(s) of the chest.  Mild interstitial prominence in the right lung stable. There are no focal infiltrates. There is a large amount of density in the left upper lobe measuring at least 8 cm in diameter. This is abutting the lateral part of  the chest. An aortic stent graft is present. The heart size is normal      Impression:      8 cm dense area left upper lobe suggesting dense pneumonia. Clinical correlation follow-up is recommended.    Mild diffuse initial prominence.    Signer Name: Eben Cisneros MD   Signed: 6/8/2022 5:55 AM   Workstation Name: [facility]-    [facility]          Cultures:  No results found for: BLOODCX, URINECX, WOUNDCX, MRSACX, RESPCX, STOOLCX    Last echocardiogram:  Results for orders placed during the hospital encounter of  10/30/21    Adult Transthoracic Echo Complete w/ Color, Spectral and Contrast if necessary per protocol    Interpretation Summary  · Normal left ventricular cavity size and wall thickness noted. All left ventricular wall segments contract normally  · Left ventricular ejection fraction appears to be 56 - 60%.  · Left ventricular diastolic function is consistent with (grade I) impaired relaxation.  · The mitral valve is structurally normal with no significant stenosis present. Mild mitral valve regurgitation is present.  · The aortic valve is structurally normal with no regurgitation or stenosis present.  · Mild dilation of the aortic root is present(4.0cm). Moderate dilation of the descending aorta present.(4.84cm).  · Mild tricuspid valve regurgitation is present. Estimated right ventricular systolic pressure from tricuspid regurgitation is moderately elevated (45-55 mmHg).  · Moderate pulmonary hypertension is present.  · There is no evidence of pericardial effusion.      I have personally reviewed the above radiology images and read the final radiology report on 06/08/22  ---------------------------------------------------------------------------------------------------------------------  Assessment / Plan     Active Hospital Problems    Diagnosis  POA   • Atrial fibrillation with rapid ventricular response (HCC) [I48.91]  Yes     -Sepsis 2/2 to Left upper lobe pneumonia (POA)  -Tachypnea, lactate 2.4, WBC 15.19,   -leukocytosis, elevated CRP, elevated Procalcitonin.   -COPD exacerbation   -hx of RUL lung cancer s/p RU lobectomy   -R&L lung nodules   -AAA, with repair graft  · Baseline oxygen requirement 4-10LNC (needs clarified by family), currently requiring 10LNC  · Reflex lacate to 2.0 after sepsis bolus   · Antimicrobial coverage with doxycycline and cefepime  · Titrate supplemental oxygen to keep SpO2 > 88%.   · Hospice patient, palliative care consult.   · atrovent nebs in the setting of afib with  RVR  · continuous spO2 monitoring    -Atrial fibrillation with RVR (POA)  -elevated proBNP (11,717)  · Cardizem gtt in ED, stopped on admission with conversion to sinus rhythm.   · Updated EKG to capture SR.   · Heparin gtt for anticoagulation  · Cardiology consult   · Last ECHO at  on 12/20/2021 with EF of 50-60%. No formal documentation of CHF, but to note patients home medications include bumex, jardiance, and lisinopril   · ReDs Vest reading.   · continuous telemetry     -hypotension with history of essential hypertension  · Hypotensive on admission, improved after fluid bolus  · Will review home medications once reconciled but will plan to hold  bumex, coreg, lisinopril   · Routine vitals, monitor blood pressure closely, avoiding hypotension    -Type 2 diabetes, non-insulin dependent, with hyperglycemia  · Low dose SSI   · accuchecks TID AC  · Will hold amaryl and metformin on admission     -F/E/N  · No IVF   · Replace electrolytes PRN  Dietary Orders (From admission, onward)     Start     Ordered    06/08/22 0914  NPO Diet NPO Type: Sips with Meds  Diet Effective Now        Question:  NPO Type  Answer:  Sips with Meds    06/08/22 0913            ·   ·     ----------  -DVT prophylaxis: heparin gtt to serve   -Activity: bedrest  -Expected length of stay: INPATIENT status due to the need for care which can only be reasonably provided in an hospital setting such as aggressive/expedited ancillary services and/or consultation services, the necessity for IV medications, close physician monitoring and/or the possible need for procedures.  In such, I feel patient’s risk for adverse outcomes and need for care warrant INPATIENT evaluation and predict the patient’s care encounter to likely last beyond 2 midnights.    -Disposition per hospital course     High risk secondary to sepsis, PNA, afib with RVR, hypotension in the setting of essential hypertension, previous lung CA with lobectomy      GOLDEN Francis    06/08/22  08:43 EDT      Electronically signed by Idris Apodaca DO at 06/08/22 1750          Emergency Department Notes      Julio C Oscar DO at 06/08/22 0551          Subjective   77-year-old male with past medical history of COPD lung cancer with resection of his right lower lobe diabetes hypertension coming in with worsening shortness of breath for the past few days.  States he has a cough but it is dry nothing comes up, no fevers chills nausea vomiting.  No sick contacts.  He typically uses 4 L of oxygen at home but titrates it as needed for himself.  When EMS arrived he stated he was on 10 L with saturations in the low 80s.  They put him on a nonrebreather which brought his sats up to the high 80s.    Family member arrived stated that the patient went 3 days ago was being posted in the garden and moving some soil around, after that he was having a lot of pain to his hospice nurse that increase his pain medicines, this made him more somnolent where he was sleeping all day and using all of his oxygen at home.  States that they had increased his pain medicines then they decreased to he was doing better until midday yesterday when his respiratory drive started to decline and he started having worsening cough.          Review of Systems   Constitutional: Negative for activity change, appetite change, chills, diaphoresis, fatigue, fever and unexpected weight change.   HENT: Negative for congestion and sore throat.    Eyes: Negative for discharge, itching and visual disturbance.   Respiratory: Positive for shortness of breath.    Cardiovascular: Negative for chest pain.   Gastrointestinal: Negative for abdominal distention, abdominal pain, constipation, diarrhea, nausea, rectal pain and vomiting.   Genitourinary: Negative for decreased urine volume, dysuria and testicular pain.   Musculoskeletal: Negative for arthralgias, myalgias and neck stiffness.   Skin: Negative for rash.   Neurological: Negative  for dizziness.   Hematological: Negative for adenopathy.   Psychiatric/Behavioral: Negative for agitation.   All other systems reviewed and are negative.      Past Medical History:   Diagnosis Date   • Aneurysm (HCC)    • Arthritis    • Asthma    • Back pain    • Cancer (HCC)     lungs   • COPD (chronic obstructive pulmonary disease) (HCC)    • Coronary artery disease    • Diabetes mellitus (HCC)    • Dyslipidemia    • GERD (gastroesophageal reflux disease)    • Heart disease    • Hypertension    • Lung cancer (HCC) 2011   • PAD (peripheral artery disease) (HCC)        Allergies   Allergen Reactions   • Acetaminophen-Codeine    • Adhesive Tape Other (See Comments)     ADHESIVE CAUSES SKIN BLISTERS, PER PT   • Indocin [Indomethacin] Itching       Past Surgical History:   Procedure Laterality Date   • ABDOMINAL AORTIC ANEURYSM REPAIR     • COLONOSCOPY N/A 10/11/2018    Procedure: COLONOSCOPY;  Surgeon: Calos Stockton MD;  Location: [facility];  Service: Gastroenterology   • ENDOSCOPY N/A 12/24/2021    Procedure: ESOPHAGOGASTRODUODENOSCOPY;  Surgeon: Magui Murillo MD;  Location: [facility];  Service: General;  Laterality: N/A;   • FINGER SURGERY Left    • KNEE SURGERY Left    • LUNG REMOVAL, PARTIAL  11/29/2011   • LUNG SURGERY  11/28/2011       Family History   Problem Relation Age of Onset   • Cancer Mother    • Cancer Brother        Social History     Socioeconomic History   • Marital status:    Tobacco Use   • Smoking status: Former Smoker     Packs/day: 3.00     Quit date: 11/2011     Years since quitting: 10.6   • Smokeless tobacco: Former User     Types: Chew   Vaping Use   • Vaping Use: Never used   Substance and Sexual Activity   • Alcohol use: Not Currently     Comment: quit 40 years ago   • Drug use: No   • Sexual activity: Defer           Objective   Physical Exam  Vitals and nursing note reviewed. Exam conducted with a chaperone present.   Constitutional:       General: He is not in acute  distress.     Appearance: He is well-developed. He is not ill-appearing or toxic-appearing.      Interventions: He is not intubated.  HENT:      Head: Normocephalic and atraumatic.      Mouth/Throat:      Mouth: Mucous membranes are moist.      Pharynx: Oropharynx is clear.   Eyes:      Extraocular Movements: Extraocular movements intact.      Pupils: Pupils are equal, round, and reactive to light.   Cardiovascular:      Rate and Rhythm: Tachycardia present. Rhythm irregular.      Heart sounds: Normal heart sounds. No murmur heard.    No friction rub. No gallop.   Pulmonary:      Effort: Tachypnea and accessory muscle usage present. No bradypnea. He is not intubated.      Breath sounds: No stridor. No decreased breath sounds, wheezing, rhonchi or rales.   Chest:      Chest wall: No mass, deformity, tenderness, crepitus or edema. There is no dullness to percussion.   Abdominal:      General: Abdomen is flat. Bowel sounds are normal. There is no distension.      Palpations: Abdomen is soft.      Tenderness: There is no abdominal tenderness.      Hernia: No hernia is present.   Skin:     General: Skin is dry.      Capillary Refill: Capillary refill takes less than 2 seconds.      Coloration: Skin is not cyanotic.      Findings: No rash.   Neurological:      General: No focal deficit present.      Mental Status: He is alert.   Psychiatric:         Mood and Affect: Mood normal.         Behavior: Behavior normal.         Procedures          ED Course  ED Course as of 06/08/22 0812   Wed Jun 08, 2022   0524 EKG A. fib with  bpm no acute signs of ischemia [JM]   0811 I assumed care of this patient at shift change.  Recommend admission for further work up and treatment.  Hospitalist team consulted and made aware of the patient.  Consults and orders placed per hospitalist request.  Patient was agreeable to admission plan.  Vitals stable on admission. [SF]      ED Course User Index  [JM] Jesus Apodaca MD  [SF] Dayne  Julio C HUNT DO                                                 [facility]    Final diagnoses:   Atrial fibrillation with rapid ventricular response (HCC)   Pneumonia due to infectious organism, unspecified laterality, unspecified part of lung   Congestive heart failure, unspecified HF chronicity, unspecified heart failure type (HCC)       ED Disposition  ED Disposition     ED Disposition   Decision to Admit    Condition   --    Comment   Level of Care: Progressive Care [20]   Diagnosis: Atrial fibrillation with rapid ventricular response (HCC) [221040]   Admitting Physician: NIMCO BERMUDEZ [[id number]]   Certification: I Certify That Inpatient Hospital Services Are Medically Necessary For Greater Than 2 Midnights               No follow-up provider specified.       Medication List      No changes were made to your prescriptions during this visit.          Julio C Oscar DO  06/08/22 0812      Electronically signed by Julio C Oscar DO at 06/08/22 0812     Gifty Fiore, RN at 06/08/22 0701        Report to LEONARD Hernandez    Electronically signed by Gifty Fiore RN at 06/08/22 0701       Vital Signs (last day)     Date/Time Temp Temp src Pulse Resp BP Patient Position SpO2    06/09/22 0803 96.6 (35.9) Axillary 93 15 160/107 Lying 93    06/09/22 0710 -- -- 79 25 146/85 -- 96    06/09/22 0626 -- -- -- -- -- -- 99    06/09/22 0625 -- -- 76 24 152/96 -- 95    06/09/22 0515 -- -- 83 -- 147/100 -- 95    06/09/22 0445 -- -- 72 20 140/95 -- 97    06/09/22 0400 97.4 (36.3) Oral -- -- -- -- --    06/09/22 0355 -- -- 85 -- -- -- 93    06/09/22 0300 -- -- 82 26 134/89 -- 93    06/09/22 0220 -- -- 66 -- 150/108 -- 96    06/09/22 0130 -- -- 86 22 143/80 -- 94    06/09/22 0035 -- -- 82 20 155/106 -- 90    06/09/22 0020 -- -- 86 -- 148/96 -- 90    06/09/22 0015 97.8 (36.6) Oral 74 24 116/93 Lying 97    06/09/22 0014 -- -- 83 24 -- -- 94    06/09/22 0005 -- -- 86 24 -- -- 95    06/08/22 2335 -- -- 85 -- 159/107 -- 100    06/08/22  2330 -- -- 79 -- 154/100 -- 97    06/08/22 2200 -- -- 70 24 129/90 -- 100    06/08/22 2100 -- -- 77 24 157/97 -- 97    06/08/22 2020 97.6 (36.4) Oral 84 20 143/96 Lying 98    06/08/22 1934 -- -- 90 18 -- -- 92    06/08/22 1900 -- -- 82 18 137/98 -- 98    06/08/22 1700 97.9 (36.6) -- -- -- -- -- --    06/08/22 1603 -- -- 85 18 123/72 -- 96    06/08/22 1532 -- -- 85 -- 153/98 -- 97    06/08/22 1517 -- -- 87 -- 139/82 -- 91    06/08/22 1502 -- -- 86 -- 138/95 -- 94    06/08/22 1403 -- -- 84 31 148/98 -- 99    06/08/22 1303 -- -- 90 35 144/91 Lying 93    06/08/22 1200 97.8 (36.6) Axillary 86 24 138/103 Lying 97    06/08/22 1117 -- -- 85 23 133/88 -- 97    06/08/22 1100 -- -- 86 21 122/80 -- 98    06/08/22 1003 -- -- 84 30 142/100 -- 90    06/08/22 0917 -- -- 62 -- 123/73 -- --    06/08/22 0915 -- -- 74 25 123/73 -- 96    06/08/22 0913 98.6 (37) Axillary 74 20 123/78 -- 95    06/08/22 0811 -- -- 105 -- 123/94 -- 96    06/08/22 0803 -- -- 120 -- 106/82 -- 97    06/08/22 0752 -- -- 106 -- 98/84 -- 94    06/08/22 0746 -- -- 105 -- 103/84 -- 97    06/08/22 0717 -- -- 115 -- 91/71 -- 95    06/08/22 06:46:58 -- -- 103 30 102/70 -- 98    06/08/22 0641 -- -- -- -- -- -- 97    06/08/22 06:38:32 -- -- 111 26 104/63 -- 100    06/08/22 0620 -- -- 117 30 -- -- 99    06/08/22 06:18:17 -- -- 118 28 114/103 -- 99    06/08/22 05:56:23 -- -- 105 30 84/66 -- 99    06/08/22 05:51:23 -- -- 97 30 90/69 -- 99    06/08/22 05:50:08 -- -- 114 26 -- -- 98    06/08/22 05:48:52 -- -- 129 26 87/67 -- 99    06/08/22 05:43:14 -- -- 142 26 85/70 -- 100    06/08/22 0520 97 (36.1) -- 145 26 87/61 -- 98            Current Facility-Administered Medications   Medication Dose Route Frequency Provider Last Rate Last Admin   • acetaminophen (TYLENOL) tablet 650 mg  650 mg Oral Q6H PRN Idris Apodaca DO   650 mg at 06/08/22 1525   • dextrose (D50W) (25 g/50 mL) IV injection 25 g  25 g Intravenous Q15 Min PRN Idris Apodaca DO       • dextrose  (GLUTOSE) oral gel 15 g  15 g Oral Q15 Min PRN Idris Apodaca DO       • dilTIAZem (CARDIZEM) 125 mg in 125 mL 0.7% sodium chloride  infusion  5-15 mg/hr Intravenous Titrated Idris Apodaca DO   Stopped at 06/08/22 0917   • doxycycline (VIBRAMYCIN) 100 mg in sodium chloride 0.9 % 100 mL IVPB-VTB  100 mg Intravenous Q12H Idris Apodaca DO   100 mg at 06/09/22 0027   • glucagon (human recombinant) (GLUCAGEN DIAGNOSTIC) injection 1 mg  1 mg Intramuscular Q15 Min PRN Idris Apodaca DO       • guaiFENesin (MUCINEX) 12 hr tablet 1,200 mg  1,200 mg Oral Q12H Ramon Li MD   1,200 mg at 06/09/22 0809   • heparin (porcine) 5000 UNIT/ML injection 2,300 Units  30 Units/kg Intravenous PRN Idris Apodaca DO   2,300 Units at 06/08/22 1431   • heparin (porcine) 5000 UNIT/ML injection 4,600 Units  60 Units/kg Intravenous PRN Idris Apodaca DO   4,600 Units at 06/08/22 2116   • heparin 81723 units/250 mL (100 units/mL) in 0.9% NaCl infusion  12 Units/kg/hr Intravenous Titrated Idris Apodaca DO 13.87 mL/hr at 06/09/22 0810 18 Units/kg/hr at 06/09/22 0810   • Insulin Aspart (novoLOG) injection 0-9 Units  0-9 Units Subcutaneous TID AC Idris Apodaca DO   2 Units at 06/09/22 0638   • ipratropium-albuterol (DUO-NEB) nebulizer solution 3 mL  3 mL Nebulization 4x Daily - RT Ramon Li MD   3 mL at 06/09/22 0005   • lidocaine (LIDODERM) 5 % 1 patch  1 patch Transdermal Q24H Cecil Garcia MD   1 patch at 06/09/22 0401   • methylPREDNISolone sodium succinate (SOLU-Medrol) injection 40 mg  40 mg Intravenous Q12H Idris Apodaca DO   40 mg at 06/09/22 0502   • nafcillin 12 g in sodium chloride 0.9 % 500 mL IVPB  12 g Intravenous Q24H Lisette Meraz APRN       • oxyCODONE-acetaminophen (PERCOCET) 7.5-325 MG per tablet 1 tablet  1 tablet Oral Q4H PRN Cecil Garcia MD   1 tablet at 06/09/22 0437   • sodium chloride 0.9 % flush 10 mL  10  mL Intravenous PRN Constanza, Christopher A, DO       • sodium chloride 0.9 % flush 10 mL  10 mL Intravenous Q12H Constanza, Christopher A, DO   10 mL at 06/09/22 0809   • sodium chloride 0.9 % flush 10 mL  10 mL Intravenous PRN Constanza, Christopher A, DO       • sodium chloride 0.9 % nebulizer solution 3 mL  3 mL Nebulization BID - RT Ramon Li MD           Lab Results (last 24 hours)     Procedure Component Value Units Date/Time    POC Glucose Once [[id number]]  (Abnormal) Collected: 06/09/22 0619    Specimen: Blood Updated: 06/09/22 0630     Glucose 159 mg/dL      Comment: Meter: [id number] : [id number] WALDO IRIZARRY       CBC & Differential [[id number]]  (Abnormal) Collected: 06/09/22 0324    Specimen: Blood Updated: 06/09/22 0553    Narrative:      The following orders were created for panel order CBC & Differential.  Procedure                               Abnormality         Status                     ---------                               -----------         ------                     CBC Auto Differential[[id number]]        Abnormal            Final result               Scan Slide[[id number]]                                                                    Please view results for these tests on the individual orders.    Blood Culture - Blood, Arm, Left [[id number]]  (Normal) Collected: 06/08/22 0540    Specimen: Blood from Arm, Left Updated: 06/09/22 0547     Blood Culture No growth at 24 hours    CBC Auto Differential [[id number]]  (Abnormal) Collected: 06/09/22 0324    Specimen: Blood Updated: 06/09/22 0517     WBC 12.16 10*3/mm3      RBC 4.73 10*6/mm3      Hemoglobin 12.2 g/dL      Hematocrit 40.1 %      MCV 84.8 fL      MCH 25.8 pg      MCHC 30.4 g/dL      RDW 20.3 %      RDW-SD 62.2 fl      MPV 9.6 fL      Platelets 186 10*3/mm3     Manual Differential [[id number]]  (Abnormal) Collected: 06/09/22 0324    Specimen: Blood Updated: 06/09/22 0517     Neutrophil % 87.0 %      Lymphocyte % 5.0 %       Monocyte % 3.0 %      Bands %  5.0 %      Neutrophils Absolute 11.19 10*3/mm3      Lymphocytes Absolute 0.61 10*3/mm3      Monocytes Absolute 0.36 10*3/mm3      Anisocytosis Mod/2+     Hypochromia Slight/1+     Platelet Morphology Normal    aPTT [[id number]]  (Abnormal) Collected: 06/09/22 0324    Specimen: Blood Updated: 06/09/22 0405     PTT 77.6 seconds     Narrative:      PTT Heparin Therapeutic Range:  59 - 95 seconds      Comprehensive Metabolic Panel [[id number]]  (Abnormal) Collected: 06/09/22 0324    Specimen: Blood Updated: 06/09/22 0403     Glucose 154 mg/dL      BUN 38 mg/dL      Creatinine 0.81 mg/dL      Sodium 135 mmol/L      Potassium 4.6 mmol/L      Comment: Slight hemolysis detected by analyzer. Results may be affected.        Chloride 98 mmol/L      CO2 22.1 mmol/L      Calcium 8.7 mg/dL      Total Protein 6.1 g/dL      Albumin 2.04 g/dL      ALT (SGPT) 10 U/L      AST (SGOT) 13 U/L      Alkaline Phosphatase 73 U/L      Total Bilirubin 0.5 mg/dL      Globulin 4.1 gm/dL      A/G Ratio 0.5 g/dL      BUN/Creatinine Ratio 46.9     Anion Gap 14.9 mmol/L      eGFR 91.4 mL/min/1.73      Comment: National Kidney Foundation and American Society of Nephrology (ASN) Task Force recommended calculation based on the Chronic Kidney Disease Epidemiology Collaboration (CKD-EPI) equation refit without adjustment for race.       Narrative:      GFR Normal >60  Chronic Kidney Disease <60  Kidney Failure <15      Magnesium [[id number]]  (Normal) Collected: 06/09/22 0324    Specimen: Blood Updated: 06/09/22 0403     Magnesium 2.3 mg/dL     Phosphorus [[id number]]  (Abnormal) Collected: 06/09/22 0324    Specimen: Blood Updated: 06/09/22 0403     Phosphorus 4.9 mg/dL     Blood Culture ID, PCR - Blood, Arm, Right [[id number]]  (Abnormal) Collected: 06/08/22 0540    Specimen: Blood from Arm, Right Updated: 06/09/22 0302     BCID, PCR Staph aureus. mecA/C and MREJ (methicillin resistance gene) NOT detected. Identification by  BCID2 PCR    Narrative:      Infectious disease consultation is highly recommended to rule out distant foci of infection.    Blood Culture - Blood, Arm, Right [[id number]]  (Abnormal) Collected: 06/08/22 0540    Specimen: Blood from Arm, Right Updated: 06/09/22 0302     Blood Culture Abnormal Stain     Gram Stain Aerobic Bottle Gram positive cocci in clusters      Anaerobic Bottle Gram positive cocci in clusters    S. Pneumo Ag Urine or CSF - Urine, Urine, Clean Catch [[id number]]  (Normal) Collected: 06/08/22 1443    Specimen: Urine, Clean Catch Updated: 06/08/22 2322     Strep Pneumo Ag Negative    aPTT [[id number]]  (Abnormal) Collected: 06/08/22 2012    Specimen: Blood Updated: 06/08/22 2052     PTT 39.7 seconds     Narrative:      PTT Heparin Therapeutic Range:  59 - 95 seconds      POC Glucose Once [[id number]]  (Abnormal) Collected: 06/08/22 1654    Specimen: Blood Updated: 06/08/22 1706     Glucose 226 mg/dL      Comment: Meter: [id number] : [id number] DENEEN EDOUARD       Legionella Antigen, Urine - Urine, Urine, Clean Catch [[id number]]  (Normal) Collected: 06/08/22 1443    Specimen: Urine, Clean Catch Updated: 06/08/22 1607     LEGIONELLA ANTIGEN, URINE Negative    Narrative:      Presumptive negative for L. pneumophilia serogroup 1 antigen, suggesting no recent or current infection.    aPTT [[id number]]  (Abnormal) Collected: 06/08/22 1357    Specimen: Blood Updated: 06/08/22 1426     PTT 46.7 seconds     Narrative:      PTT Heparin Therapeutic Range:  59 - 95 seconds      MRSA Screen, PCR (Inpatient) - Swab, Nares [[id number]]  (Abnormal) Collected: 06/08/22 1104    Specimen: Swab from Nares Updated: 06/08/22 1237     MRSA PCR Negative     Staph aureus by PCR Positive    POC Glucose Once [[id number]]  (Abnormal) Collected: 06/08/22 1054    Specimen: Blood Updated: 06/08/22 1211     Glucose 195 mg/dL      Comment: Meter: [id number] : [id number] DENEEN EDOUARD           Imaging Results (Last 24 Hours)      Procedure Component Value Units Date/Time    XR Chest 1 View [[id number]] Collected: 06/09/22 0836     Updated: 06/09/22 0850    Narrative:      XR CHEST 1 VW-     CLINICAL INDICATION: Resp failure; I48.91-Unspecified atrial  fibrillation; J18.9-Pneumonia, unspecified organism; I50.9-Heart  failure, unspecified        COMPARISON: 06/08/2022      TECHNIQUE: Single frontal view of the chest.     FINDINGS:      LUNGS: Consolidation diffusely in the left lung similar to the previous  exam. Minimal right basilar airspace disease      HEART AND MEDIASTINUM: Heart and mediastinal contours are unremarkable        SKELETON: Bony and soft tissue structures are unremarkable.             Impression:      The overall appearance is very similar to the prior study     This report was finalized on 6/9/2022 8:37 AM by Dr. Leif Taveras MD.           ECG/EMG Results (last 24 hours)     Procedure Component Value Units Date/Time    ECG 12 Lead [[id number]] Collected: 06/08/22 0521     Updated: 06/08/22 1308     QT Interval 334 ms      QTC Interval 517 ms     Narrative:      Test Reason : Chest Pain  Blood Pressure :   */*   mmHG  Vent. Rate : 144 BPM     Atrial Rate : 131 BPM     P-R Int :   * ms          QRS Dur :  84 ms      QT Int : 334 ms       P-R-T Axes :   *  34 -51 degrees     QTc Int : 517 ms    Atrial fibrillation with rapid ventricular response  ST & T wave abnormality, consider inferior ischemia  Abnormal ECG  Confirmed by Jean Marie Scherer ([id number]) on 6/8/2022 1:07:55 PM    Referred By: CARO           Confirmed By: Jean Marie Scherer    ECG 12 Lead [[id number]] Collected: 06/08/22 0954     Updated: 06/08/22 1315     QT Interval 420 ms      QTC Interval 472 ms     Narrative:      Test Reason : rhythm change  Blood Pressure :   */*   mmHG  Vent. Rate :  76 BPM     Atrial Rate :  76 BPM     P-R Int : 154 ms          QRS Dur :  86 ms      QT Int : 420 ms       P-R-T Axes :  31  17  27 degrees     QTc Int : 472 ms    Normal sinus  rhythm with sinus arrhythmia  Nonspecific ST abnormality  Abnormal ECG  Confirmed by Jean Marie Scherer ([id number]) on 6/8/2022 1:15:04 PM    Referred By: AIDAN           Confirmed By: Jean Marie Scherer    Adult Transthoracic Echo Complete W/ Cont if Necessary Per Protocol [[id number]] Resulted: 06/08/22 1650     Updated: 06/08/22 1657     Target HR (85%) 122 bpm      Max. Pred. HR (100%) 144 bpm      LA ESV Index (BP) 29.4 ml/m2      Avg E/e' ratio 7.25     ACS 3.2 cm      Ao root diam 4.0 cm      Ao pk keaton 134.0 cm/sec      Ao V2 VTI 27.7 cm      EDV(cubed) 145.9 ml      EDV(MOD-sp4) 73.5 ml      EF(MOD-sp4) 67.6 %      ESV(cubed) 45.3 ml      ESV(MOD-sp4) 23.8 ml      IVS/LVPW 0.93 cm      Lat Peak E' Keaton 7.0 cm/sec      LV mass(C)d 248.7 grams      LVPWd 1.23 cm      Med Peak E' Keaton 5.3 cm/sec      PA acc time 0.07 sec      PA pr(Accel) 45.7 mmHg      RAP systole 10.0 mmHg      RVSP(TR) 82.3 mmHg      SI(MOD-sp4) 26.3 ml/m2      SV(MOD-sp4) 49.7 ml      TR max PG 72.3 mmHg      Ao max PG 7.2 mmHg      Ao mean PG 3.0 mmHg      FS 32.3 %      IVSd 1.14 cm      LA dimension (2D)  3.1 cm      LVIDd 5.3 cm      LVIDs 3.6 cm      LVOT area 3.8 cm2      LVOT diam 2.20 cm      MV E/A 0.57     MV A max keaton 78.0 cm/sec      MV E max keaton 44.6 cm/sec      TR max keaton 425.0 cm/sec      LV Ortega Vol (BSA corrected) 38.9 cm2      LV Sys Vol (BSA corrected) 12.6 cm2      Echo EF Estimated 45 %     Narrative:      · Estimated left ventricular EF = 45% Left ventricular ejection fraction   appears to be 41 - 45%. Left ventricular systolic function is mildly   decreased.  · Estimated right ventricular systolic pressure from tricuspid   regurgitation is markedly elevated (>80 mmHg).  · Severe pulmonary hypertension is present.  · The right ventricular cavity is mildly dilated.  · Left ventricular diastolic function is consistent with (grade I)   impaired relaxation.  · Pt has converted to sinus tachycardia prior to this study.  · Since prev  study of 10/31/21 RV pressure has markedly increased from 45   to 80 mmHg       SCANNED - TELEMETRY   [[id number]] Resulted: 06/08/22     Updated: 06/08/22 1744    SCANNED - TELEMETRY   [[id number]] Resulted: 06/08/22     Updated: 06/08/22 1744    SCANNED - TELEMETRY   [[id number]] Resulted: 06/08/22     Updated: 06/09/22 0613    SCANNED - TELEMETRY   [[id number]] Resulted: 06/08/22     Updated: 06/09/22 0613    SCANNED - TELEMETRY   [[id number]] Resulted: 06/08/22     Updated: 06/09/22 0642    SCANNED - TELEMETRY   [[id number]] Resulted: 06/08/22     Updated: 06/09/22 0810               Consult Notes (last 24 hours)      Marjorie Eid APRN at 06/08/22 1524      Consult Orders    1. Inpatient Cardiology Consult [[id number]] ordered by Idris Apodaca DO               Consults  Date of Admit: 6/8/2022  Date of Consult: 06/08/22  Provider, No Known  Kb Marshall  1945    Consulting Physician: Joss Monson MD    Cardiology consultation    Reason for consultation: Atrial fibrillation with RVR    Assessment:  1. Atrial fibrillation with RVR, resolved.  Patient is currently in sinus rhythm.  2. Acute on chronic combined diastolic and systolic heart failure.  Admission proBNP of 11k and absolute lung fluid content 41  3. Left-sided chest pain, likely pleuritic and/or related to left lobe PNM given that patient has increased pain with inspiration and has chest wall tenderness.  4. Sepsis secondary to left upper lobe pneumonia, followed per primary and pulmonology  5. Severe pulmonary hypertension  6. Coronary artery disease, exact details unknown  7. AAA, status post repair  8. TAAA, status post 4 vessel repair in December 2021  9. Lung CA, status post right lung resection, remote history when he      Recommendations:  1. Patient is currently in sinus rhythm.  He does require chronic anticoagulation and is on heparin.  This can be discontinued and switched to his usual home dose of Eliquis if no procedure  "planned.  It does however, appear that pulmonology may want to do bronchoscopy.  We will defer switch to their service.  2. When pt's respiratory status improves, we will discuss possible stress test if we verify that this was not completed before his recent TAAA repair at .  3. With regard to patient's acute on chronic heart failure, he does not appear to be in acute volume overload at this time.      History of Present Illness    Subjective     Chief Complaint   Patient presents with   • Shortness of Breath       Kb Marshall is a 77 y.o. male with past medical history significant for coronary artery disease, paroxysmal atrial fibrillation, dyslipidemia, hypertension, thoracic abdominal aortic aneurysm status post four-vessel thoracic abdominal aortic repair in December 2021, lung cancer status post lung resection, type 2 diabetes, and former tobacco use.  He presented to the ED on 6/8/2022 with complaint of shortness of breath.  Patient is on chronic O2 at home and reported that over the last 2 weeks he increased his oxygen to 8 to 10 L per nasal cannula.  Patient was diagnosed with sepsis secondary to left upper lobe pneumonia.  Cardiology was consulted due to atrial fibrillation with RVR.    Patient was seen and examined.  He has a sitter at bedside due to suicidal ideation.  He has been seen by psychiatry earlier today and noted to be oriented only to self and place.  He is not oriented to time nor his situation.    In reviewing previous notes, the patient has complained of daily left sided chest pain.  Troponin has been negative.    Patient states that he came to the hospital because he was having trouble breathing and he continued to have this \"left-sided chest pain.\"  He tells me that the pain increases when he tries to take a deep breath.  He also has chest wall tenderness.    Last Echo: 6/8/2022    Interpretation Summary    · Estimated left ventricular EF = 45% Left ventricular ejection fraction " appears to be 41 - 45%. Left ventricular systolic function is mildly decreased.  · Estimated right ventricular systolic pressure from tricuspid regurgitation is markedly elevated (>80 mmHg).  · Severe pulmonary hypertension is present.  · The right ventricular cavity is mildly dilated.  · Left ventricular diastolic function is consistent with (grade I) impaired relaxation.  · Pt has converted to sinus tachycardia prior to this study.  · Since prev study of 10/31/21 RV pressure has markedly increased from 45 to 80 mmHg          Last Stress: 1/22/2015    Baseline ECG:  Normal sinus rhythm. Non-specific ST-T wave changes.   Exercise Protocol:  Maximum heart rate was 103 bpm, 67% MPHR. RPP 20379.   Pharmacologic Protocol:  The patient was unable to exercise due to unspecified debility.  Aminophylline 125 mg were given intravenously.  A 400 mcg dose of Regadenoson was administered by intravenous bolus  injection.     Stress ECG :  Normal sinus rhythm. There is non-specific ST-segment changes   Recovery ECG:  Normal sinus rhythm. There is non-specific ST segment changes 6 minutes  into recovery. Medications were administered during the recovery  period. SEE ABOVE   Hemodynamics                   Rest        Stress        Recovery      SBP         155 mmHg    144 mmHg      165 mmHg      DBP         106 mmHg    93 mmHg       102 mmHg       HR =       70 bpm      103 bpm       84 bpm        %MPHR                   67 %                           Imaging Protocol:  This was a gated SPECT myocardial perfusion imaging study. Attentuation  correction could not be performed on this study. A one day rest-stress  imaging protocol was followed using Tc-99m sestamibi (Cardiolite)  injected intravenously. For the rest portion of the study, 10.7 mCi of  the radiopharmaceutical was administered at 01/22/2015 08:20:13. For the  stress portion of the study, 28.1 mCi was administered at 01/22/2015  10:25:25.      Perfusion  Interpretation:  TID Ratio 0.81.      Wall Motion Interpretation:  Gated imaging under post-stress conditions demonstrated normal wall  motion.   The patient's calculated post stress LVEF was 70%. The patient's end  diastolic volume was 128ml. The patient's end systolic volume was  38ml.      Study Limitations:  The overall quality of the study was good. There was no lung uptake of  the radiopharmaceutical.      Nuclear Cardiology Conclusion:  Normal LV perfusion.  Normal regadenoson myocardial perfusion   with Tc-99m sestamibi imaging.   Stress testing induced no symptoms and no ECG changes consistent with  ischemia.  Normal LV size. Global left ventricular systolic function was  normal, with an EF of 70%.  In addition, there was normal wall motion.      Stress Test Conclusion:  Non-diagnosticNon-diagnostic stress test. .   Conclusions  *1) This is a negative stress test for evidence of inducible ischemia or  prior infarct.  *2) There is preserved LV function with a calculated EF of 70%.     Electronically signed by: Javier Reaves MD on 01/24/2015 14:04:06  Thank you for the courtesy of this referral.      Past Medical History:   Diagnosis Date   • Aneurysm (HCC)    • Arthritis    • Asthma    • Back pain    • Cancer (HCC)     lungs   • COPD (chronic obstructive pulmonary disease) (HCC)    • Coronary artery disease    • Diabetes mellitus (HCC)    • Dyslipidemia    • GERD (gastroesophageal reflux disease)    • Heart disease    • Hypertension    • Lung cancer (HCC) 2011   • PAD (peripheral artery disease) (HCC)      Past Surgical History:   Procedure Laterality Date   • ABDOMINAL AORTIC ANEURYSM REPAIR     • COLONOSCOPY N/A 10/11/2018    Procedure: COLONOSCOPY;  Surgeon: Calos Stockton MD;  Location: [facility] OR;  Service: Gastroenterology   • ENDOSCOPY N/A 12/24/2021    Procedure: ESOPHAGOGASTRODUODENOSCOPY;  Surgeon: Magui Murillo MD;  Location: [facility] OR;  Service: General;  Laterality: N/A;   • FINGER  SURGERY Left    • KNEE SURGERY Left    • LUNG REMOVAL, PARTIAL  11/29/2011   • LUNG SURGERY  11/28/2011     Family History   Problem Relation Age of Onset   • Cancer Mother    • Cancer Brother      Social History     Tobacco Use   • Smoking status: Former Smoker     Packs/day: 3.00     Quit date: 11/2011     Years since quitting: 10.6   • Smokeless tobacco: Former User     Types: Chew   Vaping Use   • Vaping Use: Never used   Substance Use Topics   • Alcohol use: Not Currently     Comment: quit 40 years ago   • Drug use: No     Medications Prior to Admission   Medication Sig Dispense Refill Last Dose   • bumetanide (BUMEX) 0.5 MG tablet Take 1 tablet by mouth Daily. 30 tablet 1    • hydrALAZINE (APRESOLINE) 50 MG tablet Take 50 mg by mouth 3 (Three) Times a Day.      • metFORMIN (GLUCOPHAGE) 500 MG tablet Take 1,000 mg by mouth 2 (Two) Times a Day With Meals.      • oxyCODONE-acetaminophen (PERCOCET) 7.5-325 MG per tablet Take 1 tablet by mouth Every 4 (Four) Hours As Needed for Moderate Pain .      • albuterol sulfate  (90 Base) MCG/ACT inhaler Inhale 2 puffs Every 4 (Four) Hours As Needed for Wheezing.      • apixaban (ELIQUIS) 5 MG tablet tablet Take 5 mg by mouth 2 (Two) Times a Day.      • atorvastatin (LIPITOR) 10 MG tablet Take 10 mg by mouth Daily.      • budesonide-formoterol (SYMBICORT) 160-4.5 MCG/ACT inhaler Inhale 2 puffs 2 (Two) Times a Day.      • carvedilol (COREG) 12.5 MG tablet Take 1 tablet by mouth 2 (Two) Times a Day With Meals. 60 tablet 1    • clopidogrel (PLAVIX) 75 MG tablet Take 75 mg by mouth Daily.      • dexamethasone (DECADRON) 4 MG tablet Take 4 mg by mouth Daily With Breakfast.      • diphenhydrAMINE (BENADRYL) 25 mg capsule Take 25 mg by mouth Every 8 (Eight) Hours.      • empagliflozin (Jardiance) 25 MG tablet tablet Take 25 mg by mouth Daily.      • gabapentin (NEURONTIN) 400 MG capsule Take 600 mg by mouth 4 (Four) Times a Day.      • glimepiride (AMARYL) 4 MG tablet Take 8  mg by mouth Every Morning Before Breakfast.      • hydrOXYzine pamoate (VISTARIL) 25 MG capsule TAKE 1 CAPSULE BY MOUTH EVERY 6 HOURS AS NEEDED      • ibuprofen (ADVIL,MOTRIN) 600 MG tablet Take 600 mg by mouth Every 6 (Six) Hours As Needed for Mild Pain .      • ipratropium-albuterol (DUO-NEB) 0.5-2.5 mg/3 ml nebulizer Take 3 mL by nebulization Every 6 (Six) Hours As Needed for Wheezing.      • linaclotide (LINZESS) 290 MCG capsule capsule Take 145 mcg by mouth Every Morning Before Breakfast.      • lisinopril (PRINIVIL,ZESTRIL) 20 MG tablet Take 1 tablet by mouth Daily. 30 tablet 0    • LORazepam (ATIVAN) 0.5 MG tablet Take 0.5 mg by mouth Every 6 (Six) Hours As Needed for Anxiety.      • nabumetone (RELAFEN) 500 MG tablet Take 500 mg by mouth 2 (Two) Times a Day As Needed for Mild Pain . Only for gout flare up      • nitroglycerin (NITROSTAT) 0.4 MG SL tablet Place 0.4 mg under the tongue Every 5 (Five) Minutes As Needed for Chest Pain. Take no more than 3 doses in 15 minutes.      • pantoprazole (Protonix) 40 MG EC tablet Take 1 tablet by mouth Daily. 30 tablet 1    • vitamin D (ERGOCALCIFEROL) 1.25 MG (38441 UT) capsule capsule Take 50,000 Units by mouth 1 (One) Time Per Week.      • vitamin D3 125 MCG (5000 UT) capsule capsule Take 5,000 Units by mouth Daily.        Allergies:  Acetaminophen-codeine, Adhesive tape, and Indocin [indomethacin]    Review of Systems   Unable to perform ROS: Mental status change         Objective      Vital Signs  Temp:  [97 °F (36.1 °C)-98.6 °F (37 °C)] 97.8 °F (36.6 °C)  Heart Rate:  [] 84  Resp:  [20-35] 31  BP: ()/() 148/98  Body mass index is 26.63 kg/m².    Intake/Output Summary (Last 24 hours) at 6/8/2022 1524  Last data filed at 6/8/2022 1444  Gross per 24 hour   Intake 1600 ml   Output 450 ml   Net 1150 ml       Physical Exam  Vitals reviewed.   Constitutional:       Appearance: He is well-developed.   HENT:      Head: Normocephalic and atraumatic.    Eyes:      Pupils: Pupils are equal, round, and reactive to light.   Neck:      Vascular: No JVD.   Cardiovascular:      Rate and Rhythm: Normal rate and regular rhythm.      Heart sounds: No murmur heard.    No friction rub. No gallop.   Pulmonary:      Effort: Tachypnea present. No respiratory distress.      Breath sounds: Decreased air movement present. Wheezing present. No rales.      Comments: O2 on per nasal cannula  Abdominal:      Palpations: Abdomen is soft. There is no mass.      Tenderness: There is no abdominal tenderness.      Hernia: No hernia is present.   Skin:     General: Skin is warm and dry.   Neurological:      Mental Status: He is alert.         Telemetry: Sinus 80s    Results Review:   I reviewed the patient's new clinical results.  Results from last 7 days   Lab Units 06/08/22  0540   TROPONIN T ng/mL <0.010     Results from last 7 days   Lab Units 06/08/22  0540   WBC 10*3/mm3 15.19*   HEMOGLOBIN g/dL 12.7*   PLATELETS 10*3/mm3 200     Results from last 7 days   Lab Units 06/08/22  0540   SODIUM mmol/L 136   POTASSIUM mmol/L 4.5   CHLORIDE mmol/L 96*   CO2 mmol/L 24.8   BUN mg/dL 36*   CREATININE mg/dL 0.94   CALCIUM mg/dL 8.9   GLUCOSE mg/dL 246*   ALT (SGPT) U/L 8   AST (SGOT) U/L 11     Lab Results   Component Value Date    INR 1.24 (H) 06/08/2022    INR 1.20 (H) 12/23/2021    INR 1.1 12/14/2021    INR 1.1 12/14/2021    INR 1.00 03/29/2015     Lab Results   Component Value Date    MG 2.1 06/08/2022    MG 2.1 02/09/2022    MG 1.9 01/05/2022     Lab Results   Component Value Date    TSH 2.190 12/23/2021    TRIG 66 10/31/2021    HDL 48 10/31/2021     (H) 10/31/2021      Lab Results   Component Value Date    BNP 20 04/16/2015        EKG:         Imaging Results (Last 72 Hours)     Procedure Component Value Units Date/Time    CT Angiogram Chest Pulmonary Embolism [[id number]] Collected: 06/08/22 0749     Updated: 06/08/22 0801    Narrative:      CT ANGIOGRAM CHEST PULMONARY EMBOLISM-      CLINICAL INDICATION: sob, afib        COMPARISON: 01/04/2022      PROCEDURE: Thin cut axial images were acquired through the pulmonary  vessels during the rapid infusion of IV contrast.     Additional 3-D reformatted images obtained via post-processing for  improved diagnostic accuracy and procedural planning.     Radiation dose reduction techniques were utilized per ALARA protocol.  Automated exposure control was initiated through either or [employer] or  [employer] software packages by  protocol.           FINDINGS: Today's study demonstrates opacification of the central  pulmonary vessels.   There are no filling defects.   There is no truncation.     No evidence of a pulmonary embolus.     Dense pleural-based consolidation in the left lung.  Small nodules in the right lung.     Diffuse interstitial lung disease..     Mediastinal lymph node enlargement similar to the previous exam.     No pericardial or pleural effusion.          Impression:      1. No evidence of a pulmonary embolus.,  2. Interval development of small nodules in the right lung and left  lower lobe, as well as dense consolidation pleural-based in the left  upper lobe.  3. Stable mediastinal lymph node enlargement.     This report was finalized on 6/8/2022 7:59 AM by Dr. Leif Taveras MD.       XR Chest 1 View [[id number]] Collected: 06/08/22 0555     Updated: 06/08/22 0558    Narrative:      CR Chest 1 Vw    INDICATION:   Shortness of air, sepsis     COMPARISON:    11/2/2021    FINDINGS:  Portable AP view(s) of the chest.  Mild interstitial prominence in the right lung stable. There are no focal infiltrates. There is a large amount of density in the left upper lobe measuring at least 8 cm in diameter. This is abutting the lateral part of  the chest. An aortic stent graft is present. The heart size is normal      Impression:      8 cm dense area left upper lobe suggesting dense pneumonia. Clinical correlation follow-up is  recommended.    Mild diffuse initial prominence.    Signer Name: Eben Cisneros MD   Signed: 6/8/2022 5:55 AM   Workstation Name: [facility]    [facility]             Thank you very much for asking us to be involved in this patient's care.  We will follow along with you.      Electronically signed by GOLDEN Allen, 06/08/22, 5:16 PM EDT.  .      Electronically signed by Marjorie Eid APRN at 06/08/22 1716     Janene Allison APRN at 06/08/22 1402      Consult Orders    1. Inpatient Palliative Care MD Consult [[id number]] ordered by Idris Apodaca DO at 06/08/22 0818    2. Inpatient Hospice / [facility] Consult [[id number]] ordered by Idris Apodaca DO at 06/08/22 0949             Duplicate order for palliative care consult, see 6/8 consult note  NO inpatient hospice available.    Electronically signed by Janene Allison APRN at 06/08/22 1403     Wilfredo Pérez MD at 06/08/22 1334      Consult Orders    1. Inpatient Psychiatrist Consult [[id number]] ordered by Idris Apodaca DO at 06/08/22 1124                   Referring Provider: Dr. Apodaca  Reason for Consultation: Capacity/Depression      Chief complaint/Focus of Exam: Capacity/Depression    Subjective .     History of present illness: Patient is a 77-year-old male with a history of COPD, chronic respiratory failure, lung cancer status post resection, hypertension, PAD, dyslipidemia, and thoracic ascending aortic aneurysm who presented to the hospital with complaints of shortness of breath and was subsequently found to have sepsis related to pneumonia.  Psychiatry was consulted to evaluate patient for depression and for capacity for medical decision-making.  I evaluated patient at bedside.  He was unable to answer any orientation question aside from self and current city.  He did not know the year, the president, or the situation for hospitalization.  He stated that he was hospitalized because he  wanted to shoot himself in the head.  He states that he has felt this way for 2 weeks.  He denies ever having any thoughts of wanting to harm himself in the past and reports he only has those thoughts because he has chronic left-sided chest pain starting in the afternoon on a daily basis that lasts through the night.  He states that the pain causes day-to-day functioning to be unbearable and he is miserable living this way.  Patient also reports poor sleep and poor appetite.  He is unable to tell me what medical problems he has and the treatments he requires for his medical problems.  He just states that he cannot remember or that he does not know when I ask about further details.  He currently states that he feels safe in the hospital setting and has no plan to harm himself while hospitalized.  I had a discussion with him about medications that may be helpful for his mood and patient states that medications for mood will not be beneficial unless they can alleviate his pain which is his primary reason for feeling that he wants to harm himself.    Review of Systems  Pertinent items are noted in HPI    History  Past Medical History:   Diagnosis Date   • Aneurysm (HCC)    • Arthritis    • Asthma    • Back pain    • Cancer (HCC)     lungs   • COPD (chronic obstructive pulmonary disease) (HCC)    • Coronary artery disease    • Diabetes mellitus (HCC)    • Dyslipidemia    • GERD (gastroesophageal reflux disease)    • Heart disease    • Hypertension    • Lung cancer (HCC) 2011   • PAD (peripheral artery disease) (HCC)    ,   Past Surgical History:   Procedure Laterality Date   • ABDOMINAL AORTIC ANEURYSM REPAIR     • COLONOSCOPY N/A 10/11/2018    Procedure: COLONOSCOPY;  Surgeon: Calos Stockton MD;  Location: [facility];  Service: Gastroenterology   • ENDOSCOPY N/A 12/24/2021    Procedure: ESOPHAGOGASTRODUODENOSCOPY;  Surgeon: Magui Murillo MD;  Location: [facility];  Service: General;  Laterality: N/A;   •  FINGER SURGERY Left    • KNEE SURGERY Left    • LUNG REMOVAL, PARTIAL  11/29/2011   • LUNG SURGERY  11/28/2011   ,   Family History   Problem Relation Age of Onset   • Cancer Mother    • Cancer Brother    ,   Social History     Socioeconomic History   • Marital status:    Tobacco Use   • Smoking status: Former Smoker     Packs/day: 3.00     Quit date: 11/2011     Years since quitting: 10.6   • Smokeless tobacco: Former User     Types: Chew   Vaping Use   • Vaping Use: Never used   Substance and Sexual Activity   • Alcohol use: Not Currently     Comment: quit 40 years ago   • Drug use: No   • Sexual activity: Defer     E-cigarette/Vaping   • E-cigarette/Vaping Use Never User    • Passive Exposure No    • Counseling Given No      E-cigarette/Vaping Substances   • Nicotine No    • THC No    • CBD No    • Flavoring No      E-cigarette/Vaping Devices   • Disposable No    • Pre-filled or Refillable Cartridge No    • Refillable Tank No    • Pre-filled Pod No        ,   Medications Prior to Admission   Medication Sig Dispense Refill Last Dose   • bumetanide (BUMEX) 0.5 MG tablet Take 1 tablet by mouth Daily. 30 tablet 1    • hydrALAZINE (APRESOLINE) 50 MG tablet Take 50 mg by mouth 3 (Three) Times a Day.      • metFORMIN (GLUCOPHAGE) 500 MG tablet Take 1,000 mg by mouth 2 (Two) Times a Day With Meals.      • oxyCODONE-acetaminophen (PERCOCET) 7.5-325 MG per tablet Take 1 tablet by mouth Every 4 (Four) Hours As Needed for Moderate Pain .      • albuterol sulfate  (90 Base) MCG/ACT inhaler Inhale 2 puffs Every 4 (Four) Hours As Needed for Wheezing.      • apixaban (ELIQUIS) 5 MG tablet tablet Take 5 mg by mouth 2 (Two) Times a Day.      • atorvastatin (LIPITOR) 10 MG tablet Take 10 mg by mouth Daily.      • budesonide-formoterol (SYMBICORT) 160-4.5 MCG/ACT inhaler Inhale 2 puffs 2 (Two) Times a Day.      • carvedilol (COREG) 12.5 MG tablet Take 1 tablet by mouth 2 (Two) Times a Day With Meals. 60 tablet 1    •  clopidogrel (PLAVIX) 75 MG tablet Take 75 mg by mouth Daily.      • dexamethasone (DECADRON) 4 MG tablet Take 4 mg by mouth Daily With Breakfast.      • diphenhydrAMINE (BENADRYL) 25 mg capsule Take 25 mg by mouth Every 8 (Eight) Hours.      • empagliflozin (Jardiance) 25 MG tablet tablet Take 25 mg by mouth Daily.      • gabapentin (NEURONTIN) 400 MG capsule Take 600 mg by mouth 4 (Four) Times a Day.      • glimepiride (AMARYL) 4 MG tablet Take 8 mg by mouth Every Morning Before Breakfast.      • hydrOXYzine pamoate (VISTARIL) 25 MG capsule TAKE 1 CAPSULE BY MOUTH EVERY 6 HOURS AS NEEDED      • ibuprofen (ADVIL,MOTRIN) 600 MG tablet Take 600 mg by mouth Every 6 (Six) Hours As Needed for Mild Pain .      • ipratropium-albuterol (DUO-NEB) 0.5-2.5 mg/3 ml nebulizer Take 3 mL by nebulization Every 6 (Six) Hours As Needed for Wheezing.      • linaclotide (LINZESS) 290 MCG capsule capsule Take 145 mcg by mouth Every Morning Before Breakfast.      • lisinopril (PRINIVIL,ZESTRIL) 20 MG tablet Take 1 tablet by mouth Daily. 30 tablet 0    • LORazepam (ATIVAN) 0.5 MG tablet Take 0.5 mg by mouth Every 6 (Six) Hours As Needed for Anxiety.      • nabumetone (RELAFEN) 500 MG tablet Take 500 mg by mouth 2 (Two) Times a Day As Needed for Mild Pain . Only for gout flare up      • nitroglycerin (NITROSTAT) 0.4 MG SL tablet Place 0.4 mg under the tongue Every 5 (Five) Minutes As Needed for Chest Pain. Take no more than 3 doses in 15 minutes.      • pantoprazole (Protonix) 40 MG EC tablet Take 1 tablet by mouth Daily. 30 tablet 1    • vitamin D (ERGOCALCIFEROL) 1.25 MG (69504 UT) capsule capsule Take 50,000 Units by mouth 1 (One) Time Per Week.      • vitamin D3 125 MCG (5000 UT) capsule capsule Take 5,000 Units by mouth Daily.      , Scheduled Meds:  cefepime, 2 g, Intravenous, Q8H  doxycycline, 100 mg, Intravenous, Q12H  guaiFENesin, 1,200 mg, Oral, Q12H  Insulin Aspart, 0-9 Units, Subcutaneous, TID AC  ipratropium-albuterol, 3 mL,  Nebulization, 4x Daily - RT  methylPREDNISolone sodium succinate, 40 mg, Intravenous, Q12H  sodium chloride, 10 mL, Intravenous, Q12H  sodium chloride, 3 mL, Nebulization, BID - RT    , Continuous Infusions:  dilTIAZem, 5-15 mg/hr, Last Rate: Stopped (06/08/22 0917)  heparin, 12 Units/kg/hr, Last Rate: 12 Units/kg/hr (06/08/22 0816)    , PRN Meds:  dextrose  •  dextrose  •  glucagon (human recombinant)  •  heparin (porcine)  •  heparin (porcine)  •  sodium chloride  •  sodium chloride and Allergies:  Acetaminophen-codeine, Adhesive tape, and Indocin [indomethacin]    Objective     Vital Signs   Temp:  [97 °F (36.1 °C)-98.6 °F (37 °C)] 97.8 °F (36.6 °C)  Heart Rate:  [] 85  Resp:  [20-30] 23  BP: ()/() 133/88    Mental Status Exam:   Mental Status Exam:    Hygiene:   good  Cooperation:  Cooperative  Eye Contact:  Good  Psychomotor Behavior:  Appropriate  Affect:  Restricted  Hopelessness: 8  Speech:  Normal  Thought Progress:  Goal directed  Thought Content:  Mood congruent  Suicidal:  Suicidal Ideation  Homicidal:  None  Hallucinations:  None  Delusion:  None  Memory:  Deficits  Orientation:  Person and Place  Reliability:  poor  Insight:  Poor  Judgement:  Poor  Impulse Control:  Good    Results Review:   I reviewed the patient's new clinical results.  Lab Results (last 24 hours)     Procedure Component Value Units Date/Time    MRSA Screen, PCR (Inpatient) - Swab, Nares [[id number]]  (Abnormal) Collected: 06/08/22 1104    Specimen: Swab from Nares Updated: 06/08/22 1237     MRSA PCR Negative     Staph aureus by PCR Positive    POC Glucose Once [[id number]]  (Abnormal) Collected: 06/08/22 1054    Specimen: Blood Updated: 06/08/22 1211     Glucose 195 mg/dL      Comment: Meter: [id number] : [id number] DENEEN EDOUARD       STAT Lactic Acid, Reflex [[id number]]  (Normal) Collected: 06/08/22 0850    Specimen: Blood from Hand, Right Updated: 06/08/22 0929     Lactate 2.0 mmol/L     Urinalysis With Culture  If Indicated - Urine, Clean Catch [[id number]]  (Abnormal) Collected: 06/08/22 0849    Specimen: Urine, Clean Catch Updated: 06/08/22 0902     Color, UA Yellow     Appearance, UA Clear     pH, UA 5.5     Specific Gravity, UA >1.030     Glucose, UA >=1000 mg/dL (3+)     Ketones, UA Trace     Bilirubin, UA Negative     Blood, UA Negative     Protein, UA Negative     Leuk Esterase, UA Negative     Nitrite, UA Negative     Urobilinogen, UA 0.2 E.U./dL    Narrative:      In absence of clinical symptoms, the presence of pyuria, bacteria, and/or nitrites on the urinalysis result does not correlate with infection.  Urine microscopic not indicated.    [place] Draw [[id number]] Collected: 06/08/22 0540    Specimen: Blood Updated: 06/08/22 0648    Narrative:      The following orders were created for panel order [place] Draw.  Procedure                               Abnormality         Status                     ---------                               -----------         ------                     Green Top (Gel)[[id number]]                                  Final result               Lavender Top[[id number]]                                     Final result               Gold Top - SST[[id number]]                                   Final result               Light Blue Top[[id number]]                                   Final result                 Please view results for these tests on the individual orders.    Green Top (Gel) [[id number]] Collected: 06/08/22 0540    Specimen: Blood Updated: 06/08/22 0648     Extra Tube Hold for add-ons.     Comment: Auto resulted.       Lavender Top [[id number]] Collected: 06/08/22 0540    Specimen: Blood Updated: 06/08/22 0648     Extra Tube hold for add-on     Comment: Auto resulted       Light Blue Top [[id number]] Collected: 06/08/22 0540    Specimen: Blood Updated: 06/08/22 0648     Extra Tube Hold for add-ons.     Comment: Auto resulted       Gold Top - SST [[id number]] Collected: 06/08/22 0540     Specimen: Blood Updated: 06/08/22 0648     Extra Tube Hold for add-ons.     Comment: Auto resulted.       C-reactive Protein [[id number]]  (Abnormal) Collected: 06/08/22 0540    Specimen: Blood Updated: 06/08/22 0640     C-Reactive Protein 43.58 mg/dL     Lactic Acid, Plasma [[id number]]  (Abnormal) Collected: 06/08/22 0540    Specimen: Blood Updated: 06/08/22 0634     Lactate 2.4 mmol/L     Comprehensive Metabolic Panel [[id number]]  (Abnormal) Collected: 06/08/22 0540    Specimen: Blood Updated: 06/08/22 0628     Glucose 246 mg/dL      BUN 36 mg/dL      Creatinine 0.94 mg/dL      Sodium 136 mmol/L      Potassium 4.5 mmol/L      Chloride 96 mmol/L      CO2 24.8 mmol/L      Calcium 8.9 mg/dL      Total Protein 6.0 g/dL      Albumin 2.61 g/dL      ALT (SGPT) 8 U/L      AST (SGOT) 11 U/L      Alkaline Phosphatase 70 U/L      Total Bilirubin 0.7 mg/dL      Globulin 3.4 gm/dL      A/G Ratio 0.8 g/dL      BUN/Creatinine Ratio 38.3     Anion Gap 15.2 mmol/L      eGFR 84.0 mL/min/1.73      Comment: National Kidney Foundation and American Society of Nephrology (ASN) Task Force recommended calculation based on the Chronic Kidney Disease Epidemiology Collaboration (CKD-EPI) equation refit without adjustment for race.       Narrative:      GFR Normal >60  Chronic Kidney Disease <60  Kidney Failure <15      Magnesium [[id number]]  (Normal) Collected: 06/08/22 0540    Specimen: Blood Updated: 06/08/22 0628     Magnesium 2.1 mg/dL     Troponin [[id number]]  (Normal) Collected: 06/08/22 0540    Specimen: Blood Updated: 06/08/22 0617     Troponin T <0.010 ng/mL     Narrative:      Troponin T Reference Range:  <= 0.03 ng/mL-   Negative for AMI  >0.03 ng/mL-     Abnormal for myocardial necrosis.  Clinicians would have to utilize clinical acumen, EKG, Troponin and serial changes to determine if it is an Acute Myocardial Infarction or myocardial injury due to an underlying chronic condition.       Results may be falsely decreased if patient  "taking Biotin.      Procalcitonin [[id number]]  (Abnormal) Collected: 06/08/22 0540    Specimen: Blood Updated: 06/08/22 0616     Procalcitonin 2.67 ng/mL     Narrative:      As a Marker for Sepsis (Non-Neonates):    1. <0.5 ng/mL represents a low risk of severe sepsis and/or septic shock.  2. >2 ng/mL represents a high risk of severe sepsis and/or septic shock.    As a Marker for Lower Respiratory Tract Infections that require antibiotic therapy:    PCT on Admission    Antibiotic Therapy       6-12 Hrs later    >0.5                Strongly Recommended  >0.25 - <0.5        Recommended   0.1 - 0.25          Discouraged              Remeasure/reassess PCT  <0.1                Strongly Discouraged     Remeasure/reassess PCT    As 28 day mortality risk marker: \"Change in Procalcitonin Result\" (>80% or <=80%) if Day 0 (or Day 1) and Day 4 values are available. Refer to http://www.[employer]-pct-calculator.com    Change in PCT <=80%  A decrease of PCT levels below or equal to 80% defines a positive change in PCT test result representing a higher risk for 28-day all-cause mortality of patients diagnosed with severe sepsis for septic shock.    Change in PCT >80%  A decrease of PCT levels of more than 80% defines a negative change in PCT result representing a lower risk for 28-day all-cause mortality of patients diagnosed with severe sepsis or septic shock.       BNP [[id number]]  (Abnormal) Collected: 06/08/22 0540    Specimen: Blood Updated: 06/08/22 0616     proBNP 11,717.0 pg/mL     Narrative:      Among patients with dyspnea, NT-proBNP is highly sensitive for the detection of acute congestive heart failure. In addition NT-proBNP of <300 pg/ml effectively rules out acute congestive heart failure with 99% negative predictive value.    Results may be falsely decreased if patient taking Biotin.      Respiratory Panel PCR w/COVID-19(SARS-CoV-2) [facility] In-House, NP Swab in UTM/VTM, 3-4 HR TAT - Swab, " Nasopharynx [[id number]]  (Normal) Collected: 06/08/22 0525    Specimen: Swab from Nasopharynx Updated: 06/08/22 0616     ADENOVIRUS, PCR Not Detected     Coronavirus 229E Not Detected     Coronavirus HKU1 Not Detected     Coronavirus NL63 Not Detected     Coronavirus OC43 Not Detected     COVID19 Not Detected     Human Metapneumovirus Not Detected     Human Rhinovirus/Enterovirus Not Detected     Influenza A PCR Not Detected     Influenza B PCR Not Detected     Parainfluenza Virus 1 Not Detected     Parainfluenza Virus 2 Not Detected     Parainfluenza Virus 3 Not Detected     Parainfluenza Virus 4 Not Detected     RSV, PCR Not Detected     Bordetella pertussis pcr Not Detected     Bordetella parapertussis PCR Not Detected     Chlamydophila pneumoniae PCR Not Detected     Mycoplasma pneumo by PCR Not Detected    Narrative:      In the setting of a positive respiratory panel with a viral infection PLUS a negative procalcitonin without other underlying concern for bacterial infection, consider observing off antibiotics or discontinuation of antibiotics and continue supportive care. If the respiratory panel is positive for atypical bacterial infection (Bordetella pertussis, Chlamydophila pneumoniae, or Mycoplasma pneumoniae), consider antibiotic de-escalation to target atypical bacterial infection.    Manual Differential [[id number]]  (Abnormal) Collected: 06/08/22 0540    Specimen: Blood Updated: 06/08/22 0612     Neutrophil % 90.0 %      Lymphocyte % 7.0 %      Monocyte % 3.0 %      Neutrophils Absolute 13.67 10*3/mm3      Lymphocytes Absolute 1.06 10*3/mm3      Monocytes Absolute 0.46 10*3/mm3      Dacrocytes Slight/1+     Hypochromia Slight/1+     Macrocytes Slight/1+     Platelet Morphology Normal    CBC & Differential [[id number]]  (Abnormal) Collected: 06/08/22 0540    Specimen: Blood Updated: 06/08/22 0612    Narrative:      The following orders were created for panel order CBC & Differential.  Procedure                                Abnormality         Status                     ---------                               -----------         ------                     CBC Auto Differential[[id number]]        Abnormal            Final result               Scan Slide[[id number]]                                       Final result                 Please view results for these tests on the individual orders.    CBC Auto Differential [[id number]]  (Abnormal) Collected: 06/08/22 0540    Specimen: Blood Updated: 06/08/22 0612     WBC 15.19 10*3/mm3      RBC 4.90 10*6/mm3      Hemoglobin 12.7 g/dL      Hematocrit 39.9 %      MCV 81.4 fL      MCH 25.9 pg      MCHC 31.8 g/dL      RDW 20.1 %      RDW-SD 58.5 fl      MPV 9.3 fL      Platelets 200 10*3/mm3     Scan Slide [[id number]] Collected: 06/08/22 0540    Specimen: Blood Updated: 06/08/22 0612     Scan Slide --     Comment: See Manual Differential Results       Protime-INR [[id number]]  (Abnormal) Collected: 06/08/22 0540    Specimen: Blood Updated: 06/08/22 0558     Protime 15.9 Seconds      INR 1.24    Narrative:      Suggested INR therapeutic range for stable oral anticoagulant therapy:    Low Intensity therapy:   1.5-2.0  Moderate Intensity therapy:   2.0-3.0  High Intensity therapy:   2.5-4.0    aPTT [[id number]]  (Normal) Collected: 06/08/22 0540    Specimen: Blood Updated: 06/08/22 0558     PTT 32.4 seconds     Narrative:      PTT Heparin Therapeutic Range:  59 - 95 seconds      Blood Culture - Blood, Arm, Right [[id number]] Collected: 06/08/22 0540    Specimen: Blood from Arm, Right Updated: 06/08/22 0545    Blood Culture - Blood, Arm, Left [[id number]] Collected: 06/08/22 0540    Specimen: Blood from Arm, Left Updated: 06/08/22 0545        Imaging Results (Last 24 Hours)     Procedure Component Value Units Date/Time    CT Angiogram Chest Pulmonary Embolism [[id number]] Collected: 06/08/22 0749     Updated: 06/08/22 0801    Narrative:      CT ANGIOGRAM CHEST PULMONARY EMBOLISM-      CLINICAL INDICATION: sob, afib        COMPARISON: 01/04/2022      PROCEDURE: Thin cut axial images were acquired through the pulmonary  vessels during the rapid infusion of IV contrast.     Additional 3-D reformatted images obtained via post-processing for  improved diagnostic accuracy and procedural planning.     Radiation dose reduction techniques were utilized per ALARA protocol.  Automated exposure control was initiated through either or [employer] or  [employer] software packages by  protocol.           FINDINGS: Today's study demonstrates opacification of the central  pulmonary vessels.   There are no filling defects.   There is no truncation.     No evidence of a pulmonary embolus.     Dense pleural-based consolidation in the left lung.  Small nodules in the right lung.     Diffuse interstitial lung disease..     Mediastinal lymph node enlargement similar to the previous exam.     No pericardial or pleural effusion.          Impression:      1. No evidence of a pulmonary embolus.,  2. Interval development of small nodules in the right lung and left  lower lobe, as well as dense consolidation pleural-based in the left  upper lobe.  3. Stable mediastinal lymph node enlargement.     This report was finalized on 6/8/2022 7:59 AM by Dr. Leif Taveras MD.       XR Chest 1 View [[id number]] Collected: 06/08/22 0555     Updated: 06/08/22 0558    Narrative:      CR Chest 1 Vw    INDICATION:   Shortness of air, sepsis     COMPARISON:    11/2/2021    FINDINGS:  Portable AP view(s) of the chest.  Mild interstitial prominence in the right lung stable. There are no focal infiltrates. There is a large amount of density in the left upper lobe measuring at least 8 cm in diameter. This is abutting the lateral part of  the chest. An aortic stent graft is present. The heart size is normal      Impression:      8 cm dense area left upper lobe suggesting dense pneumonia. Clinical correlation follow-up is  "recommended.    Mild diffuse initial prominence.    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  He states that he has only had these thoughts for the past 2 weeks as his pain has been worse and reports left-sided chest pain over the course of those 2 weeks making day-to-day activities unbearable.  This is somewhat consistent with his imaging as he has small nodules in the left lower lobe and a dense consolidation in the left upper lobe consistent with where he demonstrates his chest pain to be.  -Patient states that he has thought of killing himself but reports no plan or intent while hospitalized, likely okay to discontinue sitter for now and continue to monitor prospectively  -Discussed treatment options for depression and patient was reluctant and stated that unless it helped with his pain, it would not make a difference.  I do feel that mirtazapine at low dose may be beneficial to the patient as he does report low appetite, poor sleep, and mood symptoms related to current medical condition    Evaluation for medical decision making capacity  -At this point in time, I do not feel the patient has capacity for medical decision making.  He reports that he does not have anyone that makes medical decisions for him but he is oriented only to person and place and cannot tell me the date, situation for hospitalization, or discuss any of his current medical conditions.  When asked about his medical problems he stated either, \"I do not know,\" or, \"I am having trouble thinking of that right now.\"  Given that patient does have current medical decline with sepsis, he may be experiencing some delirium or confusion related to illness.  It may be " beneficial to hold and see if he recovers any mental capacity with appropriate treatment and improvement of medical situation though patient has an unclear baseline.    I discussed the patients findings and my recommendations with patient    Wilfredo Pérez MD  06/08/22  13:34 EDT          Electronically signed by Wilfredo Pérez MD at 06/08/22 1343     Janene Allison APRN at 06/08/22 1317      Consult Orders    1. Inpatient Palliative Care MD Consult [[id number]] ordered by Idris Apodaca DO at 06/08/22 0910               Palliative Care Initial Consult     Attending Physician: Idris Apodaca, *  Referring Provider: Idris Apodaca    Palliative care reason for consult: GOC/ACP  Code Status:   Code Status and Medical Interventions:   Ordered at: 06/08/22 0818     Code Status (Patient has no pulse and is not breathing):    CPR (Attempt to Resuscitate)     Medical Interventions (Patient has pulse or is breathing):    Full Support      Advanced Directives: Advance Directive Status: Patient does not have advance directive   Healthcare surrogate: MIGUEL ANGELK daughter Bhavani and son Scot  Goals of Care: Pt is only alert to self and confused, daughter Bhavani and son Scot both state they would want their father to be resuscitated and to be put on vent and are okay with him returning home with hospice.    HPI:  Kb Marshall is a 77 y.o. male admitted on 6/8/2022 with shortness of breath. He has a past medical history of  hypertension, PAD, dyslipidemia, right upper lobe lung cancer status post right upper lobectomy in 2011, COPD with oxygen dependency, thoracic ascending aortic aneurysm, aortic arch aneurysm with stenting, paroxysmal atrial fibrillation. Pt stated that he began to lose his breath two weeks ago and that it has gotten worse ever since. Pt tells me that he has been wearing 10L O2 NC at home. Per hospice RN Kary that he has been going thru 10-15 Oxygen tanks weekly due to anxiety, in  addition to his concentrator. Son Scot states his father leaves his oxygen tanks running and then panics when he can't find a full one.        ROS: Negative except as above in HPI.     Past Medical History:   Diagnosis Date   • Aneurysm (HCC)    • Arthritis    • Asthma    • Back pain    • Cancer (HCC)     lungs   • COPD (chronic obstructive pulmonary disease) (HCC)    • Coronary artery disease    • Diabetes mellitus (HCC)    • Dyslipidemia    • GERD (gastroesophageal reflux disease)    • Heart disease    • Hypertension    • Lung cancer (HCC) 2011   • PAD (peripheral artery disease) (HCC)      Past Surgical History:   Procedure Laterality Date   • ABDOMINAL AORTIC ANEURYSM REPAIR     • COLONOSCOPY N/A 10/11/2018    Procedure: COLONOSCOPY;  Surgeon: Calos Stockton MD;  Location: [facility] OR;  Service: Gastroenterology   • ENDOSCOPY N/A 12/24/2021    Procedure: ESOPHAGOGASTRODUODENOSCOPY;  Surgeon: Magui Murillo MD;  Location: [facility] OR;  Service: General;  Laterality: N/A;   • FINGER SURGERY Left    • KNEE SURGERY Left    • LUNG REMOVAL, PARTIAL  11/29/2011   • LUNG SURGERY  11/28/2011     Social History     Socioeconomic History   • Marital status:    Tobacco Use   • Smoking status: Former Smoker     Packs/day: 3.00     Quit date: 11/2011     Years since quitting: 10.6   • Smokeless tobacco: Former User     Types: Chew   Vaping Use   • Vaping Use: Never used   Substance and Sexual Activity   • Alcohol use: Not Currently     Comment: quit 40 years ago   • Drug use: No   • Sexual activity: Defer     Family History   Problem Relation Age of Onset   • Cancer Mother    • Cancer Brother        Allergies   Allergen Reactions   • Acetaminophen-Codeine    • Adhesive Tape Other (See Comments)     ADHESIVE CAUSES SKIN BLISTERS, PER PT   • Indocin [Indomethacin] Itching       Current Facility-Administered Medications   Medication Dose Route Frequency Provider Last Rate Last Admin   • cefepime (MAXIPIME) 2 g/100  mL 0.9% NS (mbp)  2 g Intravenous Q8H ConstanzaIdris tucker, DO       • dextrose (D50W) (25 g/50 mL) IV injection 25 g  25 g Intravenous Q15 Min PRN Idris Apodaca DO       • dextrose (GLUTOSE) oral gel 15 g  15 g Oral Q15 Min PRN ConstanzaIdris strange, DO       • dilTIAZem (CARDIZEM) 125 mg in 125 mL 0.7% sodium chloride  infusion  5-15 mg/hr Intravenous Titrated Idris Apodaca DO   Stopped at 06/08/22 0917   • doxycycline (VIBRAMYCIN) 100 mg in sodium chloride 0.9 % 100 mL IVPB-VTB  100 mg Intravenous Q12H ConstanzaIdris tucker DO   100 mg at 06/08/22 1103   • glucagon (human recombinant) (GLUCAGEN DIAGNOSTIC) injection 1 mg  1 mg Intramuscular Q15 Min PRN Idris Apodaca DO       • guaiFENesin (MUCINEX) 12 hr tablet 1,200 mg  1,200 mg Oral Q12H Ramon Li MD       • heparin (porcine) 5000 UNIT/ML injection 2,300 Units  30 Units/kg Intravenous PRN Idris Apodaca DO       • heparin (porcine) 5000 UNIT/ML injection 4,600 Units  60 Units/kg Intravenous PRN Idris Apodaca DO       • heparin 83849 units/250 mL (100 units/mL) in 0.9% NaCl infusion  12 Units/kg/hr Intravenous Titrated Idris Apodaca DO 9.25 mL/hr at 06/08/22 0816 12 Units/kg/hr at 06/08/22 0816   • Insulin Aspart (novoLOG) injection 0-9 Units  0-9 Units Subcutaneous TID AC Idris Apodaca DO       • ipratropium-albuterol (DUO-NEB) nebulizer solution 3 mL  3 mL Nebulization 4x Daily - RT Ramon Li MD       • methylPREDNISolone sodium succinate (SOLU-Medrol) injection 40 mg  40 mg Intravenous Q12H Idris Apodaca, DO       • sodium chloride 0.9 % flush 10 mL  10 mL Intravenous PRN Constanza, Christopher A, DO       • sodium chloride 0.9 % flush 10 mL  10 mL Intravenous Q12H Idris Apodaca DO   10 mL at 06/08/22 1104   • sodium chloride 0.9 % flush 10 mL  10 mL Intravenous PRN dIris Apodaca, DO       • sodium chloride 0.9 % nebulizer solution 3 mL  3 mL  "Nebulization BID - RT Ramon Li MD         dilTIAZem, 5-15 mg/hr, Last Rate: Stopped (06/08/22 0917)  heparin, 12 Units/kg/hr, Last Rate: 12 Units/kg/hr (06/08/22 0816)      dextrose  •  dextrose  •  glucagon (human recombinant)  •  heparin (porcine)  •  heparin (porcine)  •  sodium chloride  •  sodium chloride    Current medication reviewed for route, type, dose and frequency and are current per MAR.    Palliative Performance Scale Score:     /88   Pulse 85   Temp 97.8 °F (36.6 °C) (Axillary)   Resp 23   Ht 170.2 cm (67\")   Wt 77.1 kg (170 lb)   SpO2 97%   BMI 26.63 kg/m²     Intake/Output Summary (Last 24 hours) at 6/8/2022 1318  Last data filed at 6/8/2022 0638  Gross per 24 hour   Intake 1600 ml   Output --   Net 1600 ml       PE:     General Appearance:    Chronically ill appearing, alert to self not place or time, cooperative, NAD   HEENT:    NC/AT, without obvious abnormality, EOMI, anicteric    Neck:   supple, trachea midline, no JVD   Lungs:     Coarse upper lobe sounds with diminished bases, cough and shortness of breath, 10 L NC    Heart:    RRR, normal S1 and S2, no M/R/G   Abdomen:     Soft, NT, protuberant, NABS    Extremities:   Moves all extremities,  Trace BLE edema   Pulses:   Pulses palpable and equal bilaterally   Skin:   Warm, dry   Neurologic:   Alert to self only, confused   Psych:   Restless, appropriate       Labs:   Results from last 7 days   Lab Units 06/08/22  0540   WBC 10*3/mm3 15.19*   HEMOGLOBIN g/dL 12.7*   HEMATOCRIT % 39.9   PLATELETS 10*3/mm3 200     Results from last 7 days   Lab Units 06/08/22  0540   SODIUM mmol/L 136   POTASSIUM mmol/L 4.5   CHLORIDE mmol/L 96*   CO2 mmol/L 24.8   BUN mg/dL 36*   CREATININE mg/dL 0.94   GLUCOSE mg/dL 246*   CALCIUM mg/dL 8.9     Results from last 7 days   Lab Units 06/08/22  0540   SODIUM mmol/L 136   POTASSIUM mmol/L 4.5   CHLORIDE mmol/L 96*   CO2 mmol/L 24.8   BUN mg/dL 36*   CREATININE mg/dL 0.94   CALCIUM mg/dL 8.9 "   BILIRUBIN mg/dL 0.7   ALK PHOS U/L 70   ALT (SGPT) U/L 8   AST (SGOT) U/L 11   GLUCOSE mg/dL 246*     Imaging Results (Last 72 Hours)     Procedure Component Value Units Date/Time    CT Angiogram Chest Pulmonary Embolism [[id number]] Collected: 06/08/22 0749     Updated: 06/08/22 0801    Narrative:      CT ANGIOGRAM CHEST PULMONARY EMBOLISM-     CLINICAL INDICATION: sob, afib        COMPARISON: 01/04/2022      PROCEDURE: Thin cut axial images were acquired through the pulmonary  vessels during the rapid infusion of IV contrast.     Additional 3-D reformatted images obtained via post-processing for  improved diagnostic accuracy and procedural planning.     Radiation dose reduction techniques were utilized per ALARA protocol.  Automated exposure control was initiated through either or "[employer] or  [employer] software packages by  protocol.           FINDINGS: Today's study demonstrates opacification of the central  pulmonary vessels.   There are no filling defects.   There is no truncation.     No evidence of a pulmonary embolus.     Dense pleural-based consolidation in the left lung.  Small nodules in the right lung.     Diffuse interstitial lung disease..     Mediastinal lymph node enlargement similar to the previous exam.     No pericardial or pleural effusion.          Impression:      1. No evidence of a pulmonary embolus.,  2. Interval development of small nodules in the right lung and left  lower lobe, as well as dense consolidation pleural-based in the left  upper lobe.  3. Stable mediastinal lymph node enlargement.     This report was finalized on 6/8/2022 7:59 AM by Dr. Leif Taveras MD.       XR Chest 1 View [[id number]] Collected: 06/08/22 0555     Updated: 06/08/22 0558    Narrative:      CR Chest 1 Vw    INDICATION:   Shortness of air, sepsis     COMPARISON:    11/2/2021    FINDINGS:  Portable AP view(s) of the chest.  Mild interstitial prominence in the right lung stable. There are no focal  infiltrates. There is a large amount of density in the left upper lobe measuring at least 8 cm in diameter. This is abutting the lateral part of  the chest. An aortic stent graft is present. The heart size is normal      Impression:      8 cm dense area left upper lobe suggesting dense pneumonia. Clinical correlation follow-up is recommended.    Mild diffuse initial prominence.    Signer Name: Eben Cisneros MD   Signed: 6/8/2022 5:55 AM   Workstation Name: [facility]-    [facility]          Diagnostics: Reviewed    A: Kb Marshall is a 77 y.o. male admitted on 6/8/2022 with shortness of breath. He has a past medical history of  hypertension, PAD, dyslipidemia, right upper lobe lung cancer status post right upper lobectomy in 2011, COPD with oxygen dependency, thoracic ascending aortic aneurysm, aortic arch aneurysm with stenting, paroxysmal atrial fibrillation. Pt stated that he began to lose his breath two weeks ago and that it has gotten worse ever since. Pt tells me that he has been wearing 10L O2 NC at home. Per hospice RN Kary that he has been going thru 10-15 Oxygen tanks weekly due to anxiety, in addition to his concentrator. Son Scot states his father leaves his oxygen tanks running and then panics when he can't find a full one.             P: Palliative was consulted to discuss GOC/ACP question if pt wants to return home with hospice. Pt was only alert to self, confused, did not understand situation. When I spoke with pt daughter and Son Scot, they state that they would want everything done to save their father despite his progressively declining COPD would want him to be put on a ventilator. Scot states that I know my daddy's breathing problem is only gonna get worse but he still wants to give him a chance. He would like us to continue to treat his father and try to alleviate his shortness of breath and at discharge would like to bring him back home with hospice. I let Devan VALLE and  Dr Apodaca know of conversation.     We appreciate the consult and the opportunity to participate in Kb Marshall's care. We will continue to follow along. Please do not hesitate to contact us regarding further symptom management or goals of care needs, including after hours or on weekends via our on call provider at [phone].     Time:   40 minutes spent reviewing medical and medication records, assessing and examining patient, discussing with family, answering questions, providing some guidance about a plan and documentation of care, and coordinating care with other healthcare members, with > 50% time spent face to face.     GOLDEN Romero    6/8/2022      Electronically signed by Janene Allison APRN at 06/08/22 2179     Ramon Li MD at 06/08/22 1146      Consult Orders    1. Inpatient Pulmonology Consult [[id number]] ordered by Idris Apodaca DO at 06/08/22 1052               [facility]    Reason for consult: Pneumonia, Acute on chronic hypoxemic respiratory failure, COPD    HPI:  Mr. Marshall is a 78yo male with COPD, chronic hypoxemic respiratory failure, and lung cancer s/p resection.  I reviewed office note by Dr. Mejia from 2/23/22.  PFT have been ordered, but they were not completed.      He was brought to the ER today for gradual onset of gradually worsening, constant shortness of breath with cough and increased need for oxygen for the past few days.  He notes no exacerbating factors, feels somewhat better with increased oxygen.  Symptoms became severe so EMS was called and he was brought to ER.  He has dense left sided pneumonia on CT imaging, elevated procal and elevated wbc count. He remains on high flow oxygen.  Pulmonary consultation has been requested    Past Medical History:   Diagnosis Date   • Aneurysm (HCC)    • Arthritis    • Asthma    • Back pain    • Cancer (HCC)     lungs   • COPD (chronic obstructive pulmonary disease) (HCC)    • Coronary artery  disease    • Diabetes mellitus (HCC)    • Dyslipidemia    • GERD (gastroesophageal reflux disease)    • Heart disease    • Hypertension    • Lung cancer (HCC) 2011   • PAD (peripheral artery disease) (HCC)      Social History     Socioeconomic History   • Marital status:    Tobacco Use   • Smoking status: Former Smoker     Packs/day: 3.00     Quit date: 11/2011     Years since quitting: 10.6   • Smokeless tobacco: Former User     Types: Chew   Vaping Use   • Vaping Use: Never used   Substance and Sexual Activity   • Alcohol use: Not Currently     Comment: quit 40 years ago   • Drug use: No   • Sexual activity: Defer     Family History   Problem Relation Age of Onset   • Cancer Mother    • Cancer Brother      MEDS: reviewed as per chart  ALL: apap/codeine; adhesive tape; indocin  ROS: 12 point negative except as in HPI    Vital Sign Min/Max for last 24 hours  Temp  Min: 97 °F (36.1 °C)  Max: 98.6 °F (37 °C)   BP  Min: 84/66  Max: 142/100   Pulse  Min: 62  Max: 145   Resp  Min: 20  Max: 30   SpO2  Min: 90 %  Max: 100 %   Flow (L/min)  Min: 10  Max: 15   Weight  Min: 77.1 kg (170 lb)  Max: 77.1 kg (170 lb)     GEN:   appears ill,  AxOx3  HEENT: PERRL, EOMI, normal sclera, mmm, no jvd, trachea midline, neck supple  CHEST: decreased ae bilat, no wheezes, + crackles, no use of accessory muscles  CV: RRR, no m/g/r  ABD: soft, nt, nd +bs, no hepatosplenomegaly  EXT: no c/c/e  SKIN: no rashes, no xanthomas, nl turgor, warm, dry  LYMPH: no palpable cervical or supraclavicular lymphadenopathy  NEURO: CN 2-12 intact and symmetric bilaterally  PSYCH: nl affect, nl orientation, nl judgement, nl mood  MSK: no kyphoscoliosis, 5/5 strength ue and le bilaterally    Labs: 6/8: reviewed:  Lactate 2  Glucose 246  Bun 36  Cr 0.94  Bicarb 24  procal 2.67  probnp 00624  Wbc 15 (90% neutrophils)  hgb 12.7  plts 200  rpp negative    Ct chest: 6/8: reviewed, no pulmonary embolism, emphysema and likely some pulmonary fibrosis, given  the subpleural involvement,   Dense left sided pneumonia    Nov 21, ct chest: reviewed, looks like some fibrosis on that one    A/P:  1. Acute on chronic hypoxemic respiratory failure -- titrate oxygen as able, doesn't appear to be a bad c02 retainer.    2. Pneumonia -- agree with current antibiotic selection -- will check urine strep/leg antigens.  3. COPD -- bronchodilators and pulmonary toilet  4. ILD - consistent with UIP -- I note Dr. Mejia had prior discuss with patient offering antifibrotics, patient declined at that time, can re-visit as outpatient.  5. Sepsis  6. afib with rvr -- better, bnp is up a bit, resume diuritics as able per hospitalist team  7. DMII  8. History of lung cancer s/p resection -- recommend repeat imaging in about three months time    Cough is wet but not productive, needs help with pulmonary toilet.  Will add chest physio, flutter, hypertonic saline nebs, mucinex.  He does not appear fluid overloaded on exam.        Respiratory status too compromised at the moment for bronchoscopy    Electronically signed by Ramon Li MD at 06/08/22 1114

## 2022-06-09 NOTE — PROGRESS NOTES
"   LOS: 1 day     Name: Kb Marshall  Age/Sex: 77 y.o. male  :  1945        PCP: Scar Mejia MD    Active Problems:    Atrial fibrillation with rapid ventricular response (HCC)      Admission Information: Kb Marshall is a 77 y.o. male with a past medical history significant for coronary artery disease, paroxysmal atrial fibrillation, dyslipidemia, hypertension, thoracic abdominal aortic aneurysm status post four-vessel thoracic abdominal aortic repair in 2021, lung cancer status post lung resection, type 2 diabetes, and former tobacco use.  He presented to the ED on 2022 with complaint of shortness of breath.  Patient is on chronic O2 at home and reported that over the last 2 weeks he increased his oxygen to 8 to 10 L per nasal cannula.  Patient was diagnosed with sepsis secondary to left upper lobe pneumonia.  Cardiology was consulted due to atrial fibrillation with RVR.    Chief Complaint: Follow-up atrial fibrillation with RVR, left sided chest pain    Interval history: Patient was seen and examined.  He states he is feeling better today.  He reports that he is breathing \"some better\".  Left-sided chest pain persists but has improved.    Subjective         Vital Signs  Vital Signs (last 72 hrs)       06 0700  06/07 0659 06/07 0700  /08 0659 /08 0700   0659  0700   1805   Most Recent      Temp (°F)     97    97.4 -  98.6      96.6     96.6 (35.9)  0803    Heart Rate   97 -  145    62 -  120    68 -  96     81  1703    Resp   26 -  30    18 -  35    9 -  25     19  1703    BP   84/66 -  114/103    91/71 -  159/107    108/67 -  167/116     148/102 / 1703    SpO2 (%)   97 -  100    90 -  100    84 -  100     97 / 1703        Temp:  [96.6 °F (35.9 °C)-97.8 °F (36.6 °C)] 96.6 °F (35.9 °C)  Heart Rate:  [66-96] 81  Resp:  [9-26] 19  BP: (108-167)/() 148/102  Body mass index is 26.45 kg/m².      Intake/Output Summary (Last 24 hours) at " 6/9/2022 1805  Last data filed at 6/9/2022 1523  Gross per 24 hour   Intake 652.75 ml   Output 3325 ml   Net -2672.25 ml       Vitals reviewed.   Constitutional:       Appearance: Well-developed.   Neck:      Vascular: No carotid bruit or JVD.   Pulmonary:      Effort: Pulmonary effort is normal. No respiratory distress.      Breath sounds: Examination of the left-middle field reveals decreased breath sounds. Examination of the left-lower field reveals decreased breath sounds. Decreased breath sounds present. No wheezing. No rales.   Cardiovascular:      Normal rate. Regular rhythm.      Comments: No lower extremity edema  Skin:     General: Skin is warm and dry.   Neurological:      Mental Status: Alert and oriented to person, place, and time.         Telemetry: Sinus 90s     Results Review:     Results from last 7 days   Lab Units 06/09/22  0324 06/08/22  0540   WBC 10*3/mm3 12.16* 15.19*   HEMOGLOBIN g/dL 12.2* 12.7*   PLATELETS 10*3/mm3 186 200     Results from last 7 days   Lab Units 06/09/22  0324 06/08/22  0540   SODIUM mmol/L 135* 136   POTASSIUM mmol/L 4.6 4.5   CHLORIDE mmol/L 98 96*   CO2 mmol/L 22.1 24.8   BUN mg/dL 38* 36*   CREATININE mg/dL 0.81 0.94   CALCIUM mg/dL 8.7 8.9   GLUCOSE mg/dL 154* 246*     Results from last 7 days   Lab Units 06/08/22  0540   TROPONIN T ng/mL <0.010       Results from last 7 days   Lab Units 06/08/22  0540   INR  1.24*       I reviewed the patient's new clinical results.  I reviewed the patient's new imaging results and agree with the interpretation.  I personally viewed and interpreted the patient's EKG/Telemetry data      Medication Review:   doxycycline, 100 mg, Intravenous, Q12H  guaiFENesin, 1,200 mg, Oral, Q12H  Insulin Aspart, 0-9 Units, Subcutaneous, TID AC  ipratropium-albuterol, 3 mL, Nebulization, 4x Daily - RT  lidocaine, 1 patch, Transdermal, Q24H  methylPREDNISolone sodium succinate, 40 mg, Intravenous, Q12H  nafcillin (UNIPEN) 12 g in  mL continuous  infusion, 12 g, Intravenous, Q24H  sodium chloride, 10 mL, Intravenous, Q12H  sodium chloride, 3 mL, Nebulization, BID - RT      dilTIAZem, 5-15 mg/hr, Last Rate: Stopped (06/08/22 0917)  heparin, 12 Units/kg/hr, Last Rate: 20 Units/kg/hr (06/09/22 1125)        Assessment:  1. Atrial fibrillation with RVR, resolved.  Patient is currently in sinus rhythm.  2. Acute on chronic combined diastolic and systolic heart failure.  Admission proBNP of 11k and absolute lung fluid content 41  3. Left-sided chest pain, likely pleuritic and/or related to left lobe PNM given that patient has increased pain with inspiration and has chest wall tenderness.  4. Sepsis secondary to left upper lobe pneumonia, followed per primary and pulmonology  5. Severe pulmonary hypertension  6. Coronary artery disease, exact details unknown  7. AAA, status post repair  8. TAAA, status post 4 vessel repair in December 2021  9. Lung CA, status post right lung resection, remote history         Recommendations:  1. Patient is currently in sinus rhythm.  He is on heparin for anticoagulation at this time.  This can be discontinued and switched to his usual home dose of Eliquis if no procedures planned.  2. When patient's respiratory status improves further we will discuss possible stress test.  3. With regard to patient's acute on chronic heart failure, he does not appear to be volume overloaded.  His respiratory distress is likely coming largely from his pneumonia.  We will continue to monitor.    I discussed the patients findings and my recommendations with patient and family    Electronically signed by GOLDEN Allen, 06/09/22, 6:13 PM EDT.

## 2022-06-09 NOTE — PLAN OF CARE
Goal Outcome Evaluation:           Progress: no change  Outcome Evaluation: Patient has reported back pain throughout the night, lidocaine patch ordered and prn pain medications given. Patient was weaned down to 10LBHF during the night but did not tolerate and is now back on 12LBHF respirations remain labored. Heparin drip is infusing. patient is alert to self and place, confused at times. patient has been reoriented throughout the night with sitter at bedside and suicide precautions in place. VSS, WCTM

## 2022-06-10 NOTE — PROGRESS NOTES
[facility] HOSPITALIST PROGRESS NOTE    Subjective     History:   Kb Marshall is a 77 y.o. male admitted on 6/8/2022 secondary to <principal problem not specified>     Procedures: None      CC: Follow up sepsis, pneumonia, resp failure    Patient seen and examined with LEONARD West. Awake and alert. Remains confused. Reports pain and dyspnea but appears to be resting more comfortably compared to yesterday. Sitter at bedside states he has continued to make statements about shooting himself.. Remains on bubble HFNC. Good UOP noted. Pt was agitated and aggressive overnight requiring Benadryl and Haldol.     History taken from: patient, chart, and RN.      Objective     Vital Signs  Temp:  [97 °F (36.1 °C)-97.7 °F (36.5 °C)] 97.2 °F (36.2 °C)  Heart Rate:  [52-97] 94  Resp:  [18-22] 22  BP: (131-161)/() 132/75    Intake/Output Summary (Last 24 hours) at 6/10/2022 1822  Last data filed at 6/10/2022 1715  Gross per 24 hour   Intake 1332 ml   Output 1100 ml   Net 232 ml         Physical Exam:  General:    Awake, alert but confused, appears more comfortable today, chronically ill appearing    Heart:      Normal S1 and S2. Regular rate and rhythm. No significant murmur, rubs or gallops appreciated.   Lungs:     Respirations appear less labored today. Mildly tachypneic. Diminished breath sounds throughout. No wheezes.     Abdomen:   Soft and nontender. No guarding, rebound tenderness or  organomegaly noted. Bowel sounds present x 4.   Extremities:  Trace-1+ bilateral lower extremity edema.      Results Review:    Results from last 7 days   Lab Units 06/10/22  0038 06/09/22  0324 06/08/22  0540   WBC 10*3/mm3 9.63 12.16* 15.19*   HEMOGLOBIN g/dL 11.6* 12.2* 12.7*   PLATELETS 10*3/mm3 164 186 200     Results from last 7 days   Lab Units 06/10/22  0038 06/09/22  0324 06/08/22  0540   SODIUM mmol/L 140 135* 136   POTASSIUM mmol/L 4.0 4.6 4.5   CHLORIDE mmol/L 100 98 96*   CO2 mmol/L 25.1 22.1 24.8   BUN  mg/dL 38* 38* 36*   CREATININE mg/dL 0.68* 0.81 0.94   CALCIUM mg/dL 8.5* 8.7 8.9   GLUCOSE mg/dL 165* 154* 246*     Results from last 7 days   Lab Units 06/10/22  0038 06/09/22  0324 06/08/22  0540   BILIRUBIN mg/dL 0.6 0.5 0.7   ALK PHOS U/L 67 73 70   AST (SGOT) U/L 13 13 11   ALT (SGPT) U/L 12 10 8     Results from last 7 days   Lab Units 06/10/22  0038 06/09/22  0324 06/08/22  0540   MAGNESIUM mg/dL 2.0 2.3 2.1     Results from last 7 days   Lab Units 06/08/22  0540   INR  1.24*     Results from last 7 days   Lab Units 06/08/22  0540   TROPONIN T ng/mL <0.010       Imaging Results (Last 24 Hours)     Procedure Component Value Units Date/Time    XR Chest AP [[id number]] Collected: 06/10/22 1220     Updated: 06/10/22 1222    Narrative:      XR CHEST AP-     CLINICAL INDICATION: resp failure, pneumonia; I48.91-Unspecified atrial  fibrillation; J18.9-Pneumonia, unspecified organism; I50.9-Heart  failure, unspecified        COMPARISON: 06/09/2022      TECHNIQUE: Single frontal view of the chest.     FINDINGS:      LUNGS: Extensive left-sided consolidation similar to the previous exam      HEART AND MEDIASTINUM: Heart and mediastinal contours are unremarkable        SKELETON: Bony and soft tissue structures are unremarkable.             Impression:      Little overall change     This report was finalized on 6/10/2022 12:20 PM by Dr. Leif Taveras MD.               Medications:  amLODIPine, 5 mg, Oral, Q24H  apixaban, 5 mg, Oral, Q12H  doxycycline, 100 mg, Intravenous, Q12H  guaiFENesin, 1,200 mg, Oral, Q12H  Insulin Aspart, 0-9 Units, Subcutaneous, TID AC  ipratropium-albuterol, 3 mL, Nebulization, 4x Daily - RT  lidocaine, 1 patch, Transdermal, Q24H  [START ON 6/11/2022] methylPREDNISolone sodium succinate, 40 mg, Intravenous, Daily  nafcillin (UNIPEN) 12 g in  mL continuous infusion, 12 g, Intravenous, Q24H  nystatin, 5 mL, Oral, 4x Daily  sodium chloride, 10 mL, Intravenous, Q12H  sodium chloride, 3 mL,  Nebulization, BID - RT               Assessment & Plan   Severe sepsis (present on admission): Likely 2/2 pneumonia and bacteremia. Afebrile.  Hypotensive upon presentation but BP now improved. WBC improved. CRP and procal elevated. Lactate has normalized. Cont Doxycycline and nafcillin per ID recs. Follow cultures and repeat labs in the AM. ID input appreciated.     Dense MOMO pneumonia: Strep pneumo and legionella antigens negative. (+) MSSA PCR. Cont antibiotics as above and nebs.     MSSA bacteremia: Culture from 6/8 revealing MSSA. Repeat cultures from 6/9 remain positive. Repeat cultures. Cont antibiotics as above. TTE reveals an EF of 41-45%, grade I diastolic dysfunction and severe pulmonary HTN. Follow cultures.     Acute exacerbation of COPD: Cont antibiotics as above, steroids and nebs.     Acute on chronic combined systolic and diastolic CHF: Echo as above. Diuresing well. Hold Lasix today. Monitor volume status.     Acute on chronic hypoxic respiratory failure: Likely multifactorial in the setting of above. No evidence of PE on CT chest. Cont treatment as outlined above. Wean supplemental O2 as tolerated. Pulm input appreciated.     Afib with RVR: Initially required Cardizem gtt but HR quickly improved and converted back to NSR. Cardizem gtt stopped. Treatment of sepsis as above. Echo as above. Cont heparin gtt for stroke prevention. Monitor on telemetry. Cardiology input appreciated.     Essential HTN with hypotension upon admission: BP quickly improved and overall stable today. Cont to monitor.     Interstitial lung disease: Antifibrotics previously discussed. Will need outpatient follow up.     Severe pulmonary HTN: Cont management as above.     DM II, non-insulin dependent with hyperglycemia: Steroids likely contributing. Cont SSI with Accuchecks.     Hx of RUL lung CA s/p lobectomy: Will need outpatient follow up.     Depression with reports of SI: Psychiatry consulted and feels pt's presentation  may be related to his acute condition and chronic pain. Continues to report SI and psychiatry reconsulted. Cont to monitor closely. Psych input appreciated.     DVT PPX: Heparin gtt    GOC: Currently full code with all aggressive measures. Cont ongoing discussions and provide family support. Palliative care input appreciated.     Discussed with palliative care APRN.     Pt is at high risk 2/2 severe sepsis, pneumonia, bacteremia, COPD exacerbation, acute on chronic CHF, acute on chronic hypoxic resp failure, Afib with RVR, severe pulmonary HTN, hx of lung CA.       Disposition: Unclear at present in the setting of acute illness.       Idris Apodaca DO  06/10/22  18:22 EDT

## 2022-06-10 NOTE — PROGRESS NOTES
[facility] HOSPITALISTS CROSS COVER NOTE    Patient Identification:  Name:  Kb Marshall  Age:  77 y.o.  Sex:  male  :  1945  MRN:  [id number]  Visit number:  [id number]  Primary Care Provider:  Scar Mejia MD    Length of stay in inpatient status:  1    Brief Update     Called about the patient being confused and hitting both staff and family.  Will give a 50 mg IV benadryl now and if he calms down enough then will give 1 mg IM haldol.  Will continue to monitor his mental status.    Jb Flannery MD  [facility]  22  23:43 EDT

## 2022-06-10 NOTE — PLAN OF CARE
Problem: Fall Injury Risk  Goal: Absence of Fall and Fall-Related Injury  Outcome: Ongoing, Progressing  Intervention: Identify and Manage Contributors  Recent Flowsheet Documentation  Taken 6/10/2022 0931 by Jenna Jewell RN  Medication Review/Management: medications reviewed  Self-Care Promotion:   BADL personal routines maintained   BADL personal objects within reach  Intervention: Promote Injury-Free Environment  Recent Flowsheet Documentation  Taken 6/10/2022 0931 by Jenna Jewell RN  Safety Promotion/Fall Prevention:   room organization consistent   fall prevention program maintained   assistive device/personal items within reach   safety round/check completed   Problem: Adult Inpatient Plan of Care  Goal: Plan of Care Review  Outcome: Ongoing, Progressing  Flowsheets  Taken 6/10/2022 0116 by Lety Alarcon RN  Progress: no change  Taken 6/9/2022 0845 by Jacques Brown PT  Plan of Care Reviewed With: patient  Goal: Patient-Specific Goal (Individualized)  Outcome: Ongoing, Progressing  Goal: Absence of Hospital-Acquired Illness or Injury  Outcome: Ongoing, Progressing  Intervention: Identify and Manage Fall Risk  Recent Flowsheet Documentation  Taken 6/10/2022 0931 by Jenna Jewell RN  Safety Promotion/Fall Prevention:   room organization consistent   fall prevention program maintained   assistive device/personal items within reach   safety round/check completed  Intervention: Prevent Skin Injury  Recent Flowsheet Documentation  Taken 6/10/2022 0931 by Jenna Jewell RN  Body Position:   side-lying   position changed independently  Skin Protection: adhesive use limited  Intervention: Prevent and Manage VTE (Venous Thromboembolism) Risk  Recent Flowsheet Documentation  Taken 6/10/2022 0931 by Jenna Jewell RN  Activity Management: sitting, edge of bed  Intervention: Prevent Infection  Recent Flowsheet Documentation  Taken 6/10/2022 0931 by Jenna Jewell RN  Infection Prevention: single patient room  provided  Goal: Optimal Comfort and Wellbeing  Outcome: Ongoing, Progressing  Intervention: Monitor Pain and Promote Comfort  Recent Flowsheet Documentation  Taken 6/10/2022 0901 by Jenna Jewell RN  Pain Management Interventions: see MAR  Goal: Readiness for Transition of Care  Outcome: Ongoing, Progressing     Problem: COPD (Chronic Obstructive Pulmonary Disease) Comorbidity  Goal: Maintenance of COPD Symptom Control  Outcome: Ongoing, Progressing  Intervention: Maintain COPD-Symptom Control  Recent Flowsheet Documentation  Taken 6/10/2022 0931 by Jenna Jewell RN  Supportive Measures: active listening utilized  Medication Review/Management: medications reviewed   Problem: Hypertension Comorbidity  Goal: Blood Pressure in Desired Range  Outcome: Ongoing, Progressing  Intervention: Maintain Blood Pressure Management  Recent Flowsheet Documentation  Taken 6/10/2022 0931 by Jenna Jewell RN  Medication Review/Management: medications reviewed   Problem: Pain Chronic (Persistent) (Comorbidity Management)  Goal: Acceptable Pain Control and Functional Ability  Outcome: Ongoing, Progressing  Intervention: Manage Persistent Pain  Recent Flowsheet Documentation  Taken 6/10/2022 0931 by Jenna Jewell RN  Sleep/Rest Enhancement: awakenings minimized  Medication Review/Management: medications reviewed  Intervention: Develop Pain Management Plan  Recent Flowsheet Documentation  Taken 6/10/2022 0901 by Jenna Jewell RN  Pain Management Interventions: see MAR  Intervention: Optimize Psychosocial Wellbeing  Recent Flowsheet Documentation  Taken 6/10/2022 0931 by Jenna Jewell RN  Supportive Measures: active listening utilized  Diversional Activities: television  Family/Support System Care: support provided   Problem: Skin Injury Risk Increased  Goal: Skin Health and Integrity  Outcome: Ongoing, Progressing  Intervention: Optimize Skin Protection  Recent Flowsheet Documentation  Taken 6/10/2022 0931 by Jenna Jewell  RN  Pressure Reduction Techniques:   frequent weight shift encouraged   weight shift assistance provided  Head of Bed (HOB) Positioning: HOB elevated  Pressure Reduction Devices: pressure-redistributing mattress utilized  Skin Protection: adhesive use limited   Problem: Suicide Risk  Goal: Absence of Self-Harm  Outcome: Ongoing, Progressing  Intervention: Promote Psychosocial Wellbeing  Recent Flowsheet Documentation  Taken 6/10/2022 0931 by Jenna Jewell RN  Supportive Measures: active listening utilized  Family/Support System Care: support provided  Sleep/Rest Enhancement: awakenings minimized  Intervention: Assess Risk to Self and Maintain Safety  Recent Flowsheet Documentation  Taken 6/10/2022 0931 by Jenna Jewell RN  Self-Harm Prevention: observed one-to-one  Goal: Absence of Self-Harm  Outcome: Ongoing, Progressing  Intervention: Promote Psychosocial Wellbeing  Recent Flowsheet Documentation  Taken 6/10/2022 0931 by Jenna Jewell RN  Supportive Measures: active listening utilized  Family/Support System Care: support provided  Sleep/Rest Enhancement: awakenings minimized  Intervention: Assess Risk to Self and Maintain Safety  Recent Flowsheet Documentation  Taken 6/10/2022 0931 by Jenna Jewell RN  Self-Harm Prevention: observed one-to-one   Problem: Suicidal Behavior  Goal: Suicidal Behavior is Absent or Managed  Outcome: Ongoing, Progressing   Goal Outcome Evaluation:

## 2022-06-10 NOTE — PROGRESS NOTES
LOS: 2 days     Name: Kb Marshall  Age/Sex: 77 y.o. male  :  1945        PCP: Scar Mejia MD    Active Problems:    Atrial fibrillation with rapid ventricular response (HCC)      Admission Information: Kb Marshall is a 77 y.o. male with a past medical history significant for coronary artery disease, paroxysmal atrial fibrillation, dyslipidemia, hypertension, thoracic abdominal aortic aneurysm status post four-vessel thoracic abdominal aortic repair in 2021, lung cancer status post lung resection, type 2 diabetes, and former tobacco use.  He presented to the ED on 2022 with complaint of shortness of breath.  Patient is on chronic O2 at home and reported that over the last 2 weeks he increased his oxygen to 8 to 10 L per nasal cannula.  Patient was diagnosed with sepsis secondary to left upper lobe pneumonia.  Cardiology was consulted due to atrial fibrillation with RVR.    Chief Complaint: Follow-up atrial fibrillation with RVR, left-sided chest pain    Interval history: Patient became agitated and was aggressive overnight.  He was given both Haldol and Benadryl.    Patient was seen and examined.  He continues report of difficulty breathing and left sided chest pain.    Subjective         Vital Signs  Vital Signs (last 72 hrs)       06/ 0700  06/08 0659 06/08 0700  /09 0659  0700  06/10 0659 06/10 0700  06/10 1225   Most Recent      Temp (°F)   97    97.4 -  98.6    96.6 -  97.4    97 -  97.7     97 (36.1) /10 1200    Heart Rate 97 -  145    62 -  120    52 -  96    84 -  92     84 /10 0930    Resp 26 -  30    18 -  35    9 -  25    18 -  21     18 /10 0930    BP 84/66 -  114/103    91/71 -  159/107    108/67 -  167/116    132/83 -  142/106     142/106 /10 0930    SpO2 (%) 97 -  100    90 -  100    84 -  100    92 -  98     98 /10 0930        Temp:  [97 °F (36.1 °C)-97.7 °F (36.5 °C)] 97 °F (36.1 °C)  Heart Rate:  [52-93] 84  Resp:  [] 18  BP:  (131-163)/() 142/106  Body mass index is 25.06 kg/m².      Intake/Output Summary (Last 24 hours) at 6/10/2022 1225  Last data filed at 6/10/2022 1219  Gross per 24 hour   Intake 1883.69 ml   Output 800 ml   Net 1083.69 ml       Vitals reviewed.   Constitutional:       Appearance: Well-developed.   Neck:      Vascular: No carotid bruit or JVD.   Pulmonary:      Effort: Pulmonary effort is normal. No respiratory distress.      Breath sounds: Examination of the left-middle field reveals decreased breath sounds. Examination of the left-lower field reveals decreased breath sounds. Decreased breath sounds present. No wheezing. No rales.   Cardiovascular:      Normal rate. Regular rhythm.      Comments: No lower extremity edema  Skin:     General: Skin is warm and dry.   Neurological:      Mental Status: Alert and oriented to person, place, and time.         Telemetry: Sinus 80s       Results Review:     Results from last 7 days   Lab Units 06/10/22  0038 06/09/22  0324 06/08/22  0540   WBC 10*3/mm3 9.63 12.16* 15.19*   HEMOGLOBIN g/dL 11.6* 12.2* 12.7*   PLATELETS 10*3/mm3 164 186 200     Results from last 7 days   Lab Units 06/10/22  0038 06/09/22  0324 06/08/22  0540   SODIUM mmol/L 140 135* 136   POTASSIUM mmol/L 4.0 4.6 4.5   CHLORIDE mmol/L 100 98 96*   CO2 mmol/L 25.1 22.1 24.8   BUN mg/dL 38* 38* 36*   CREATININE mg/dL 0.68* 0.81 0.94   CALCIUM mg/dL 8.5* 8.7 8.9   GLUCOSE mg/dL 165* 154* 246*     Results from last 7 days   Lab Units 06/08/22  0540   TROPONIN T ng/mL <0.010       Results from last 7 days   Lab Units 06/08/22  0540   INR  1.24*       I reviewed the patient's new clinical results.  I reviewed the patient's new imaging results and agree with the interpretation.  I personally viewed and interpreted the patient's EKG/Telemetry data      Medication Review:   doxycycline, 100 mg, Intravenous, Q12H  guaiFENesin, 1,200 mg, Oral, Q12H  Insulin Aspart, 0-9 Units, Subcutaneous, TID  AC  ipratropium-albuterol, 3 mL, Nebulization, 4x Daily - RT  lidocaine, 1 patch, Transdermal, Q24H  [START ON 6/11/2022] methylPREDNISolone sodium succinate, 40 mg, Intravenous, Daily  nafcillin (UNIPEN) 12 g in  mL continuous infusion, 12 g, Intravenous, Q24H  nystatin, 5 mL, Oral, 4x Daily  sodium chloride, 10 mL, Intravenous, Q12H  sodium chloride, 3 mL, Nebulization, BID - RT      dilTIAZem, 5-15 mg/hr, Last Rate: Stopped (06/08/22 0917)  heparin, 12 Units/kg/hr, Last Rate: 20 Units/kg/hr (06/10/22 0133)        Assessment:  1. Atrial fibrillation with RVR, resolved.  Patient is currently in sinus rhythm.  2. Acute on chronic combined diastolic and systolic heart failure.  Admission proBNP of 11k and absolute lung fluid content 41  3. Left-sided chest pain, likely pleuritic and/or related to left lobe PNM given that patient has increased pain with inspiration and has chest wall tenderness.  4. Sepsis secondary to left upper lobe pneumonia, followed per primary and pulmonology  5. Severe pulmonary hypertension  6. Coronary artery disease, exact details unknown  7. AAA, status post repair  8. TAAA, status post 4 vessel repair in December 2021  9. Lung CA, status post right lung resection, remote history     Recommendations:  1. Patient has maintained sinus rhythm.  He can be transitioned to oral anticoagulation.    2. We would recommend ischemic eval for this patient given his complaint of left sided pain and multiple risk factors.  We will try to plan this for Monday if his respiratory status improves.    I discussed the patients findings and my recommendations with patient and family    Electronically signed by GOLDEN Allen, 06/10/22, 2:02 PM EDT.

## 2022-06-10 NOTE — PROGRESS NOTES
[facility]      Mar/chart reviewed  Follow up Pneumonia, AHRF, COPD, pulmonary fibrosis, lung cancer s/p resection  Was agitated and aggressive overnight, was given benadryl and haldol  Not very interactive, does make eye contact, doesn't really answer questions    Vital Sign Min/Max for last 24 hours  Temp  Min: 97.4 °F (36.3 °C)  Max: 97.7 °F (36.5 °C)   BP  Min: 108/67  Max: 163/115   Pulse  Min: 52  Max: 96   Resp  Min: 9  Max: 22   SpO2  Min: 84 %  Max: 100 %   Flow (L/min)  Min: 8  Max: 15   Weight  Min: 72.6 kg (160 lb)  Max: 72.6 kg (160 lb)     Appears ill, not very interactive  perrl, eomi, normal sclera,  mmm, no jvd, trachea midline, neck supple,  chest decreased ae bilaterally, + crackles, no wheezes, +use of ae msucles  Irrg, rate ok  soft, nt, nd +bs,  no c/c/ 1+edema  Skin warm, dry no rashes    Labs: 6/10: reviewed:  Bun 38  Cr 0.68  Bicarb 25  Wbc 9.6  hgb 11.6  plts 164    A/P:  1. Acute on chronic hypoxemic respiratory failure -- titrate oxygen as able, doesn't appear to be a bad c02 retainer.    2. Pneumonia -- --  urine strep/leg antigens are negative; mrsa screen +-- antibiotics her ID  3. COPD -- bronchodilators and pulmonary toilet  4. ILD - consistent with UIP -- I note Dr. Mejia had prior discuss with patient offering antifibrotics, patient declined at that time, can re-visit as outpatient.  5. Sepsis  6. afib with rvr -- better, bnp is up a bit, resume diuritics as able per hospitalist team  7. DMII  8. History of lung cancer s/p resection -- recommend repeat imaging in about three months time  9. Pulmonary hypertension -- diuresis for now, recommend repeat echo when improved, he's not the best candidate for pulmonary vasodilators given ILD/COPD but if anyone would be  Used would be tyvaso would  Have to have RHC prior to starting and would have to start as outpatient, would not purse at this time unless failure to improve despite resolution of pneumonia and would repeat  echo prior to going with RHC  10. MSSA bacteremia -- as per ID    Can decrease steroids, some improvement in oxygen requirement. Having decent production to cough per RN

## 2022-06-10 NOTE — PLAN OF CARE
F/u this pm at bedside in PCU. Mr. Marshall is resting upon entry, easily awakens. His daughter and a PCA are present at bedside.     His daughter reports decreased po intake. He is noted w/ significant new onset oral candidiasis diffusely. He adamantly declines all po presentations/consistencies at this time.     Recommend continuing modified po diet of puree consistency, thin liquids as tolerated. SLP to continue to f/u for diet safety/reassessment for upgrade pending improvements.     D/w RN pt status w/ verbal agreement. Noted MD order for nystatin suspension to address candidiasis.     Thank you-  Tiffanie Marshall M.A., CCC-SLP

## 2022-06-10 NOTE — CASE MANAGEMENT/SOCIAL WORK
Discharge Planning Assessment   David     Patient Name: Kb Marshall  MRN: [id number]  Today's Date: 6/10/2022    Admit Date: 6/8/2022     Discharge Plan     Row Name 06/10/22 1516       Plan    Plan Pt admitted 6/8/22. Pt is from home and utilizes [facility]. Family would like for pt to return home with hospice. Pt has a wheelchair and home oxygen at 10L. Pt now has psych consult, remains on suicide precautions and has been aggressive with family and staff. Discharge plan and needs pending improvement. SS following.              Continued Care and Services - Admitted Since 6/8/2022     Home Medical Care     Service Provider Request Status Selected Services Address Phone Fax Patient Preferred    [facility] [address] [place] [id number] [phone] --              DIANE Lugo

## 2022-06-11 NOTE — PROGRESS NOTES
[facility] HOSPITALIST PROGRESS NOTE    Subjective     History:   Kb Marshall is a 77 y.o. male admitted on 6/8/2022 secondary to Atrial fibrillation with rapid ventricular response (HCC)     Procedures:   6/11/22: Intubation    CC: Follow up sepsis, pneumonia, resp failure    Patient seen and examined with LEONARD Jansen. Sedated on vent support. Appears to be resting comfortably. Pt became agitated overnight and removing supplemental O2. Despite encouragement he continued to refuse with worsening hypoxia noted resulting in subsequent intubation. Hypotensive this AM once sedated and vasopressor support initiated.     History taken from: chart, and RN.      Objective     Vital Signs  Temp:  [97.2 °F (36.2 °C)-100.4 °F (38 °C)] 100.1 °F (37.8 °C)  Heart Rate:  [] 61  Resp:  [18-38] 18  BP: ()/() 96/62  FiO2 (%):  [50 %] 50 %    Intake/Output Summary (Last 24 hours) at 6/11/2022 1309  Last data filed at 6/11/2022 1235  Gross per 24 hour   Intake 1432.91 ml   Output 2750 ml   Net -1317.09 ml         Physical Exam:  General:    Sedated on vent support. Appears to be resting comfortably, chronically ill appearing    Heart:      Normal S1 and S2. Regular rate and rhythm. No significant murmur, rubs or gallops appreciated.   Lungs:     Respirations appear regular, even and unlabored. Diminished breath sounds throughout. No wheezes.     Abdomen:   Soft and nontender. No guarding, rebound tenderness or  organomegaly noted. Bowel sounds present x 4.   Extremities:  Trace-1+ bilateral lower extremity edema.      Results Review:    Results from last 7 days   Lab Units 06/10/22  2358 06/10/22  0038 06/09/22  0324 06/08/22  0540   WBC 10*3/mm3 12.72* 9.63 12.16* 15.19*   HEMOGLOBIN g/dL 12.5* 11.6* 12.2* 12.7*   PLATELETS 10*3/mm3 201 164 186 200     Results from last 7 days   Lab Units 06/10/22  2358 06/10/22  0038 06/09/22  0324 06/08/22  0540   SODIUM mmol/L 139 140 135* 136   POTASSIUM mmol/L  3.3* 4.0 4.6 4.5   CHLORIDE mmol/L 99 100 98 96*   CO2 mmol/L 27.3 25.1 22.1 24.8   BUN mg/dL 26* 38* 38* 36*   CREATININE mg/dL 0.58* 0.68* 0.81 0.94   CALCIUM mg/dL 8.8 8.5* 8.7 8.9   GLUCOSE mg/dL 211* 165* 154* 246*     Results from last 7 days   Lab Units 06/10/22  2358 06/10/22  0038 06/09/22  0324 06/08/22  0540   BILIRUBIN mg/dL 1.0 0.6 0.5 0.7   ALK PHOS U/L 84 67 73 70   AST (SGOT) U/L 18 13 13 11   ALT (SGPT) U/L 13 12 10 8     Results from last 7 days   Lab Units 06/10/22  2358 06/10/22  0038 06/09/22  0324 06/08/22  0540   MAGNESIUM mg/dL 1.9 2.0 2.3 2.1     Results from last 7 days   Lab Units 06/08/22  0540   INR  1.24*     Results from last 7 days   Lab Units 06/10/22  2358 06/08/22  0540   TROPONIN T ng/mL 0.012 <0.010       Imaging Results (Last 24 Hours)     Procedure Component Value Units Date/Time    XR Chest 1 View [[id number]] Collected: 06/11/22 0538     Updated: 06/11/22 0541    Narrative:      CR Chest 1 Vw    INDICATION:   Endotracheal tube and OG tube placement. Respiratory failure and hypoxia.     COMPARISON:    6/10/2022    FINDINGS:  Single portable AP view(s) of the chest.    Endotracheal tube is in good position in the mid trachea. OG tube tip is in the fundus of the stomach. Patient is status post endovascular repair of the descending aorta. Heart size is stable. There is chronic elevation of the right hemidiaphragm. There  are some chronic changes in both lungs. Dense infiltrates in the left lung are similar to prior. No pneumothorax.       Impression:      Well-positioned OG tube and ET tube. Stable appearance of infiltrates and chronic changes in the lungs. No pneumothorax.    Signer Name: Karson Smart MD   Signed: 6/11/2022 5:38 AM   Workstation Name: [email or web address]    [facility]    XR Chest 1 View [[id number]] Collected: 06/10/22 2314     Updated: 06/10/22 2316    Narrative:      CR Chest 1 Vw    INDICATION:   Worsening shortness of air today.      COMPARISON:    Chest 6/10/2022    FINDINGS:  Portable AP view(s) of the chest.    Dense infiltrate again noted throughout the left upper lung field with patchy infiltrates throughout the remainder of the left lung. Minimal patchy infiltrates throughout the right lung are unchanged. Chronic elevation of the right hemidiaphragm. No  large pleural effusions. No pneumothorax. Heart size and mediastinum are stable. Descending thoracic aortic stent graft. Lung sutures noted in the right suprahilar region.       Impression:      No significant interval change in patchy infiltrates throughout both lungs and dense infiltrate in the left upper lobe.    Signer Name: Yimi Garcia MD   Signed: 6/10/2022 11:14 PM   [facility]: [email or web address]    [facility]            Medications:  apixaban, 5 mg, Oral, Q12H  chlorhexidine, 15 mL, Mouth/Throat, Q12H  doxycycline, 100 mg, Intravenous, Q12H  famotidine, 20 mg, Intravenous, BID  guaiFENesin, 1,200 mg, Oral, Q12H  Insulin Aspart, 0-9 Units, Subcutaneous, TID AC  ipratropium-albuterol, 3 mL, Nebulization, 4x Daily - RT  lidocaine, 1 patch, Transdermal, Q24H  methylPREDNISolone sodium succinate, 40 mg, Intravenous, Daily  nafcillin (UNIPEN) 12 g in  mL continuous infusion, 12 g, Intravenous, Q24H  nystatin, 5 mL, Oral, 4x Daily  potassium phosphate, 30 mmol, Intravenous, Once  sodium chloride, 10 mL, Intravenous, Q12H  sodium chloride, 3 mL, Nebulization, BID - RT      dexmedetomidine, 0.2-1.5 mcg/kg/hr, Last Rate: 0.8 mcg/kg/hr (06/11/22 0658)  fentaNYL Citrate,   norepinephrine, 0.02-0.3 mcg/kg/min, Last Rate: 0.04 mcg/kg/min (06/11/22 1142)  propofol, 5-50 mcg/kg/min            Assessment & Plan   Severe sepsis (present on admission): Likely 2/2 pneumonia and bacteremia. Afebrile.  Hypotensive upon presentation but initially improved. Hypotensive this AM requiring initiation of vasopressor support with sedation thought to be contributing.  Surgery consulted for central line placement. WBC mildly elevated today. CRP and procal elevated. Lactate intially normalized but mildly elevated today. Cont Doxycycline and nafcillin per ID recs. Follow cultures and repeat labs in the AM. ID input appreciated.     Dense MOMO pneumonia: Strep pneumo and legionella antigens negative. (+) MSSA PCR. Cont antibiotics as above and nebs.     MSSA bacteremia: Culture from 6/8 revealing MSSA. Repeat cultures from 6/9 remain positive. Repeat cultures from 6/10 pending. Cont antibiotics as above. TTE reveals an EF of 41-45%, grade I diastolic dysfunction and severe pulmonary HTN. Follow cultures.     Acute exacerbation of COPD: Cont antibiotics as above, steroids and nebs.     Acute on chronic combined systolic and diastolic CHF: Echo as above. Received Lasix last PM with good response. Hold Lasix this AM 2/2 hypotension. Monitor volume status.     Acute on chronic hypoxic respiratory failure: Likely multifactorial in the setting of above. No evidence of PE on CT chest. Pt previously overall stable on bubble HFNC but became hypoxic overnight as outlined above requiring intubation. Consult nutrition to begin TF's. Cont treatment as outlined above. Repeat ABG and CXR in the AM. Will be very difficult to wean off vent in the setting of severe COPD and this has been discussed with pt's family.     Afib with RVR: Initially required Cardizem gtt but HR quickly improved and converted back to NSR. Cardizem gtt stopped. Treatment of sepsis as above. Echo as above. Cont heparin gtt for stroke prevention. Monitor on telemetry. Cardiology input appreciated.     Essential HTN with hypotension upon admission: BP quickly improved and stabilized. Hypotensive this AM with sedation thought to be contributing. Cont to monitor.     Electrolyte abnormalities: K+ mildly low today. Mg is <2. PO4 critically low. Replace per protocol. Cont electrolyte replacement protocols and repeat labs in the AM.      Interstitial lung disease: Antifibrotics previously discussed.    Severe pulmonary HTN: Cont management as above.     DM II, non-insulin dependent with hyperglycemia: Steroids likely contributing. Cont SSI with Accuchecks.     Hx of RUL lung CA s/p lobectomy: Cont supportive treatment.      Depression with reports of SI: Psychiatry consulted and felt pt's presentation may be related to his acute condition and chronic pain. Continued to report SI and psychiatry reconsulted. Pt now intubated and sedated. Psych input appreciated.     DVT PPX: Heparin gtt    GOC: Currently full code with all aggressive measures. Cont ongoing discussions and provide family support. Palliative care input appreciated.     Critical Care Time: 38 minutes     Pt is at high risk 2/2 severe sepsis, pneumonia, bacteremia, COPD exacerbation, acute on chronic CHF, acute on chronic hypoxic resp failure, Afib with RVR, severe pulmonary HTN, hx of lung CA.       Disposition: Unclear at present in the setting of acute illness. Prognosis is poor.       Idris Apodaca DO  06/11/22  13:09 EDT

## 2022-06-11 NOTE — PROGRESS NOTES
[facility]    Patient Identification:  Name:  Kb Marshall  Age:  77 y.o.  Sex:  male  :  1945  MRN:  [id number]  Visit number:  [id number]  Primary Care Provider:  Scar Mejia MD    Length of stay in inpatient status:  3    Brief Update     Called about the patient having worsening hypoxia.  He is currently being treated for pneumonia.  He tells me that he is having trouble breathing and is producing green/yellow sputum.  He denies any hemoptysis.  On exam, he has crackles that are were on the left than the right.  He has decreased air movement on the right base.  I hear some end expiratory wheezing; my exam was shortly after a breathing treatment.  The daughter was already at bedside; the patient has 4 total children but the son and daughter that live in town do not know the phone numbers of the other two that live out-of-state.  I talked to the son and daughter that live in town and then talked to the patient.  The patient and his two children then talked.  The patient is miserable and would like to try a ventilator temporarily and if he cannot come off of the ventilator then he wants his son and daughter to withdraw care.  I then told the family to talk to each other as the chances of the patient being weaned off of the ventilator are low in light of his end stage COPD.  I did order labs and blood cultures:    Son is Wesley and daughter is Bhavani; the sister is Suzie and the children would like her to talk to the patient before he goes on the ventilator.    I did give a 40 mg IV bolus of Lasix and so far urine has been produced; despite this, the patient still has severe dyspnea and would like to be placed on the ventilator for comfort.      LABS:    CBC and coagulation:  Results from last 7 days   Lab Units 06/10/22  2358 06/10/22  0038 22  0324 22  0850 22  0540   PROCALCITONIN ng/mL 0.49*  --   --   --  2.67*   LACTATE mmol/L 2.1*   --   --  2.0 2.4*   CRP mg/dL  --   --   --   --  43.58*   WBC 10*3/mm3 12.72* 9.63 12.16*  --  15.19*   HEMOGLOBIN g/dL 12.5* 11.6* 12.2*  --  12.7*   HEMATOCRIT % 40.2 36.8* 40.1  --  39.9   MCV fL 81.9 80.9 84.8  --  81.4   MCHC g/dL 31.1* 31.5 30.4*  --  31.8   PLATELETS 10*3/mm3 201 164 186  --  200   INR   --   --   --   --  1.24*     Acid/base balance:  Results from last 7 days   Lab Units 06/10/22  2301   PH, ARTERIAL pH units 7.472*   PO2 ART mm Hg 48.3*   PCO2, ARTERIAL mm Hg 44.3   HCO3 ART mmol/L 32.3*     Renal and electrolytes:  Results from last 7 days   Lab Units 06/10/22  2358 06/10/22  0038 06/09/22  0324   SODIUM mmol/L 139 140 135*   POTASSIUM mmol/L 3.3* 4.0 4.6   MAGNESIUM mg/dL 1.9 2.0 2.3   CHLORIDE mmol/L 99 100 98   CO2 mmol/L 27.3 25.1 22.1   BUN mg/dL 26* 38* 38*   CREATININE mg/dL 0.58* 0.68* 0.81   CALCIUM mg/dL 8.8 8.5* 8.7   PHOSPHORUS mg/dL 1.9* 3.6 4.9*   GLUCOSE mg/dL 211* 165* 154*     Estimated Creatinine Clearance: 111.3 mL/min (A) (by C-G formula based on SCr of 0.58 mg/dL (L)).    Liver and pancreatic function:  Results from last 7 days   Lab Units 06/10/22  2358 06/10/22  0038 06/09/22  0324   ALBUMIN g/dL 2.74* 2.64* 2.04*   BILIRUBIN mg/dL 1.0 0.6 0.5   ALK PHOS U/L 84 67 73   AST (SGOT) U/L 18 13 13   ALT (SGPT) U/L 13 12 10     Endocrine function:  Lab Results   Component Value Date    HGBA1C 6.50 (H) 12/23/2021     Point of care bedside glucose levels:  Results from last 7 days   Lab Units 06/10/22  1815 06/10/22  1640 06/10/22  1041 06/10/22  0516 06/09/22  1728 06/09/22  1050 06/09/22  0619 06/08/22  1654 06/08/22  1054   GLUCOSE mg/dL 168* 55* 205* 161* 240* 189* 159* 226* 195*     Lab Results   Component Value Date    TSH 2.190 12/23/2021     Cardiac:  Results from last 7 days   Lab Units 06/10/22  2358 06/08/22  0540   TROPONIN T ng/mL 0.012 <0.010   PROBNP pg/mL 14,507.0* 11,717.0*       Cultures:  Microbiology Results (last 10 days)     Procedure Component Value  - Date/Time    Respiratory Culture - Sputum, Cough [[id number]] Collected: 06/10/22 0941    Lab Status: Preliminary result Specimen: Sputum from Cough Updated: 06/10/22 1223     Gram Stain Many (4+) WBCs per low power field      Rare (1+) Epithelial cells per low power field      Few (2+) Budding yeast    Blood Culture - Blood, Arm, Left [[id number]]  (Abnormal) Collected: 06/09/22 1011    Lab Status: Preliminary result Specimen: Blood from Arm, Left Updated: 06/10/22 1037     Blood Culture Abnormal Stain     Gram Stain Aerobic Bottle Gram positive cocci in clusters    Narrative:      No BCID performed, refer to previous blood culture specimen collected on 6/8/22.      Blood Culture - Blood, Hand, Right [[id number]]  (Abnormal) Collected: 06/09/22 1011    Lab Status: Preliminary result Specimen: Blood from Hand, Right Updated: 06/10/22 1600     Blood Culture Abnormal Stain     Gram Stain Aerobic Bottle Gram positive cocci in clusters    Narrative:      No BCID performed, refer to previous blood culture specimen collected on 6-8-22.      S. Pneumo Ag Urine or CSF - Urine, Urine, Clean Catch [[id number]]  (Normal) Collected: 06/08/22 1443    Lab Status: Final result Specimen: Urine, Clean Catch Updated: 06/08/22 2322     Strep Pneumo Ag Negative    Legionella Antigen, Urine - Urine, Urine, Clean Catch [[id number]]  (Normal) Collected: 06/08/22 1443    Lab Status: Final result Specimen: Urine, Clean Catch Updated: 06/08/22 1607     LEGIONELLA ANTIGEN, URINE Negative    Narrative:      Presumptive negative for L. pneumophilia serogroup 1 antigen, suggesting no recent or current infection.    MRSA Screen, PCR (Inpatient) - Swab, Nares [[id number]]  (Abnormal) Collected: 06/08/22 1104    Lab Status: Final result Specimen: Swab from Nares Updated: 06/08/22 1237     MRSA PCR Negative     Staph aureus by PCR Positive    Blood Culture - Blood, Arm, Left [[id number]]  (Abnormal) Collected: 06/08/22 0540    Lab Status: Preliminary  result Specimen: Blood from Arm, Left Updated: 06/10/22 2156     Blood Culture Abnormal Stain     Gram Stain Aerobic Bottle Gram positive bacilli    Narrative:      Blood culture does not meet the specified criteria for PCR identification.  If pregnant, immunocompromised, or clinical concern for meningitis, call lab to run BCID for Listeria monocytogenes.    Blood Culture - Blood, Arm, Right [[id number]]  (Abnormal) Collected: 06/08/22 0540    Lab Status: Preliminary result Specimen: Blood from Arm, Right Updated: 06/10/22 0810     Blood Culture Staphylococcus aureus     Comment: Infectious disease consultation is highly recommended to rule out distant foci of infection.        Isolated from Aerobic and Anaerobic Bottles     Gram Stain Aerobic Bottle Gram positive cocci in clusters      Anaerobic Bottle Gram positive cocci in clusters    Blood Culture ID, PCR - Blood, Arm, Right [[id number]]  (Abnormal) Collected: 06/08/22 0540    Lab Status: Final result Specimen: Blood from Arm, Right Updated: 06/09/22 0302     BCID, PCR Staph aureus. mecA/C and MREJ (methicillin resistance gene) NOT detected. Identification by BCID2 PCR    Narrative:      Infectious disease consultation is highly recommended to rule out distant foci of infection.          Jb Flannery MD  [facility]  06/11/22  02:57 EDT

## 2022-06-11 NOTE — PROGRESS NOTES
THC Physician - Brief Progress Note  PERMANENT  06/11/2022 02:45    [facility] - CCU - [facility] - [place], [place] ([facility])    JENNIFFER CARTAGENA    Date of Service 06/11/2022 02:45    HPI/Events of Note [facility] Provider Assessment Note     77-year-old male admitted to the ICU from the floor with worsening respiratory failure, sepsis due to left upper lobe pneumonia and MSSA bacteremia.  patient currently on high-flow nasal cannula.  Patient currently getting bronchodilators, IV steroids,   and nafcillin.    Past medical history:  COPD, AFib, hypertension, interstitial lung disease, severe pulmonary hypertension, diabetes, right upper lobe cancer status post lobectomy,  PAD, hyperlipidemia, thoracic ascending aortic aneurysm status post stenting     June 10th chest x-ray:   No significant interval change in patchy infiltrates throughout both lungs and dense infiltrate in the left upper lobe     June 8th CT angiogram of the chest:  1.   No PE  2.  interval development of small nodules in the right lung and left lower lobe, as well as dense consolidation pleural based in the left upper lobe  3.  stable mediastinal lymph node enlargement      Assessment and Plan:    - patient already seen and followed by Pulmonary, Cardiology      __Y___   Video Assessment performed  __Y___   Most recent labs reviewed  __Y___   Vital Signs reviewed  __Y___   Best Practices addressed:                 VTE prophylaxis: Eliquis                 SUP (when indicated):                 Glycemic control: SSI                      Please notify bedside physician when present or [facility] if glc > 180 X 2                 Sepsis guidelines: yes                 Lung protective strategy                 Targeted Temperature Management:    _____     Spoke with bedside RN  __N___     Orders written      Contact [facility] for any needs if bedside physician is not present.      Interventions Major-Hypoxemia - evaluation and  management, Infection - evaluation and management, Respiratory failure - evaluation and management, Sepsis - evaluation and management        Electronically Signed by: Heraclio Billy) on 06/11/2022 02:55

## 2022-06-11 NOTE — PROGRESS NOTES
PROGRESS NOTE         Patient Identification:  Name:  Kb Marshall  Age:  77 y.o.  Sex:  male  :  1945  MRN:  [id number]  Visit Number:  [id number]  Primary Care Provider:  Scar Mejia MD         LOS: 3 days       ----------------------------------------------------------------------------------------------------------------------  Subjective       Chief Complaints:    Shortness of Breath        Interval History:      Patient's respiratory status declined and patient is currently intubated at 50% FiO2.  WBC slightly elevated 12.72, likely related to steroid therapy.  Lactic acid 2.1.  Procalcitonin from 6/10/2022 improving at 0.49.  Respiratory culture from 6/10/2022 reports growth of Candida albicans, likely colonization.  Blood cultures from 6/10/2022 in process.  Blood cultures from 2022 2 out of 2 sets positive for MSSA.    Review of Systems:    Unable to obtain.  Intubated and sedated.  ----------------------------------------------------------------------------------------------------------------------      Objective       Current Hospital Meds:  apixaban, 5 mg, Oral, Q12H  chlorhexidine, 15 mL, Mouth/Throat, Q12H  doxycycline, 100 mg, Intravenous, Q12H  famotidine, 20 mg, Intravenous, BID  guaiFENesin, 1,200 mg, Oral, Q12H  Insulin Aspart, 0-9 Units, Subcutaneous, TID AC  ipratropium-albuterol, 3 mL, Nebulization, 4x Daily - RT  lidocaine, 1 patch, Transdermal, Q24H  methylPREDNISolone sodium succinate, 40 mg, Intravenous, Daily  nafcillin (UNIPEN) 12 g in  mL continuous infusion, 12 g, Intravenous, Q24H  nystatin, 5 mL, Oral, 4x Daily  potassium phosphate, 30 mmol, Intravenous, Once  sodium chloride, 10 mL, Intravenous, Q12H  sodium chloride, 3 mL, Nebulization, BID - RT      dexmedetomidine, 0.2-1.5 mcg/kg/hr, Last Rate: 0.8 mcg/kg/hr (22 0610)  fentaNYL Citrate,   norepinephrine, 0.02-0.3 mcg/kg/min, Last Rate: 0.04 mcg/kg/min (22 1142)  propofol, 5-50  mcg/kg/min      ----------------------------------------------------------------------------------------------------------------------    Vital Signs:  Temp:  [97.2 °F (36.2 °C)-100.4 °F (38 °C)] 100.1 °F (37.8 °C)  Heart Rate:  [] 61  Resp:  [18-38] 18  BP: ()/() 96/62  FiO2 (%):  [50 %] 50 %  Mean Arterial Pressure (Non-Invasive) for the past 24 hrs (Last 3 readings):   Noninvasive MAP (mmHg)   06/11/22 1301 74   06/11/22 1248 56   06/11/22 1230 67     SpO2 Percentage    06/11/22 1230 06/11/22 1248 06/11/22 1301   SpO2: 96% 95% 96%     SpO2:  [80 %-100 %] 96 %  on  Flow (L/min):  [8-12] 12;   Device (Oxygen Therapy): ventilator    Body mass index is 25.06 kg/m².  Wt Readings from Last 3 Encounters:   06/10/22 72.6 kg (160 lb)   02/23/22 73.9 kg (163 lb)   02/09/22 78.5 kg (173 lb)        Intake/Output Summary (Last 24 hours) at 6/11/2022 1312  Last data filed at 6/11/2022 1235  Gross per 24 hour   Intake 1432.91 ml   Output 2750 ml   Net -1317.09 ml     NPO Diet NPO Type: Strict NPO  ----------------------------------------------------------------------------------------------------------------------      Physical Exam:       Constitutional:  Obese, chronically ill-appearing.  Intubated and sedated.  HENT:  Head: Normocephalic and atraumatic.  Mouth:  Moist mucous membranes.    Eyes:  Conjunctivae and EOM are normal.  No scleral icterus.  Neck:  Neck supple.  No JVD present.    Cardiovascular:  Normal rate, regular rhythm and normal heart sounds with no murmur. No edema.  Pulmonary/Chest: Coarse lung sounds noted bilaterally.  Intubated and sedated  Abdominal:  Soft.  Bowel sounds are normal.  No distension and no tenderness.   Musculoskeletal:  No edema, no tenderness, and no deformity.  No swelling or redness of joints.  Neurological:   Intubated and sedated.  Skin:  Skin is warm and dry.  No rash noted.  No pallor.   Psychiatric:  Intubated and  sedated.    ----------------------------------------------------------------------------------------------------------------------  Results from last 7 days   Lab Units 06/10/22  2358 06/08/22  0540   TROPONIN T ng/mL 0.012 <0.010     Results from last 7 days   Lab Units 06/10/22  2358 06/08/22  0540   PROBNP pg/mL 14,507.0* 11,717.0*       Results from last 7 days   Lab Units 06/11/22  0631   PH, ARTERIAL pH units 7.431   PO2 ART mm Hg 96.9   PCO2, ARTERIAL mm Hg 51.6*   HCO3 ART mmol/L 34.3*     Results from last 7 days   Lab Units 06/10/22  2358 06/10/22  0038 06/09/22  0324 06/08/22  0850 06/08/22  0540   CRP mg/dL  --   --   --   --  43.58*   LACTATE mmol/L 2.1*  --   --  2.0 2.4*   WBC 10*3/mm3 12.72* 9.63 12.16*  --  15.19*   HEMOGLOBIN g/dL 12.5* 11.6* 12.2*  --  12.7*   HEMATOCRIT % 40.2 36.8* 40.1  --  39.9   MCV fL 81.9 80.9 84.8  --  81.4   MCHC g/dL 31.1* 31.5 30.4*  --  31.8   PLATELETS 10*3/mm3 201 164 186  --  200   INR   --   --   --   --  1.24*     Results from last 7 days   Lab Units 06/10/22  2358 06/10/22  0038 06/09/22  0324   SODIUM mmol/L 139 140 135*   POTASSIUM mmol/L 3.3* 4.0 4.6   MAGNESIUM mg/dL 1.9 2.0 2.3   CHLORIDE mmol/L 99 100 98   CO2 mmol/L 27.3 25.1 22.1   BUN mg/dL 26* 38* 38*   CREATININE mg/dL 0.58* 0.68* 0.81   CALCIUM mg/dL 8.8 8.5* 8.7   GLUCOSE mg/dL 211* 165* 154*   ALBUMIN g/dL 2.74* 2.64* 2.04*   BILIRUBIN mg/dL 1.0 0.6 0.5   ALK PHOS U/L 84 67 73   AST (SGOT) U/L 18 13 13   ALT (SGPT) U/L 13 12 10   Estimated Creatinine Clearance: 111.3 mL/min (A) (by C-G formula based on SCr of 0.58 mg/dL (L)).  No results found for: AMMONIA    Glucose   Date/Time Value Ref Range Status   06/11/2022 1108 135 (H) 70 - 130 mg/dL Final     Comment:     Meter: [id number] : [id number] MARITO CARTY   06/11/2022 0752 201 (H) 70 - 130 mg/dL Final     Comment:     Meter: [id number] : [id number] MARITO CARTY   06/10/2022 1815 168 (H) 70 - 130 mg/dL Final     Comment:     Meter:  [id number] : [id number] jael disla   06/10/2022 1640 55 (L) 70 - 130 mg/dL Final     Comment:     Meter: [id number] : [id number] jael disla   06/10/2022 1041 205 (H) 70 - 130 mg/dL Final     Comment:     Meter: [id number] : [id number] jael disla   06/10/2022 0516 161 (H) 70 - 130 mg/dL Final     Comment:     Meter: [id number] : [id number] [facility]   06/09/2022 1728 240 (H) 70 - 130 mg/dL Final     Comment:     Meter: [id number] : [id number] DENEEN EDOUARD   06/09/2022 1050 189 (H) 70 - 130 mg/dL Final     Comment:     Meter: [id number] : [id number] DENEEN EDOUARD     Lab Results   Component Value Date    HGBA1C 6.50 (H) 12/23/2021     Lab Results   Component Value Date    TSH 2.190 12/23/2021       Blood Culture   Date Value Ref Range Status   06/09/2022 Staphylococcus aureus (C)  Final     Comment:     Infectious disease consultation is highly recommended to rule out distant foci of infection.   06/09/2022 Staphylococcus aureus (C)  Final     Comment:     Infectious disease consultation is highly recommended to rule out distant foci of infection.   06/08/2022 Abnormal Stain (C)  Preliminary   06/08/2022 Staphylococcus aureus (C)  Final     Comment:     Infectious disease consultation is highly recommended to rule out distant foci of infection.     No results found for: URINECX  No results found for: WOUNDCX  No results found for: STOOLCX  Respiratory Culture   Date Value Ref Range Status   06/10/2022 Moderate growth (3+) Candida albicans (A)  Preliminary   06/10/2022 Rare Normal Respiratory [place]  Preliminary     Pain Management Panel    There is no flowsheet data to display.           ----------------------------------------------------------------------------------------------------------------------  Imaging Results (Last 24 Hours)       Procedure Component Value Units Date/Time    XR Chest 1 View [[id number]] Collected: 06/11/22 0538     Updated: 06/11/22 0541    Narrative:      SANA  Chest 1 Vw    INDICATION:   Endotracheal tube and OG tube placement. Respiratory failure and hypoxia.     COMPARISON:    6/10/2022    FINDINGS:  Single portable AP view(s) of the chest.    Endotracheal tube is in good position in the mid trachea. OG tube tip is in the fundus of the stomach. Patient is status post endovascular repair of the descending aorta. Heart size is stable. There is chronic elevation of the right hemidiaphragm. There  are some chronic changes in both lungs. Dense infiltrates in the left lung are similar to prior. No pneumothorax.       Impression:      Well-positioned OG tube and ET tube. Stable appearance of infiltrates and chronic changes in the lungs. No pneumothorax.    Signer Name: Karson Smart MD   Signed: 6/11/2022 5:38 AM   [facility]    [facility]    XR Chest 1 View [[id number]] Collected: 06/10/22 2314     Updated: 06/10/22 2316    Narrative:      CR Chest 1 Vw    INDICATION:   Worsening shortness of air today.     COMPARISON:    Chest 6/10/2022    FINDINGS:  Portable AP view(s) of the chest.    Dense infiltrate again noted throughout the left upper lung field with patchy infiltrates throughout the remainder of the left lung. Minimal patchy infiltrates throughout the right lung are unchanged. Chronic elevation of the right hemidiaphragm. No  large pleural effusions. No pneumothorax. Heart size and mediastinum are stable. Descending thoracic aortic stent graft. Lung sutures noted in the right suprahilar region.       Impression:      No significant interval change in patchy infiltrates throughout both lungs and dense infiltrate in the left upper lobe.    Signer Name: Yimi Garcia MD   Signed: 6/10/2022 11:14 PM   Workstation Name: [facility]    [facility]            ----------------------------------------------------------------------------------------------------------------------    Assessment/Plan        ASSESSMENT:    1.  Severe sepsis with lactic acid greater than 2 on admission  2.  MSSA bacteremia  3.  Pneumonia    PLAN:    Patient's respiratory status declined and patient is currently intubated at 50% FiO2.  WBC slightly elevated 12.72, likely related to steroid therapy.  Lactic acid 2.1.  Procalcitonin from 6/10/2022 improving at 0.49.  Respiratory culture from 6/10/2022 reports growth of Candida albicans, likely colonization.  Blood cultures from 6/10/2022 in process.  Blood cultures from 6/9/2022 2 out of 2 sets positive for MSSA.    Lactic acid 2.4 on admission.  Respiratory panel PCR negative.  Strep pneumo antigen negative.  Legionella negative.  Urinalysis from 6/8/2022 negative.  Procalcitonin from 6/8/2022 elevated at 2.67.  MRSA PCR negative, MSSA PCR positive.  Blood cultures from 6/8/2022 2 out of 2 sets positive for MSSA.    2D echo from 6/8/2022 reports no evidence of vegetation.  Chest x-ray from 6/9/2022 reports consolidation diffusely in the left lung similar to previous exam, minimal right basilar airspace disease.  CT angiogram from 6/8/2022 reports no evidence of PE, interval development of small nodules in the right lung and left lower lobe as well as dense consolidation in the pleural-based left upper lobe, stable mediastinal lymph node enlargement.     Recommend to continue Nafcillin 12 g IV continuous drip was initiated to continue with doxycycline 100 mg IV every 12 hours.  Recommend bronchoscopy for both diagnostic and therapeutic purposes.  We will continue to follow closely and adjust antibiotic therapy as needed.      Code Status:   Code Status and Medical Interventions:   Ordered at: 06/08/22 0818     Code Status (Patient has no pulse and is not breathing):    CPR (Attempt to Resuscitate)     Medical Interventions (Patient has pulse or is breathing):    Full Support       GOLDEN Desai  06/11/22  13:12 EDT

## 2022-06-11 NOTE — PROGRESS NOTES
Notified by nursing staff that patient had self-extubated. I quickly evaluated pt at bedside with nursing staff and RRT present. Pt awake and alert but confused. Initially placed on NRB but has been transitioned to bubble HFNC. Initially on 15L's upon my arrival but titrated down to 10L's while at bedside. Pt initially anxious and trying to get out of bed. I requested that RN titrate Precedex upward with better response noted. Pt currently able to be redirected and nursing staff able to calm him with supportive treatment. BP improved off sedation. Order a dose of IV Lasix as he continues to diurese well. O2 remaining in the mid-high 90's. Will attempt to titrate Precedex, monitor patient closely and avoid reintubation if at all possible in the setting of his severe COPD.       Idris Apodaca DO  06/11/22  15:31 EDT

## 2022-06-11 NOTE — NURSING NOTE
0505 Dr. Flannery at bedside to intubate patient.  0507 10.5mL Etomidate  0508 14mL Dipervan  0509 intubated by Dr. Flannery 8.0 ETT 23 lip  0513 OG@55   0513 chest xray ordered

## 2022-06-11 NOTE — NURSING NOTE
"Patient arrived to CCU alert with BHFNC in place. Patient had excellent urine output and was less tachypnic. Patient constantly asking for pain medication and to be put on the machine with the tube so he could be \"knocked out\". At approximately 0435 patient removed BHFNC and refused to put it back on.   Patient educated multiple times by myself, Lead RN Hernesto, Bailee VALLE, Dr. Flannery, and sitter at bedside. Patient became very agitated. Patient threw ice water at lead RN. Patient saturation continued to drop below 90% and was in the 70's and 60's at times. Patient stated he wanted the tube to breath with but continued to refuse any other form of oxygenation and subsequently was intubated. Son was notified by Bailee VALLE of intubation.  "

## 2022-06-11 NOTE — PROCEDURES
"Intubation    Date/Time: 6/11/2022 5:30 AM  Performed by: Jb Flannery MD  Authorized by: Jb Flannery MD   Consent: Verbal consent obtained.  Risks and benefits: risks, benefits and alternatives were discussed  Consent given by: patient  Patient understanding: patient states understanding of the procedure being performed  Patient consent: the patient's understanding of the procedure matches consent given  Imaging studies: imaging studies available  Required items: required blood products, implants, devices, and special equipment available  Patient identity confirmed: provided demographic data and arm band  Time out: Immediately prior to procedure a \"time out\" was called to verify the correct patient, procedure, equipment, support staff and site/side marked as required.  Indications: hypoxemia and  respiratory distress  Intubation method: video-assisted  Patient status: awake  Preoxygenation: nonrebreather mask  Pretreatment medications: none  Sedatives: propofol and etomidate  Paralytic: none  Laryngoscope size: Benedict with a 4 blade.  Tube size: 8.0 mm  Tube type: cuffed  Number of attempts: 1  Ventilation between attempts: BVM  Cricoid pressure: yes  Cords visualized: yes  Post-procedure assessment: CO2 detector  Breath sounds: equal  Cuff inflated: yes  ETT to lip: 23 cm  ETT to teeth: 22 cm  Tube secured with: ETT andrew  Patient tolerance: patient tolerated the procedure well with no immediate complications        "

## 2022-06-11 NOTE — PROCEDURES
IV access for pressors    Physician Dr Palumbo    Procedure  The right chest and neck was prepped and draped. Local was injected and the subclavian vein accessed. The dilator was passed and the catheter inserted. It was sutured in at 18 cm. All ports flushed and had blood return. The dressing was applied and CXR ordered.

## 2022-06-11 NOTE — CONSULTS
Psychiatry again consulted for suicidal ideation.  Patient has continued to make comments that he wants to shoot himself.  I previously evaluated him 3 days ago for this issue and he stated that he did not want to do this but he was suffering with left-sided chest pain and difficulty breathing and it has been worse in the past 2 weeks prior to hospitalization leading him to feeling that he could not live this way.  He did state that if his pain was improved, his feelings about self-harm what improved.  Prior to my second evaluation of the patient, his respiratory status worsened and patient has been intubated and is too sedated to participate in clinical evaluation today.

## 2022-06-11 NOTE — PLAN OF CARE
Problem: Fall Injury Risk  Goal: Absence of Fall and Fall-Related Injury  Outcome: Ongoing, Not Progressing  Intervention: Identify and Manage Contributors  Recent Flowsheet Documentation  Taken 6/11/2022 0400 by Marie Ferreira RN  Medication Review/Management: medications reviewed  Taken 6/11/2022 0200 by Marie Ferreira RN  Medication Review/Management: medications reviewed  Intervention: Promote Injury-Free Environment  Recent Flowsheet Documentation  Taken 6/11/2022 0400 by Marie Ferreira RN  Safety Promotion/Fall Prevention: safety round/check completed  Taken 6/11/2022 0200 by Marie Ferreira RN  Safety Promotion/Fall Prevention: safety round/check completed     Problem: Adult Inpatient Plan of Care  Goal: Plan of Care Review  Outcome: Ongoing, Not Progressing  Goal: Patient-Specific Goal (Individualized)  Outcome: Ongoing, Not Progressing  Goal: Absence of Hospital-Acquired Illness or Injury  Outcome: Ongoing, Not Progressing  Intervention: Identify and Manage Fall Risk  Recent Flowsheet Documentation  Taken 6/11/2022 0400 by Marie Ferreira RN  Safety Promotion/Fall Prevention: safety round/check completed  Taken 6/11/2022 0200 by Marie Ferreira RN  Safety Promotion/Fall Prevention: safety round/check completed  Intervention: Prevent Skin Injury  Recent Flowsheet Documentation  Taken 6/11/2022 0400 by Marie Ferreira RN  Body Position: position changed independently  Taken 6/11/2022 0200 by Marie Ferreira RN  Body Position: position changed independently  Skin Protection: adhesive use limited  Intervention: Prevent and Manage VTE (Venous Thromboembolism) Risk  Recent Flowsheet Documentation  Taken 6/11/2022 0400 by Marie Ferreira RN  Activity Management: activity adjusted per tolerance  Taken 6/11/2022 0200 by Marie Ferreira RN  Activity Management: activity adjusted per tolerance  VTE Prevention/Management: bleeding risk factor(s) identified  Range of Motion: active ROM (range of motion)  encouraged  Goal: Optimal Comfort and Wellbeing  Outcome: Ongoing, Not Progressing  Intervention: Provide Person-Centered Care  Recent Flowsheet Documentation  Taken 6/11/2022 0200 by Marie Ferreira RN  Trust Relationship/Rapport:   care explained   questions answered   questions encouraged   reassurance provided   thoughts/feelings acknowledged  Goal: Readiness for Transition of Care  Outcome: Ongoing, Not Progressing     Problem: COPD (Chronic Obstructive Pulmonary Disease) Comorbidity  Goal: Maintenance of COPD Symptom Control  Outcome: Ongoing, Not Progressing  Intervention: Maintain COPD-Symptom Control  Recent Flowsheet Documentation  Taken 6/11/2022 0400 by Marie Ferreira RN  Medication Review/Management: medications reviewed  Taken 6/11/2022 0200 by Marie Ferreira RN  Medication Review/Management: medications reviewed     Problem: Hypertension Comorbidity  Goal: Blood Pressure in Desired Range  Outcome: Ongoing, Not Progressing  Intervention: Maintain Blood Pressure Management  Recent Flowsheet Documentation  Taken 6/11/2022 0400 by Marie Ferreira RN  Medication Review/Management: medications reviewed  Taken 6/11/2022 0200 by Marie Ferreira RN  Medication Review/Management: medications reviewed     Problem: Pain Chronic (Persistent) (Comorbidity Management)  Goal: Acceptable Pain Control and Functional Ability  Outcome: Ongoing, Not Progressing  Intervention: Manage Persistent Pain  Recent Flowsheet Documentation  Taken 6/11/2022 0400 by Marie Ferreira RN  Medication Review/Management: medications reviewed  Taken 6/11/2022 0200 by Marie Ferreira RN  Medication Review/Management: medications reviewed  Intervention: Optimize Psychosocial Wellbeing  Recent Flowsheet Documentation  Taken 6/11/2022 0200 by Marie Ferreira RN  Family/Support System Care: support provided     Problem: Skin Injury Risk Increased  Goal: Skin Health and Integrity  Outcome: Ongoing, Not Progressing  Intervention: Optimize Skin  Protection  Recent Flowsheet Documentation  Taken 6/11/2022 0400 by Marie Ferreira, RN  Head of Bed (HOB) Positioning: HOB at 30-45 degrees  Taken 6/11/2022 0200 by Marie Ferreira RN  Pressure Reduction Techniques: frequent weight shift encouraged  Head of Bed (HOB) Positioning: HOB at 30-45 degrees  Pressure Reduction Devices:   pressure-redistributing mattress utilized   heel offloading device utilized   positioning supports utilized  Skin Protection: adhesive use limited     Problem: Suicide Risk  Goal: Absence of Self-Harm  Outcome: Ongoing, Not Progressing  Intervention: Promote Psychosocial Wellbeing  Recent Flowsheet Documentation  Taken 6/11/2022 0200 by Marie Ferreira RN  Family/Support System Care: support provided  Intervention: Assess Risk to Self and Maintain Safety  Recent Flowsheet Documentation  Taken 6/11/2022 0400 by Marie Ferreira RN  Self-Harm Prevention: observed one-to-one  Goal: Absence of Self-Harm  Outcome: Ongoing, Not Progressing  Intervention: Promote Psychosocial Wellbeing  Recent Flowsheet Documentation  Taken 6/11/2022 0200 by Marie Ferreira RN  Family/Support System Care: support provided  Intervention: Assess Risk to Self and Maintain Safety  Recent Flowsheet Documentation  Taken 6/11/2022 0400 by Marie Ferreira RN  Self-Harm Prevention: observed one-to-one     Problem: Suicidal Behavior  Goal: Suicidal Behavior is Absent or Managed  Outcome: Ongoing, Not Progressing

## 2022-06-11 NOTE — NURSING NOTE
Patient transferred to [facility], family members took all patient's belongings

## 2022-06-11 NOTE — PLAN OF CARE
Goal Outcome Evaluation:  Plan of Care Reviewed With: patient        Progress: improving  Upon arrival pt was intubated with a continuous MAP of 60-65. Levophed was started. He had a central line placed. Pt was tolerating vent well until 1500 when he extubated himself. Pt is now on 1.4 of precedex and 10 L NC high flow. He is alert and oriented to only person. He failed his bedside swallow evaluation. VSS, pt appears to be asleep with call light in hand. Will continue to monitor.

## 2022-06-11 NOTE — PROGRESS NOTES
[facility] HOSPITALISTS CROSS COVER NOTE    Patient Identification:  Name:  Kb Marshall  Age:  77 y.o.  Sex:  male  :  1945  MRN:  [id number]  Visit number:  [id number]  Primary Care Provider:  Scar Mejia MD    Length of stay in inpatient status:  3    Brief Update     After patient was moved to the CCU, he asked for hours to be intubated.  However, by the time that he arrived to the CCU, over one liter of urine had been produced after he was given the IV Lasix.  The patient's oxygen saturations did improve with this and we were actually able to start weaning his oxygen demand.  However, for a couple hours, the patient was yelling out that he wanted to be intubated.  He was alert and somewhat oriented.  Then, the patient ripped off his nasal cannula oxygen and his oxygen saturations dropped to the 60% range.  We immediately called his son, who stated that he would like the patient intubated if the patient would not wear the oxygen.  We did offer her the son to talk to his dad either on the phone or in person but the son stated that he did not think that the conversation would improve the chances of his father putting on the oxygen.  We tried several more times to redirect the patient and to apply the oxygen; we even tried to give him different options of masks and nasal cannulas.  However, we were unable to get the patient's oxygenation up with noninvasive measures and thus the patient was intubated per the son's request.    Jb Flannery MD  [facility]  22  05:32 EDT

## 2022-06-12 NOTE — PROGRESS NOTES
Patient Identification:  Name:  Kb Marshall  Age:  77 y.o.  Sex:  male  :  1945  MRN:  [id number]  Visit Number:  [id number]    Chief Complaint:   SOB and chest pain    Subjective:    Pt developed respiratory difficulty and required intubation, Transferred to ICU. He remains on the vent with no active rhythm issues.   ----------------------------------------------------------------------------------------------------------------------  Current Hospital Meds:  apixaban, 5 mg, Oral, Q12H  chlorhexidine, 15 mL, Mouth/Throat, Q12H  doxycycline, 100 mg, Intravenous, Q12H  famotidine, 20 mg, Intravenous, BID  guaiFENesin, 1,200 mg, Oral, Q12H  Insulin Aspart, 0-9 Units, Subcutaneous, TID AC  ipratropium-albuterol, 3 mL, Nebulization, 4x Daily - RT  lidocaine, 1 patch, Transdermal, Q24H  methylPREDNISolone sodium succinate, 40 mg, Intravenous, Daily  nafcillin (UNIPEN) 12 g in  mL continuous infusion, 12 g, Intravenous, Q24H  nystatin, 5 mL, Oral, 4x Daily  sodium chloride, 10 mL, Intravenous, Q12H  sodium chloride, 10 mL, Intravenous, Q12H  sodium chloride, 10 mL, Intravenous, Q12H  sodium chloride, 10 mL, Intravenous, Q12H  sodium chloride, 3 mL, Nebulization, BID - RT      dexmedetomidine, 0.2-1.5 mcg/kg/hr, Last Rate: 1 mcg/kg/hr (22)  fentaNYL Citrate, , Last Rate: Stopped (22)  norepinephrine, 0.02-0.3 mcg/kg/min, Last Rate: Stopped (22)  propofol, 5-50 mcg/kg/min      ----------------------------------------------------------------------------------------------------------------------  Vital Signs:  Temp:  [97.9 °F (36.6 °C)-100.4 °F (38 °C)] 99.1 °F (37.3 °C)  Heart Rate:  [] 62  Resp:  [18-38] 18  BP: ()/() 103/69  FiO2 (%):  [50 %] 50 %      22  0432 06/10/22  0526 22  1400   Weight: 76.6 kg (168 lb 14.4 oz) 72.6 kg (160 lb) 72.6 kg (160 lb 0.9 oz)     Body mass index is 25.06 kg/m².    Intake/Output Summary (Last 24 hours) at  6/11/2022 2120  Last data filed at 6/11/2022 1800  Gross per 24 hour   Intake 1385.83 ml   Output 3600 ml   Net -2214.17 ml     NPO Diet NPO Type: Ice Chips  ----------------------------------------------------------------------------------------------------------------------  Physical exam:  Constitutional:  Well-developed and well-nourished.  No respiratory distress.      HENT:  Head:  Normocephalic and atraumatic.  Mouth:  Moist mucous membranes.    Eyes:  Conjunctivae and EOM are normal.  Pupils are equal, round, and reactive to light.  No scleral icterus.    Neck:  Neck supple.  No JVD present.    Cardiovascular:  Normal rate, regular rhythm and normal heart sounds with no murmur.  Pulmonary/Chest:  No respiratory distress, no wheezes, no crackles, with normal breath sounds and good air movement.  Abdominal:  Soft.  Bowel sounds are normal.  No distension and no tenderness.   Musculoskeletal:  No edema, no tenderness, and no deformity.  No red or swollen joints anywhere.    Neurological:  Alert and oriented to person, place, and time.  No cranial nerve deficit.  No tongue deviation.  No facial droop.  No slurred speech.   Skin:  Skin is warm and dry. No rash noted. No pallor.   Peripheral vascular:  Strong pulses in all 4 extremities with no clubbing, no cyanosis, no edema.  ----------------------------------------------------------------------------------------------------------------------  Tele:  Sinus rhythm  ----------------------------------------------------------------------------------------------------------------------  Results from last 7 days   Lab Units 06/10/22  2358 06/08/22  0540   TROPONIN T ng/mL 0.012 <0.010     Results from last 7 days   Lab Units 06/10/22  2358 06/10/22  0038 06/09/22  0324 06/08/22  0850 06/08/22  0540   CRP mg/dL  --   --   --   --  43.58*   LACTATE mmol/L 2.1*  --   --  2.0 2.4*   WBC 10*3/mm3 12.72* 9.63 12.16*  --  15.19*   HEMOGLOBIN g/dL 12.5* 11.6* 12.2*  --  12.7*    HEMATOCRIT % 40.2 36.8* 40.1  --  39.9   MCV fL 81.9 80.9 84.8  --  81.4   MCHC g/dL 31.1* 31.5 30.4*  --  31.8   PLATELETS 10*3/mm3 201 164 186  --  200   INR   --   --   --   --  1.24*     Results from last 7 days   Lab Units 06/11/22  1414   PH, ARTERIAL pH units 7.398   PO2 ART mm Hg 67.7*   PCO2, ARTERIAL mm Hg 54.6*   HCO3 ART mmol/L 33.6*     Results from last 7 days   Lab Units 06/10/22  2358 06/10/22  0038 06/09/22  0324   SODIUM mmol/L 139 140 135*   POTASSIUM mmol/L 3.3* 4.0 4.6   MAGNESIUM mg/dL 1.9 2.0 2.3   CHLORIDE mmol/L 99 100 98   CO2 mmol/L 27.3 25.1 22.1   BUN mg/dL 26* 38* 38*   CREATININE mg/dL 0.58* 0.68* 0.81   CALCIUM mg/dL 8.8 8.5* 8.7   GLUCOSE mg/dL 211* 165* 154*   ALBUMIN g/dL 2.74* 2.64* 2.04*   BILIRUBIN mg/dL 1.0 0.6 0.5   ALK PHOS U/L 84 67 73   AST (SGOT) U/L 18 13 13   ALT (SGPT) U/L 13 12 10   Estimated Creatinine Clearance: 111.3 mL/min (A) (by C-G formula based on SCr of 0.58 mg/dL (L)).    No results found for: AMMONIA      Blood Culture   Date Value Ref Range Status   06/10/2022 No growth at 24 hours  Preliminary   06/10/2022 No growth at 24 hours  Preliminary   06/09/2022 Staphylococcus aureus (C)  Final     Comment:     Infectious disease consultation is highly recommended to rule out distant foci of infection.   06/09/2022 Staphylococcus aureus (C)  Final     Comment:     Infectious disease consultation is highly recommended to rule out distant foci of infection.   06/08/2022 Abnormal Stain (C)  Preliminary   06/08/2022 Staphylococcus aureus (C)  Final     Comment:     Infectious disease consultation is highly recommended to rule out distant foci of infection.     No results found for: URINECX  No results found for: WOUNDCX  No results found for: STOOLCX    I have personally looked at the labs and they are summarized above.  ----------------------------------------------------------------------------------------------------------------------  Imaging Results (Last 24  Hours)     Procedure Component Value Units Date/Time    XR Chest 1 View [[id number]] Collected: 06/11/22 1556     Updated: 06/11/22 1602    Narrative:      XR CHEST 1 VW-     CLINICAL INDICATION: Cental line placement; I48.91-Unspecified atrial  fibrillation; J18.9-Pneumonia, unspecified organism; I50.9-Heart  failure, unspecified; J96.21-Acute and chronic respiratory failure with  hypoxia        COMPARISON: 06/11/2022      TECHNIQUE: Single frontal view of the chest.     FINDINGS:      LUNGS: Right-sided subclavian central line is been placed. The tip is at  the atrial junction. No pneumothorax is seen  Continued bilateral airspace disease.  Patient has been extubated and the nasogastric tube is been removed.      HEART AND MEDIASTINUM: Heart and mediastinal contours are unremarkable        SKELETON: Bony and soft tissue structures are unremarkable.             Impression:      Central line as above     This report was finalized on 6/11/2022 3:57 PM by Dr. Leif Taveras MD.       US Venous Doppler Lower Extremity Bilateral (duplex) [[id number]] Collected: 06/11/22 1317     Updated: 06/11/22 1319    Narrative:      US VENOUS DOPPLER LOWER EXTREMITY BILATERAL (DUPLEX)-     CLINICAL INDICATION: worsening hypoxia and limited mobility;  I48.91-Unspecified atrial fibrillation; J18.9-Pneumonia, unspecified  organism; I50.9-Heart failure, unspecified; J96.21-Acute and chronic  respiratory failure with hypoxia        COMPARISON: 10/31/2021      TECHNIQUE: Color Doppler imaging was used with compression and  augmentation to evaluate the lower extremity deep venous system.     FINDINGS:   There is patent spontaneous flow from the common femoral vein through  the posterior tibial veins.  There was no internal clot or area of noncompressibility.  Normal augmentation was elicited where applicable.       Impression:      No DVT in the lower extremities on today's exam.      This report was finalized on 6/11/2022 1:17 PM by   Leif Taveras MD.       XR Chest 1 View [[id number]] Collected: 06/11/22 0538     Updated: 06/11/22 0541    Narrative:      CR Chest 1 Vw    INDICATION:   Endotracheal tube and OG tube placement. Respiratory failure and hypoxia.     COMPARISON:    6/10/2022    FINDINGS:  Single portable AP view(s) of the chest.    Endotracheal tube is in good position in the mid trachea. OG tube tip is in the fundus of the stomach. Patient is status post endovascular repair of the descending aorta. Heart size is stable. There is chronic elevation of the right hemidiaphragm. There  are some chronic changes in both lungs. Dense infiltrates in the left lung are similar to prior. No pneumothorax.       Impression:      Well-positioned OG tube and ET tube. Stable appearance of infiltrates and chronic changes in the lungs. No pneumothorax.    Signer Name: Karson Smart MD   Signed: 6/11/2022 5:38 AM   Workstation Name: [facility]    [facility]    XR Chest 1 View [[id number]] Collected: 06/10/22 2314     Updated: 06/10/22 2316    Narrative:      CR Chest 1 Vw    INDICATION:   Worsening shortness of air today.     COMPARISON:    Chest 6/10/2022    FINDINGS:  Portable AP view(s) of the chest.    Dense infiltrate again noted throughout the left upper lung field with patchy infiltrates throughout the remainder of the left lung. Minimal patchy infiltrates throughout the right lung are unchanged. Chronic elevation of the right hemidiaphragm. No  large pleural effusions. No pneumothorax. Heart size and mediastinum are stable. Descending thoracic aortic stent graft. Lung sutures noted in the right suprahilar region.       Impression:      No significant interval change in patchy infiltrates throughout both lungs and dense infiltrate in the left upper lobe.    Signer Name: Yimi Garcia MD   Signed: 6/10/2022 11:14 PM   [facility]    [facility]         ----------------------------------------------------------------------------------------------------------------------    Assessment:  Chest pain which has been atypical with negative troponin  PAF now in sinus rhythm  Dyspnea with respiratory failure requiring intubation   Pt self-extubated  Suicidal ideation     Plan:  Ischemic evaluation when back in PCU  He will likely need chronic anticoagulation       Joss Monson MD  06/11/22  21:20 EDT        Director, Cardiac Cath Lab

## 2022-06-12 NOTE — PLAN OF CARE
Goal Outcome Evaluation:           Progress: no change  Outcome Evaluation: Received patient to room 349b as a transfer from CCU under the services of the Hospitalist.  Patient is now comfort measures.  O2 at 10L bubble high flow nasal cannula, o2 sat 95-97%. Family at bedside.

## 2022-06-12 NOTE — PROGRESS NOTES
[facility] HOSPITALISTS CROSS COVER NOTE    Patient Identification:  Name:  Kb Marshall  Age:  77 y.o.  Sex:  male  :  1945  MRN:  [id number]  Visit number:  [id number]  Primary Care Provider:  Scar Mejia MD    Length of stay in inpatient status:  4    Brief Update     Called about the patient having outbursts of confusion.  We did try some morphine first as the patient was having pain.  The patient did sleep for a few hours after receiving this medication.  However, the patient did wake up later in the night and unhooked his nafcillin and swallowed about 50 mL of this medication.  When his night time bedside nurse Marie confronted him, the patient told her that he was thirsty.  We contacted Jb in pharmacy and because the medication was a desiccant, Poison control was contacted.  Poison control stated that no further interventions were needed.  I have ordered a one-to-one sitter for the patient due to the inability to redirect the patient due to his memory issues.      Jb Flannery MD  [facility]  22  08:41 EDT

## 2022-06-12 NOTE — PROGRESS NOTES
PROGRESS NOTE         Patient Identification:  Name:  Kb Marshall  Age:  77 y.o.  Sex:  male  :  1945  MRN:  [id number]  Visit Number:  [id number]  Primary Care Provider:  Scar Mejia MD         LOS: 4 days       ----------------------------------------------------------------------------------------------------------------------  Subjective       Chief Complaints:    Shortness of Breath        Interval History:      Patient self extubated yesterday evening and is currently on 10 L bubble nasal cannula this morning.  WBC normal.  Blood cultures from 6/10/2022 show no growth thus far.  Respiratory culture finalized as Candida albicans, likely colonization and does not need to be treated.  Chest x-ray from 2022 reports very little overall change, diffuse consolidation in the left lung with minimal airspace disease in the right lung.    Review of Systems:    Unable to obtain.    ----------------------------------------------------------------------------------------------------------------------      Objective       Current Hospital Meds:  chlorhexidine, 15 mL, Mouth/Throat, Q12H  famotidine, 20 mg, Intravenous, BID  guaiFENesin, 1,200 mg, Oral, Q12H  lidocaine, 1 patch, Transdermal, Q24H  nystatin, 5 mL, Oral, 4x Daily  sodium chloride, 10 mL, Intravenous, Q12H  sodium chloride, 10 mL, Intravenous, Q12H  sodium chloride, 10 mL, Intravenous, Q12H  sodium chloride, 10 mL, Intravenous, Q12H  sodium chloride, 3 mL, Nebulization, BID - RT         ----------------------------------------------------------------------------------------------------------------------    Vital Signs:  Temp:  [97.7 °F (36.5 °C)-100.1 °F (37.8 °C)] 97.7 °F (36.5 °C)  Heart Rate:  [] 64  Resp:  [18-22] 22  BP: ()/() 145/90  FiO2 (%):  [50 %] 50 %  Mean Arterial Pressure (Non-Invasive) for the past 24 hrs (Last 3 readings):   Noninvasive MAP (mmHg)   22 1202 108   22 1132 96   22  1117 100     SpO2 Percentage    06/12/22 1132 06/12/22 1145 06/12/22 1202   SpO2: 97% 98% 100%     SpO2:  [90 %-100 %] 100 %  on  Flow (L/min):  [10] 10;   Device (Oxygen Therapy): high-flow nasal cannula;bubble    Body mass index is 25.06 kg/m².  Wt Readings from Last 3 Encounters:   06/11/22 72.6 kg (160 lb 0.9 oz)   02/23/22 73.9 kg (163 lb)   02/09/22 78.5 kg (173 lb)        Intake/Output Summary (Last 24 hours) at 6/12/2022 1223  Last data filed at 6/12/2022 1123  Gross per 24 hour   Intake 660.06 ml   Output 1750 ml   Net -1089.94 ml     No diet orders on file  ----------------------------------------------------------------------------------------------------------------------      Physical Exam:       Constitutional:  Obese, chronically ill-appearing.   HENT:  Head: Normocephalic and atraumatic.  Mouth:  Moist mucous membranes.    Eyes:  Conjunctivae and EOM are normal.  No scleral icterus.  Neck:  Neck supple.  No JVD present.    Cardiovascular:  Normal rate, regular rhythm and normal heart sounds with no murmur. No edema.  Pulmonary/Chest: Coarse lung sounds noted bilaterally.   Abdominal:  Soft.  Bowel sounds are normal.  No distension and no tenderness.   Musculoskeletal:  No edema, no tenderness, and no deformity.  No swelling or redness of joints.  Neurological:   Confused, drowsy.  Skin:  Skin is warm and dry.  No rash noted.  No pallor.   Psychiatric:  Anxious.    ----------------------------------------------------------------------------------------------------------------------  Results from last 7 days   Lab Units 06/10/22  2358 06/08/22  0540   TROPONIN T ng/mL 0.012 <0.010     Results from last 7 days   Lab Units 06/10/22  2358 06/08/22  0540   PROBNP pg/mL 14,507.0* 11,717.0*       Results from last 7 days   Lab Units 06/11/22  1414   PH, ARTERIAL pH units 7.398   PO2 ART mm Hg 67.7*   PCO2, ARTERIAL mm Hg 54.6*   HCO3 ART mmol/L 33.6*     Results from last 7 days   Lab Units 06/12/22  0045  06/10/22  2358 06/10/22  0038 06/09/22  0324 06/08/22  0850 06/08/22  0540   CRP mg/dL  --   --   --   --   --  43.58*   LACTATE mmol/L  --  2.1*  --   --  2.0 2.4*   WBC 10*3/mm3 10.33 12.72* 9.63   < >  --  15.19*   HEMOGLOBIN g/dL 11.3* 12.5* 11.6*   < >  --  12.7*   HEMATOCRIT % 36.5* 40.2 36.8*   < >  --  39.9   MCV fL 82.4 81.9 80.9   < >  --  81.4   MCHC g/dL 31.0* 31.1* 31.5   < >  --  31.8   PLATELETS 10*3/mm3 164 201 164   < >  --  200   INR   --   --   --   --   --  1.24*    < > = values in this interval not displayed.     Results from last 7 days   Lab Units 06/12/22  0045 06/10/22  2358 06/10/22  0038   SODIUM mmol/L 140 139 140   POTASSIUM mmol/L 3.1* 3.3* 4.0   MAGNESIUM mg/dL 1.9 1.9 2.0   CHLORIDE mmol/L 99 99 100   CO2 mmol/L 30.3* 27.3 25.1   BUN mg/dL 26* 26* 38*   CREATININE mg/dL 0.61* 0.58* 0.68*   CALCIUM mg/dL 8.1* 8.8 8.5*   GLUCOSE mg/dL 150* 211* 165*   ALBUMIN g/dL 2.41* 2.74* 2.64*   BILIRUBIN mg/dL 0.9 1.0 0.6   ALK PHOS U/L 71 84 67   AST (SGOT) U/L 13 18 13   ALT (SGPT) U/L 11 13 12   Estimated Creatinine Clearance: 105.8 mL/min (A) (by C-G formula based on SCr of 0.61 mg/dL (L)).  No results found for: AMMONIA    Glucose   Date/Time Value Ref Range Status   06/12/2022 1103 135 (H) 70 - 130 mg/dL Final     Comment:     Meter: [id number] : [id number] MARITO MACHELLE   06/12/2022 0724 138 (H) 70 - 130 mg/dL Final     Comment:     Meter: [id number] : [id number] MARITO CARTY   06/11/2022 1635 165 (H) 70 - 130 mg/dL Final     Comment:     Meter: [id number] : [id number] MARITO CARTY   06/11/2022 1108 135 (H) 70 - 130 mg/dL Final     Comment:     Meter: [id number] : [id number] MARITO CARTY   06/11/2022 0752 201 (H) 70 - 130 mg/dL Final     Comment:     Meter: [id number] : [id number] MARITO CARTY   06/10/2022 1815 168 (H) 70 - 130 mg/dL Final     Comment:     Meter: [id number] : [id number] jael disla   06/10/2022 1640 55 (L) 70 - 130 mg/dL Final     Comment:      Meter: [id number] : [id number] jael disla   06/10/2022 1041 205 (H) 70 - 130 mg/dL Final     Comment:     Meter: [id number] : [id number] jael disla     Lab Results   Component Value Date    HGBA1C 6.50 (H) 12/23/2021     Lab Results   Component Value Date    TSH 2.190 12/23/2021       Blood Culture   Date Value Ref Range Status   06/09/2022 Staphylococcus aureus (C)  Final     Comment:     Infectious disease consultation is highly recommended to rule out distant foci of infection.   06/09/2022 Staphylococcus aureus (C)  Final     Comment:     Infectious disease consultation is highly recommended to rule out distant foci of infection.   06/08/2022 Abnormal Stain (C)  Preliminary   06/08/2022 Staphylococcus aureus (C)  Final     Comment:     Infectious disease consultation is highly recommended to rule out distant foci of infection.     No results found for: URINECX  No results found for: WOUNDCX  No results found for: STOOLCX  Respiratory Culture   Date Value Ref Range Status   06/10/2022 Moderate growth (3+) Candida albicans (A)  Preliminary   06/10/2022 Rare Normal Respiratory [place]  Preliminary     Pain Management Panel    There is no flowsheet data to display.           ----------------------------------------------------------------------------------------------------------------------  Imaging Results (Last 24 Hours)       Procedure Component Value Units Date/Time    XR Chest AP [[id number]] Collected: 06/12/22 1109     Updated: 06/12/22 1112    Narrative:      XR CHEST AP-     CLINICAL INDICATION: Resp failure; I48.91-Unspecified atrial  fibrillation; J18.9-Pneumonia, unspecified organism; I50.9-Heart  failure, unspecified; J96.21-Acute and chronic respiratory failure with  hypoxia        COMPARISON: 06/11/2022      TECHNIQUE: Single frontal view of the chest.     FINDINGS:      LUNGS: Diffuse consolidation in the left lung. Minimal airspace disease  in the right lung.      HEART AND  MEDIASTINUM: Heart and mediastinal contours are unremarkable        SKELETON: Bony and soft tissue structures are unremarkable.             Impression:      Very little overall change     This report was finalized on 6/12/2022 11:10 AM by Dr. Leif Taveras MD.       XR Chest 1 View [[id number]] Collected: 06/11/22 1556     Updated: 06/11/22 1602    Narrative:      XR CHEST 1 VW-     CLINICAL INDICATION: Cental line placement; I48.91-Unspecified atrial  fibrillation; J18.9-Pneumonia, unspecified organism; I50.9-Heart  failure, unspecified; J96.21-Acute and chronic respiratory failure with  hypoxia        COMPARISON: 06/11/2022      TECHNIQUE: Single frontal view of the chest.     FINDINGS:      LUNGS: Right-sided subclavian central line is been placed. The tip is at  the atrial junction. No pneumothorax is seen  Continued bilateral airspace disease.  Patient has been extubated and the nasogastric tube is been removed.      HEART AND MEDIASTINUM: Heart and mediastinal contours are unremarkable        SKELETON: Bony and soft tissue structures are unremarkable.             Impression:      Central line as above     This report was finalized on 6/11/2022 3:57 PM by Dr. Leif Taveras MD.       US Venous Doppler Lower Extremity Bilateral (duplex) [[id number]] Collected: 06/11/22 1317     Updated: 06/11/22 1319    Narrative:      US VENOUS DOPPLER LOWER EXTREMITY BILATERAL (DUPLEX)-     CLINICAL INDICATION: worsening hypoxia and limited mobility;  I48.91-Unspecified atrial fibrillation; J18.9-Pneumonia, unspecified  organism; I50.9-Heart failure, unspecified; J96.21-Acute and chronic  respiratory failure with hypoxia        COMPARISON: 10/31/2021      TECHNIQUE: Color Doppler imaging was used with compression and  augmentation to evaluate the lower extremity deep venous system.     FINDINGS:   There is patent spontaneous flow from the common femoral vein through  the posterior tibial veins.  There was no internal clot or area  of noncompressibility.  Normal augmentation was elicited where applicable.       Impression:      No DVT in the lower extremities on today's exam.      This report was finalized on 6/11/2022 1:17 PM by Dr. Leif Taveras MD.               ----------------------------------------------------------------------------------------------------------------------    Assessment/Plan       ASSESSMENT:    1.  Severe sepsis with lactic acid greater than 2 on admission  2.  MSSA bacteremia  3.  Pneumonia    PLAN:    Patient self extubated yesterday evening and is currently on 10 L bubble nasal cannula this morning.  WBC normal.  Blood cultures from 6/10/2022 show no growth thus far.  Respiratory culture finalized as Candida albicans, likely colonization and does not need to be treated.  Chest x-ray from 6/12/2022 reports very little overall change, diffuse consolidation in the left lung with minimal airspace disease in the right lung.    Procalcitonin from 6/10/2022 improving at 0.49.     Blood cultures from 6/9/2022 2 out of 2 sets positive for MSSA.    Lactic acid 2.4 on admission.  Respiratory panel PCR negative.  Strep pneumo antigen negative.  Legionella negative.  Urinalysis from 6/8/2022 negative.  Procalcitonin from 6/8/2022 elevated at 2.67.  MRSA PCR negative, MSSA PCR positive.  Blood cultures from 6/8/2022 2 out of 2 sets positive for MSSA.    2D echo from 6/8/2022 reports no evidence of vegetation.  Chest x-ray from 6/9/2022 reports consolidation diffusely in the left lung similar to previous exam, minimal right basilar airspace disease.  CT angiogram from 6/8/2022 reports no evidence of PE, interval development of small nodules in the right lung and left lower lobe as well as dense consolidation in the pleural-based left upper lobe, stable mediastinal lymph node enlargement.     Recommend to continue Nafcillin 12 g IV continuous drip was initiated to continue with doxycycline 100 mg IV every 12 hours.      Patient  has been transitioned to comfort measures and antibiotic therapy has been discontinued.  ID will sign off for now.  Please contact us if we can further assist in this case.      Code Status:   Code Status and Medical Interventions:   Ordered at: 06/12/22 1154     Level Of Support Discussed With:    Next of Kin (If No Surrogate)     Code Status (Patient has no pulse and is not breathing):    No CPR (Do Not Attempt to Resuscitate)     Medical Interventions (Patient has pulse or is breathing):    Comfort Measures       GOLDEN Desai  06/12/22  12:23 EDT

## 2022-06-12 NOTE — PROGRESS NOTES
[facility] HOSPITALIST PROGRESS NOTE    Subjective     History:   Kb Marshall is a 77 y.o. male admitted on 6/8/2022 secondary to Atrial fibrillation with rapid ventricular response (HCC)     Procedures:   6/11/22: Intubation  6/11/22: Right subclavian central line placement   6/11/22: Self-extubation       CC: Follow up sepsis, pneumonia, resp failure    Patient seen and examined with LEONARD Warner. Awake and alert but confused. Able to provide limited history. Reports dyspnea and generalized pain. Currently on Precedex and appears less restless compared to previous. RN reports episode of worsening confusion overnight during which time he unhooked his IV and proceeded to swallow some of his antibiotic infusion. Poison control contacted with no further interventions recommended. O2 remaining stable on bubble HFNC.     History taken from: chart, and RN.      Objective     Vital Signs  Temp:  [97.7 °F (36.5 °C)-99.1 °F (37.3 °C)] 97.7 °F (36.5 °C)  Heart Rate:  [49-87] 72  Resp:  [18-22] 22  BP: ()/() 140/94    Intake/Output Summary (Last 24 hours) at 6/12/2022 1633  Last data filed at 6/12/2022 1532  Gross per 24 hour   Intake 721.21 ml   Output 2475 ml   Net -1753.79 ml         Physical Exam:  General:    Awake and alert but confused, no acute distress, chronically ill appearing    Heart:      Normal S1 and S2. Regular rate and rhythm. No significant murmur, rubs or gallops appreciated.   Lungs:     Respirations appear regular, even and unlabored. Diminished breath sounds throughout. No wheezes.     Abdomen:   Soft and nontender. No guarding, rebound tenderness or  organomegaly noted. Bowel sounds present x 4.   Extremities:  Trace-1+ bilateral lower extremity edema.      Results Review:    Results from last 7 days   Lab Units 06/12/22  0045 06/10/22  2358 06/10/22  0038 06/09/22  0324 06/08/22  0540   WBC 10*3/mm3 10.33 12.72* 9.63 12.16* 15.19*   HEMOGLOBIN g/dL 11.3* 12.5* 11.6* 12.2*  12.7*   PLATELETS 10*3/mm3 164 201 164 186 200     Results from last 7 days   Lab Units 06/12/22  0045 06/10/22  2358 06/10/22  0038 06/09/22  0324 06/08/22  0540   SODIUM mmol/L 140 139 140 135* 136   POTASSIUM mmol/L 3.1* 3.3* 4.0 4.6 4.5   CHLORIDE mmol/L 99 99 100 98 96*   CO2 mmol/L 30.3* 27.3 25.1 22.1 24.8   BUN mg/dL 26* 26* 38* 38* 36*   CREATININE mg/dL 0.61* 0.58* 0.68* 0.81 0.94   CALCIUM mg/dL 8.1* 8.8 8.5* 8.7 8.9   GLUCOSE mg/dL 150* 211* 165* 154* 246*     Results from last 7 days   Lab Units 06/12/22  0045 06/10/22  2358 06/10/22  0038 06/09/22  0324 06/08/22  0540   BILIRUBIN mg/dL 0.9 1.0 0.6 0.5 0.7   ALK PHOS U/L 71 84 67 73 70   AST (SGOT) U/L 13 18 13 13 11   ALT (SGPT) U/L 11 13 12 10 8     Results from last 7 days   Lab Units 06/12/22  0045 06/10/22  2358 06/10/22  0038 06/09/22 0324 06/08/22  0540   MAGNESIUM mg/dL 1.9 1.9 2.0 2.3 2.1     Results from last 7 days   Lab Units 06/08/22  0540   INR  1.24*     Results from last 7 days   Lab Units 06/10/22  2358 06/08/22  0540   TROPONIN T ng/mL 0.012 <0.010       Imaging Results (Last 24 Hours)     Procedure Component Value Units Date/Time    XR Chest AP [[id number]] Collected: 06/12/22 1109     Updated: 06/12/22 1112    Narrative:      XR CHEST AP-     CLINICAL INDICATION: Resp failure; I48.91-Unspecified atrial  fibrillation; J18.9-Pneumonia, unspecified organism; I50.9-Heart  failure, unspecified; J96.21-Acute and chronic respiratory failure with  hypoxia        COMPARISON: 06/11/2022      TECHNIQUE: Single frontal view of the chest.     FINDINGS:      LUNGS: Diffuse consolidation in the left lung. Minimal airspace disease  in the right lung.      HEART AND MEDIASTINUM: Heart and mediastinal contours are unremarkable        SKELETON: Bony and soft tissue structures are unremarkable.             Impression:      Very little overall change     This report was finalized on 6/12/2022 11:10 AM by Dr. Leif Taveras MD.        "        Medications:  chlorhexidine, 15 mL, Mouth/Throat, Q12H  famotidine, 20 mg, Intravenous, BID  guaiFENesin, 1,200 mg, Oral, Q12H  lidocaine, 1 patch, Transdermal, Q24H  metoprolol tartrate, 25 mg, Oral, Q12H  nystatin, 5 mL, Oral, 4x Daily  sodium chloride, 10 mL, Intravenous, Q12H  sodium chloride, 10 mL, Intravenous, Q12H  sodium chloride, 10 mL, Intravenous, Q12H  sodium chloride, 10 mL, Intravenous, Q12H  sodium chloride, 3 mL, Nebulization, BID - RT               Assessment & Plan   Severe sepsis (present on admission)  Dense MOMO pneumonia   MSSA bacteremia  Acute exacerbation of COPD   Acute on chronic combined systolic and diastolic CHF   Acute on chronic hypoxic respiratory failure  Afib with RVR  Essential HTN with hypotension upon admission   Hypokalemia  Hypomagnesemia  Hypophosphatemia   Interstitial lung disease  Severe pulmonary HTN   DM II, non-insulin dependent with hyperglycemia   Hx of RUL lung CA s/p lobectomy   Depression with reports of SI    Plan  Pt initially evaluated this AM with plan of care to continue current interventions. However, I was later notified by nursing staff that pt's family had arrived and wished to discuss GOC again today. Initially upon my arrival back to the CCU family had stepped out but hospice RN was present who informed me patient's family had been reconsidering GOC. Once family arrived back at bedside I was able to have further GOC discussions with family and nursing at bedside. Pt confused but interactive as well. Pt's son informed me that after our discussions including palliative earlier in the week he had talked to some friends regarding their end of life experiences with their family members and he had been reconsidering his wishes. Pt also indicated that he wanted \"this to stop\" and told me he \"can see James in the corner waiting on me\". After further discussion, pt's family wishes to transition to comfort measures only. Will stop current treatment, adjust " medication regimen to focus on symptom management, attempt to wean Precedex and transfer to private room on med/surg floor.      Disposition: Transition to comfort measures today.        Idris Apodaca DO  06/12/22  16:33 EDT

## 2022-06-13 NOTE — PLAN OF CARE
Goal Outcome Evaluation:              Outcome Evaluation: Pt has been agitated and restless this shift. PRN Morphine and Ativan given per orders. Pt is being discharged to [facility] on this date. Pt is resting in bed at this time. Will continue to monitor.

## 2022-06-13 NOTE — PLAN OF CARE
Goal Outcome Evaluation:  Plan of Care Reviewed With: patient        Progress: no change       Pt has been restless this shift- have administered Ativan a couple of times and benedryl x1. He's had PRN morphine 3x. Sisters are at bedside.

## 2022-06-13 NOTE — PROGRESS NOTES
"Palliative Care Daily Progress Note     S: 77-year-old male with end-stage COPD who has been on hospice services .  He was admitted with respiratory failure and decompensation and presently is on comfort measures.  After discussion with the family, they are interested in transferring the patient to the hospice inpatient unit for ongoing care.  The patient is still somewhat restless and I will need to increase his medications which they are in agreement with.  O:   Palliative Performance Scale Score:     /85 (BP Location: Left arm, Patient Position: Lying)   Pulse 105   Temp 99.2 °F (37.3 °C) (Axillary)   Resp 22   Ht 170.2 cm (67\")   Wt 76.9 kg (169 lb 8 oz)   SpO2 99%   BMI 26.55 kg/m²     Intake/Output Summary (Last 24 hours) at 6/13/2022 1156  Last data filed at 6/13/2022 0652  Gross per 24 hour   Intake 81.15 ml   Output 1625 ml   Net -1543.85 ml       PE:  General Appearance:   dry mucous membranes   HEENT:     Neck:   supple   Lungs:    Minimal airflow with prolonged expiratory phase, labored with accessory muscle use and pursed lip breathing    Heart:   Tachycardic   Abdomen:     Soft, nondistended   Extremities:   Moves all extremities, no edema   Pulses:   Pulses palpable restless, opens his eyes, not able to speak   Skin:   Warm, dry   Neurologic:  Restless, not able to speak, opens his eyes and reaches for his son's hand   Psych:  Restless         Meds: ReviewedLabs:   Results from last 7 days   Lab Units 06/12/22  0045   WBC 10*3/mm3 10.33   HEMOGLOBIN g/dL 11.3*   HEMATOCRIT % 36.5*   PLATELETS 10*3/mm3 164     Results from last 7 days   Lab Units 06/12/22  0045   SODIUM mmol/L 140   POTASSIUM mmol/L 3.1*   CHLORIDE mmol/L 99   CO2 mmol/L 30.3*   BUN mg/dL 26*   CREATININE mg/dL 0.61*   GLUCOSE mg/dL 150*   CALCIUM mg/dL 8.1*     Results from last 7 days   Lab Units 06/12/22  0045   SODIUM mmol/L 140   POTASSIUM mmol/L 3.1*   CHLORIDE mmol/L 99   CO2 mmol/L 30.3*   BUN mg/dL 26* "   CREATININE mg/dL 0.61*   CALCIUM mg/dL 8.1*   BILIRUBIN mg/dL 0.9   ALK PHOS U/L 71   ALT (SGPT) U/L 11   AST (SGOT) U/L 13   GLUCOSE mg/dL 150*     Imaging Results (Last 72 Hours)     Procedure Component Value Units Date/Time    XR Chest AP [[id number]] Collected: 06/12/22 1109     Updated: 06/12/22 1112    Narrative:      XR CHEST AP-     CLINICAL INDICATION: Resp failure; I48.91-Unspecified atrial  fibrillation; J18.9-Pneumonia, unspecified organism; I50.9-Heart  failure, unspecified; J96.21-Acute and chronic respiratory failure with  hypoxia        COMPARISON: 06/11/2022      TECHNIQUE: Single frontal view of the chest.     FINDINGS:      LUNGS: Diffuse consolidation in the left lung. Minimal airspace disease  in the right lung.      HEART AND MEDIASTINUM: Heart and mediastinal contours are unremarkable        SKELETON: Bony and soft tissue structures are unremarkable.             Impression:      Very little overall change     This report was finalized on 6/12/2022 11:10 AM by Dr. Leif Taveras MD.       XR Chest 1 View [[id number]] Collected: 06/11/22 1556     Updated: 06/11/22 1602    Narrative:      XR CHEST 1 VW-     CLINICAL INDICATION: Cental line placement; I48.91-Unspecified atrial  fibrillation; J18.9-Pneumonia, unspecified organism; I50.9-Heart  failure, unspecified; J96.21-Acute and chronic respiratory failure with  hypoxia        COMPARISON: 06/11/2022      TECHNIQUE: Single frontal view of the chest.     FINDINGS:      LUNGS: Right-sided subclavian central line is been placed. The tip is at  the atrial junction. No pneumothorax is seen  Continued bilateral airspace disease.  Patient has been extubated and the nasogastric tube is been removed.      HEART AND MEDIASTINUM: Heart and mediastinal contours are unremarkable        SKELETON: Bony and soft tissue structures are unremarkable.             Impression:      Central line as above     This report was finalized on 6/11/2022 3:57 PM by Dr. Yadav  MD Darcy.       US Venous Doppler Lower Extremity Bilateral (duplex) [[id number]] Collected: 06/11/22 1317     Updated: 06/11/22 1319    Narrative:      US VENOUS DOPPLER LOWER EXTREMITY BILATERAL (DUPLEX)-     CLINICAL INDICATION: worsening hypoxia and limited mobility;  I48.91-Unspecified atrial fibrillation; J18.9-Pneumonia, unspecified  organism; I50.9-Heart failure, unspecified; J96.21-Acute and chronic  respiratory failure with hypoxia        COMPARISON: 10/31/2021      TECHNIQUE: Color Doppler imaging was used with compression and  augmentation to evaluate the lower extremity deep venous system.     FINDINGS:   There is patent spontaneous flow from the common femoral vein through  the posterior tibial veins.  There was no internal clot or area of noncompressibility.  Normal augmentation was elicited where applicable.       Impression:      No DVT in the lower extremities on today's exam.      This report was finalized on 6/11/2022 1:17 PM by Dr. Leif Taveras MD.       XR Chest 1 View [[id number]] Collected: 06/11/22 0538     Updated: 06/11/22 0541    Narrative:      CR Chest 1 Vw    INDICATION:   Endotracheal tube and OG tube placement. Respiratory failure and hypoxia.     COMPARISON:    6/10/2022    FINDINGS:  Single portable AP view(s) of the chest.    Endotracheal tube is in good position in the mid trachea. OG tube tip is in the fundus of the stomach. Patient is status post endovascular repair of the descending aorta. Heart size is stable. There is chronic elevation of the right hemidiaphragm. There  are some chronic changes in both lungs. Dense infiltrates in the left lung are similar to prior. No pneumothorax.       Impression:      Well-positioned OG tube and ET tube. Stable appearance of infiltrates and chronic changes in the lungs. No pneumothorax.    Signer Name: Karson Smart MD   Signed: 6/11/2022 5:38 AM   Workstation Name: [facility]    [facility]    XR Chest 1 View  [[id number]] Collected: 06/10/22 2314     Updated: 06/10/22 2316    Narrative:      CR Chest 1 Vw    INDICATION:   Worsening shortness of air today.     COMPARISON:    Chest 6/10/2022    FINDINGS:  Portable AP view(s) of the chest.    Dense infiltrate again noted throughout the left upper lung field with patchy infiltrates throughout the remainder of the left lung. Minimal patchy infiltrates throughout the right lung are unchanged. Chronic elevation of the right hemidiaphragm. No  large pleural effusions. No pneumothorax. Heart size and mediastinum are stable. Descending thoracic aortic stent graft. Lung sutures noted in the right suprahilar region.       Impression:      No significant interval change in patchy infiltrates throughout both lungs and dense infiltrate in the left upper lobe.    Signer Name: Yimi Garcia MD   Signed: 6/10/2022 11:14 PM   Workstation Name: [email or web address]-    [facility]    XR Chest AP [[id number]] Collected: 06/10/22 1220     Updated: 06/10/22 1222    Narrative:      XR CHEST AP-     CLINICAL INDICATION: resp failure, pneumonia; I48.91-Unspecified atrial  fibrillation; J18.9-Pneumonia, unspecified organism; I50.9-Heart  failure, unspecified        COMPARISON: 06/09/2022      TECHNIQUE: Single frontal view of the chest.     FINDINGS:      LUNGS: Extensive left-sided consolidation similar to the previous exam      HEART AND MEDIASTINUM: Heart and mediastinal contours are unremarkable        SKELETON: Bony and soft tissue structures are unremarkable.             Impression:      Little overall change     This report was finalized on 6/10/2022 12:20 PM by Dr. Leif Taveras MD.               Diagnostics: Reviewed    A: End-stage COPD with respiratory failure now on comfort measures.  Family is willing to send him to the inpatient hospice unit at this time.      P: We will begin arrangements for transfer to the inpatient hospice unit and has slurred.  We will continue to  monitor and support patient and family.  I will increase the frequency of his morphine for comfort.        We will continue to follow along. Please do not hesitate to contact us regarding further sx mgmt or GOC needs, including after hours or on weekends via our on call provider at [phone].     Bethel Nieto MD    6/13/2022

## 2022-06-13 NOTE — PROGRESS NOTES
" LOS: 5 days     Chief Complaint:  Pulmonology is following for pneumonia, COPD, history of lung cancer    Subjective     Interval History:     Pulmonology initially evaluated patient on 6/8.  Past medical history is notable for COPD, chronic hypoxic respiratory failure, lung cancer post resection, hypertension, PAD, dyslipidemia, thoracic ascending aortic aneurysm/aortic arch aneurysm, paroxysmal A. fib.   He was initially admitted on 6/8 due to shortness of breath that progressively worsened over the previous 2 weeks.  During evaluation, he is notable for A. fib with RVR.  Chest x-ray showed stable interstitial prominence of the right lung, and large prominence/consolidation of the left lung.  He was admitted for sepsis related to left upper lobe pneumonia, COPD exacerbation, A. fib with RVR, elevated proBNP without known CHF, hypotension, and management of other chronic conditions.  Since admission, he has been treated with antibiotics.  Also on other cardiac medications due to A. fib with RVR.  He has intermittently received diuresis.  Family is present at bedside today.  They confirm that he is on 10 L/min at home.  He is using 9 L/min via bubble high flow today.  He did require intubation on 6/11, and self extubated on the evening of 6/11.  One of the family members present at bedside, stated that he has been trying to remove his Campbell catheter.  Patient does not offer much verbal response today on evaluation, and was turning and repositioning in bed.  Family states that they may be transitioning him to hospice care today.      Review of Systems:     Review of systems limited due to patient's response.  Notable for acute confusion, shortness of breath.       Objective     Vital Signs  /85 (BP Location: Left arm, Patient Position: Lying)   Pulse 105   Temp 99.2 °F (37.3 °C) (Axillary)   Resp 22   Ht 170.2 cm (67\")   Wt 76.9 kg (169 lb 8 oz)   SpO2 99%   BMI 26.55 kg/m²      Physical Exam:  GENERAL " APPEARANCE: awake, elderly male. No acute distress.   HEENT:  normocephalic. Atraumatic. Pupils equal bilaterally. No scleral icterus.n.   CARDIAC: Normal S1 and S2.  Rhythm is regular. There is no cyanosis, pallor.   RESPIRATORY: Bilateral air entry positive. Non labored breathing.   GI: nondistended.   MUSCULOSKELETAL: No significant deformity or joint abnormality. Able to move all extremities.   NEUROLOGICAL: no focal deficits.   PSYCH: confusion to surroundings noted.  Restless appearance.                             Results Review:   I reviewed the patient's new clinical results.  I reviewed the patient's new imaging results and agree with the interpretation.        CBC      CBC 6/9/22 6/10/22 6/10/22 6/12/22     0038 2358    WBC 12.16 (A) 9.63 12.72 (A) 10.33   RBC 4.73 4.55 4.91 4.43   Hemoglobin 12.2 (A) 11.6 (A) 12.5 (A) 11.3 (A)   Hematocrit 40.1 36.8 (A) 40.2 36.5 (A)   MCV 84.8 80.9 81.9 82.4   MCH 25.8 (A) 25.5 (A) 25.5 (A) 25.5 (A)   MCHC 30.4 (A) 31.5 31.1 (A) 31.0 (A)   RDW 20.3 (A) 19.5 (A) 19.8 (A) 20.3 (A)   Platelets 186 164 201 164   (A) Abnormal value                  CMP      CMP 6/9/22 6/10/22 6/10/22 6/12/22     0038 2358    Glucose 154 (A) 165 (A) 211 (A) 150 (A)   BUN 38 (A) 38 (A) 26 (A) 26 (A)   Creatinine 0.81 0.68 (A) 0.58 (A) 0.61 (A)   Sodium 135 (A) 140 139 140   Potassium 4.6 4.0 3.3 (A) 3.1 (A)   Chloride 98 100 99 99   Calcium 8.7 8.5 (A) 8.8 8.1 (A)   Albumin 2.04 (A) 2.64 (A) 2.74 (A) 2.41 (A)   Total Bilirubin 0.5 0.6 1.0 0.9   Alkaline Phosphatase 73 67 84 71   AST (SGOT) 13 13 18 13   ALT (SGPT) 10 12 13 11   (A) Abnormal value         Comments are available for some flowsheets but are not being displayed.                 Site   Date Value Ref Range Status   06/11/2022 Right Radial  Final     Dwayne's Test   Date Value Ref Range Status   06/11/2022 N/A  Final     pH, Arterial   Date Value Ref Range Status   06/11/2022 7.398 7.350 - 7.450 pH units Final     pCO2, Arterial    Date Value Ref Range Status   06/11/2022 54.6 (H) 35.0 - 45.0 mm Hg Final     Comment:     83 Value above reference range     pO2, Arterial   Date Value Ref Range Status   06/11/2022 67.7 (L) 83.0 - 108.0 mm Hg Final     Comment:     84 Value below reference range     HCO3, Arterial   Date Value Ref Range Status   06/11/2022 33.6 (H) 20.0 - 26.0 mmol/L Final     Comment:     83 Value above reference range     Base Excess, Arterial   Date Value Ref Range Status   06/11/2022 7.3 (H) 0.0 - 2.0 mmol/L Final     O2 Saturation, Arterial   Date Value Ref Range Status   06/11/2022 93.4 (L) 94.0 - 99.0 % Final     Comment:     84 Value below reference range     Hemoglobin, Blood Gas   Date Value Ref Range Status   06/11/2022 11.9 (L) 14 - 18 g/dL Final     Comment:     84 Value below reference range     Hematocrit, Blood Gas   Date Value Ref Range Status   06/11/2022 36.4 (L) 38.0 - 51.0 % Final     Comment:     84 Value below reference range     Oxyhemoglobin   Date Value Ref Range Status   06/11/2022 92.0 (L) 94 - 99 % Final     Comment:     84 Value below reference range     Methemoglobin   Date Value Ref Range Status   06/11/2022 0.00 0.00 - 3.00 % Final     Carboxyhemoglobin   Date Value Ref Range Status   06/11/2022 1.5 0 - 5 % Final     CO2 Content   Date Value Ref Range Status   06/11/2022 35.3 (H) 22 - 33 mmol/L Final     Barometric Pressure for Blood Gas   Date Value Ref Range Status   06/11/2022 724 mmHg Final     Modality   Date Value Ref Range Status   06/11/2022 Ventilator  Final     FIO2   Date Value Ref Range Status   06/11/2022 50 % Final           Medication Review:   Scheduled Medications:  chlorhexidine, 15 mL, Mouth/Throat, Q12H  famotidine, 20 mg, Intravenous, BID  gabapentin, 600 mg, Oral, Q6H  guaiFENesin, 1,200 mg, Oral, Q12H  lidocaine, 1 patch, Transdermal, Q24H  LORazepam, 1 mg, Intravenous, Once  metoprolol tartrate, 25 mg, Oral, Q12H  nystatin, 5 mL, Oral, 4x Daily  polyethylene glycol, 17 g,  Oral, Daily  sodium chloride, 10 mL, Intravenous, Q12H  sodium chloride, 10 mL, Intravenous, Q12H  sodium chloride, 10 mL, Intravenous, Q12H  sodium chloride, 10 mL, Intravenous, Q12H      Continuous infusions:         Assessment:   Acute on chronic hypoxic respiratory failure  Sepsis related to pneumonia, unspecified organism  COPD, ILD   history of lung cancer status postresection      Plan:    Patient notably restless and confused during evaluation today.  Family also confirmed this as well.  Ordered EKG.  If QTC appropriate, may initiate olanzapine.  May also consider melatonin to better regulate sleep.  Reported use of 10 L/min of home oxygen use.  Currently on 9 L/min bubble high flow nasal cannula.  Titrate to maintain SPO2 at 90% or greater.  Patient patient required intubation on 6/11, but self extubated that evening.  Since then, has continued to tolerate bubble high flow nasal cannula.  Agree with antibiotics including doxycycline nafcillin after MSSA positive  Infectious disease team on board.  Received neb treatments, IV steroids  Family states they are likely transitioning to hospice care today.       Patient was later transitioned to hospice care and comfort measures.        Elena Osorio PA-C  06/13/22  15:31 EDT      Scribed for Dr. Mejia by Elena Osorio PA-C  I, Joseph Mejia M.D. attest that the above note accurately reflects the work and decisions made  by me.  Patient was seen and evaluated by Dr. Mejia, including history of present illness, physical exam, assessment, and treatment plan.  The above note was reviewed and edited by Dr. Mejia.

## 2022-06-13 NOTE — CASE MANAGEMENT/SOCIAL WORK
Discharge Planning Assessment   David     Patient Name: Kb Marshall  MRN: [id number]  Today's Date: 6/13/2022    Admit Date: 6/8/2022     Discharge Plan     Row Name 06/13/22 0845       Plan    Plan Pt transferred to [address] [place] [address] from [facility]. Pt is now comfort measures. Pt is [facility] with [facility]. SS following.              Continued Care and Services - Admitted Since 6/8/2022     Home Medical Care     Service Provider Request Status Selected Services Address Phone Fax Patient Preferred    [facility] [employer] [facility] [address] [place], [place] [id number] [phone] --              DIANE Lugo

## 2022-06-13 NOTE — DISCHARGE PLACEMENT REQUEST
"Kb Cartagena (77 y.o. Male)             Date of Birth   1945    Social Security Number       Address   [address] [place] [id number]    Home Phone   [phone]    MRN   [id number]       Restorationist   [facility]    Marital Status                               Admission Date   6/8/22    Admission Type   Emergency    Admitting Provider   Idris Apodaca DO    Attending Provider   Randy Macias MD    Department, Room/Bed   [address], 3349/2S       Discharge Date       Discharge Disposition       Discharge Destination                               Attending Provider: Randy Macias MD    Allergies: Acetaminophen-codeine, Adhesive Tape, Indocin [Indomethacin]    Isolation: None   Infection: None   Code Status: No CPR   Advance Care Planning Activity    Ht: 170.2 cm (67\")   Wt: 76.9 kg (169 lb 8 oz)    Admission Cmt: None   Principal Problem: Atrial fibrillation with rapid ventricular response (HCC) [I48.91]                 Active Insurance as of 6/8/2022     Primary Coverage     Payor Plan Insurance Group Employer/Plan Group    ANTHEM MEDICARE REPLACEMENT ANTHEM MEDICARE ADVANTAGE [id number]     Payor Plan Address Payor Plan Phone Number Payor Plan Fax Number Effective Dates    [address] [phone]  1/1/2020 - None Entered    [facility] [id number]       Subscriber Name Subscriber Birth Date Member ID       KB CARTAGENA 1945 [id number]           Secondary Coverage     Payor Plan Insurance Group Employer/Plan Group    [place]      Payor Plan Address Payor Plan Phone Number Payor Plan Fax Number Effective Dates    [address] [phone]  5/4/2017 - None Entered    [facility] [id number]       Subscriber Name Subscriber Birth Date Member ID       KB CARTAGENA 1945 [id number]                 Emergency Contacts      (Rel.) Home Phone Work Phone Mobile Phone    Suzie Yu (Sister) [phone] -- --    " "MAXWELL CARTAGENA (Son) [phone] -- --               History & Physical      Heidi ChengGOLDEN at 22 0842     Attestation signed by Idris Apodaca DO at 22 [phone]    I have reviewed this documentation and agree. Pt seen and examined with LEONARD Hartman. Cont broad spectrum IV antibiotics and steroids. HR improved and Cardizem gtt stopped. Appears overall compensated clinically but ReDs vest reading is elevated. Will order a dose of Lasix now that BP is improved. Pt reporting depression with SI and psychiatry consulted. Pulm consulted in the setting of acute on chronic hypoxic resp failure, COPD exacerbation and dense pneumonia. Palliative care also consulted to assist with GOC discussions. Cardiology consulted in the setting of Afib with RVR. Input from consultants appreciated. Monitor closely in the PCU.                       [facility]  History & Physical    Patient Identification:  Name:  Kb Cartagena  Age:  77 y.o.  Sex:  male  :  1945  MRN:  [id number]   Visit Number:  [id number]  Admit Date: 2022   Primary Care Physician:  Scar Mejia MD    Subjective     Chief complaint:   Chief Complaint   Patient presents with   • Shortness of Breath         History of presenting illness:      Kb Cartagena is a 77 y.o. male with past medical history significant for hypertension, PAD, dyslipidemia, right upper lobe lung cancer status post right upper lobectomy in , COPD with oxygen dependency, thoracic ascending aortic aneurysm, aortic arch aneurysm with stenting, paroxysmal atrial fibrillation to the emergency department at [facility] on 2022 with complaints of shortness of breath. There was no family at bedside at the time of my elation and the patient is a rather poor historian. The patient states that he \"lost his breath\" around 2 weeks ago and this has progressively gotten worse. He wears oxygen at home at all times. He " reported to me that he is using 8-10 liters at home. However, ED documentation states a reported 4 liters. He has a cough that is non-productive. He reports sharp left chest wall pain, that is intermittent in nature and worse with exertion. He denies palpitations or known history of atrial fibrillation. After review of his medical record, it appears the patient experienced an episode of atrial fibrillation during as admission on 12/23/2021. The patient is not chronically anticoagulated.    He denies any known sick contacts. He denies fevers, chills, diarrhea, abdominal pain. He reports an episode of vomiting, unknown when. He is lethargic at bedside. Increased respiratory effort with slight respiratory distress noted. He is on 10liters highflow with an oxygen saturation of 98%. He was started on a Cardizem drip in the ED, this was discontinued at the time of my evaluation. He was noted to be in sinus rhythm with rate in the 80's.  Heparin drip infusing.       Upon arrival to the ED, vital signs were pulse 145, respirations 26, blood pressure 87/61, oxygen saturation 98% on 15 L via nonrebreather, temperature 97.  They run able to obtain an ABG in the ED due to patient refusal.  proBNP was elevated at 11,717, CRP significantly elevated to 43.58, lactate positive at 2.4, procalcitonin 2.67, white blood cell 15.19.  EKG showing atrial fibrillation with a rapid ventricular response, ST and T wave abnormalities.  His chest x-ray showing stable interstitial prominence of the right lung.  8 cm density in the left upper lobe that suggests a dense pneumonia.  CT angiogram chest PE protocol without evidence of a pulmonary embolism.  Interval development of small nodules in the right lung and left lower lobe.  Dense consolidation in the left upper lobe again seen.  The patient will be admitted to the progressive care unit for continued evaluation and therapy.    Known Emergency Department medications received prior to my  evaluation included cefepime, Cardizem, heparin, Solu-Medrol, sepsis bolus.    Patient was in PCU room 211 at the time of my evaluation.     ---------------------------------------------------------------------------------------------------------------------   Review of Systems   Constitutional: Positive for activity change and fatigue. Negative for chills, diaphoresis and fever.   HENT: Negative for congestion.    Respiratory: Positive for cough and shortness of breath.    Cardiovascular: Positive for chest pain. Negative for palpitations and leg swelling.   Gastrointestinal: Positive for nausea and vomiting. Negative for abdominal pain and diarrhea.   Neurological: Positive for weakness. Negative for dizziness, numbness and headaches.   All other systems reviewed and are negative.     ---------------------------------------------------------------------------------------------------------------------   Past Medical History:   Diagnosis Date   • Aneurysm (HCC)    • Arthritis    • Asthma    • Back pain    • Cancer (HCC)     lungs   • COPD (chronic obstructive pulmonary disease) (HCC)    • Coronary artery disease    • Diabetes mellitus (HCC)    • Dyslipidemia    • GERD (gastroesophageal reflux disease)    • Heart disease    • Hypertension    • Lung cancer (HCC) 2011   • PAD (peripheral artery disease) (HCC)      Past Surgical History:   Procedure Laterality Date   • ABDOMINAL AORTIC ANEURYSM REPAIR     • COLONOSCOPY N/A 10/11/2018    Procedure: COLONOSCOPY;  Surgeon: Calos Stockton MD;  Location: [facility];  Service: Gastroenterology   • ENDOSCOPY N/A 12/24/2021    Procedure: ESOPHAGOGASTRODUODENOSCOPY;  Surgeon: Magui Murillo MD;  Location:  [facility];  Service: General;  Laterality: N/A;   • FINGER SURGERY Left    • KNEE SURGERY Left    • LUNG REMOVAL, PARTIAL  11/29/2011   • LUNG SURGERY  11/28/2011     Family History   Problem Relation Age of Onset   • Cancer Mother    • Cancer Brother      Social  History     Socioeconomic History   • Marital status:    Tobacco Use   • Smoking status: Former Smoker     Packs/day: 3.00     Quit date: 11/2011     Years since quitting: 10.6   • Smokeless tobacco: Former User     Types: Chew   Vaping Use   • Vaping Use: Never used   Substance and Sexual Activity   • Alcohol use: Not Currently     Comment: quit 40 years ago   • Drug use: No   • Sexual activity: Defer     ---------------------------------------------------------------------------------------------------------------------   Allergies:  Acetaminophen-codeine, Adhesive tape, and Indocin [indomethacin]  ---------------------------------------------------------------------------------------------------------------------   Home medications:    Medications below are reported home medications pulling from within the system; at this time, these medications have not been reconciled unless otherwise specified and are in the verification process for further verifcation as current home medications.  (Not in a hospital admission)      Hospital Scheduled Meds:     dilTIAZem, 5-15 mg/hr, Last Rate: 5 mg/hr (06/08/22 0823)  heparin, 12 Units/kg/hr, Last Rate: 12 Units/kg/hr (06/08/22 0816)      Current listed hospital scheduled medications may not yet reflect those currently placed in orders that are signed and held awaiting patient's arrival to floor.   ---------------------------------------------------------------------------------------------------------------------     Objective     Vital Signs:  Temp:  [97 °F (36.1 °C)] 97 °F (36.1 °C)  Heart Rate:  [] 105  Resp:  [26-30] 30  BP: ()/() 123/94      06/08/22  0520   Weight: 77.1 kg (170 lb)     Body mass index is 26.63 kg/m².  ---------------------------------------------------------------------------------------------------------------------       Physical Exam  Vitals and nursing note reviewed.   Constitutional:       Appearance: He is ill-appearing.       Interventions: Nasal cannula in place.   HENT:      Head: Normocephalic and atraumatic.      Mouth/Throat:      Lips: Pink.      Mouth: Mucous membranes are dry.   Eyes:      General: Lids are normal.   Cardiovascular:      Rate and Rhythm: Normal rate and regular rhythm.      Pulses:           Dorsalis pedis pulses are 2+ on the right side and 2+ on the left side.        Posterior tibial pulses are 2+ on the right side and 2+ on the left side.      Heart sounds: Normal heart sounds, S1 normal and S2 normal.   Pulmonary:      Effort: Tachypnea and respiratory distress present.      Breath sounds: Decreased air movement present. Decreased breath sounds present.   Abdominal:      General: Abdomen is protuberant. Bowel sounds are normal.      Palpations: Abdomen is soft.   Musculoskeletal:      Cervical back: Normal range of motion and neck supple.      Right lower leg: No edema.      Left lower leg: No edema.   Feet:      Right foot:      Skin integrity: Skin integrity normal.      Left foot:      Skin integrity: Skin integrity normal.   Skin:     General: Skin is warm and dry.      Capillary Refill: Capillary refill takes less than 2 seconds.   Neurological:      General: No focal deficit present.      Mental Status: He is easily aroused. Mental status is at baseline. He is lethargic.      Motor: Weakness present.       ---------------------------------------------------------------------------------------------------------------------  EKG:        I have personally reviewed the above EKG.   ---------------------------------------------------------------------------------------------------------------------   Results from last 7 days   Lab Units 06/08/22  0540   CRP mg/dL 43.58*   LACTATE mmol/L 2.4*   WBC 10*3/mm3 15.19*   HEMOGLOBIN g/dL 12.7*   HEMATOCRIT % 39.9   MCV fL 81.4   MCHC g/dL 31.8   PLATELETS 10*3/mm3 200   INR  1.24*         Results from last 7 days   Lab Units 06/08/22  0540   SODIUM mmol/L 136    POTASSIUM mmol/L 4.5   MAGNESIUM mg/dL 2.1   CHLORIDE mmol/L 96*   CO2 mmol/L 24.8   BUN mg/dL 36*   CREATININE mg/dL 0.94   CALCIUM mg/dL 8.9   GLUCOSE mg/dL 246*   ALBUMIN g/dL 2.61*   BILIRUBIN mg/dL 0.7   ALK PHOS U/L 70   AST (SGOT) U/L 11   ALT (SGPT) U/L 8   Estimated Creatinine Clearance: 72.9 mL/min (by C-G formula based on SCr of 0.94 mg/dL).  No results found for: AMMONIA  Results from last 7 days   Lab Units 06/08/22  0540   TROPONIN T ng/mL <0.010     Results from last 7 days   Lab Units 06/08/22  0540   PROBNP pg/mL 11,717.0*     Lab Results   Component Value Date    HGBA1C 6.50 (H) 12/23/2021     Lab Results   Component Value Date    TSH 2.190 12/23/2021     No results found for: PREGTESTUR, PREGSERUM, HCG, HCGQUANT  Pain Management Panel    There is no flowsheet data to display.       No results found for: BLOODCX  No results found for: URINECX  No results found for: WOUNDCX  No results found for: STOOLCX      ---------------------------------------------------------------------------------------------------------------------  Imaging Results (Last 7 Days)     Procedure Component Value Units Date/Time    CT Angiogram Chest Pulmonary Embolism [[id number]] Collected: 06/08/22 0749     Updated: 06/08/22 0801    Narrative:      CT ANGIOGRAM CHEST PULMONARY EMBOLISM-     CLINICAL INDICATION: sob, afib        COMPARISON: 01/04/2022      PROCEDURE: Thin cut axial images were acquired through the pulmonary  vessels during the rapid infusion of IV contrast.     Additional 3-D reformatted images obtained via post-processing for  improved diagnostic accuracy and procedural planning.     Radiation dose reduction techniques were utilized per ALARA protocol.  Automated exposure control was initiated through either or [employer] Mobile or  DoseRight software packages by  protocol.           FINDINGS: Today's study demonstrates opacification of the central  pulmonary vessels.   There are no filling defects.    There is no truncation.     No evidence of a pulmonary embolus.     Dense pleural-based consolidation in the left lung.  Small nodules in the right lung.     Diffuse interstitial lung disease..     Mediastinal lymph node enlargement similar to the previous exam.     No pericardial or pleural effusion.          Impression:      1. No evidence of a pulmonary embolus.,  2. Interval development of small nodules in the right lung and left  lower lobe, as well as dense consolidation pleural-based in the left  upper lobe.  3. Stable mediastinal lymph node enlargement.     This report was finalized on 6/8/2022 7:59 AM by Dr. Leif Taveras MD.       XR Chest 1 View [[id number]] Collected: 06/08/22 0555     Updated: 06/08/22 0558    Narrative:      CR Chest 1 Vw    INDICATION:   Shortness of air, sepsis     COMPARISON:    11/2/2021    FINDINGS:  Portable AP view(s) of the chest.  Mild interstitial prominence in the right lung stable. There are no focal infiltrates. There is a large amount of density in the left upper lobe measuring at least 8 cm in diameter. This is abutting the lateral part of  the chest. An aortic stent graft is present. The heart size is normal      Impression:      8 cm dense area left upper lobe suggesting dense pneumonia. Clinical correlation follow-up is recommended.    Mild diffuse initial prominence.    Signer Name: Eben Cisneros MD   Signed: 6/8/2022 5:55 AM   Workstation Name: [email or web address]    [facility]          Cultures:  No results found for: BLOODCX, URINECX, WOUNDCX, MRSACX, RESPCX, STOOLCX    Last echocardiogram:  Results for orders placed during the hospital encounter of 10/30/21    Adult Transthoracic Echo Complete w/ Color, Spectral and Contrast if necessary per protocol    Interpretation Summary  · Normal left ventricular cavity size and wall thickness noted. All left ventricular wall segments contract normally  · Left ventricular ejection fraction appears to be 56 -  60%.  · Left ventricular diastolic function is consistent with (grade I) impaired relaxation.  · The mitral valve is structurally normal with no significant stenosis present. Mild mitral valve regurgitation is present.  · The aortic valve is structurally normal with no regurgitation or stenosis present.  · Mild dilation of the aortic root is present(4.0cm). Moderate dilation of the descending aorta present.(4.84cm).  · Mild tricuspid valve regurgitation is present. Estimated right ventricular systolic pressure from tricuspid regurgitation is moderately elevated (45-55 mmHg).  · Moderate pulmonary hypertension is present.  · There is no evidence of pericardial effusion.      I have personally reviewed the above radiology images and read the final radiology report on 06/08/22  ---------------------------------------------------------------------------------------------------------------------  Assessment / Plan     Active Hospital Problems    Diagnosis  POA   • Atrial fibrillation with rapid ventricular response (HCC) [I48.91]  Yes     -Sepsis 2/2 to Left upper lobe pneumonia (POA)  -Tachypnea, lactate 2.4, WBC 15.19,   -leukocytosis, elevated CRP, elevated Procalcitonin.   -COPD exacerbation   -hx of RUL lung cancer s/p RU lobectomy   -R&L lung nodules   -AAA, with repair graft  · Baseline oxygen requirement 4-10LNC (needs clarified by family), currently requiring 10LNC  · Reflex lacate to 2.0 after sepsis bolus   · Antimicrobial coverage with doxycycline and cefepime  · Titrate supplemental oxygen to keep SpO2 > 88%.   · Hospice patient, palliative care consult.   · atrovent nebs in the setting of afib with RVR  · continuous spO2 monitoring    -Atrial fibrillation with RVR (POA)  -elevated proBNP (11,717)  · Cardizem gtt in ED, stopped on admission with conversion to sinus rhythm.   · Updated EKG to capture SR.   · Heparin gtt for anticoagulation  · Cardiology consult   · Last ECHO at  on 12/20/2021 with EF  of 50-60%. No formal documentation of CHF, but to note patients home medications include bumex, jardiance, and lisinopril   · ReDs Vest reading.   · continuous telemetry     -hypotension with history of essential hypertension  · Hypotensive on admission, improved after fluid bolus  · Will review home medications once reconciled but will plan to hold  bumex, coreg, lisinopril   · Routine vitals, monitor blood pressure closely, avoiding hypotension    -Type 2 diabetes, non-insulin dependent, with hyperglycemia  · Low dose SSI   · accuchecks TID AC  · Will hold amaryl and metformin on admission     -F/E/N  · No IVF   · Replace electrolytes PRN  Dietary Orders (From admission, onward)     Start     Ordered    06/08/22 0914  NPO Diet NPO Type: Sips with Meds  Diet Effective Now        Question:  NPO Type  Answer:  Sips with Meds    06/08/22 0913            ·   ·     ----------  -DVT prophylaxis: heparin gtt to serve   -Activity: bedrest  -Expected length of stay: INPATIENT status due to the need for care which can only be reasonably provided in an hospital setting such as aggressive/expedited ancillary services and/or consultation services, the necessity for IV medications, close physician monitoring and/or the possible need for procedures.  In such, I feel patient’s risk for adverse outcomes and need for care warrant INPATIENT evaluation and predict the patient’s care encounter to likely last beyond 2 midnights.    -Disposition per hospital course     High risk secondary to sepsis, PNA, afib with RVR, hypotension in the setting of essential hypertension, previous lung CA with lobectomy      GOLDEN Francis   06/08/22  08:43 EDT      Electronically signed by Idris Apodaca DO at 06/08/22 1750       Vital Signs (last day)     Date/Time Temp Temp src Pulse Resp BP Patient Position SpO2    06/13/22 0720 -- -- -- -- -- -- 99    06/13/22 0700 99.2 (37.3) Axillary 105 22 152/85 Lying 90    06/13/22 0309 98.2  (36.8) Axillary 81 20 143/87 Lying 96    06/12/22 2234 98.5 (36.9) Axillary 73 20 138/83 Lying 95    06/12/22 1835 98.2 (36.8) Oral 63 18 129/67 Lying 96    06/12/22 1829 -- -- 57 20 -- -- 96    06/12/22 1600 97.9 (36.6) Axillary 77 18 130/67 Lying 98    06/12/22 1502 -- -- 72 -- 140/94 -- 99    06/12/22 1441 -- -- 78 -- 129/92 -- --    06/12/22 1402 -- -- 87 -- -- -- 99    06/12/22 1303 -- -- 74 -- 134/84 -- --    06/12/22 1300 -- -- 85 -- 134/84 -- 100    06/12/22 1202 97.7 (36.5) Axillary 64 22 145/90 Lying 100    06/12/22 1145 -- -- 75 -- -- -- 98    06/12/22 1132 -- -- 61 -- 141/80 -- 97    06/12/22 1117 -- -- 59 -- 141/81 -- 96    06/12/22 1102 -- -- 60 -- 140/78 -- 96    06/12/22 1047 -- -- 59 -- 129/67 -- 98    06/12/22 1032 -- -- 58 -- 120/70 -- 96    06/12/22 1018 -- -- 70 -- 113/69 -- 95    06/12/22 1001 -- -- 63 -- 129/65 -- 100    06/12/22 0947 -- -- 49 -- 113/64 -- 95    06/12/22 0932 -- -- 62 -- 117/69 -- 95    06/12/22 0917 -- -- 57 -- 125/69 -- 97    06/12/22 0901 -- -- 73 -- 132/84 -- 97    06/12/22 0848 -- -- 73 -- 140/86 -- 100    06/12/22 0832 -- -- 80 -- 143/109 -- 100    06/12/22 0818 -- -- 55 -- 136/83 -- 99    06/12/22 0801 98.9 (37.2) Axillary 66 18 134/77 Lying 99    06/12/22 0747 -- -- 57 -- 139/85 -- 100    06/12/22 0732 -- -- 59 -- 116/66 -- 96    06/12/22 0718 -- -- 53 -- 134/63 -- 99    06/12/22 0700 -- -- 56 -- 132/90 -- 99    06/12/22 0603 -- -- 65 18 136/78 -- 95    06/12/22 0503 -- -- 58 20 116/66 -- 96    06/12/22 0403 99.1 (37.3) Oral 83 20 109/50 -- 90    06/12/22 0203 -- -- 72 20 114/69 -- 100    06/12/22 0103 -- -- 55 20 93/53 -- 96    06/12/22 0035 -- -- 69 -- -- -- 95    06/12/22 0003 98.7 (37.1) Oral 69 18 114/70 -- 100          Intake & Output (last day)       06/12 0701  06/13 0700 06/13 0701  06/14 0700    I.V. (mL/kg) 81.2 (1.1)     Other      IV Piggyback 100     Total Intake(mL/kg) 181.2 (2.4)     Urine (mL/kg/hr) 1625 (0.9)     Total Output 1625     Net -1443.9                Lines, Drains & Airways     Active LDAs     Name Placement date Placement time Site Days    CVC Triple Lumen 06/11/22 Tunneled Right Subclavian 06/11/22  1400  Subclavian  1    Peripheral IV 06/08/22 0531 Left Antecubital 06/08/22  0531  Antecubital  5    Peripheral IV 06/10/22 2315 Left;Posterior Hand 06/10/22  2315  Hand  2    Urethral Catheter 16 Fr. 06/11/22  0544  -- 2                  Current Facility-Administered Medications   Medication Dose Route Frequency Provider Last Rate Last Admin   • acetaminophen (TYLENOL) tablet 650 mg  650 mg Oral Q6H PRN ConstanzaIdris tucker DO   650 mg at 06/12/22 1444   • bisacodyl (DULCOLAX) EC tablet 5 mg  5 mg Oral Daily PRN Randy Macias MD       • chlorhexidine (PERIDEX) 0.12 % solution 15 mL  15 mL Mouth/Throat Q12H ConstanzaIdris tucker, DO   15 mL at 06/12/22 0824   • diphenhydrAMINE (BENADRYL) injection 12.5 mg  12.5 mg Intravenous Q6H PRN Idris Apodaca DO   12.5 mg at 06/13/22 0224   • famotidine (PEPCID) injection 20 mg  20 mg Intravenous BID Idris Apodaca DO   20 mg at 06/13/22 0951   • gabapentin (NEURONTIN) capsule 600 mg  600 mg Oral Q6H Randy Macias MD       • glycopyrrolate (ROBINUL) injection 0.2 mg  0.2 mg Intravenous Q4H PRN Idris Apodaca DO       • guaiFENesin (MUCINEX) 12 hr tablet 1,200 mg  1,200 mg Oral Q12H ConstanzaIdris strange, DO   1,200 mg at 06/13/22 0951   • haloperidol lactate (HALDOL) injection 0.5 mg  0.5 mg Intramuscular Q6H PRN ConstanzaIdris strange, DO   0.5 mg at 06/13/22 0351   • hydrOXYzine (ATARAX) tablet 25 mg  25 mg Oral TID PRN Idris Apodaca DO   25 mg at 06/12/22 1239   • ipratropium-albuterol (DUO-NEB) nebulizer solution 3 mL  3 mL Nebulization Q6H PRN Idris Apodaca DO       • lidocaine (LIDODERM) 5 % 1 patch  1 patch Transdermal Q24H Idris Apodaca DO   1 patch at 06/13/22 0951   • LORazepam (ATIVAN) injection 1 mg  1 mg Intravenous Q4H PRN Constanza,  Idris HUNT, DO   1 mg at 06/13/22 0445   • LORazepam (ATIVAN) injection 1 mg  1 mg Intravenous Once Keeley Aguilera, DO       • metoprolol tartrate (LOPRESSOR) tablet 25 mg  25 mg Oral Q12H Joss Monson MD   25 mg at 06/13/22 0951   • morphine injection 2 mg  2 mg Intravenous Q3H PRN ConstanzaIdris strange, DO   2 mg at 06/13/22 1002   • nystatin (MYCOSTATIN) 100,000 unit/mL suspension 500,000 Units  5 mL Oral 4x Daily ConstanzaIdris, DO   500,000 Units at 06/13/22 0952   • ondansetron (ZOFRAN) injection 4 mg  4 mg Intravenous Q6H PRN ConstanzaIdris DO       • oxyCODONE-acetaminophen (PERCOCET) 7.5-325 MG per tablet 1 tablet  1 tablet Oral Q4H PRN ConstanzaIdris tucker DO   1 tablet at 06/11/22 0422   • phenol (CHLORASEPTIC) 1.4 % liquid 1 spray  1 spray Mouth/Throat Q2H PRN ConstanzaIdris tucker, DO       • polyethylene glycol (MIRALAX) packet 17 g  17 g Oral Daily Randy Macias MD       • sodium chloride 0.9 % flush 10 mL  10 mL Intravenous PRN ConstanzaIdris strange, DO       • sodium chloride 0.9 % flush 10 mL  10 mL Intravenous Q12H ConstanzaIdris DO   10 mL at 06/12/22 0825   • sodium chloride 0.9 % flush 10 mL  10 mL Intravenous PRN ConstanzaIdris, DO       • sodium chloride 0.9 % flush 10 mL  10 mL Intravenous Q12H ConstanzaIdris A, DO   10 mL at 06/12/22 0825   • sodium chloride 0.9 % flush 10 mL  10 mL Intravenous Q12H ConstanzaIdris, DO   10 mL at 06/12/22 0825   • sodium chloride 0.9 % flush 10 mL  10 mL Intravenous Q12H ConstanzaIdris, DO   10 mL at 06/13/22 0951   • sodium chloride 0.9 % flush 10 mL  10 mL Intravenous PRN ConstanzaCarlos strangeopher A, DO       • sodium chloride 0.9 % flush 20 mL  20 mL Intravenous PRN Idris Apodaca DO            Operative/Procedure Notes (most recent note)      Yusef Palumbo MD at 06/11/22 1507        IV access for pressors    Physician Dr Palumbo    Procedure  The right chest and neck was  prepped and draped. Local was injected and the subclavian vein accessed. The dilator was passed and the catheter inserted. It was sutured in at 18 cm. All ports flushed and had blood return. The dressing was applied and CXR ordered.    Electronically signed by Yusef Palumbo MD at 06/11/22 1509          Physician Progress Notes (most recent note)      Idris Apodaca DO at 06/12/22 1633                [facility]    Subjective     History:   Kb Marshall is a 77 y.o. male admitted on 6/8/2022 secondary to Atrial fibrillation with rapid ventricular response (HCC)     Procedures:   6/11/22: Intubation  6/11/22: Right subclavian central line placement   6/11/22: Self-extubation       CC: Follow up sepsis, pneumonia, resp failure    Patient seen and examined with LEONARD Warner. Awake and alert but confused. Able to provide limited history. Reports dyspnea and generalized pain. Currently on Precedex and appears less restless compared to previous. RN reports episode of worsening confusion overnight during which time he unhooked his IV and proceeded to swallow some of his antibiotic infusion. Poison control contacted with no further interventions recommended. O2 remaining stable on bubble HFNC.     History taken from: chart, and RN.      Objective     Vital Signs  Temp:  [97.7 °F (36.5 °C)-99.1 °F (37.3 °C)] 97.7 °F (36.5 °C)  Heart Rate:  [49-87] 72  Resp:  [18-22] 22  BP: ()/() 140/94    Intake/Output Summary (Last 24 hours) at 6/12/2022 1633  Last data filed at 6/12/2022 1532  Gross per 24 hour   Intake 721.21 ml   Output 2475 ml   Net -1753.79 ml         Physical Exam:  General:    Awake and alert but confused, no acute distress, chronically ill appearing    Heart:      Normal S1 and S2. Regular rate and rhythm. No significant murmur, rubs or gallops appreciated.   Lungs:     Respirations appear regular, even and unlabored. Diminished breath sounds throughout. No  wheezes.     Abdomen:   Soft and nontender. No guarding, rebound tenderness or  organomegaly noted. Bowel sounds present x 4.   Extremities:  Trace-1+ bilateral lower extremity edema.      Results Review:    Results from last 7 days   Lab Units 06/12/22  0045 06/10/22  2358 06/10/22  0038 06/09/22 0324 06/08/22  0540   WBC 10*3/mm3 10.33 12.72* 9.63 12.16* 15.19*   HEMOGLOBIN g/dL 11.3* 12.5* 11.6* 12.2* 12.7*   PLATELETS 10*3/mm3 164 201 164 186 200     Results from last 7 days   Lab Units 06/12/22  0045 06/10/22  2358 06/10/22  0038 06/09/22  0324 06/08/22  0540   SODIUM mmol/L 140 139 140 135* 136   POTASSIUM mmol/L 3.1* 3.3* 4.0 4.6 4.5   CHLORIDE mmol/L 99 99 100 98 96*   CO2 mmol/L 30.3* 27.3 25.1 22.1 24.8   BUN mg/dL 26* 26* 38* 38* 36*   CREATININE mg/dL 0.61* 0.58* 0.68* 0.81 0.94   CALCIUM mg/dL 8.1* 8.8 8.5* 8.7 8.9   GLUCOSE mg/dL 150* 211* 165* 154* 246*     Results from last 7 days   Lab Units 06/12/22  0045 06/10/22  2358 06/10/22  0038 06/09/22 0324 06/08/22  0540   BILIRUBIN mg/dL 0.9 1.0 0.6 0.5 0.7   ALK PHOS U/L 71 84 67 73 70   AST (SGOT) U/L 13 18 13 13 11   ALT (SGPT) U/L 11 13 12 10 8     Results from last 7 days   Lab Units 06/12/22  0045 06/10/22  2358 06/10/22  0038 06/09/22 0324 06/08/22  0540   MAGNESIUM mg/dL 1.9 1.9 2.0 2.3 2.1     Results from last 7 days   Lab Units 06/08/22  0540   INR  1.24*     Results from last 7 days   Lab Units 06/10/22  2358 06/08/22  0540   TROPONIN T ng/mL 0.012 <0.010       Imaging Results (Last 24 Hours)     Procedure Component Value Units Date/Time    XR Chest AP [[id number]] Collected: 06/12/22 1109     Updated: 06/12/22 1112    Narrative:      XR CHEST AP-     CLINICAL INDICATION: Resp failure; I48.91-Unspecified atrial  fibrillation; J18.9-Pneumonia, unspecified organism; I50.9-Heart  failure, unspecified; J96.21-Acute and chronic respiratory failure with  hypoxia        COMPARISON: 06/11/2022      TECHNIQUE: Single frontal view of the chest.      FINDINGS:      LUNGS: Diffuse consolidation in the left lung. Minimal airspace disease  in the right lung.      HEART AND MEDIASTINUM: Heart and mediastinal contours are unremarkable        SKELETON: Bony and soft tissue structures are unremarkable.             Impression:      Very little overall change     This report was finalized on 6/12/2022 11:10 AM by Dr. Leif Taveras MD.               Medications:  chlorhexidine, 15 mL, Mouth/Throat, Q12H  famotidine, 20 mg, Intravenous, BID  guaiFENesin, 1,200 mg, Oral, Q12H  lidocaine, 1 patch, Transdermal, Q24H  metoprolol tartrate, 25 mg, Oral, Q12H  nystatin, 5 mL, Oral, 4x Daily  sodium chloride, 10 mL, Intravenous, Q12H  sodium chloride, 10 mL, Intravenous, Q12H  sodium chloride, 10 mL, Intravenous, Q12H  sodium chloride, 10 mL, Intravenous, Q12H  sodium chloride, 3 mL, Nebulization, BID - RT               Assessment & Plan   Severe sepsis (present on admission)  Dense MOMO pneumonia   MSSA bacteremia  Acute exacerbation of COPD   Acute on chronic combined systolic and diastolic CHF   Acute on chronic hypoxic respiratory failure  Afib with RVR  Essential HTN with hypotension upon admission   Hypokalemia  Hypomagnesemia  Hypophosphatemia   Interstitial lung disease  Severe pulmonary HTN   DM II, non-insulin dependent with hyperglycemia   Hx of RUL lung CA s/p lobectomy   Depression with reports of SI    Plan  Pt initially evaluated this AM with plan of care to continue current interventions. However, I was later notified by nursing staff that pt's family had arrived and wished to discuss GOC again today. Initially upon my arrival back to the CCU family had stepped out but hospice RN was present who informed me patient's family had been reconsidering GOC. Once family arrived back at bedside I was able to have further GOC discussions with family and nursing at bedside. Pt confused but interactive as well. Pt's son informed me that after our discussions including  "palliative earlier in the week he had talked to some friends regarding their end of life experiences with their family members and he had been reconsidering his wishes. Pt also indicated that he wanted \"this to stop\" and told me he \"can see James in the corner waiting on me\". After further discussion, pt's family wishes to transition to comfort measures only. Will stop current treatment, adjust medication regimen to focus on symptom management, attempt to wean Precedex and transfer to private room on med/surg floor.      Disposition: Transition to comfort measures today.        Idris Apodaca DO  06/12/22  16:33 EDT    Electronically signed by Idris Apodaca DO at 06/12/22 0859          Consult Notes (most recent note)      Wilfredo Pérez MD at 06/11/22 1419      Consult Orders    1. Inpatient Psychiatrist Consult [[id number]] ordered by Jb Flannery MD at 06/10/22 1259             Psychiatry again consulted for suicidal ideation.  Patient has continued to make comments that he wants to shoot himself.  I previously evaluated him 3 days ago for this issue and he stated that he did not want to do this but he was suffering with left-sided chest pain and difficulty breathing and it has been worse in the past 2 weeks prior to hospitalization leading him to feeling that he could not live this way.  He did state that if his pain was improved, his feelings about self-harm what improved.  Prior to my second evaluation of the patient, his respiratory status worsened and patient has been intubated and is too sedated to participate in clinical evaluation today.    Electronically signed by Wilfredo Pérez MD at 06/11/22 7052       "

## 2022-06-13 NOTE — PLAN OF CARE
Goal Outcome Evaluation:  Plan of Care Reviewed With: patient        Progress: no change    Pt very restless this shift. See nursing notes.

## 2022-06-13 NOTE — NURSING NOTE
Pt extremely agitated with oxygen tubing and keeps taking it off.  He is on 10 L high flow 02- he was made comfort care today, so I'm calling MD to see if 02 can be lowered. Awaiting response.

## 2022-06-13 NOTE — CASE MANAGEMENT/SOCIAL WORK
Discharge Planning Assessment   David     Patient Name: Kb Marshall  MRN: [id number]  Today's Date: 6/13/2022    Admit Date: 6/8/2022                   Discharge Plan     Row Name 06/13/22 1719       Plan    Final Discharge Disposition Code 51 - hospice medical facility    Final Note Pt is being discharged to [facility] center on this date. Pt's son is aware and agreeable to discharge. SS faxed AVS summary, prescriptions and discharge summary to Hospice center fax [phone].  provided RN with report number [phone]. SS contacted  for EMS DNR. SS to arrange EMS transport.    17:26pm: [facility] per Dispatcher Umair states he will check with [facility] and call floor at [phone] with answer for transport.     17:36pm: [facility] will transport after 2000pm per Dispatcher Umair.  notified RN.                          Sarahi Rodriguez

## 2022-06-13 NOTE — CASE MANAGEMENT/SOCIAL WORK
Discharge Planning Assessment   David     Patient Name: Kb Marshall  MRN: [id number]  Today's Date: 6/13/2022    Admit Date: 6/8/2022                   Discharge Plan     Row Name 06/13/22 1049       Plan    Plan Pt was admitted on 06/08/22. SS spoke with Dr. Macias who [facility] Palliative care team has spoken with Pt and family. Pt is interested in [facility] for placement. SS faxed referral to [facility] intake [phone] for review. SS to follow.    16:40pm: SS spoke with [facility] per [facility] Pt can be accepted today. SS notified Dr. Macias and Pt's son, Scot of bed availability. SS to follow.                          Sarahi Rodriguez

## 2022-06-13 NOTE — NURSING NOTE
PT is being discharged to [facility]. Report called to Kevin at this time.

## 2022-06-13 NOTE — DISCHARGE PLACEMENT REQUEST
"Kb Cartagena (77 y.o. Male)             Date of Birth   1945    Social Security Number       Address   [address] [place] [id number]    Home Phone   [phone]    MRN   [id number]       [facility]   [facility]    Marital Status                               Admission Date   6/8/22    Admission Type   Emergency    Admitting Provider   Idris Apodaca DO    Attending Provider   Randy Macias MD    Department, Room/Bed   [address], 3349/2S       Discharge Date       Discharge Disposition   Hospice/Medical Facility (DC - External)    Discharge Destination                               Attending Provider: Randy Macias MD    Allergies: Acetaminophen-codeine, Adhesive Tape, Indocin [Indomethacin]    Isolation: None   Infection: None   Code Status: No CPR   Advance Care Planning Activity    Ht: 170.2 cm (67\")   Wt: 76.9 kg (169 lb 8 oz)    Admission Cmt: None   Principal Problem: Atrial fibrillation with rapid ventricular response (HCC) [I48.91]                 Active Insurance as of 6/8/2022     Primary Coverage     Payor Plan Insurance Group Employer/Plan Group    ANTHEM MEDICARE REPLACEMENT ANTHEM MEDICARE ADVANTAGE [id number]     Payor Plan Address Payor Plan Phone Number Payor Plan Fax Number Effective Dates    [address] [phone]  1/1/2020 - None Entered    [facility] [id number]       Subscriber Name Subscriber Birth Date Member ID       KB CARTAGENA 1945 [id number]           Secondary Coverage     Payor Plan Insurance Group Employer/Plan Group    [place]      Payor Plan Address Payor Plan Phone Number Payor Plan Fax Number Effective Dates    [address] [phone]  5/4/2017 - None Entered    [facility] [id number]       Subscriber Name Subscriber Birth Date Member ID       KB CARTAGENA 1945 [id number]                 Emergency Contacts      (Rel.) Home Phone Work Phone Mobile Phone    Suzie Yu " (Sister) [phone] -- --    MAXWELL MARSHALL (Son) [phone] -- --               Discharge Summary      Randy Macias MD at 22 1616              [facility] DISCHARGE SUMMARY    Patient Identification:  Name:  Kb Marshall  Age:  77 y.o.  Sex:  male  :  1945  MRN:  [id number]  Visit Number:  [id number]    Date of Admission: 2022  Date of Discharge:  2022    PCP: Scar Mejia MD    DISCHARGE DIAGNOSIS  Severe sepsis (present on admission)  Dense MOMO pneumonia   MSSA bacteremia  Acute exacerbation of COPD   Acute on chronic combined systolic and diastolic CHF   Acute on chronic hypoxic respiratory failure  Afib with RVR  Essential HTN with hypotension upon admission   Hypokalemia  Hypomagnesemia  Hypophosphatemia   Interstitial lung disease  Severe pulmonary HTN   DM II, non-insulin dependent with hyperglycemia   Hx of RUL lung CA s/p lobectomy   Depression with reports of SI    CONSULTS   Psychiatry   Infectious disease  Palliative care   Pulmonology     PROCEDURES PERFORMED  Endotracheal intubation   Central line placement      HOSPITAL COURSE  Patient is a 77 y.o. male presented on  to [facility] complaining of shortness of breath.  Please see the admitting history and physical for further details.      Mr. Marshall is our 76 yo with severe COPD, CHF, chronic resp failure, on hospice at home for pain management, full code with all aggressive measures upon admission, who was admitted with severe sepsis 2/2 dense MOMO pneumonia. Patient also evaluated to have COPD exacerbation acute on chronic CHF, Afib with RVR and acute on chronic hypoxic resp failure requiring bubble HFNC.  Quickly converted to NSR upon admission and weaned off Cardizem gtt. Involved palliative early given hospice status. Blood cultures began revealing MSSA. ID, pulm, cards, psych, and palliative consulted. He initially stabilized on bubble HFNC with some improvement in  his symptoms. However, he became agitated, pulling off his O2 with subsequent hypoxia and intubated overnight/morning hours on 6/11. He self-extubated later in the afternoon on 6/11. Patient was left on precedex. Goals of care conversation took place on 6/12 between hospice nurse, Dr. Apodaca. Family have reasonably decided to make patient comfort care. I again discussed this today and family agreed that was their goal. PRN morphine and ativan appear to be keeping patient comfortable. He was agitated on my evaluation improved with PRN ativan dose. Palliative care discussed options with family today and they are electing to transfer patient to inpatient hospice unit in [place], [place].  Hospice unit able to do IV medications for comfort per SW. Current regimen appears to be keeping patient comfortable. Hospice providers can consider switching to PO pending clinical course.     VITAL SIGNS:  Temp:  [98.2 °F (36.8 °C)-99.2 °F (37.3 °C)] 99.2 °F (37.3 °C)  Heart Rate:  [] 105  Resp:  [18-22] 22  BP: (129-152)/(67-87) 152/85  SpO2:  [90 %-99 %] 99 %  on  Flow (L/min):  [8-10] 9;   Device (Oxygen Therapy): bubble;high-flow nasal cannula    Body mass index is 26.55 kg/m².  Wt Readings from Last 3 Encounters:   06/12/22 76.9 kg (169 lb 8 oz)   02/23/22 73.9 kg (163 lb)   02/09/22 78.5 kg (173 lb)       PHYSICAL EXAM:  Constitutional:  Chronically ill appearing. Agitated at times. Multiple family members present.       HENT:  Head:  Normocephalic and atraumatic.  Mouth:  Moist mucous membranes.    Eyes:  Conjunctivae and EOM are normal.  Pupils are equal, round, and reactive to light.  No scleral icterus.    Cardiovascular:  Normal rate, regular rhythm and normal heart sounds with no murmur.  Pulmonary/Chest:  No dyspnea    DISCHARGE DISPOSITION   Stable    DISCHARGE MEDICATIONS:     Discharge Medications      ASK your doctor about these medications      Instructions Start Date   albuterol sulfate  (90 Base) MCG/ACT  inhaler  Commonly known as: PROVENTIL HFA;VENTOLIN HFA;PROAIR HFA   2 puffs, Inhalation, Every 4 Hours PRN      aluminum-magnesium hydroxide-simethicone 400-400-40 MG/5ML suspension  Commonly known as: MAALOX MAX   20 mL, Oral, Every 6 Hours PRN      apixaban 5 MG tablet tablet  Commonly known as: ELIQUIS   5 mg, Oral, 2 Times Daily      atorvastatin 10 MG tablet  Commonly known as: LIPITOR   10 mg, Oral, Daily      bisacodyl 5 MG EC tablet  Commonly known as: DULCOLAX   5 mg, Oral, Daily PRN      bumetanide 0.5 MG tablet  Commonly known as: BUMEX   0.5 mg, Oral, Daily      carvedilol 12.5 MG tablet  Commonly known as: COREG   12.5 mg, Oral, 2 Times Daily With Meals      clopidogrel 75 MG tablet  Commonly known as: PLAVIX   75 mg, Oral, Daily      dexamethasone 4 MG tablet  Commonly known as: DECADRON   4 mg, Oral, Daily With Breakfast      diphenhydrAMINE 25 mg capsule  Commonly known as: BENADRYL   25 mg, Oral, Every 8 Hours      empagliflozin 25 MG tablet tablet  Commonly known as: JARDIANCE   25 mg, Oral, Daily      fluticasone 50 MCG/ACT nasal spray  Commonly known as: FLONASE   1 spray, Nasal, Every 12 Hours      gabapentin 600 MG tablet  Commonly known as: NEURONTIN  Ask about: Which instructions should I use?   600 mg, Oral, Every 6 Hours      glimepiride 4 MG tablet  Commonly known as: AMARYL   4 mg, Oral, Every Morning Before Breakfast      hydrALAZINE 50 MG tablet  Commonly known as: APRESOLINE   50 mg, Oral, 3 Times Daily      hydrOXYzine pamoate 25 MG capsule  Commonly known as: VISTARIL   25 mg, Oral, Every 6 Hours PRN      ibuprofen 600 MG tablet  Commonly known as: ADVIL,MOTRIN   600 mg, Oral, Every 6 Hours PRN      ipratropium-albuterol 0.5-2.5 mg/3 ml nebulizer  Commonly known as: DUO-NEB   3 mL, Nebulization, Every 6 Hours PRN      linaclotide 145 MCG capsule capsule  Commonly known as: LINZESS  Ask about: Which instructions should I use?   145 mcg, Oral, Every Morning Before Breakfast       lisinopril 20 MG tablet  Commonly known as: PRINIVIL,ZESTRIL  Ask about: Which instructions should I use?   20 mg, Oral, 2 Times Daily      LORazepam 0.5 MG tablet  Commonly known as: ATIVAN   0.5 mg, Oral, Every 6 Hours PRN      metFORMIN 500 MG tablet  Commonly known as: GLUCOPHAGE   1,000 mg, Oral, 2 Times Daily With Meals      nabumetone 500 MG tablet  Commonly known as: RELAFEN   500 mg, Oral, 2 Times Daily PRN, Only for gout flare up      nitroglycerin 0.4 MG SL tablet  Commonly known as: NITROSTAT   0.4 mg, Sublingual, Every 5 Minutes PRN, Take no more than 3 doses in 15 minutes.      oxyCODONE-acetaminophen 7.5-325 MG per tablet  Commonly known as: PERCOCET   1 tablet, Oral, Every 6 Hours PRN      pantoprazole 40 MG EC tablet  Commonly known as: Protonix   40 mg, Oral, Daily      polyethylene glycol 17 g packet  Commonly known as: MIRALAX   17 g, Oral, Daily      vitamin D 1.25 MG (55462 UT) capsule capsule  Commonly known as: ERGOCALCIFEROL   50,000 Units, Oral, Weekly                Contact information for after-discharge care     Home Medical Care     [facility] .    Service: Home Hospice  Contact information:  [address] [place] [address]  [phone]                              TEST  RESULTS PENDING AT DISCHARGE  Pending Labs     Order Current Status    Blood Culture - Blood, Arm, Left Preliminary result    Blood Culture - Blood, Arm, Left Preliminary result    Blood Culture - Blood, Hand, Right Preliminary result           CODE STATUS  Code Status and Medical Interventions:   Ordered at: 06/12/22 1154     Level Of Support Discussed With:    Next of Kin (If No Surrogate)     Code Status (Patient has no pulse and is not breathing):    No CPR (Do Not Attempt to Resuscitate)     Medical Interventions (Patient has pulse or is breathing):    Comfort Measures       The 10-year ASCVD risk score (Aurelia KINA Jr., et al., 2013) is: 59.3%    Values used to calculate the  score:      Age: 77 years      Sex: Male      Is Non- : No      Diabetic: Yes      Tobacco smoker: No      Systolic Blood Pressure: 152 mmHg      Is BP treated: Yes      HDL Cholesterol: 48 mg/dL      Total Cholesterol: 167 mg/dL     Mckayla Macias MD  [facility]  06/13/22  16:17 EDT    Please note that this discharge summary required more than 30 minutes to complete.      Electronically signed by Mckayla Macias MD at 06/13/22 1642       Discharge Order (From admission, onward)     Start     Ordered    06/13/22 1636  Discharge patient  Once        Expected Discharge Date: 06/13/22    Expected Discharge Time: Afternoon    Discharge Disposition: Hospice/Medical Facility (DC - External)    Physician of Record for Attribution - Please select from Treatment Team: MCKAYLA MACIAS [[id number]]    Review needed by CMO to determine Physician of Record: No       Question Answer Comment   Physician of Record for Attribution - Please select from Treatment Team MCKAYLA MACIAS    Review needed by CMO to determine Physician of Record No        06/13/22 1640

## 2022-06-13 NOTE — DISCHARGE SUMMARY
[facility] DISCHARGE SUMMARY    Patient Identification:  Name:  Kb Marshall  Age:  77 y.o.  Sex:  male  :  1945  MRN:  [id number]  Visit Number:  [id number]    Date of Admission: 2022  Date of Discharge:  2022    PCP: Scar Mejia MD    DISCHARGE DIAGNOSIS  Severe sepsis (present on admission)  Dense MOMO pneumonia   MSSA bacteremia  Acute exacerbation of COPD   Acute on chronic combined systolic and diastolic CHF   Acute on chronic hypoxic respiratory failure  Afib with RVR  Essential HTN with hypotension upon admission   Hypokalemia  Hypomagnesemia  Hypophosphatemia   Interstitial lung disease  Severe pulmonary HTN   DM II, non-insulin dependent with hyperglycemia   Hx of RUL lung CA s/p lobectomy   Depression with reports of SI    CONSULTS   Psychiatry   Infectious disease  Palliative care   Pulmonology     PROCEDURES PERFORMED  Endotracheal intubation   Central line placement      HOSPITAL COURSE  Patient is a 77 y.o. male presented on  to [facility] complaining of shortness of breath.  Please see the admitting history and physical for further details.      Mr. Marshall is our 78 yo with severe COPD, CHF, chronic resp failure, on hospice at home for pain management, full code with all aggressive measures upon admission, who was admitted with severe sepsis 2/2 dense MOMO pneumonia. Patient also evaluated to have COPD exacerbation acute on chronic CHF, Afib with RVR and acute on chronic hypoxic resp failure requiring bubble HFNC.  Quickly converted to NSR upon admission and weaned off Cardizem gtt. Involved palliative early given hospice status. Blood cultures began revealing MSSA. ID, pulm, cards, psych, and palliative consulted. He initially stabilized on bubble HFNC with some improvement in his symptoms. However, he became agitated, pulling off his O2 with subsequent hypoxia and intubated overnight/morning hours on . He self-extubated  later in the afternoon on 6/11. Patient was left on precedex. Goals of care conversation took place on 6/12 between hospice nurse, Dr. Apodaca. Family have reasonably decided to make patient comfort care. I again discussed this today and family agreed that was their goal. PRN morphine and ativan appear to be keeping patient comfortable. He was agitated on my evaluation improved with PRN ativan dose. Palliative care discussed options with family today and they are electing to transfer patient to inpatient hospice unit in [place], [place].  Hospice unit able to do IV medications for comfort per . Current regimen appears to be keeping patient comfortable. Hospice providers can consider switching to PO pending clinical course.     VITAL SIGNS:  Temp:  [98.2 °F (36.8 °C)-99.2 °F (37.3 °C)] 99.2 °F (37.3 °C)  Heart Rate:  [] 105  Resp:  [18-22] 22  BP: (129-152)/(67-87) 152/85  SpO2:  [90 %-99 %] 99 %  on  Flow (L/min):  [8-10] 9;   Device (Oxygen Therapy): bubble;high-flow nasal cannula    Body mass index is 26.55 kg/m².  Wt Readings from Last 3 Encounters:   06/12/22 76.9 kg (169 lb 8 oz)   02/23/22 73.9 kg (163 lb)   02/09/22 78.5 kg (173 lb)       PHYSICAL EXAM:  Constitutional:  Chronically ill appearing. Agitated at times. Multiple family members present.       HENT:  Head:  Normocephalic and atraumatic.  Mouth:  Moist mucous membranes.    Eyes:  Conjunctivae and EOM are normal.  Pupils are equal, round, and reactive to light.  No scleral icterus.    Cardiovascular:  Normal rate, regular rhythm and normal heart sounds with no murmur.  Pulmonary/Chest:  No dyspnea    DISCHARGE DISPOSITION   Stable    DISCHARGE MEDICATIONS:     Discharge Medications      ASK your doctor about these medications      Instructions Start Date   albuterol sulfate  (90 Base) MCG/ACT inhaler  Commonly known as: PROVENTIL HFA;VENTOLIN HFA;PROAIR HFA   2 puffs, Inhalation, Every 4 Hours PRN      aluminum-magnesium  hydroxide-simethicone 400-400-40 MG/5ML suspension  Commonly known as: MAALOX MAX   20 mL, Oral, Every 6 Hours PRN      apixaban 5 MG tablet tablet  Commonly known as: ELIQUIS   5 mg, Oral, 2 Times Daily      atorvastatin 10 MG tablet  Commonly known as: LIPITOR   10 mg, Oral, Daily      bisacodyl 5 MG EC tablet  Commonly known as: DULCOLAX   5 mg, Oral, Daily PRN      bumetanide 0.5 MG tablet  Commonly known as: BUMEX   0.5 mg, Oral, Daily      carvedilol 12.5 MG tablet  Commonly known as: COREG   12.5 mg, Oral, 2 Times Daily With Meals      clopidogrel 75 MG tablet  Commonly known as: PLAVIX   75 mg, Oral, Daily      dexamethasone 4 MG tablet  Commonly known as: DECADRON   4 mg, Oral, Daily With Breakfast      diphenhydrAMINE 25 mg capsule  Commonly known as: BENADRYL   25 mg, Oral, Every 8 Hours      empagliflozin 25 MG tablet tablet  Commonly known as: JARDIANCE   25 mg, Oral, Daily      fluticasone 50 MCG/ACT nasal spray  Commonly known as: FLONASE   1 spray, Nasal, Every 12 Hours      gabapentin 600 MG tablet  Commonly known as: NEURONTIN  Ask about: Which instructions should I use?   600 mg, Oral, Every 6 Hours      glimepiride 4 MG tablet  Commonly known as: AMARYL   4 mg, Oral, Every Morning Before Breakfast      hydrALAZINE 50 MG tablet  Commonly known as: APRESOLINE   50 mg, Oral, 3 Times Daily      hydrOXYzine pamoate 25 MG capsule  Commonly known as: VISTARIL   25 mg, Oral, Every 6 Hours PRN      ibuprofen 600 MG tablet  Commonly known as: ADVIL,MOTRIN   600 mg, Oral, Every 6 Hours PRN      ipratropium-albuterol 0.5-2.5 mg/3 ml nebulizer  Commonly known as: DUO-NEB   3 mL, Nebulization, Every 6 Hours PRN      linaclotide 145 MCG capsule capsule  Commonly known as: LINZESS  Ask about: Which instructions should I use?   145 mcg, Oral, Every Morning Before Breakfast      lisinopril 20 MG tablet  Commonly known as: PRINIVIL,ZESTRIL  Ask about: Which instructions should I use?   20 mg, Oral, 2 Times Daily       LORazepam 0.5 MG tablet  Commonly known as: ATIVAN   0.5 mg, Oral, Every 6 Hours PRN      metFORMIN 500 MG tablet  Commonly known as: GLUCOPHAGE   1,000 mg, Oral, 2 Times Daily With Meals      nabumetone 500 MG tablet  Commonly known as: RELAFEN   500 mg, Oral, 2 Times Daily PRN, Only for gout flare up      nitroglycerin 0.4 MG SL tablet  Commonly known as: NITROSTAT   0.4 mg, Sublingual, Every 5 Minutes PRN, Take no more than 3 doses in 15 minutes.      oxyCODONE-acetaminophen 7.5-325 MG per tablet  Commonly known as: PERCOCET   1 tablet, Oral, Every 6 Hours PRN      pantoprazole 40 MG EC tablet  Commonly known as: Protonix   40 mg, Oral, Daily      polyethylene glycol 17 g packet  Commonly known as: MIRALAX   17 g, Oral, Daily      vitamin D 1.25 MG (80034 UT) capsule capsule  Commonly known as: ERGOCALCIFEROL   50,000 Units, Oral, Weekly                Contact information for after-discharge care     Home Medical Care     [facility] .    Service: Home Hospice  Contact information:  [address] [place] [address]  [phone]                              TEST  RESULTS PENDING AT DISCHARGE  Pending Labs     Order Current Status    Blood Culture - Blood, Arm, Left Preliminary result    Blood Culture - Blood, Arm, Left Preliminary result    Blood Culture - Blood, Hand, Right Preliminary result           CODE STATUS  Code Status and Medical Interventions:   Ordered at: 06/12/22 5674     Level Of Support Discussed With:    Next of Kin (If No Surrogate)     Code Status (Patient has no pulse and is not breathing):    No CPR (Do Not Attempt to Resuscitate)     Medical Interventions (Patient has pulse or is breathing):    Comfort Measures       The 10-year ASCVD risk score (Martin KINA Jr., et al., 2013) is: 59.3%    Values used to calculate the score:      Age: 77 years      Sex: Male      Is Non- : No      Diabetic: Yes      Tobacco smoker: No      Systolic Blood  Pressure: 152 mmHg      Is BP treated: Yes      HDL Cholesterol: 48 mg/dL      Total Cholesterol: 167 mg/dL     Randy Macias MD  [facility]  06/13/22  16:17 EDT    Please note that this discharge summary required more than 30 minutes to complete.

## 2022-06-14 NOTE — NURSING NOTE
2115- Report given to [facility] and pt discharged with them to in hospice in [place]. Pt medicated prior to transfer and resting peacefully. 22G IV in right AC intact. Notified pt's sister and son via phone. Belongings sent with pt.

## 2022-06-14 NOTE — PLAN OF CARE
Problem: Fall Injury Risk  Goal: Absence of Fall and Fall-Related Injury  Outcome: Adequate for Care Transition     Problem: Adult Inpatient Plan of Care  Goal: Plan of Care Review  Outcome: Adequate for Care Transition  Goal: Patient-Specific Goal (Individualized)  Outcome: Adequate for Care Transition  Goal: Absence of Hospital-Acquired Illness or Injury  Outcome: Adequate for Care Transition  Goal: Optimal Comfort and Wellbeing  Outcome: Adequate for Care Transition  Goal: Readiness for Transition of Care  Outcome: Adequate for Care Transition     Problem: COPD (Chronic Obstructive Pulmonary Disease) Comorbidity  Goal: Maintenance of COPD Symptom Control  Outcome: Adequate for Care Transition     Problem: Hypertension Comorbidity  Goal: Blood Pressure in Desired Range  Outcome: Adequate for Care Transition   Goal Outcome Evaluation:

## 2022-06-14 NOTE — NURSING NOTE
Patient left Room 332 via stretcher with [facility] to Hospice in [place], [place].

## 2022-07-07 NOTE — PLAN OF CARE
Goal Outcome Evaluation:           Progress: no change  Outcome Evaluation: VSS. 12L bubble. Pt. became more confused and aggressive this shift, hitting staff. MD made aware, see orders. Sitter remains at bedside. Call light within reach.   Pt initially seen in consult on 6/16 and denied due to unsafe community discharge plan.     Awaiting verification of discharge plan/support.

## 2025-01-10 NOTE — OUTREACH NOTE
Patient Outreach Note    RN-ACM outreach to patient.  Patient had ED visit at [facility] 09/20/20.  Call answered by patient's niece.  Message left for patient.  Niece reported patient has a PCP appointment tomorrow morning.      Vicky Oliveros RN  Ambulatory     9/21/2020, 17:06 EDT      
- - -

## (undated) DEVICE — THE BITE BLOCK MAXI, LATEX FREE STRAP IS USED TO PROTECT THE ENDOSCOPE INSERTION TUBE FROM BEING BITTEN BY THE PATIENT.

## (undated) DEVICE — GOWN,REINF,POLY,ECL,PP SLV,XL: Brand: MEDLINE

## (undated) DEVICE — Device

## (undated) DEVICE — SYR LUERLOK 30CC

## (undated) DEVICE — Device: Brand: ENDOGATOR

## (undated) DEVICE — CONN Y IRR DISP 1P/U

## (undated) DEVICE — SUCTION CANISTER, 1500CC, RIGID: Brand: DEROYAL

## (undated) DEVICE — ENDOGATOR TUBING FOR ENDOGATOR EGP-100 IRRIGATION PUMP,OLYMPUS OFP PUMP, OLYMPUS AFU-100 PUMP AND ERBE EIP2 PUMP: Brand: ENDOGATOR

## (undated) DEVICE — SNAR POLYP CAPTIFLX MICRO OVL 13MM 240CM

## (undated) DEVICE — THE DISPOSABLE OVERTUBE IS USED IN CONJUNCTION WITH A FLEXIBLE ENDOSCOPE FOR FOREIGN BODY OR TISSUE RETRIEVAL, AND/OR FOR ENDOSCOPIC PROCEDURES REQUIRING MULTIPLE ENDOSCOPE INTUBATIONS.: Brand: GUARDUS

## (undated) DEVICE — THE TALON GRASPING DEVICE IS USED TO RETRIEVE OF FOREIGN BODIES, TISSUE SPECIMENS, STONES OR CALCULI IN ENDOSCOPIC PROCEDURES OF THE GASTROINTESTINAL TRACT.: Brand: TALON